# Patient Record
Sex: FEMALE | Race: WHITE | Employment: OTHER | URBAN - METROPOLITAN AREA
[De-identification: names, ages, dates, MRNs, and addresses within clinical notes are randomized per-mention and may not be internally consistent; named-entity substitution may affect disease eponyms.]

---

## 2017-04-11 ENCOUNTER — RECORDS - HEALTHEAST (OUTPATIENT)
Dept: LAB | Facility: CLINIC | Age: 82
End: 2017-04-11

## 2017-04-11 LAB
CHOLEST SERPL-MCNC: 222 MG/DL
FASTING STATUS PATIENT QL REPORTED: ABNORMAL
HDLC SERPL-MCNC: 65 MG/DL
LDLC SERPL CALC-MCNC: 138 MG/DL
TRIGL SERPL-MCNC: 96 MG/DL

## 2018-04-10 ENCOUNTER — RECORDS - HEALTHEAST (OUTPATIENT)
Dept: LAB | Facility: CLINIC | Age: 83
End: 2018-04-10

## 2018-04-10 LAB
ALBUMIN SERPL-MCNC: 3.6 G/DL (ref 3.5–5)
ALP SERPL-CCNC: 46 U/L (ref 45–120)
ALT SERPL W P-5'-P-CCNC: 10 U/L (ref 0–45)
ANION GAP SERPL CALCULATED.3IONS-SCNC: 10 MMOL/L (ref 5–18)
AST SERPL W P-5'-P-CCNC: 18 U/L (ref 0–40)
BILIRUB SERPL-MCNC: 0.5 MG/DL (ref 0–1)
BUN SERPL-MCNC: 31 MG/DL (ref 8–28)
CALCIUM SERPL-MCNC: 9.7 MG/DL (ref 8.5–10.5)
CHLORIDE BLD-SCNC: 104 MMOL/L (ref 98–107)
CO2 SERPL-SCNC: 28 MMOL/L (ref 22–31)
CREAT SERPL-MCNC: 1.17 MG/DL (ref 0.6–1.1)
GFR SERPL CREATININE-BSD FRML MDRD: 43 ML/MIN/1.73M2
GLUCOSE BLD-MCNC: 97 MG/DL (ref 70–125)
POTASSIUM BLD-SCNC: 3.8 MMOL/L (ref 3.5–5)
PROT SERPL-MCNC: 7 G/DL (ref 6–8)
SODIUM SERPL-SCNC: 142 MMOL/L (ref 136–145)

## 2019-07-26 ENCOUNTER — RECORDS - HEALTHEAST (OUTPATIENT)
Dept: LAB | Facility: CLINIC | Age: 84
End: 2019-07-26

## 2019-07-26 LAB
ANION GAP SERPL CALCULATED.3IONS-SCNC: 9 MMOL/L (ref 5–18)
BUN SERPL-MCNC: 24 MG/DL (ref 8–28)
CALCIUM SERPL-MCNC: 9.9 MG/DL (ref 8.5–10.5)
CHLORIDE BLD-SCNC: 103 MMOL/L (ref 98–107)
CO2 SERPL-SCNC: 28 MMOL/L (ref 22–31)
CREAT SERPL-MCNC: 1.2 MG/DL (ref 0.6–1.1)
GFR SERPL CREATININE-BSD FRML MDRD: 42 ML/MIN/1.73M2
GLUCOSE BLD-MCNC: 94 MG/DL (ref 70–125)
IRON SATN MFR SERPL: 13 % (ref 20–50)
IRON SERPL-MCNC: 59 UG/DL (ref 42–175)
MAGNESIUM SERPL-MCNC: 1.9 MG/DL (ref 1.8–2.6)
POTASSIUM BLD-SCNC: 4 MMOL/L (ref 3.5–5)
SODIUM SERPL-SCNC: 140 MMOL/L (ref 136–145)
TIBC SERPL-MCNC: 438 UG/DL (ref 313–563)
TRANSFERRIN SERPL-MCNC: 350 MG/DL (ref 212–360)
VIT B12 SERPL-MCNC: 165 PG/ML (ref 213–816)

## 2019-11-01 ENCOUNTER — RECORDS - HEALTHEAST (OUTPATIENT)
Dept: LAB | Facility: CLINIC | Age: 84
End: 2019-11-01

## 2019-11-01 LAB
IRON SATN MFR SERPL: 47 % (ref 20–50)
IRON SERPL-MCNC: 172 UG/DL (ref 42–175)
TIBC SERPL-MCNC: 364 UG/DL (ref 313–563)
TRANSFERRIN SERPL-MCNC: 291 MG/DL (ref 212–360)

## 2020-09-10 ENCOUNTER — RECORDS - HEALTHEAST (OUTPATIENT)
Dept: LAB | Facility: CLINIC | Age: 85
End: 2020-09-10

## 2020-09-10 LAB
ANION GAP SERPL CALCULATED.3IONS-SCNC: 16 MMOL/L (ref 5–18)
BUN SERPL-MCNC: 46 MG/DL (ref 8–28)
CALCIUM SERPL-MCNC: 9.3 MG/DL (ref 8.5–10.5)
CHLORIDE BLD-SCNC: 103 MMOL/L (ref 98–107)
CO2 SERPL-SCNC: 22 MMOL/L (ref 22–31)
CREAT SERPL-MCNC: 1.71 MG/DL (ref 0.6–1.1)
GFR SERPL CREATININE-BSD FRML MDRD: 28 ML/MIN/1.73M2
GLUCOSE BLD-MCNC: 101 MG/DL (ref 70–125)
MAGNESIUM SERPL-MCNC: 2.2 MG/DL (ref 1.8–2.6)
POTASSIUM BLD-SCNC: 4.2 MMOL/L (ref 3.5–5)
SODIUM SERPL-SCNC: 141 MMOL/L (ref 136–145)
VIT B12 SERPL-MCNC: 417 PG/ML (ref 213–816)

## 2020-09-30 ENCOUNTER — RECORDS - HEALTHEAST (OUTPATIENT)
Dept: LAB | Facility: CLINIC | Age: 85
End: 2020-09-30

## 2020-09-30 LAB
ANION GAP SERPL CALCULATED.3IONS-SCNC: 13 MMOL/L (ref 5–18)
BUN SERPL-MCNC: 40 MG/DL (ref 8–28)
CALCIUM SERPL-MCNC: 9.6 MG/DL (ref 8.5–10.5)
CHLORIDE BLD-SCNC: 105 MMOL/L (ref 98–107)
CO2 SERPL-SCNC: 27 MMOL/L (ref 22–31)
CREAT SERPL-MCNC: 1.43 MG/DL (ref 0.6–1.1)
GFR SERPL CREATININE-BSD FRML MDRD: 34 ML/MIN/1.73M2
GLUCOSE BLD-MCNC: 98 MG/DL (ref 70–125)
POTASSIUM BLD-SCNC: 4.4 MMOL/L (ref 3.5–5)
SODIUM SERPL-SCNC: 145 MMOL/L (ref 136–145)

## 2021-05-12 ENCOUNTER — RECORDS - HEALTHEAST (OUTPATIENT)
Dept: LAB | Facility: CLINIC | Age: 86
End: 2021-05-12

## 2021-05-12 LAB
ANION GAP SERPL CALCULATED.3IONS-SCNC: 14 MMOL/L (ref 5–18)
BUN SERPL-MCNC: 33 MG/DL (ref 8–28)
CALCIUM SERPL-MCNC: 9.6 MG/DL (ref 8.5–10.5)
CHLORIDE BLD-SCNC: 104 MMOL/L (ref 98–107)
CO2 SERPL-SCNC: 26 MMOL/L (ref 22–31)
CREAT SERPL-MCNC: 1.34 MG/DL (ref 0.6–1.1)
GFR SERPL CREATININE-BSD FRML MDRD: 37 ML/MIN/1.73M2
GLUCOSE BLD-MCNC: 101 MG/DL (ref 70–125)
POTASSIUM BLD-SCNC: 3.6 MMOL/L (ref 3.5–5)
SODIUM SERPL-SCNC: 144 MMOL/L (ref 136–145)

## 2021-09-06 ENCOUNTER — APPOINTMENT (OUTPATIENT)
Dept: RADIOLOGY | Facility: HOSPITAL | Age: 86
DRG: 854 | End: 2021-09-06
Attending: FAMILY MEDICINE
Payer: MEDICARE

## 2021-09-06 ENCOUNTER — APPOINTMENT (OUTPATIENT)
Dept: CT IMAGING | Facility: HOSPITAL | Age: 86
DRG: 854 | End: 2021-09-06
Attending: EMERGENCY MEDICINE
Payer: MEDICARE

## 2021-09-06 ENCOUNTER — APPOINTMENT (OUTPATIENT)
Dept: RADIOLOGY | Facility: HOSPITAL | Age: 86
DRG: 854 | End: 2021-09-06
Attending: EMERGENCY MEDICINE
Payer: MEDICARE

## 2021-09-06 ENCOUNTER — HOSPITAL ENCOUNTER (INPATIENT)
Facility: HOSPITAL | Age: 86
LOS: 10 days | Discharge: NURSING, REHAB OR LONG-TERM CARE | DRG: 854 | End: 2021-09-16
Attending: FAMILY MEDICINE | Admitting: FAMILY MEDICINE
Payer: MEDICARE

## 2021-09-06 DIAGNOSIS — S72.401A CLOSED FRACTURE OF DISTAL END OF RIGHT FEMUR, UNSPECIFIED FRACTURE MORPHOLOGY, INITIAL ENCOUNTER (H): Primary | ICD-10-CM

## 2021-09-06 DIAGNOSIS — I48.91 RAPID ATRIAL FIBRILLATION (H): ICD-10-CM

## 2021-09-06 LAB
ALBUMIN UR-MCNC: 30 MG/DL
ANION GAP SERPL CALCULATED.3IONS-SCNC: 14 MMOL/L (ref 5–18)
APPEARANCE UR: ABNORMAL
ATRIAL RATE - MUSE: 87 BPM
BACTERIA #/AREA URNS HPF: ABNORMAL /HPF
BILIRUB UR QL STRIP: NEGATIVE
BUN SERPL-MCNC: 34 MG/DL (ref 8–28)
CALCIUM SERPL-MCNC: 9.3 MG/DL (ref 8.5–10.5)
CHLORIDE BLD-SCNC: 105 MMOL/L (ref 98–107)
CK SERPL-CCNC: 2528 U/L (ref 30–190)
CO2 SERPL-SCNC: 25 MMOL/L (ref 22–31)
COLOR UR AUTO: YELLOW
CREAT SERPL-MCNC: 1.29 MG/DL (ref 0.6–1.1)
DIASTOLIC BLOOD PRESSURE - MUSE: NORMAL MMHG
ERYTHROCYTE [DISTWIDTH] IN BLOOD BY AUTOMATED COUNT: 12.8 % (ref 10–15)
GFR SERPL CREATININE-BSD FRML MDRD: 35 ML/MIN/1.73M2
GLUCOSE BLD-MCNC: 124 MG/DL (ref 70–125)
GLUCOSE UR STRIP-MCNC: NEGATIVE MG/DL
HCT VFR BLD AUTO: 31.3 % (ref 35–47)
HGB BLD-MCNC: 10.4 G/DL (ref 11.7–15.7)
HGB UR QL STRIP: ABNORMAL
HYALINE CASTS: 3 /LPF
INTERPRETATION ECG - MUSE: NORMAL
KETONES UR STRIP-MCNC: 10 MG/DL
LACTATE SERPL-SCNC: 1.1 MMOL/L (ref 0.7–2)
LACTATE SERPL-SCNC: 3 MMOL/L (ref 0.7–2)
LEUKOCYTE ESTERASE UR QL STRIP: ABNORMAL
MAGNESIUM SERPL-MCNC: 1.6 MG/DL (ref 1.8–2.6)
MCH RBC QN AUTO: 32.5 PG (ref 26.5–33)
MCHC RBC AUTO-ENTMCNC: 33.2 G/DL (ref 31.5–36.5)
MCV RBC AUTO: 98 FL (ref 78–100)
MUCOUS THREADS #/AREA URNS LPF: PRESENT /LPF
NITRATE UR QL: POSITIVE
P AXIS - MUSE: NORMAL DEGREES
PH UR STRIP: 5 [PH] (ref 5–7)
PLATELET # BLD AUTO: 148 10E3/UL (ref 150–450)
POTASSIUM BLD-SCNC: 3.3 MMOL/L (ref 3.5–5)
POTASSIUM BLD-SCNC: 3.4 MMOL/L (ref 3.5–5)
PR INTERVAL - MUSE: NORMAL MS
QRS DURATION - MUSE: 78 MS
QT - MUSE: 318 MS
QTC - MUSE: 469 MS
R AXIS - MUSE: -31 DEGREES
RBC # BLD AUTO: 3.2 10E6/UL (ref 3.8–5.2)
RBC URINE: 10 /HPF
SARS-COV-2 RNA RESP QL NAA+PROBE: NEGATIVE
SODIUM SERPL-SCNC: 144 MMOL/L (ref 136–145)
SP GR UR STRIP: 1.02 (ref 1–1.03)
SYSTOLIC BLOOD PRESSURE - MUSE: NORMAL MMHG
T AXIS - MUSE: 187 DEGREES
TROPONIN I SERPL-MCNC: 0.06 NG/ML (ref 0–0.29)
UROBILINOGEN UR STRIP-MCNC: <2 MG/DL
VENTRICULAR RATE- MUSE: 131 BPM
WBC # BLD AUTO: 16.4 10E3/UL (ref 4–11)
WBC URINE: 37 /HPF

## 2021-09-06 PROCEDURE — 82550 ASSAY OF CK (CPK): CPT | Performed by: FAMILY MEDICINE

## 2021-09-06 PROCEDURE — 96376 TX/PRO/DX INJ SAME DRUG ADON: CPT

## 2021-09-06 PROCEDURE — 81001 URINALYSIS AUTO W/SCOPE: CPT | Performed by: EMERGENCY MEDICINE

## 2021-09-06 PROCEDURE — 99223 1ST HOSP IP/OBS HIGH 75: CPT | Performed by: FAMILY MEDICINE

## 2021-09-06 PROCEDURE — 83735 ASSAY OF MAGNESIUM: CPT | Performed by: FAMILY MEDICINE

## 2021-09-06 PROCEDURE — 87086 URINE CULTURE/COLONY COUNT: CPT | Performed by: EMERGENCY MEDICINE

## 2021-09-06 PROCEDURE — 250N000013 HC RX MED GY IP 250 OP 250 PS 637: Performed by: EMERGENCY MEDICINE

## 2021-09-06 PROCEDURE — 87040 BLOOD CULTURE FOR BACTERIA: CPT | Performed by: FAMILY MEDICINE

## 2021-09-06 PROCEDURE — 70450 CT HEAD/BRAIN W/O DYE: CPT

## 2021-09-06 PROCEDURE — 72125 CT NECK SPINE W/O DYE: CPT

## 2021-09-06 PROCEDURE — 250N000009 HC RX 250: Performed by: FAMILY MEDICINE

## 2021-09-06 PROCEDURE — 85027 COMPLETE CBC AUTOMATED: CPT | Performed by: EMERGENCY MEDICINE

## 2021-09-06 PROCEDURE — 96374 THER/PROPH/DIAG INJ IV PUSH: CPT

## 2021-09-06 PROCEDURE — 36415 COLL VENOUS BLD VENIPUNCTURE: CPT | Performed by: EMERGENCY MEDICINE

## 2021-09-06 PROCEDURE — 73502 X-RAY EXAM HIP UNI 2-3 VIEWS: CPT

## 2021-09-06 PROCEDURE — 73560 X-RAY EXAM OF KNEE 1 OR 2: CPT | Mod: LT,XS

## 2021-09-06 PROCEDURE — 83605 ASSAY OF LACTIC ACID: CPT | Performed by: FAMILY MEDICINE

## 2021-09-06 PROCEDURE — 210N000001 HC R&B IMCU HEART CARE

## 2021-09-06 PROCEDURE — 82565 ASSAY OF CREATININE: CPT | Performed by: EMERGENCY MEDICINE

## 2021-09-06 PROCEDURE — 250N000011 HC RX IP 250 OP 636: Performed by: EMERGENCY MEDICINE

## 2021-09-06 PROCEDURE — 84484 ASSAY OF TROPONIN QUANT: CPT | Performed by: EMERGENCY MEDICINE

## 2021-09-06 PROCEDURE — 36415 COLL VENOUS BLD VENIPUNCTURE: CPT | Performed by: FAMILY MEDICINE

## 2021-09-06 PROCEDURE — 250N000013 HC RX MED GY IP 250 OP 250 PS 637: Performed by: FAMILY MEDICINE

## 2021-09-06 PROCEDURE — 250N000013 HC RX MED GY IP 250 OP 250 PS 637: Performed by: INTERNAL MEDICINE

## 2021-09-06 PROCEDURE — 96375 TX/PRO/DX INJ NEW DRUG ADDON: CPT

## 2021-09-06 PROCEDURE — C9803 HOPD COVID-19 SPEC COLLECT: HCPCS

## 2021-09-06 PROCEDURE — 73700 CT LOWER EXTREMITY W/O DYE: CPT | Mod: RT

## 2021-09-06 PROCEDURE — 73600 X-RAY EXAM OF ANKLE: CPT | Mod: RT

## 2021-09-06 PROCEDURE — 258N000003 HC RX IP 258 OP 636: Performed by: FAMILY MEDICINE

## 2021-09-06 PROCEDURE — 250N000011 HC RX IP 250 OP 636: Performed by: FAMILY MEDICINE

## 2021-09-06 PROCEDURE — 84132 ASSAY OF SERUM POTASSIUM: CPT | Performed by: FAMILY MEDICINE

## 2021-09-06 PROCEDURE — 73560 X-RAY EXAM OF KNEE 1 OR 2: CPT | Mod: RT

## 2021-09-06 PROCEDURE — 93005 ELECTROCARDIOGRAM TRACING: CPT | Performed by: EMERGENCY MEDICINE

## 2021-09-06 PROCEDURE — 250N000009 HC RX 250: Performed by: EMERGENCY MEDICINE

## 2021-09-06 PROCEDURE — 99285 EMERGENCY DEPT VISIT HI MDM: CPT | Mod: 25

## 2021-09-06 PROCEDURE — 87635 SARS-COV-2 COVID-19 AMP PRB: CPT | Performed by: EMERGENCY MEDICINE

## 2021-09-06 RX ORDER — LISINOPRIL AND HYDROCHLOROTHIAZIDE 20; 25 MG/1; MG/1
1 TABLET ORAL DAILY
Status: ON HOLD | COMMUNITY
Start: 2021-07-26 | End: 2021-09-16

## 2021-09-06 RX ORDER — DILTIAZEM HYDROCHLORIDE 5 MG/ML
10 INJECTION INTRAVENOUS ONCE
Status: COMPLETED | OUTPATIENT
Start: 2021-09-06 | End: 2021-09-06

## 2021-09-06 RX ORDER — DILTIAZEM HYDROCHLORIDE 30 MG/1
30 TABLET, FILM COATED ORAL ONCE
Status: COMPLETED | OUTPATIENT
Start: 2021-09-06 | End: 2021-09-06

## 2021-09-06 RX ORDER — NALOXONE HYDROCHLORIDE 0.4 MG/ML
0.2 INJECTION, SOLUTION INTRAMUSCULAR; INTRAVENOUS; SUBCUTANEOUS
Status: DISCONTINUED | OUTPATIENT
Start: 2021-09-06 | End: 2021-09-16 | Stop reason: HOSPADM

## 2021-09-06 RX ORDER — HYDRALAZINE HYDROCHLORIDE 20 MG/ML
5 INJECTION INTRAMUSCULAR; INTRAVENOUS EVERY 6 HOURS PRN
Status: DISCONTINUED | OUTPATIENT
Start: 2021-09-06 | End: 2021-09-16 | Stop reason: HOSPADM

## 2021-09-06 RX ORDER — SODIUM CHLORIDE 9 MG/ML
INJECTION, SOLUTION INTRAVENOUS CONTINUOUS
Status: DISCONTINUED | OUTPATIENT
Start: 2021-09-06 | End: 2021-09-08

## 2021-09-06 RX ORDER — POLYETHYLENE GLYCOL 3350 17 G/17G
17 POWDER, FOR SOLUTION ORAL DAILY
Status: DISCONTINUED | OUTPATIENT
Start: 2021-09-06 | End: 2021-09-08

## 2021-09-06 RX ORDER — AMLODIPINE BESYLATE 5 MG/1
5 TABLET ORAL DAILY
Status: DISCONTINUED | OUTPATIENT
Start: 2021-09-06 | End: 2021-09-09

## 2021-09-06 RX ORDER — AMLODIPINE BESYLATE 5 MG/1
5 TABLET ORAL DAILY
Status: ON HOLD | COMMUNITY
Start: 2021-07-26 | End: 2021-09-16

## 2021-09-06 RX ORDER — CEFTRIAXONE 1 G/1
1 INJECTION, POWDER, FOR SOLUTION INTRAMUSCULAR; INTRAVENOUS EVERY 24 HOURS
Status: DISCONTINUED | OUTPATIENT
Start: 2021-09-06 | End: 2021-09-08

## 2021-09-06 RX ORDER — UBIDECARENONE 75 MG
100 CAPSULE ORAL DAILY
Status: DISCONTINUED | OUTPATIENT
Start: 2021-09-06 | End: 2021-09-16 | Stop reason: HOSPADM

## 2021-09-06 RX ORDER — LIDOCAINE 40 MG/G
CREAM TOPICAL
Status: DISCONTINUED | OUTPATIENT
Start: 2021-09-06 | End: 2021-09-08

## 2021-09-06 RX ORDER — MORPHINE SULFATE 4 MG/ML
4 INJECTION, SOLUTION INTRAMUSCULAR; INTRAVENOUS ONCE
Status: COMPLETED | OUTPATIENT
Start: 2021-09-06 | End: 2021-09-06

## 2021-09-06 RX ORDER — FERROUS SULFATE 325(65) MG
325 TABLET ORAL
COMMUNITY
End: 2022-01-01

## 2021-09-06 RX ORDER — NALOXONE HYDROCHLORIDE 0.4 MG/ML
0.4 INJECTION, SOLUTION INTRAMUSCULAR; INTRAVENOUS; SUBCUTANEOUS
Status: DISCONTINUED | OUTPATIENT
Start: 2021-09-06 | End: 2021-09-16 | Stop reason: HOSPADM

## 2021-09-06 RX ORDER — METOPROLOL TARTRATE 1 MG/ML
5 INJECTION, SOLUTION INTRAVENOUS EVERY 6 HOURS PRN
Status: DISCONTINUED | OUTPATIENT
Start: 2021-09-06 | End: 2021-09-16 | Stop reason: HOSPADM

## 2021-09-06 RX ORDER — POTASSIUM CHLORIDE 1500 MG/1
20 TABLET, EXTENDED RELEASE ORAL ONCE
Status: COMPLETED | OUTPATIENT
Start: 2021-09-06 | End: 2021-09-06

## 2021-09-06 RX ORDER — ACETAMINOPHEN 325 MG/1
975 TABLET ORAL EVERY 8 HOURS
Status: DISCONTINUED | OUTPATIENT
Start: 2021-09-06 | End: 2021-09-08

## 2021-09-06 RX ORDER — VITAMIN B COMPLEX
25 TABLET ORAL DAILY
Status: DISCONTINUED | OUTPATIENT
Start: 2021-09-06 | End: 2021-09-16 | Stop reason: HOSPADM

## 2021-09-06 RX ORDER — MORPHINE SULFATE 2 MG/ML
1 INJECTION, SOLUTION INTRAMUSCULAR; INTRAVENOUS EVERY 4 HOURS PRN
Status: DISCONTINUED | OUTPATIENT
Start: 2021-09-06 | End: 2021-09-11

## 2021-09-06 RX ADMIN — ACETAMINOPHEN 975 MG: 325 TABLET ORAL at 18:25

## 2021-09-06 RX ADMIN — POLYETHYLENE GLYCOL 3350 17 G: 17 POWDER, FOR SOLUTION ORAL at 20:25

## 2021-09-06 RX ADMIN — ASPIRIN 325 MG: 325 TABLET, COATED ORAL at 20:25

## 2021-09-06 RX ADMIN — MORPHINE SULFATE 4 MG: 4 INJECTION INTRAVENOUS at 15:05

## 2021-09-06 RX ADMIN — POTASSIUM CHLORIDE 20 MEQ: 20 TABLET, EXTENDED RELEASE ORAL at 20:28

## 2021-09-06 RX ADMIN — METOPROLOL TARTRATE 5 MG: 5 INJECTION INTRAVENOUS at 18:57

## 2021-09-06 RX ADMIN — AMLODIPINE BESYLATE 5 MG: 5 TABLET ORAL at 18:30

## 2021-09-06 RX ADMIN — SODIUM CHLORIDE: 9 INJECTION, SOLUTION INTRAVENOUS at 20:28

## 2021-09-06 RX ADMIN — MORPHINE SULFATE 1 MG: 2 INJECTION, SOLUTION INTRAMUSCULAR; INTRAVENOUS at 18:04

## 2021-09-06 RX ADMIN — DILTIAZEM HYDROCHLORIDE 30 MG: 30 TABLET, FILM COATED ORAL at 15:50

## 2021-09-06 RX ADMIN — Medication 100 MCG: at 20:25

## 2021-09-06 RX ADMIN — Medication 12.5 MG: at 20:24

## 2021-09-06 RX ADMIN — DILTIAZEM HYDROCHLORIDE 10 MG: 5 INJECTION INTRAVENOUS at 15:02

## 2021-09-06 RX ADMIN — CEFTRIAXONE SODIUM 1 G: 1 INJECTION, POWDER, FOR SOLUTION INTRAMUSCULAR; INTRAVENOUS at 18:26

## 2021-09-06 RX ADMIN — Medication 25 MCG: at 18:26

## 2021-09-06 RX ADMIN — DILTIAZEM HYDROCHLORIDE 10 MG: 5 INJECTION INTRAVENOUS at 13:39

## 2021-09-06 ASSESSMENT — ENCOUNTER SYMPTOMS
EYE PAIN: 0
CHEST TIGHTNESS: 0
COUGH: 0
FEVER: 0
PALPITATIONS: 0
SEIZURES: 0
HEMATURIA: 0
DIFFICULTY URINATING: 0
SHORTNESS OF BREATH: 0
VOMITING: 0
SORE THROAT: 0
ABDOMINAL PAIN: 0
ACTIVITY CHANGE: 0
CHILLS: 0
DYSURIA: 0
COLOR CHANGE: 0

## 2021-09-06 ASSESSMENT — MIFFLIN-ST. JEOR: SCORE: 941.54

## 2021-09-06 ASSESSMENT — ACTIVITIES OF DAILY LIVING (ADL)
DEPENDENT_IADLS:: TRANSPORTATION;SHOPPING
DIFFICULTY_COMMUNICATING: NO
DIFFICULTY_EATING/SWALLOWING: NO
DRESSING/BATHING_DIFFICULTY: NO
TOILETING_ISSUES: YES

## 2021-09-06 NOTE — CONSULTS
Care Management Initial Consult    General Information  Assessment completed with: Patient, pt  Type of CM/SW Visit: Initial Assessment    Primary Care Provider verified and updated as needed: Yes   Readmission within the last 30 days: no previous admission in last 30 days   Return Category: Progression of disease  Reason for Consult: discharge planning  Advance Care Planning: Advance Care Planning Reviewed: no concerns identified          Communication Assessment  Patient's communication style: spoken language (English or Bilingual)    Hearing Difficulty or Deaf: no   Wear Glasses or Blind: no    Cognitive  Cognitive/Neuro/Behavioral: (P) orientation                      Living Environment:   People in home: alone     Current living Arrangements: house      Able to return to prior arrangements: yes       Family/Social Support:  Care provided by: self  Provides care for: no one  Marital Status:   Other (specify) (Kenia and Pacheco/ friends)          Description of Support System: Supportive    Support Assessment: Lacks necessary supervision and assistance    Current Resources:   Patient receiving home care services: No     Community Resources: DME  Equipment currently used at home: cane, straight  Supplies currently used at home: None    Employment/Financial:  Employment Status:          Financial Concerns: No concerns identified   Referral to Financial Counselor: No       Lifestyle & Psychosocial Needs:  Social Determinants of Health     Tobacco Use:      Smoking Tobacco Use:      Smokeless Tobacco Use:    Alcohol Use:      Frequency of Alcohol Consumption:      Average Number of Drinks:      Frequency of Binge Drinking:    Financial Resource Strain:      Difficulty of Paying Living Expenses:    Food Insecurity:      Worried About Running Out of Food in the Last Year:      Ran Out of Food in the Last Year:    Transportation Needs:      Lack of Transportation (Medical):      Lack of Transportation  (Non-Medical):    Physical Activity:      Days of Exercise per Week:      Minutes of Exercise per Session:    Stress:      Feeling of Stress :    Social Connections:      Frequency of Communication with Friends and Family:      Frequency of Social Gatherings with Friends and Family:      Attends Jainism Services:      Active Member of Clubs or Organizations:      Attends Club or Organization Meetings:      Marital Status:    Intimate Partner Violence:      Fear of Current or Ex-Partner:      Emotionally Abused:      Physically Abused:      Sexually Abused:    Depression:      PHQ-2 Score:    Housing Stability:      Unable to Pay for Housing in the Last Year:      Number of Places Lived in the Last Year:      Unstable Housing in the Last Year:        Functional Status:  Prior to admission patient needed assistance:   Dependent ADLs:: Ambulation-cane  Dependent IADLs:: Transportation, Shopping  Assesssment of Functional Status: Not at baseline with mobility    Mental Health Status:  Mental Health Status: No Current Concerns       Chemical Dependency Status:                Values/Beliefs:  Spiritual, Cultural Beliefs, Jainism Practices, Values that affect care:                 Additional Information:  Assessed, lives alone and has friend, Kenia and her  to help w/transportation and shopping, [place], they correspond by letter only, may need more help at home when discharged. Will need transport.      Peterson Garcia RN

## 2021-09-06 NOTE — ED PROVIDER NOTES
EMERGENCY DEPARTMENT ENCOUnter      NAME: Elysia Lane  AGE: 89 year old female  YOB: 1923  MRN: [id number]  EVALUATION DATE & TIME: 2021 11:26 AM    PCP: Sade Monique    ED PROVIDER: Shaggy Calzada DO      Chief Complaint   Patient presents with     Fall         FINAL IMPRESSION:  1. Closed fracture of distal end of right femur, unspecified fracture morphology, initial encounter (H)    2. Rapid atrial fibrillation (H)          ED COURSE & MEDICAL DECISION MAKIN:25 AM Met with patient for initial interview and exam. Discussed initial plan for care for their stay in the emergency department.   1:55 PM Paged Dr. Spicer at [facility].   2:33 PM I discussed case with Dr. Spicer, [facility]. Recommends a CT of the right knee.    The patient presents the emergency department today after being found on the ground in her apartment.  She complains of pain at the right knee and hip.  On exam, she was found to have swelling surrounding the right knee.  X-rays notable for a distal femur fracture.  These findings were discussed with orthopedics and they recommend admitting the patient and obtaining a CT for further evaluation.  The patient was also found to be in rapid atrial fibrillation and was given Cardizem for rate control.      At the conclusion of the encounter I discussed the results of all of the tests and the disposition. The questions were answered. The patient or family acknowledged understanding and was agreeable with the care plan.         =================================================================    HPI        Elysia Lane is a 89 year old female with a pertinent history of arthritis and hypertension who presents to this ED via EMS for evaluation of fall.    Per EMS, patient coming from her independent apartment where she was found half underneath the bed by her POA Kenia this morning. She was found to have significant pain, swelling, and ecchymosis  to the right knee. Unsure if she hit her head or not but patient is not complaining of headache or dizziness. Kenia states she was confused yesterday and this morning. She smelled of old urine on medic arrival. She also complained of right hip pain; given 25 mcg Fentanyl with notable improvement. She does not have a history of atrial fibrillation but noted to have in afib on 12-lead. No known allergies.    Patient does not remember falling today. She currently reports left shoulder pain, right hip pain, and right knee pain and swelling.     History limited secondary to confusion.      REVIEW OF SYSTEMS     Constitutional:  Denies fever or chills  HENT:  Denies sore throat   Respiratory:  Denies cough or shortness of breath   Cardiovascular:  Denies chest pain or palpitations  GI:  Denies abdominal pain, nausea, or vomiting  Musculoskeletal:  Positive for left shoulder, right hip, and right knee pain  Skin:  Denies rash   Neurologic:  Denies headache, focal weakness or sensory changes. Positive for confusion  All other systems reviewed and are negative      PAST MEDICAL HISTORY:  Past Medical History:   Diagnosis Date     Anemia      Chronic kidney disease      Essential hypertension      Osteoarthritis      Stenosis of carotid artery        PAST SURGICAL HISTORY:  Past Surgical History:   Procedure Laterality Date     OPEN REDUCTION INTERNAL FIXATION FEMUR DISTAL Right 9/8/2021    Procedure: OPEN REDUCTION INTERNAL FIXATION, FRACTURE, FEMUR, DISTAL;  Surgeon: Liam Haji MD;  [place] [facility] [place]           CURRENT MEDICATIONS:    acetaminophen (TYLENOL) 325 MG tablet  apixaban ANTICOAGULANT (ELIQUIS) 5 MG tablet  cyanocobalamin (CYANOCOBALAMIN) 100 MCG tablet  diltiazem ER (CARDIZEM SR) 90 MG 12 hr capsule  ferrous sulfate (FEROSUL) 325 (65 Fe) MG tablet  metoprolol tartrate (LOPRESSOR) 100 MG tablet  oxyCODONE (ROXICODONE) 5 MG tablet  polyethylene glycol (MIRALAX) 17 GM/Dose powder  Vitamin D3  (CHOLECALCIFEROL) 25 mcg (1000 units) tablet        ALLERGIES:  No Known Allergies    FAMILY HISTORY:  Family History   Problem Relation Age of Onset     No Known Problems Mother      No Known Problems Father        SOCIAL HISTORY:   Social History     Socioeconomic History     Marital status: Single     Spouse name: None     Number of children: None     Years of education: None     Highest education level: None   Occupational History     None   Tobacco Use     Smoking status: Never Smoker     Smokeless tobacco: Never Used   Vaping Use     Vaping Use: Never assessed   Substance and Sexual Activity     Alcohol use: Not Currently     Drug use: Never     Sexual activity: None   Other Topics Concern     Parent/sibling w/ CABG, MI or angioplasty before 65F 55M? Not Asked   Social History Narrative    At [facility] /will need TCU post femur fx    Eriberto cook [phone] are essentially next of kin/POA and have been involved with [place] since  when  and dtr . -they visit daily    Another daughter alive but estranged and not involved for 20+ years     Social Determinants of Health     Financial Resource Strain:      Difficulty of Paying Living Expenses:    Food Insecurity:      Worried About Running Out of Food in the Last Year:      Ran Out of Food in the Last Year:    Transportation Needs:      Lack of Transportation (Medical):      Lack of Transportation (Non-Medical):    Physical Activity:      Days of Exercise per Week:      Minutes of Exercise per Session:    Stress:      Feeling of Stress :    Social Connections:      Frequency of Communication with Friends and Family:      Frequency of Social Gatherings with Friends and Family:      Attends Yarsani Services:      Active Member of Clubs or Organizations:      Attends Club or Organization Meetings:      Marital Status:    Intimate Partner Violence:      Fear of Current or Ex-Partner:      Emotionally Abused:       Physically Abused:      Sexually Abused:        VITALS:  No data found.    PHYSICAL EXAM    Constitutional:  Well developed, Well nourished,  HENT:  Normocephalic, Atraumatic, Bilateral external ears normal, Oropharynx moist, Nose normal.   Neck:  Normal range of motion, No meningismus, No stridor.   Eyes:  EOMI, Conjunctiva normal, No discharge.   Respiratory:  Normal breath sounds, No respiratory distress, No wheezing, No chest tenderness.   Cardiovascular: Tachycardic, irregularly irregular rhythm, No murmurs  GI:  Soft, No tenderness, No guarding, No CVA tenderness.   Musculoskeletal:  Neurovascularly intact distally, No edema, No tenderness, No cyanosis, Good range of motion in all major joints. No tenderness to palpation or major deformities noted.   Integument:  Warm, Dry, No erythema, No rash.   Lymphatic:  No lymphadenopathy noted.   Neurologic:  Alert & oriented x 3, Normal motor function, Normal sensory function, No focal deficits noted.   Psychiatric:  Affect normal, Judgment normal, Mood normal.      LAB:  All pertinent labs reviewed and interpreted.  Results for orders placed or performed during the hospital encounter of 09/06/21                                                                                CBC with platelets   Result Value Ref Range    WBC Count 16.4 (H) 4.0 - 11.0 10e3/uL    RBC Count 3.20 (L) 3.80 - 5.20 10e6/uL    Hemoglobin 10.4 (L) 11.7 - 15.7 g/dL    Hematocrit 31.3 (L) 35.0 - 47.0 %    MCV 98 78 - 100 fL    MCH 32.5 26.5 - 33.0 pg    MCHC 33.2 31.5 - 36.5 g/dL    RDW 12.8 10.0 - 15.0 %    Platelet Count 148 (L) 150 - 450 10e3/uL   Basic metabolic panel   Result Value Ref Range    Sodium 144 136 - 145 mmol/L    Potassium 3.4 (L) 3.5 - 5.0 mmol/L    Chloride 105 98 - 107 mmol/L    Carbon Dioxide (CO2) 25 22 - 31 mmol/L    Anion Gap 14 5 - 18 mmol/L    Urea Nitrogen 34 (H) 8 - 28 mg/dL    Creatinine 1.29 (H) 0.60 - 1.10 mg/dL    Calcium 9.3 8.5 - 10.5 mg/dL    Glucose 124 70 - 125  mg/dL    GFR Estimate 35 (L) >60 mL/min/1.73m2   UA with Microscopic reflex to Culture    Specimen: Urine, Catheter   Result Value Ref Range    Color Urine Yellow Colorless, Straw, Light Yellow, Yellow    Appearance Urine Turbid (A) Clear    Glucose Urine Negative Negative mg/dL    Bilirubin Urine Negative Negative    Ketones Urine 10  (A) Negative mg/dL    Specific Gravity Urine 1.021 1.001 - 1.030    Blood Urine >1.0 mg/dL (A) Negative    pH Urine 5.0 5.0 - 7.0    Protein Albumin Urine 30  (A) Negative mg/dL    Urobilinogen Urine <2.0 <2.0 mg/dL    Nitrite Urine Positive (A) Negative    Leukocyte Esterase Urine 250 Castro/uL (A) Negative    Bacteria Urine Moderate (A) None Seen /HPF    Mucus Urine Present (A) None Seen /LPF    RBC Urine 10 (H) <=2 /HPF    WBC Urine 37 (H) <=5 /HPF    Hyaline Casts Urine 3 (H) <=2 /LPF   Result Value Ref Range    Troponin I 0.06 0.00 - 0.29 ng/mL   Asymptomatic COVID-19 Virus (Coronavirus) by PCR Nasopharyngeal    Specimen: Nasopharyngeal; Swab   Result Value Ref Range    SARS CoV2 PCR Negative Negative         RADIOLOGY:  I have independently reviewed and interpreted the above imaging, pending the final radiology read.                                               CT Knee Right w/o Contrast   Final Result   IMPRESSION:   1.  Acute displaced and angulated oblique fracture of the distal femoral metadiaphysis.   2.  There is marked diffuse bony demineralization, which reduces sensitivity for nondisplaced fracture.         XR Pelvis and Hip Right 2 Views   Final Result   IMPRESSION: No acute fracture or malalignment. Severe hip joint degenerative changes bilaterally with bone-on-bone articulation, most pronounced on the right with bony remodeling and subchondral sclerosis. Moderate to severe degenerative changes of the    lower lumbar spine. Osteopenia. Atherosclerosis.      XR Knee Right 1/2 Views   Final Result   IMPRESSION: Acute obliquely oriented fracture of the distal femoral  diaphysis with up to 3 cm medial displacement and 2.2 cm posterior displacement. There is likely intra-articular extension. Limited evaluation for joint effusion. Mild medial compartment    joint space narrowing. Osteopenia. Atherosclerosis.      CT Cervical Spine w/o Contrast   Final Result   IMPRESSION:   1.  No acute cervical spine fracture.      CT Head w/o Contrast   Final Result   IMPRESSION:   1.  No acute intracranial injury, hemorrhage, mass, or CT evidence of recent ischemia.   2.  Shallow fluid levels in the sinuses. Correlate for any symptoms of sinus inflammation.          EKG:    Rapid atrial fibrillation at 131 bpm.  No obvious signs of acute ischemia.  QRS 70 ms,  ms.    I have independently reviewed and interpreted this EKG          I, Charity Whittaker, am serving as a scribe to document services personally performed by Dr. Calzada based on my observation and the provider's statements to me. I, Shaggy Calzada, DO attest that Charity Whittaker is acting in a scribe capacity, has observed my performance of the services and has documented them in accordance with my direction.    Shaggy Calzada, DO  Emergency Medicine  [facility] EMERGENCY DEPARTMENT  [address] [place] [id number]  [phone]  Dept: [phone]       Shaggy Calzada MD  09/20/21 0226

## 2021-09-06 NOTE — PHARMACY-ADMISSION MEDICATION HISTORY
Pharmacy Note - Admission Medication History    Pertinent Provider Information:  Patient does not remember when she last took her pills for certain, but thinks it has been within the past week. ______________________________________________________________________    Prior To Admission (PTA) med list completed and updated in EMR.       PTA Med List   Medication Sig Note Last Dose     acetaminophen (TYLENOL) 325 MG tablet Take 325 mg by mouth every 6 hours as needed for fever or pain   PRN     amLODIPine (NORVASC) 5 MG tablet Take 5 mg by mouth daily 9/6/2021: Filled 7/26, #90 tablets Past Week at Unknown time     aspirin (ASA) 325 MG EC tablet Take 325 mg by mouth daily  Past Week at Unknown time     ferrous sulfate (FEROSUL) 325 (65 Fe) MG tablet Take 325 mg by mouth daily as needed  9/6/2021: Pt remembers taking iron, but not every day Past Month at Unknown time     lisinopril-hydrochlorothiazide (ZESTORETIC) 20-25 MG tablet Take 1 tablet by mouth daily 9/6/2021: Filled 7/26 #90 tablets Past Week at Unknown time       Information source(s): Patient and CareEverywhere/[facility]  Method of interview communication: in-person    Summary of Changes to PTA Med List  New: all    Patient was asked about OTC/herbal products specifically.  PTA med list reflects this.    In the past week, patient estimated taking medication this percent of the time:  50-90% due to other.    Allergies were reviewed, assessed, and updated with the patient.      Patient does not use any multi-dose medications prior to admission.    The information provided in this note is only as accurate as the sources available at the time of the update(s).    Thank you,  Marti Perez, [facility]  9/6/2021 4:29 PM

## 2021-09-06 NOTE — H&P
"ADMISSION HISTORY & PHYSICAL        ADMIT DATE: 9/6/2021      FACILITY: [facility]      PCP: Elif Alcantara, [phone]     ASSESSMENT AND PLAN:  89 year old female with history significant for  has a past medical history of Anemia, Chronic kidney disease, Essential hypertension, Osteoarthritis, and Stenosis of carotid artery. comes in after suspected fall at home. Patient was found to have a right leg fracture, new onset atrial fib, and fit SIRS criteria on admission with suspicion for a UTI and was admitted for these reasons.     Active Problems:    Rapid atrial fibrillation (H)    Closed fracture of distal end of right femur, unspecified fracture morphology, initial encounter (H)      Right leg fracture  -x-ray knee shows: \"Acute obliquely oriented fracture of the distal femoral diaphysis with up to 3 cm medial displacement and 2.2 cm posterior displacement. \"  -ortho consult  -strict bedrest for now  -pain control with scheduled tylenol and IV morphine for now      Left knee pain and right ankle pain  -left knee x-ray and right ankle x-ray   -pain control with scheduled tylenol and IV morphine for now      Fall  -CT head and cervical spine showed no acute findings  -fall precautions   -obtain CK level  -orthostatics  -PT/OT eval  -obtain vitamin B12 and vitamin D levels and start daily supplementation      SIRS criteria with suspicion for UTI  -patient fit SIRS criteria on admission with tachycardia and leukocytosis of 16.4  -UA concerning for UTI  -urine culture pending  -obtain blood cultures  -start IV rocephin for now      New onset atrial fib  -EKG on admission showed atrial fib with RVR with HR of 131 bpm. Atrial fib is new onset.  -given IV cardizem in ED, HR is still elevated  -start PO metoprolol 25 mg BID  -start IV metoprolol q 6 hours PRN for HR > 120 bpm  -cardio consult  -tele    HTN  -BP elevated on admission.   -c/w home norvasc  -hold home zestoretic for now  -IV " "hydralazine PRN for now      Hypokalemia  -Mg level  -RN managed K and Mag protocols    CKD  -creatinine at baseline on admission  -monitor with daily BMP for now    Anemia  -Hgb trending down  -monitor daily for now     Lactic acidosis  -lactic acid 3.0 on admission  -IV fluids  -trend lactic acid q 4 hours for now until value normalizes      FEN/GI: low salt diet  DVT proph: hold AC for now, start lovenox 24 hours after surgery  Code status: Full code (patient states she would like CPR and intubation if needed stating \"I would like to live a little bit longer.\" )      Updated friend Kenia on current plan.         Disposition:  -Anticipated Length of Stay in midnights and medical necessity (including a midnight in the Emergency Department after triage if applicable): 3-4 days  -Discharge barriers: surgery, post-op care, PT/OT eval, discharge planning, HR control, cardio consult, IV antibx, cultures pending       CHIEF COMPLAINT:  Chief Complaint   Patient presents with     Fall        HISTORY OF PRESENTING ILLNESS:  89 year old female with history significant for  has a past medical history of Anemia, Chronic kidney disease, Essential hypertension, Osteoarthritis, and Stenosis of carotid artery. comes in after suspected fall at home. Patient was found to have a right leg fracture, new onset atrial fib, and fit SIRS criteria on admission with suspicion for a UTI and was admitted for these reasons.     History limited because patient could not remember the events that brought her to the hospital. Obtained history from ED provider's note:    \"Per EMS, patient coming from her independent apartment where she was found half underneath the bed by her POA Kenia this morning. She was found to have significant pain, swelling, and ecchymosis to the right knee. Unsure if she hit her head or not but patient is not complaining of headache or dizziness. Kenia states she was confused yesterday and this morning. She smelled " "of old urine on medic arrival. She also complained of right hip pain; given 25 mcg Fentanyl with notable improvement. She does not have a history of atrial fibrillation but noted to have in afib on 12-lead. No known allergies.     Patient does not remember falling today. She currently reports left shoulder pain, right hip pain, and right knee pain and swelling.\"       Patient complains of severe right knee pain right now. She also complains of right ankle pain and left knee pain as well right now. Patient denies any other complaints at this time.       PMH:  Past Medical History:   Diagnosis Date     Anemia      Chronic kidney disease      Essential hypertension      Osteoarthritis      Stenosis of carotid artery        PSH:  History reviewed. No pertinent surgical history.     ALLERGIES:  No Known Allergies    MEDICATIONS:  Reviewed.  Current Facility-Administered Medications   Medication     acetaminophen (TYLENOL) tablet 975 mg     morphine (PF) injection 1 mg     naloxone (NARCAN) injection 0.2 mg    Or     naloxone (NARCAN) injection 0.4 mg    Or     naloxone (NARCAN) injection 0.2 mg    Or     naloxone (NARCAN) injection 0.4 mg        SOCIAL HISTORY:  Social History     Socioeconomic History     Marital status: Single     Spouse name: Not on file     Number of children: Not on file     Years of education: Not on file     Highest education level: Not on file   Occupational History     Not on file   Tobacco Use     Smoking status: Not on file   Substance and Sexual Activity     Alcohol use: Not on file     Drug use: Not on file     Sexual activity: Not on file   Other Topics Concern     Not on file   Social History Narrative     Not on file     Social Determinants of Health     Financial Resource Strain:      Difficulty of Paying Living Expenses:    Food Insecurity:      Worried About Running Out of Food in the Last Year:      Ran Out of Food in the Last Year:    Transportation Needs:      Lack of Transportation " "(Medical):      Lack of Transportation (Non-Medical):    Physical Activity:      Days of Exercise per Week:      Minutes of Exercise per Session:    Stress:      Feeling of Stress :    Social Connections:      Frequency of Communication with Friends and Family:      Frequency of Social Gatherings with Friends and Family:      Attends [employer] Services:      Active Member of Clubs or Organizations:      Attends Club or Organization Meetings:      Marital Status:    Intimate Partner Violence:      Fear of Current or Ex-Partner:      Emotionally Abused:      Physically Abused:      Sexually Abused:        FAMILY HISTORY:  Family History   Problem Relation Age of Onset     No Known Problems Mother      No Known Problems Father        ROS:  Review of Systems   Constitutional: Negative for activity change, chills and fever.   HENT: Negative for congestion, ear pain and sore throat.    Eyes: Negative for pain and visual disturbance.   Respiratory: Negative for cough, chest tightness and shortness of breath.    Cardiovascular: Negative for chest pain and palpitations.   Gastrointestinal: Negative for abdominal pain and vomiting.   Genitourinary: Negative for difficulty urinating, dysuria and hematuria.   Musculoskeletal:        Right knee, left knee, and right ankle pain   Skin: Negative for color change and rash.   Neurological: Negative for seizures and syncope.   All other systems reviewed and are negative.         PHYSICAL EXAM:  Patient Vitals for the past 24 hrs:   BP Temp Temp src Pulse Resp SpO2 Height Weight   09/06/21 1726 (!) 144/103 97.7  F (36.5  C) Oral (!) 122 22 93 % -- --   09/06/21 1705 139/69 98.7  F (37.1  C) Oral 108 22 94 % 1.575 m (5' 2\") --   09/06/21 1530 132/63 -- -- (!) 156 17 96 % -- --   09/06/21 1515 (!) 140/63 -- -- 120 23 94 % -- --   09/06/21 1500 (!) 151/80 -- -- (!) 161 26 92 % -- --   09/06/21 1445 (!) 177/73 -- -- (!) 135 24 98 % -- --   09/06/21 1400 (!) 146/74 -- -- (!) 133 19 97 % -- " --   09/06/21 1345 131/69 -- -- 96 20 94 % -- --   09/06/21 1300 -- -- -- (!) 155 (!) 39 94 % -- --   09/06/21 1200 -- -- -- (!) 131 17 -- 1.524 m (5') 63.5 kg (140 lb)   09/06/21 1145 97/54 -- -- (!) 173 22 -- -- --   09/06/21 1130 (!) 161/76 -- -- 116 -- 97 % -- --      No intake or output data in the 24 hours ending 09/06/21 1602  GENRL: Alert and oriented X 3. Not in acute distress. Satting at 93% on room air.   HEENT: NC/AT      Neck- supple      Sclera- anicteric      Mucous membrane- moist and pink  CHEST: Clear to auscultation bilaterally  HEART: S1S2 irregular. No murmurs, rubs or gallops  ABDMN: Soft. Non-tender. No guarding or rigidity. Bowel sounds- active  EXTRM: Significant bruising present on right knee and is very tender on palpation. Right ankle is mild bruising and mildly tender on palpation. Left knee is very tender on palpation, no bruising present on left knee.   NEURO: No involuntary movements  INTGM: please see nursing assessment for full skin assessment  PSYCH: normal affect, normal speech       DIAGNOSTIC DATA:  Recent Results (from the past 24 hour(s))   ECG 12-LEAD WITH MUSE (LHE)    Collection Time: 09/06/21 11:51 AM   Result Value Ref Range    Systolic Blood Pressure  mmHg    Diastolic Blood Pressure  mmHg    Ventricular Rate 131 BPM    Atrial Rate 87 BPM    ND Interval  ms    QRS Duration 78 ms     ms    QTc 469 ms    P Axis  degrees    R AXIS -31 degrees    T Axis 187 degrees    Interpretation ECG        Poor data quality, interpretation may be adversely affected  Supraventricular tachycardia  Left axis deviation  Inferior infarct , age undetermined  Anterior infarct (cited on or before 23-MAY-2014)  ST & T wave abnormality, consider lateral ischemia  Abnormal ECG  When compared with ECG of 19-NOV-2018 09:15,  Significant changes have occurred  Confirmed by SEE ED PROVIDER NOTE FOR, ECG INTERPRETATION ([id number]),  RUBY EDWARDS ([id number]) on 9/6/2021 12:12:25 PM     CBC with  platelets    Collection Time: 09/06/21 12:31 PM   Result Value Ref Range    WBC Count 16.4 (H) 4.0 - 11.0 10e3/uL    RBC Count 3.20 (L) 3.80 - 5.20 10e6/uL    Hemoglobin 10.4 (L) 11.7 - 15.7 g/dL    Hematocrit 31.3 (L) 35.0 - 47.0 %    MCV 98 78 - 100 fL    MCH 32.5 26.5 - 33.0 pg    MCHC 33.2 31.5 - 36.5 g/dL    RDW 12.8 10.0 - 15.0 %    Platelet Count 148 (L) 150 - 450 10e3/uL   Basic metabolic panel    Collection Time: 09/06/21 12:31 PM   Result Value Ref Range    Sodium 144 136 - 145 mmol/L    Potassium 3.4 (L) 3.5 - 5.0 mmol/L    Chloride 105 98 - 107 mmol/L    Carbon Dioxide (CO2) 25 22 - 31 mmol/L    Anion Gap 14 5 - 18 mmol/L    Urea Nitrogen 34 (H) 8 - 28 mg/dL    Creatinine 1.29 (H) 0.60 - 1.10 mg/dL    Calcium 9.3 8.5 - 10.5 mg/dL    Glucose 124 70 - 125 mg/dL    GFR Estimate 35 (L) >60 mL/min/1.73m2   Troponin I    Collection Time: 09/06/21 12:31 PM   Result Value Ref Range    Troponin I 0.06 0.00 - 0.29 ng/mL   UA with Microscopic reflex to Culture    Collection Time: 09/06/21  1:46 PM    Specimen: Urine, Catheter   Result Value Ref Range    Color Urine Yellow Colorless, Straw, Light Yellow, Yellow    Appearance Urine Turbid (A) Clear    Glucose Urine Negative Negative mg/dL    Bilirubin Urine Negative Negative    Ketones Urine 10  (A) Negative mg/dL    Specific Gravity Urine 1.021 1.001 - 1.030    Blood Urine >1.0 mg/dL (A) Negative    pH Urine 5.0 5.0 - 7.0    Protein Albumin Urine 30  (A) Negative mg/dL    Urobilinogen Urine <2.0 <2.0 mg/dL    Nitrite Urine Positive (A) Negative    Leukocyte Esterase Urine 250 Castro/uL (A) Negative    Bacteria Urine Moderate (A) None Seen /HPF    Mucus Urine Present (A) None Seen /LPF    RBC Urine 10 (H) <=2 /HPF    WBC Urine 37 (H) <=5 /HPF    Hyaline Casts Urine 3 (H) <=2 /LPF   Asymptomatic COVID-19 Virus (Coronavirus) by PCR Nasopharyngeal    Collection Time: 09/06/21  3:33 PM    Specimen: Nasopharyngeal; Swab   Result Value Ref Range    SARS CoV2 PCR Negative  Negative   Lactic Acid STAT    Collection Time: 09/06/21  5:35 PM   Result Value Ref Range    Lactic Acid 3.0 (H) 0.7 - 2.0 mmol/L      Results for orders placed or performed during the hospital encounter of 09/06/21   CT Head w/o Contrast    Impression    IMPRESSION:  1.  No acute intracranial injury, hemorrhage, mass, or CT evidence of recent ischemia.  2.  Shallow fluid levels in the sinuses. Correlate for any symptoms of sinus inflammation.   CT Cervical Spine w/o Contrast    Impression    IMPRESSION:  1.  No acute cervical spine fracture.   XR Pelvis and Hip Right 2 Views    Impression    IMPRESSION: No acute fracture or malalignment. Severe hip joint degenerative changes bilaterally with bone-on-bone articulation, most pronounced on the right with bony remodeling and subchondral sclerosis. Moderate to severe degenerative changes of the   lower lumbar spine. Osteopenia. Atherosclerosis.   XR Knee Right 1/2 Views    Impression    IMPRESSION: Acute obliquely oriented fracture of the distal femoral diaphysis with up to 3 cm medial displacement and 2.2 cm posterior displacement. There is likely intra-articular extension. Limited evaluation for joint effusion. Mild medial compartment   joint space narrowing. Osteopenia. Atherosclerosis.      All lab studies reviewed personally    Radiology Results: imaging results reviewed      Total time is 70 minutes with greater than 50% of time spent in chart review, coordination of care with ED provider/patient's PCP/provider from outside facility and consultants, and discussing plan of care with patient and his/her family.     Dexter Ny MD.   [facility] Medicine Service   [phone]   Pager [phone]

## 2021-09-06 NOTE — PLAN OF CARE
ORTHO REMOTE NOTE    Xrays and CT reviewed, showing a displaced extra-articular fracture of the right distal femur. Recommend open reduction internal fixation.    Have discussed with my partner Dr. Schneider, and patient is currently scheduled for surgery with him tomorrow morning at 0930. Patient to be NPO after midnight. Bedrest. Knee immobilizer to right knee. Non-weightbearing. Pain control as needed. Appreciate admitting hospitalist assistance in medical optimization and OR clearance.    Formal consult in morning.    Jaswant Spicer MD ................. 9/6/2021 6:08 PM  [facility]

## 2021-09-06 NOTE — PLAN OF CARE
Patient states pain medication is providing adequate pain control. Afib with RVR. Heart rate continues to be 120-160's. Blood pressure improved with pain medication administration. Continue to monitor telemetry.

## 2021-09-07 ENCOUNTER — ANESTHESIA EVENT (OUTPATIENT)
Dept: MEDSURG UNIT | Facility: HOSPITAL | Age: 86
End: 2021-09-07

## 2021-09-07 ENCOUNTER — ANCILLARY PROCEDURE (OUTPATIENT)
Dept: ULTRASOUND IMAGING | Facility: HOSPITAL | Age: 86
DRG: 854 | End: 2021-09-07
Attending: ANESTHESIOLOGY
Payer: MEDICARE

## 2021-09-07 ENCOUNTER — APPOINTMENT (OUTPATIENT)
Dept: CARDIOLOGY | Facility: HOSPITAL | Age: 86
DRG: 854 | End: 2021-09-07
Attending: INTERNAL MEDICINE
Payer: MEDICARE

## 2021-09-07 ENCOUNTER — ANESTHESIA (OUTPATIENT)
Dept: MEDSURG UNIT | Facility: HOSPITAL | Age: 86
End: 2021-09-07

## 2021-09-07 ENCOUNTER — DOCUMENTATION ONLY (OUTPATIENT)
Dept: OTHER | Facility: CLINIC | Age: 86
End: 2021-09-07

## 2021-09-07 LAB
ALBUMIN SERPL-MCNC: 3.1 G/DL (ref 3.5–5)
ALP SERPL-CCNC: 43 U/L (ref 45–120)
ALT SERPL W P-5'-P-CCNC: 28 U/L (ref 0–45)
ANION GAP SERPL CALCULATED.3IONS-SCNC: 11 MMOL/L (ref 5–18)
AST SERPL W P-5'-P-CCNC: 76 U/L (ref 0–40)
BILIRUB SERPL-MCNC: 1 MG/DL (ref 0–1)
BUN SERPL-MCNC: 36 MG/DL (ref 8–28)
CALCIUM SERPL-MCNC: 8.9 MG/DL (ref 8.5–10.5)
CHLORIDE BLD-SCNC: 107 MMOL/L (ref 98–107)
CO2 SERPL-SCNC: 24 MMOL/L (ref 22–31)
CREAT SERPL-MCNC: 0.94 MG/DL (ref 0.6–1.1)
DEPRECATED CALCIDIOL+CALCIFEROL SERPL-MC: 15 UG/L (ref 30–80)
ERYTHROCYTE [DISTWIDTH] IN BLOOD BY AUTOMATED COUNT: 12.9 % (ref 10–15)
GFR SERPL CREATININE-BSD FRML MDRD: 51 ML/MIN/1.73M2
GLUCOSE BLD-MCNC: 113 MG/DL (ref 70–125)
HCT VFR BLD AUTO: 28.3 % (ref 35–47)
HGB BLD-MCNC: 9.4 G/DL (ref 11.7–15.7)
LACTATE SERPL-SCNC: 0.9 MMOL/L (ref 0.7–2)
LACTATE SERPL-SCNC: 1 MMOL/L (ref 0.7–2)
LACTATE SERPL-SCNC: 1.3 MMOL/L (ref 0.7–2)
LACTATE SERPL-SCNC: 1.3 MMOL/L (ref 0.7–2)
LACTATE SERPL-SCNC: 1.6 MMOL/L (ref 0.7–2)
LACTATE SERPL-SCNC: 2.1 MMOL/L (ref 0.7–2)
LVEF ECHO: NORMAL
MAGNESIUM SERPL-MCNC: 1.6 MG/DL (ref 1.8–2.6)
MCH RBC QN AUTO: 32.5 PG (ref 26.5–33)
MCHC RBC AUTO-ENTMCNC: 33.2 G/DL (ref 31.5–36.5)
MCV RBC AUTO: 98 FL (ref 78–100)
PLATELET # BLD AUTO: 150 10E3/UL (ref 150–450)
POTASSIUM BLD-SCNC: 3.4 MMOL/L (ref 3.5–5)
POTASSIUM BLD-SCNC: 3.7 MMOL/L (ref 3.5–5)
PROT SERPL-MCNC: 5.9 G/DL (ref 6–8)
RBC # BLD AUTO: 2.89 10E6/UL (ref 3.8–5.2)
SODIUM SERPL-SCNC: 142 MMOL/L (ref 136–145)
VIT B12 SERPL-MCNC: 316 PG/ML (ref 213–816)
WBC # BLD AUTO: 16 10E3/UL (ref 4–11)

## 2021-09-07 PROCEDURE — 250N000013 HC RX MED GY IP 250 OP 250 PS 637: Performed by: FAMILY MEDICINE

## 2021-09-07 PROCEDURE — 80053 COMPREHEN METABOLIC PANEL: CPT | Performed by: INTERNAL MEDICINE

## 2021-09-07 PROCEDURE — 83605 ASSAY OF LACTIC ACID: CPT | Performed by: FAMILY MEDICINE

## 2021-09-07 PROCEDURE — 210N000001 HC R&B IMCU HEART CARE

## 2021-09-07 PROCEDURE — 999N000141 HC STATISTIC PRE-PROCEDURE NURSING ASSESSMENT: Performed by: ORTHOPAEDIC SURGERY

## 2021-09-07 PROCEDURE — 99222 1ST HOSP IP/OBS MODERATE 55: CPT | Mod: 25 | Performed by: INTERNAL MEDICINE

## 2021-09-07 PROCEDURE — 83735 ASSAY OF MAGNESIUM: CPT | Performed by: INTERNAL MEDICINE

## 2021-09-07 PROCEDURE — 93306 TTE W/DOPPLER COMPLETE: CPT | Mod: 26 | Performed by: INTERNAL MEDICINE

## 2021-09-07 PROCEDURE — 250N000013 HC RX MED GY IP 250 OP 250 PS 637: Performed by: INTERNAL MEDICINE

## 2021-09-07 PROCEDURE — 36415 COLL VENOUS BLD VENIPUNCTURE: CPT | Performed by: FAMILY MEDICINE

## 2021-09-07 PROCEDURE — 250N000009 HC RX 250: Performed by: FAMILY MEDICINE

## 2021-09-07 PROCEDURE — 84132 ASSAY OF SERUM POTASSIUM: CPT | Performed by: INTERNAL MEDICINE

## 2021-09-07 PROCEDURE — 82607 VITAMIN B-12: CPT | Performed by: FAMILY MEDICINE

## 2021-09-07 PROCEDURE — G0463 HOSPITAL OUTPT CLINIC VISIT: HCPCS

## 2021-09-07 PROCEDURE — 82306 VITAMIN D 25 HYDROXY: CPT | Performed by: FAMILY MEDICINE

## 2021-09-07 PROCEDURE — 999N000208 ECHOCARDIOGRAM COMPLETE

## 2021-09-07 PROCEDURE — 85027 COMPLETE CBC AUTOMATED: CPT | Performed by: FAMILY MEDICINE

## 2021-09-07 PROCEDURE — 99232 SBSQ HOSP IP/OBS MODERATE 35: CPT | Performed by: HOSPITALIST

## 2021-09-07 PROCEDURE — 36415 COLL VENOUS BLD VENIPUNCTURE: CPT | Performed by: INTERNAL MEDICINE

## 2021-09-07 PROCEDURE — 250N000011 HC RX IP 250 OP 636: Performed by: FAMILY MEDICINE

## 2021-09-07 PROCEDURE — 255N000002 HC RX 255 OP 636: Performed by: HOSPITALIST

## 2021-09-07 PROCEDURE — C8929 TTE W OR WO FOL WCON,DOPPLER: HCPCS

## 2021-09-07 PROCEDURE — 250N000011 HC RX IP 250 OP 636: Performed by: ANESTHESIOLOGY

## 2021-09-07 PROCEDURE — 84132 ASSAY OF SERUM POTASSIUM: CPT | Performed by: FAMILY MEDICINE

## 2021-09-07 RX ORDER — LIDOCAINE 40 MG/G
CREAM TOPICAL
Status: DISCONTINUED | OUTPATIENT
Start: 2021-09-07 | End: 2021-09-07 | Stop reason: HOSPADM

## 2021-09-07 RX ORDER — SODIUM CHLORIDE, SODIUM LACTATE, POTASSIUM CHLORIDE, CALCIUM CHLORIDE 600; 310; 30; 20 MG/100ML; MG/100ML; MG/100ML; MG/100ML
INJECTION, SOLUTION INTRAVENOUS CONTINUOUS
Status: DISCONTINUED | OUTPATIENT
Start: 2021-09-07 | End: 2021-09-07 | Stop reason: HOSPADM

## 2021-09-07 RX ORDER — ACETAMINOPHEN 325 MG/1
975 TABLET ORAL ONCE
Status: DISCONTINUED | OUTPATIENT
Start: 2021-09-07 | End: 2021-09-07 | Stop reason: HOSPADM

## 2021-09-07 RX ORDER — POTASSIUM CHLORIDE 1500 MG/1
20 TABLET, EXTENDED RELEASE ORAL ONCE
Status: COMPLETED | OUTPATIENT
Start: 2021-09-07 | End: 2021-09-07

## 2021-09-07 RX ORDER — METOPROLOL TARTRATE 25 MG/1
25 TABLET, FILM COATED ORAL ONCE
Status: DISCONTINUED | OUTPATIENT
Start: 2021-09-07 | End: 2021-09-07

## 2021-09-07 RX ORDER — MORPHINE SULFATE 4 MG/ML
2 INJECTION, SOLUTION INTRAMUSCULAR; INTRAVENOUS ONCE
Status: COMPLETED | OUTPATIENT
Start: 2021-09-07 | End: 2021-09-07

## 2021-09-07 RX ORDER — FENTANYL CITRATE 50 UG/ML
50 INJECTION, SOLUTION INTRAMUSCULAR; INTRAVENOUS
Status: DISCONTINUED | OUTPATIENT
Start: 2021-09-07 | End: 2021-09-07 | Stop reason: HOSPADM

## 2021-09-07 RX ORDER — METOPROLOL TARTRATE 25 MG/1
25 TABLET, FILM COATED ORAL 2 TIMES DAILY
Status: DISCONTINUED | OUTPATIENT
Start: 2021-09-07 | End: 2021-09-08

## 2021-09-07 RX ORDER — MAGNESIUM SULFATE 4 G/50ML
4 INJECTION INTRAVENOUS ONCE
Status: DISCONTINUED | OUTPATIENT
Start: 2021-09-07 | End: 2021-09-07 | Stop reason: HOSPADM

## 2021-09-07 RX ADMIN — ACETAMINOPHEN 975 MG: 325 TABLET ORAL at 02:17

## 2021-09-07 RX ADMIN — MORPHINE SULFATE 1 MG: 2 INJECTION, SOLUTION INTRAMUSCULAR; INTRAVENOUS at 06:34

## 2021-09-07 RX ADMIN — MORPHINE SULFATE 2 MG: 4 INJECTION INTRAVENOUS at 09:11

## 2021-09-07 RX ADMIN — METOPROLOL TARTRATE 5 MG: 5 INJECTION INTRAVENOUS at 18:04

## 2021-09-07 RX ADMIN — ACETAMINOPHEN 975 MG: 325 TABLET ORAL at 18:03

## 2021-09-07 RX ADMIN — MORPHINE SULFATE 1 MG: 2 INJECTION, SOLUTION INTRAMUSCULAR; INTRAVENOUS at 22:59

## 2021-09-07 RX ADMIN — PERFLUTREN 3 ML: 6.52 INJECTION, SUSPENSION INTRAVENOUS at 10:30

## 2021-09-07 RX ADMIN — METOPROLOL TARTRATE 25 MG: 25 TABLET, FILM COATED ORAL at 20:00

## 2021-09-07 RX ADMIN — Medication 12.5 MG: at 08:00

## 2021-09-07 RX ADMIN — Medication 12.5 MG: at 11:51

## 2021-09-07 RX ADMIN — POTASSIUM CHLORIDE 20 MEQ: 20 TABLET, EXTENDED RELEASE ORAL at 02:17

## 2021-09-07 RX ADMIN — CEFTRIAXONE SODIUM 1 G: 1 INJECTION, POWDER, FOR SOLUTION INTRAMUSCULAR; INTRAVENOUS at 19:55

## 2021-09-07 RX ADMIN — AMLODIPINE BESYLATE 5 MG: 5 TABLET ORAL at 08:00

## 2021-09-07 ASSESSMENT — MIFFLIN-ST. JEOR: SCORE: 961.04

## 2021-09-07 NOTE — OR NURSING
Pt arrived in the  [facility] and was connected to the monitors.  Pt heart rate 100-150's.  Dr. Reilly anesthesiologist visited with pt.  Stated that pt needs to have the cardiac consult and clearance before surgery can be done.  Let P3 RN know that pt is going back to her room and wait for the cardiac consult.

## 2021-09-07 NOTE — CONSULTS
ORTHOPEDIC CONSULTATION    Consultation  Elysia Lane,  1923, MRN [id number]    @[facility] @  Rapid atrial fibrillation (H) [I48.91]  Closed fracture of distal end of right femur, unspecified fracture morphology, initial encounter (H) [S72.401A]    PCP: Elif Alcantara, [phone]   Code status:  Full Code       Extended Emergency Contact Information  Primary Emergency Contact: Pacheco Vaughn  Home Phone: [phone]  Mobile Phone: [phone]  Relation: Friend  Secondary Emergency Contact: Kenia Vaughn  Home Phone: [phone]  Mobile Phone: [phone]  Relation: Friend         IMPRESSION:  Right distal femur fracture     PLAN:  -Patient with new onset a-fib preoperatively, awaiting Cardiology consult for medical clearance  -Plan for right distal femur ORIF pending clearance, either later today or tomorrow by Dr. Schneider or Dr. Haji  -Continue NPO  -Hold all anticoagulation  -Ice to affected area  -Bedrest until fixation  -Appreciate continued medicine input for optimization    This patient was discussed with Dr. Spicer/Dr. Haji, on-call surgeon for [facility] and they are in agreement with the following plan.     Thank you for including [facility] in the care of Elysia Lane. It has been a pleasure participating in her care.        CHIEF COMPLAINT: <principal problem not specified>    HISTORY OF PRESENT ILLNESS:  The patient is seen in orthopedic consultation at the request of Dr. Dexter Ny. 89-year-old female PMHx significant for anemia, CKD, HTN, osteoarthritis, stenosis or carotid artery presents to the ER status post unwitnessed GLF at home.  She was found to be confused and was unable to bear weight after falling, complaining of right knee pain.  X-rays show displaced extra-articular distal femur fracture.  Baseline anticoagulation is  mg daily. Walker and cane baseline assistive device.        PAST MEDICAL HISTORY:  Past Medical History:   Diagnosis Date      Anemia      Chronic kidney disease      Essential hypertension      Osteoarthritis      Stenosis of carotid artery          ALLERGIES:   Review of patient's allergies indicates No Known Allergies      MEDICATIONS UPON ADMISSION:  Medications were reviewed.  They include:   Medications Prior to Admission   Medication Sig Dispense Refill Last Dose     acetaminophen (TYLENOL) 325 MG tablet Take 325 mg by mouth every 6 hours as needed for fever or pain    PRN     amLODIPine (NORVASC) 5 MG tablet Take 5 mg by mouth daily   Past Week at Unknown time     aspirin (ASA) 325 MG EC tablet Take 325 mg by mouth daily   Past Week at Unknown time     ferrous sulfate (FEROSUL) 325 (65 Fe) MG tablet Take 325 mg by mouth daily as needed    Past Month at Unknown time     lisinopril-hydrochlorothiazide (ZESTORETIC) 20-25 MG tablet Take 1 tablet by mouth daily   Past Week at Unknown time         SOCIAL HISTORY:   she  reports that she has never smoked. She has never used smokeless tobacco. She reports previous alcohol use. She reports that she does not use drugs.      FAMILY HISTORY:  family history includes No Known Problems in her father and mother.      REVIEW OF SYSTEMS:   Reviewed with patient. See HPI, otherwise negative.      PHYSICAL EXAMINATION:  Vitals: Temp:  [97.5  F (36.4  C)-98.7  F (37.1  C)] 98.6  F (37  C)  Pulse:  [] 126  Resp:  [17-39] 20  BP: ()/() 146/81  SpO2:  [92 %-98 %] 94 %     General: On examination, the patient is resting comfortably, NAD, awake and alert and oriented to person, place, time, and and general circumstances   Right knee: Diffuse knee swelling and ecchymosis.  Diffuse knee and distal femur tenderness.  ROM deferred 2/2 fracture.   Intact plantar/dorsiflexion ankles bilaterally.  Sensation grossly intact bilateral lower extremities.    Wiggles all toes without pain.   Calf soft and nontender.   Foot warm and well-perfused.   Brisk capillary refill.     Contralateral side=  Full range of motion, Negative joint instability findings, 5/5 motor groups about the joint, Non-tender.       RADIOGRAPHIC EVALUATION:    EXAM: CT KNEE RIGHT W/O CONTRAST  LOCATION: [facility]  DATE/TIME: 9/6/2021 3:56 PM     INDICATION: Pain, fracture  COMPARISON: None.  TECHNIQUE: Noncontrast. Axial, sagittal and coronal thin-section reconstruction. Dose reduction techniques were used.      FINDINGS:   Acute displaced and angulated oblique fracture of the distal femoral metadiaphysis. The distal-articular fragment is displaced posterolaterally by two thirds bone width. There is overriding of the fracture fragments and apex lateral and posterior   angulation. The fracture extends from the distal diaphysis medially through the metaphysis laterally and extends to the level of the physeal scar near the lateral margin of the trochlear groove. The distal end of the proximal fragment protrudes   anterolaterally and superficially into the subcutaneous fat near the dermis. There is fluid or hemorrhage within the fracture, and a small amount of contiguous fluid in the knee joint.     There is marked bony demineralization. Tricompartmental degenerative arthritis, advanced in the patellofemoral compartment. Lateral subluxation of the patella. Multiple small intra-articular ossific bodies in the posterior joint recess.     Arterial calcification.                                                                      IMPRESSION:  1.  Acute displaced and angulated oblique fracture of the distal femoral metadiaphysis.  2.  There is marked diffuse bony demineralization, which reduces sensitivity for nondisplaced fracture.         PERTINENT LABS:  Lab Results: personally reviewed.   Lab Results   Component Value Date     09/07/2021    CO2 24 09/07/2021    BUN 36 09/07/2021     Lab Results   Component Value Date    WBC 16.0 09/07/2021    HGB 9.4 09/07/2021    HCT 28.3 09/07/2021    MCV 98 09/07/2021      09/07/2021         Audrey Kyle PA-C, JOBY  Date: 9/7/2021  Time: 10:12 AM    CC1:   Nani Cherry MD    CC2:   Elif Alcantara

## 2021-09-07 NOTE — PLAN OF CARE
Denies pain, only with movement.  NPO instructed, for surgery at 9:50 am.  Afib 90 to 120's fluctuating  Pressure ulcer noted on her left buttock and coccyx area, area cleansed and mepilex applied. WOC RN to see.  Unable to void, bladder scan showed 350 CC- unable to void despite several attempts by using the bedpan. Dr. Sloan notified, Sun inserted as ordered.  During Sun insertion. HR went up to 180's due to pain and discomfort. Morphine given, HR gone down to 110 to 130's after Morphine dose

## 2021-09-07 NOTE — PLAN OF CARE
Care Management Follow Up    Length of Stay (days): 1    Expected Discharge Date: 09/10/2021     Concerns to be Addressed:     Surgery  Patient plan of care discussed at interdisciplinary rounds: Yes    Anticipated Discharge Disposition:  TBD       Additional Information:  Plan is for the patient to have surgery, Care management would follow patients progress post surgery and follow discharge recommendations by the medical team.      Suraj Mcdaniel RN

## 2021-09-07 NOTE — CONSULTS
Wound Ostomy  WOC Assessment       Allergies:  No Known Allergies    Diagnosis:   Patient Active Problem List    Diagnosis Date Noted     Rapid atrial fibrillation (H) 09/06/2021     Priority: Medium     Closed fracture of distal end of right femur, unspecified fracture morphology, initial encounter (H) 09/06/2021     Priority: Medium       Height:  [unfilled]    Weight:  [unfilled]    Labs:  Recent Labs   Lab Test 09/07/21  0521   HGB 9.4*   ALBUMIN 3.1*       Shahab:  Shahab Score: 13    Specialty Bed:       Wound culture obtained: No    Edema:  Yes:  Localized    Anatomic Site/Laterality: Bilateral ITs and buttocks    Reason for ongoing care:   Wound assessment and plan of care     Encounter Type:  Subsequent Encounter Wound Type:   Pressure Injury (Pressure Ulcer): Present on Admit    Stage:  Deep Tissue Injury    Associated conditions/related trauma: Fall at home, unclear how long she was down prior to admission    Assessment:    Left IT 3x3x0.3cm deep purple with 10% full thickness    Right IT (unable to get photo) 3 x 4cm deep purple    Left buttock 3 x 4cm and right buttock 7x5cm deep purple    Tunneling/Undermining: No    Wound Bed: see above    Exudate: No    Periwound Skin: Intact    Treatment Plan: Silicone foam and MAURICE mattress                       Nursing care provided was coordinated care with NA.    Discussed plan of care with nurse and patient.    Outcomes and treatment recommendations are to promote skin integrity and promote wound healing.    Actions taken by WOC RN: 2 minutes of education.    Planned Follow Up: Later this week.    Plan for next visit: Reassess wound(s) and Reassess skin integrity

## 2021-09-07 NOTE — CONSULTS
"  HEART CARE CONSULTATON NOTE        Assessment/Recommendations   Assessment:  1.  Preoperative cardiac evaluation prior to right femoral fracture repair  2.  Atrial fibrillation: New onset persistent atrial fibrillation with rapid ventricular response of unknown duration and asymptomatic.  May have been triggered by UTI and a fall with femur fracture.  3.  Anticoagulation: [id number] score of 4 for age, gender and hypertension  4.  Right femur fracture after a fall    Plan:  1.  Awaiting echocardiogram  2.  Increase metoprolol for better heart rate control.    3.  If no significant findings on echocardiogram ie significant valvular heart disease or segmental wall motion abnormality may proceed with surgery as planned later today.  No signs of heart failure and heart rates have been better controlled now that metoprolol has been started.  4.  Will reevaluate post surgery to determine whether or not we want to start long-term anticoagulation.  Given advanced age, falls primary team may decide against this.         History of Present Illness/Subjective    HPI: Elysia Lane is a 89 year old female with history of anemia, chronic kidney disease, hypertension who was admitted to the hospital after a fall at home.  She is found to have a right leg fracture as well as new onset atrial fibrillation and UTI.  The atrial fibrillation is new onset for her.  Unclear duration.  She is not symptomatic.  No complaints of shortness of breath or chest discomfort.      Twelve-lead EKG 9/6/2021 atrial fibrillation with rapid ventricular response at 131 bpm with nonspecific ST-T abnormality     Physical Examination  Review of Systems   VITALS: BP (!) 146/81   Pulse (!) 126   Temp 98.6  F (37  C) (Oral)   Resp 20   Ht 1.575 m (5' 2\")   Wt 62.3 kg (137 lb 4.8 oz)   SpO2 94%   BMI 25.11 kg/m    BMI: Body mass index is 25.11 kg/m .  Wt Readings from Last 3 Encounters:   09/07/21 62.3 kg (137 lb 4.8 oz)       Intake/Output " Summary (Last 24 hours) at 9/7/2021 1201  Last data filed at 9/7/2021 0700  Gross per 24 hour   Intake --   Output 350 ml   Net -350 ml     General Appearance:   no distress, normal body habitus   ENT/Mouth: membranes moist, no oral lesions or bleeding gums.      EYES:  no scleral icterus, normal conjunctivae   Neck: no carotid bruits or thyromegaly   Chest/Lungs:   lungs are clear to auscultation   Cardiovascular:   irregular. Normal first and second heart sounds with no murmurs, rubs, or gallopsJugular venous pressure normal, mild edema bilaterally    Abdomen:  no organomegaly, masses, bruits, or tenderness; bowel sounds are present   Extremities: no cyanosis or clubbing   Skin: no xanthelasma, warm.    Neurologic: normal  bilateral, no tremors     Psychiatric: alert and oriented x3, calm     Review Of Systems  Skin: negative  Eyes: negative  Ears/Nose/Throat: negative  Respiratory: No shortness of breath, dyspnea on exertion, cough, or hemoptysis  Cardiovascular: irregular heart beat  Gastrointestinal: negative  Genitourinary: negative  Musculoskeletal: negative  Neurologic: negative  Psychiatric: negative  Hematologic/Lymphatic/Immunologic: negative  Endocrine: negative          Lab Results    Chemistry/lipid CBC Cardiac Enzymes/BNP/TSH/INR   Recent Labs   Lab Test 04/11/17  1039   CHOL 222*   HDL 65   *   TRIG 96     Recent Labs   Lab Test 04/11/17  1039 04/13/15  1151   * 130*     Recent Labs   Lab Test 09/07/21  0521      POTASSIUM 3.7   CHLORIDE 107   CO2 24      BUN 36*   CR 0.94   GFRESTIMATED 51*   ADAM 8.9     Recent Labs   Lab Test 09/07/21  0521 09/06/21  1231 05/12/21  1506   CR 0.94 1.29* 1.34*     No results for input(s): A1C in the last 93986 hours.       Recent Labs   Lab Test 09/07/21  0521   WBC 16.0*   HGB 9.4*   HCT 28.3*   MCV 98        Recent Labs   Lab Test 09/07/21  0521 09/06/21  1231 11/19/18  0904   HGB 9.4* 10.4* 12.2    Recent Labs   Lab Test  09/06/21  1231 11/19/18  0904   TROPONINI 0.06 0.01     No results for input(s): BNP, NTBNPI, NTBNP in the last 40911 hours.  No results for input(s): TSH in the last 49225 hours.  No results for input(s): INR in the last 76975 hours.     Medical History  Surgical History Family History Social History   Past Medical History:   Diagnosis Date     Anemia      Chronic kidney disease      Essential hypertension      Osteoarthritis      Stenosis of carotid artery      History reviewed. No pertinent surgical history.  Family History   Problem Relation Age of Onset     No Known Problems Mother      No Known Problems Father         Social History     Socioeconomic History     Marital status: Single     Spouse name: Not on file     Number of children: Not on file     Years of education: Not on file     Highest education level: Not on file   Occupational History     Not on file   Tobacco Use     Smoking status: Never Smoker     Smokeless tobacco: Never Used   Vaping Use     Vaping Use: Never assessed   Substance and Sexual Activity     Alcohol use: Not Currently     Drug use: Never     Sexual activity: Not on file   Other Topics Concern     Parent/sibling w/ CABG, MI or angioplasty before 65F 55M? Not Asked   Social History Narrative     Not on file     Social Determinants of Health     Financial Resource Strain:      Difficulty of Paying Living Expenses:    Food Insecurity:      Worried About Running Out of Food in the Last Year:      Ran Out of Food in the Last Year:    Transportation Needs:      Lack of Transportation (Medical):      Lack of Transportation (Non-Medical):    Physical Activity:      Days of Exercise per Week:      Minutes of Exercise per Session:    Stress:      Feeling of Stress :    Social Connections:      Frequency of Communication with Friends and Family:      Frequency of Social Gatherings with Friends and Family:      Attends [employer] Services:      Active Member of Clubs or Organizations:       Attends Club or Organization Meetings:      Marital Status:    Intimate Partner Violence:      Fear of Current or Ex-Partner:      Emotionally Abused:      Physically Abused:      Sexually Abused:          Medications  Allergies   No current outpatient medications on file.      No Known Allergies      Rhonda Marshall MD

## 2021-09-07 NOTE — PLAN OF CARE
Problem: Fracture Stabilization and Management (Orthopaedic Fracture)  Goal: Fracture Stability  Outcome: Improving   Plan for surgery tomorrow morning. Updated patient and friend. Eating lunch now, npo at midnight. Denies pain at rest, but painful to turn. Ice applied to right knee. Will continue to monitor.    Problem: Dysrhythmia  Goal: Normalized Cardiac Rhythm  Outcome: Improving   HR sporadic, increases with activity or pain, 90s at rest. ECHO done this AM.

## 2021-09-07 NOTE — PROGRESS NOTES
Hospitalist Progress Note    Assessment/Plan    Active Problems:    Rapid atrial fibrillation (H)    Closed fracture of distal end of right femur, unspecified fracture morphology, initial encounter (H)  89-year-old patient with history of carotid artery, hypertension, anemia presented to the hospital for evaluation of right leg pain after fall was found to have new onset A. fib and right    hip fracture,     right hip fracture   secondary to fall, orthopedic surgery consulted awaiting cardiac clearance given the A. fib with RVR  -Pain appears to be controlled while resting in bed    New onset A. fib with RVR   patient was found to have A. fib with RVR no previous diagnosis of A. Fib,   patient appears to be asymptomatic and comfortable, started on beta-blocker, IV and p.o.   cardiology consulted, echo showed EF of 45 to 50%, with mild global hypokinesia of the left ventricle,  Will reevaluate post surgery the need for long-term anticoagulation per cardiology    SIRS positive, sepsis  Tachycardia leukocytosis evidence of UTI on UA   urine culture ordered blood culture ordered started on Rocephin,      Lactic acidosis   started on IV fluids lactic acid came down to 2.1 from 3     anemia.   , hemoglobin trending down we will monitor Q12 HOURS      hypokalemia.   -Replace with potassium replacement protocol      left knee pain and right ankle pain,  No evidence of fracture but soft tissue swelling    DVT prophylaxis  -Lovenox, prophylactic dose, will hold off patient has a drop in his hemoglobin    Barriers to Discharge: Pending surgery,    Anticipated discharge date/Disposition: TBD    Subjective  Patient was seen this morning, he states his pain is controlled as long as he is lying in bed, denies feeling palpitation or history of A. fib    Objective    Vital signs in last 24 hours  Temp:  [97.5  F (36.4  C)-98.7  F (37.1  C)] 98.6  F (37  C)  Pulse:  [] 126  Resp:  [17-23] 20  BP: (132-146)/()  146/81  SpO2:  [93 %-96 %] 94 % [unfilled] O2 Device: None (Room air)    Weight:   [unfilled] Weight change:     Intake/Output last 3 shifts  I/O last 3 completed shifts:  In: -   Out: 350 [Urine:350]  Body mass index is 25.11 kg/m .    Physical Exam:  General Appearance: Alert, oriented x3, does not appear in distress.  HEENT: Normocephalic. No scleral icterus, . Mucous membranes moist.  Heart: Fast irregular rate and rhythm, normal S1 ,S2, No murmurs, no JVD, no pedal edema   Lungs: Clear to auscultation bilaterally. No wheezing or crackles  Abdomen: Soft, non tender, no rebound or rigidity, non distended, bowel sounds present.  Extremity: right knee bruising, range of motion of her right lower extremity due to pain    Pertinent Labs   Lab Results: personally reviewed.   Recent Labs   Lab 09/07/21  0521 09/06/21  1231    144   CO2 24 25   BUN 36* 34*   ALBUMIN 3.1*  --    ALKPHOS 43*  --    ALT 28  --    AST 76*  --      Recent Labs   Lab 09/07/21  0521 09/06/21  1231   WBC 16.0* 16.4*   HGB 9.4* 10.4*   HCT 28.3* 31.3*    148*     Recent Labs   Lab 09/06/21  1231   TROPONINI 0.06     Invalid input(s): POCGLUFGR    Medications  Drug and lactation database from the [place] [employer] of Medicine:  http://toxnet.nlm.nih.gov/cgi-bin/sis/htmlgen?LACT        Advanced Care Planning:  Discharge Planning discussed with patient  Total time with this patient is 25 min with 50% of time spent in examining the patient, reviewing records, discussing plan of care and counseling, 50% of time spent in coordination of care.  Care discussed and coordinated with RN, cardiology.      Nani Cherry MD  Internal Medicine Hospitalist  9/7/2021

## 2021-09-08 ENCOUNTER — ANESTHESIA EVENT (OUTPATIENT)
Dept: SURGERY | Facility: HOSPITAL | Age: 86
DRG: 854 | End: 2021-09-08
Payer: MEDICARE

## 2021-09-08 ENCOUNTER — APPOINTMENT (OUTPATIENT)
Dept: RADIOLOGY | Facility: HOSPITAL | Age: 86
DRG: 854 | End: 2021-09-08
Attending: ORTHOPAEDIC SURGERY
Payer: MEDICARE

## 2021-09-08 ENCOUNTER — ANESTHESIA (OUTPATIENT)
Dept: SURGERY | Facility: HOSPITAL | Age: 86
DRG: 854 | End: 2021-09-08
Payer: MEDICARE

## 2021-09-08 LAB
ALBUMIN SERPL-MCNC: 2.6 G/DL (ref 3.5–5)
ALP SERPL-CCNC: 38 U/L (ref 45–120)
ALT SERPL W P-5'-P-CCNC: 27 U/L (ref 0–45)
ANION GAP SERPL CALCULATED.3IONS-SCNC: 10 MMOL/L (ref 5–18)
AST SERPL W P-5'-P-CCNC: 56 U/L (ref 0–40)
BACTERIA UR CULT: ABNORMAL
BILIRUB SERPL-MCNC: 0.6 MG/DL (ref 0–1)
BUN SERPL-MCNC: 37 MG/DL (ref 8–28)
CALCIUM SERPL-MCNC: 8.4 MG/DL (ref 8.5–10.5)
CHLORIDE BLD-SCNC: 108 MMOL/L (ref 98–107)
CO2 SERPL-SCNC: 24 MMOL/L (ref 22–31)
CREAT SERPL-MCNC: 0.82 MG/DL (ref 0.6–1.1)
CREAT SERPL-MCNC: 0.83 MG/DL (ref 0.6–1.1)
ERYTHROCYTE [DISTWIDTH] IN BLOOD BY AUTOMATED COUNT: 13 % (ref 10–15)
ERYTHROCYTE [DISTWIDTH] IN BLOOD BY AUTOMATED COUNT: 13 % (ref 10–15)
GFR SERPL CREATININE-BSD FRML MDRD: 59 ML/MIN/1.73M2
GFR SERPL CREATININE-BSD FRML MDRD: 60 ML/MIN/1.73M2
GLUCOSE BLD-MCNC: 98 MG/DL (ref 70–125)
GLUCOSE BLDC GLUCOMTR-MCNC: 113 MG/DL (ref 70–125)
HCT VFR BLD AUTO: 26 % (ref 35–47)
HCT VFR BLD AUTO: 27.6 % (ref 35–47)
HGB BLD-MCNC: 8.4 G/DL (ref 11.7–15.7)
HGB BLD-MCNC: 8.4 G/DL (ref 11.7–15.7)
HGB BLD-MCNC: 8.8 G/DL (ref 11.7–15.7)
HGB BLD-MCNC: 8.9 G/DL (ref 11.7–15.7)
INR PPP: 1.09 (ref 0.9–1.15)
LACTATE SERPL-SCNC: 0.7 MMOL/L (ref 0.7–2)
LACTATE SERPL-SCNC: 0.8 MMOL/L (ref 0.7–2)
LACTATE SERPL-SCNC: 1.3 MMOL/L (ref 0.7–2)
LACTATE SERPL-SCNC: 2.2 MMOL/L (ref 0.7–2)
MAGNESIUM SERPL-MCNC: 1.7 MG/DL (ref 1.8–2.6)
MCH RBC QN AUTO: 32.1 PG (ref 26.5–33)
MCH RBC QN AUTO: 32.3 PG (ref 26.5–33)
MCHC RBC AUTO-ENTMCNC: 32.2 G/DL (ref 31.5–36.5)
MCHC RBC AUTO-ENTMCNC: 32.3 G/DL (ref 31.5–36.5)
MCV RBC AUTO: 100 FL (ref 78–100)
MCV RBC AUTO: 100 FL (ref 78–100)
PLATELET # BLD AUTO: 116 10E3/UL (ref 150–450)
PLATELET # BLD AUTO: 138 10E3/UL (ref 150–450)
POTASSIUM BLD-SCNC: 3.5 MMOL/L (ref 3.5–5)
POTASSIUM BLD-SCNC: 3.6 MMOL/L (ref 3.5–5)
PROT SERPL-MCNC: 5.3 G/DL (ref 6–8)
RBC # BLD AUTO: 2.6 10E6/UL (ref 3.8–5.2)
RBC # BLD AUTO: 2.77 10E6/UL (ref 3.8–5.2)
SODIUM SERPL-SCNC: 142 MMOL/L (ref 136–145)
TSH SERPL DL<=0.005 MIU/L-ACNC: 1.55 UIU/ML (ref 0.3–5)
WBC # BLD AUTO: 11.4 10E3/UL (ref 4–11)
WBC # BLD AUTO: 12.2 10E3/UL (ref 4–11)

## 2021-09-08 PROCEDURE — 84132 ASSAY OF SERUM POTASSIUM: CPT | Performed by: PHYSICIAN ASSISTANT

## 2021-09-08 PROCEDURE — 250N000013 HC RX MED GY IP 250 OP 250 PS 637: Performed by: FAMILY MEDICINE

## 2021-09-08 PROCEDURE — 250N000013 HC RX MED GY IP 250 OP 250 PS 637: Performed by: INTERNAL MEDICINE

## 2021-09-08 PROCEDURE — 82565 ASSAY OF CREATININE: CPT | Performed by: PHYSICIAN ASSISTANT

## 2021-09-08 PROCEDURE — 36415 COLL VENOUS BLD VENIPUNCTURE: CPT | Performed by: HOSPITALIST

## 2021-09-08 PROCEDURE — 83605 ASSAY OF LACTIC ACID: CPT | Performed by: FAMILY MEDICINE

## 2021-09-08 PROCEDURE — 210N000001 HC R&B IMCU HEART CARE

## 2021-09-08 PROCEDURE — 85027 COMPLETE CBC AUTOMATED: CPT | Performed by: PHYSICIAN ASSISTANT

## 2021-09-08 PROCEDURE — 99233 SBSQ HOSP IP/OBS HIGH 50: CPT | Performed by: INTERNAL MEDICINE

## 2021-09-08 PROCEDURE — P9041 ALBUMIN (HUMAN),5%, 50ML: HCPCS | Performed by: NURSE ANESTHETIST, CERTIFIED REGISTERED

## 2021-09-08 PROCEDURE — 272N000001 HC OR GENERAL SUPPLY STERILE: Performed by: ORTHOPAEDIC SURGERY

## 2021-09-08 PROCEDURE — 360N000084 HC SURGERY LEVEL 4 W/ FLUORO, PER MIN: Performed by: ORTHOPAEDIC SURGERY

## 2021-09-08 PROCEDURE — 36415 COLL VENOUS BLD VENIPUNCTURE: CPT | Performed by: PHYSICIAN ASSISTANT

## 2021-09-08 PROCEDURE — C1713 ANCHOR/SCREW BN/BN,TIS/BN: HCPCS | Performed by: ORTHOPAEDIC SURGERY

## 2021-09-08 PROCEDURE — 36415 COLL VENOUS BLD VENIPUNCTURE: CPT | Performed by: FAMILY MEDICINE

## 2021-09-08 PROCEDURE — 250N000011 HC RX IP 250 OP 636: Performed by: NURSE ANESTHETIST, CERTIFIED REGISTERED

## 2021-09-08 PROCEDURE — 85027 COMPLETE CBC AUTOMATED: CPT | Performed by: FAMILY MEDICINE

## 2021-09-08 PROCEDURE — 250N000011 HC RX IP 250 OP 636: Performed by: PHYSICIAN ASSISTANT

## 2021-09-08 PROCEDURE — 278N000051 HC OR IMPLANT GENERAL: Performed by: ORTHOPAEDIC SURGERY

## 2021-09-08 PROCEDURE — 999N000127 HC STATISTIC PERIPHERAL IV START W US GUIDANCE

## 2021-09-08 PROCEDURE — 250N000009 HC RX 250: Performed by: ANESTHESIOLOGY

## 2021-09-08 PROCEDURE — 250N000013 HC RX MED GY IP 250 OP 250 PS 637: Performed by: PHYSICIAN ASSISTANT

## 2021-09-08 PROCEDURE — 258N000003 HC RX IP 258 OP 636: Performed by: NURSE ANESTHETIST, CERTIFIED REGISTERED

## 2021-09-08 PROCEDURE — 710N000009 HC RECOVERY PHASE 1, LEVEL 1, PER MIN: Performed by: ORTHOPAEDIC SURGERY

## 2021-09-08 PROCEDURE — 258N000003 HC RX IP 258 OP 636: Performed by: FAMILY MEDICINE

## 2021-09-08 PROCEDURE — 999N000141 HC STATISTIC PRE-PROCEDURE NURSING ASSESSMENT: Performed by: ORTHOPAEDIC SURGERY

## 2021-09-08 PROCEDURE — 84443 ASSAY THYROID STIM HORMONE: CPT | Performed by: INTERNAL MEDICINE

## 2021-09-08 PROCEDURE — 999N000180 XR SURGERY CARM FLUORO LESS THAN 5 MIN

## 2021-09-08 PROCEDURE — 36415 COLL VENOUS BLD VENIPUNCTURE: CPT | Performed by: INTERNAL MEDICINE

## 2021-09-08 PROCEDURE — 85018 HEMOGLOBIN: CPT | Performed by: HOSPITALIST

## 2021-09-08 PROCEDURE — 83735 ASSAY OF MAGNESIUM: CPT | Performed by: INTERNAL MEDICINE

## 2021-09-08 PROCEDURE — 0QHB36Z INSERTION OF INTRAMEDULLARY INTERNAL FIXATION DEVICE INTO RIGHT LOWER FEMUR, PERCUTANEOUS APPROACH: ICD-10-PCS | Performed by: ORTHOPAEDIC SURGERY

## 2021-09-08 PROCEDURE — 258N000003 HC RX IP 258 OP 636: Performed by: ANESTHESIOLOGY

## 2021-09-08 PROCEDURE — 258N000001 HC RX 258: Performed by: PHYSICIAN ASSISTANT

## 2021-09-08 PROCEDURE — 370N000017 HC ANESTHESIA TECHNICAL FEE, PER MIN: Performed by: ORTHOPAEDIC SURGERY

## 2021-09-08 PROCEDURE — 85610 PROTHROMBIN TIME: CPT | Performed by: PHYSICIAN ASSISTANT

## 2021-09-08 PROCEDURE — 250N000009 HC RX 250: Performed by: NURSE ANESTHETIST, CERTIFIED REGISTERED

## 2021-09-08 PROCEDURE — 82040 ASSAY OF SERUM ALBUMIN: CPT | Performed by: FAMILY MEDICINE

## 2021-09-08 DEVICE — IMPLANTABLE DEVICE: Type: IMPLANTABLE DEVICE | Site: LEG | Status: FUNCTIONAL

## 2021-09-08 DEVICE — IMP SCR STRK LOCK 5.0X70MM FT 1896-5070S: Type: IMPLANTABLE DEVICE | Site: LEG | Status: FUNCTIONAL

## 2021-09-08 DEVICE — IMP SCR STRK LOCK 5.0X60MM FT 1896-5060S: Type: IMPLANTABLE DEVICE | Site: LEG | Status: FUNCTIONAL

## 2021-09-08 RX ORDER — HYDROMORPHONE HCL IN WATER/PF 6 MG/30 ML
0.2 PATIENT CONTROLLED ANALGESIA SYRINGE INTRAVENOUS
Status: DISCONTINUED | OUTPATIENT
Start: 2021-09-08 | End: 2021-09-11

## 2021-09-08 RX ORDER — ACETAMINOPHEN 325 MG/1
975 TABLET ORAL EVERY 8 HOURS
Status: COMPLETED | OUTPATIENT
Start: 2021-09-08 | End: 2021-09-11

## 2021-09-08 RX ORDER — CEFAZOLIN SODIUM 2 G/100ML
2 INJECTION, SOLUTION INTRAVENOUS
Status: COMPLETED | OUTPATIENT
Start: 2021-09-08 | End: 2021-09-08

## 2021-09-08 RX ORDER — DIPHENHYDRAMINE HCL 12.5MG/5ML
12.5 LIQUID (ML) ORAL EVERY 6 HOURS PRN
Status: DISCONTINUED | OUTPATIENT
Start: 2021-09-08 | End: 2021-09-16 | Stop reason: HOSPADM

## 2021-09-08 RX ORDER — ACETAMINOPHEN 325 MG/1
975 TABLET ORAL ONCE
Status: COMPLETED | OUTPATIENT
Start: 2021-09-08 | End: 2021-09-08

## 2021-09-08 RX ORDER — KETAMINE HYDROCHLORIDE 50 MG/ML
INJECTION, SOLUTION INTRAMUSCULAR; INTRAVENOUS PRN
Status: DISCONTINUED | OUTPATIENT
Start: 2021-09-08 | End: 2021-09-08

## 2021-09-08 RX ORDER — ONDANSETRON 4 MG/1
4 TABLET, ORALLY DISINTEGRATING ORAL EVERY 6 HOURS PRN
Status: DISCONTINUED | OUTPATIENT
Start: 2021-09-08 | End: 2021-09-16 | Stop reason: HOSPADM

## 2021-09-08 RX ORDER — SODIUM CHLORIDE, SODIUM LACTATE, POTASSIUM CHLORIDE, CALCIUM CHLORIDE 600; 310; 30; 20 MG/100ML; MG/100ML; MG/100ML; MG/100ML
INJECTION, SOLUTION INTRAVENOUS CONTINUOUS
Status: DISCONTINUED | OUTPATIENT
Start: 2021-09-08 | End: 2021-09-08 | Stop reason: HOSPADM

## 2021-09-08 RX ORDER — LIDOCAINE 40 MG/G
CREAM TOPICAL
Status: DISCONTINUED | OUTPATIENT
Start: 2021-09-08 | End: 2021-09-16 | Stop reason: HOSPADM

## 2021-09-08 RX ORDER — ONDANSETRON 2 MG/ML
4 INJECTION INTRAMUSCULAR; INTRAVENOUS EVERY 6 HOURS PRN
Status: DISCONTINUED | OUTPATIENT
Start: 2021-09-08 | End: 2021-09-16 | Stop reason: HOSPADM

## 2021-09-08 RX ORDER — SODIUM CHLORIDE, SODIUM LACTATE, POTASSIUM CHLORIDE, CALCIUM CHLORIDE 600; 310; 30; 20 MG/100ML; MG/100ML; MG/100ML; MG/100ML
INJECTION, SOLUTION INTRAVENOUS CONTINUOUS
Status: DISCONTINUED | OUTPATIENT
Start: 2021-09-08 | End: 2021-09-08

## 2021-09-08 RX ORDER — OXYCODONE HYDROCHLORIDE 5 MG/1
5 TABLET ORAL EVERY 4 HOURS PRN
Status: DISCONTINUED | OUTPATIENT
Start: 2021-09-08 | End: 2021-09-12

## 2021-09-08 RX ORDER — BUPIVACAINE HYDROCHLORIDE 5 MG/ML
INJECTION, SOLUTION EPIDURAL; INTRACAUDAL
Status: COMPLETED | OUTPATIENT
Start: 2021-09-08 | End: 2021-09-08

## 2021-09-08 RX ORDER — CEFAZOLIN SODIUM 1 G/3ML
1 INJECTION, POWDER, FOR SOLUTION INTRAMUSCULAR; INTRAVENOUS EVERY 8 HOURS
Status: COMPLETED | OUTPATIENT
Start: 2021-09-08 | End: 2021-09-10

## 2021-09-08 RX ORDER — BISACODYL 10 MG
10 SUPPOSITORY, RECTAL RECTAL DAILY PRN
Status: DISCONTINUED | OUTPATIENT
Start: 2021-09-08 | End: 2021-09-16 | Stop reason: HOSPADM

## 2021-09-08 RX ORDER — ONDANSETRON 4 MG/1
4 TABLET, ORALLY DISINTEGRATING ORAL EVERY 30 MIN PRN
Status: DISCONTINUED | OUTPATIENT
Start: 2021-09-08 | End: 2021-09-08 | Stop reason: HOSPADM

## 2021-09-08 RX ORDER — ACETAMINOPHEN 325 MG/1
650 TABLET ORAL EVERY 4 HOURS PRN
Status: DISCONTINUED | OUTPATIENT
Start: 2021-09-11 | End: 2021-09-16 | Stop reason: HOSPADM

## 2021-09-08 RX ORDER — OXYCODONE HCL 10 MG/1
10 TABLET, FILM COATED, EXTENDED RELEASE ORAL ONCE
Status: DISCONTINUED | OUTPATIENT
Start: 2021-09-08 | End: 2021-09-08 | Stop reason: HOSPADM

## 2021-09-08 RX ORDER — ASPIRIN 81 MG/1
81 TABLET ORAL 2 TIMES DAILY
Status: DISCONTINUED | OUTPATIENT
Start: 2021-09-08 | End: 2021-09-08

## 2021-09-08 RX ORDER — ONDANSETRON 2 MG/ML
4 INJECTION INTRAMUSCULAR; INTRAVENOUS EVERY 30 MIN PRN
Status: DISCONTINUED | OUTPATIENT
Start: 2021-09-08 | End: 2021-09-08 | Stop reason: HOSPADM

## 2021-09-08 RX ORDER — PROPOFOL 10 MG/ML
INJECTION, EMULSION INTRAVENOUS CONTINUOUS PRN
Status: DISCONTINUED | OUTPATIENT
Start: 2021-09-08 | End: 2021-09-08

## 2021-09-08 RX ORDER — SODIUM CHLORIDE, SODIUM LACTATE, POTASSIUM CHLORIDE, CALCIUM CHLORIDE 600; 310; 30; 20 MG/100ML; MG/100ML; MG/100ML; MG/100ML
INJECTION, SOLUTION INTRAVENOUS CONTINUOUS PRN
Status: DISCONTINUED | OUTPATIENT
Start: 2021-09-08 | End: 2021-09-08

## 2021-09-08 RX ORDER — AMOXICILLIN 250 MG
1 CAPSULE ORAL 2 TIMES DAILY
Status: DISCONTINUED | OUTPATIENT
Start: 2021-09-08 | End: 2021-09-16 | Stop reason: HOSPADM

## 2021-09-08 RX ORDER — CEFAZOLIN SODIUM 2 G/100ML
2 INJECTION, SOLUTION INTRAVENOUS SEE ADMIN INSTRUCTIONS
Status: DISCONTINUED | OUTPATIENT
Start: 2021-09-08 | End: 2021-09-08 | Stop reason: HOSPADM

## 2021-09-08 RX ORDER — POLYETHYLENE GLYCOL 3350 17 G/17G
17 POWDER, FOR SOLUTION ORAL DAILY
Status: DISCONTINUED | OUTPATIENT
Start: 2021-09-09 | End: 2021-09-16 | Stop reason: HOSPADM

## 2021-09-08 RX ORDER — TRANEXAMIC ACID 650 MG/1
1950 TABLET ORAL ONCE
Status: COMPLETED | OUTPATIENT
Start: 2021-09-08 | End: 2021-09-08

## 2021-09-08 RX ORDER — OXYCODONE HYDROCHLORIDE 5 MG/1
5 TABLET ORAL EVERY 4 HOURS PRN
Status: DISCONTINUED | OUTPATIENT
Start: 2021-09-08 | End: 2021-09-08 | Stop reason: HOSPADM

## 2021-09-08 RX ORDER — MAGNESIUM HYDROXIDE/ALUMINUM HYDROXICE/SIMETHICONE 120; 1200; 1200 MG/30ML; MG/30ML; MG/30ML
30 SUSPENSION ORAL EVERY 4 HOURS PRN
Status: DISCONTINUED | OUTPATIENT
Start: 2021-09-08 | End: 2021-09-16 | Stop reason: HOSPADM

## 2021-09-08 RX ORDER — FENTANYL CITRATE 50 UG/ML
25 INJECTION, SOLUTION INTRAMUSCULAR; INTRAVENOUS EVERY 5 MIN PRN
Status: DISCONTINUED | OUTPATIENT
Start: 2021-09-08 | End: 2021-09-08 | Stop reason: HOSPADM

## 2021-09-08 RX ORDER — ALBUMIN, HUMAN INJ 5% 5 %
SOLUTION INTRAVENOUS CONTINUOUS PRN
Status: DISCONTINUED | OUTPATIENT
Start: 2021-09-08 | End: 2021-09-08

## 2021-09-08 RX ORDER — METOPROLOL TARTRATE 25 MG/1
50 TABLET, FILM COATED ORAL 2 TIMES DAILY
Status: DISCONTINUED | OUTPATIENT
Start: 2021-09-08 | End: 2021-09-09

## 2021-09-08 RX ADMIN — METOPROLOL TARTRATE 50 MG: 25 TABLET, FILM COATED ORAL at 20:28

## 2021-09-08 RX ADMIN — CEFAZOLIN 1 G: 1 INJECTION, POWDER, FOR SOLUTION INTRAMUSCULAR; INTRAVENOUS at 17:24

## 2021-09-08 RX ADMIN — PHENYLEPHRINE HYDROCHLORIDE 100 MCG: 10 INJECTION INTRAVENOUS at 10:59

## 2021-09-08 RX ADMIN — BUPIVACAINE HYDROCHLORIDE 2.6 ML: 5 INJECTION, SOLUTION EPIDURAL; INTRACAUDAL; PERINEURAL at 10:25

## 2021-09-08 RX ADMIN — SODIUM CHLORIDE, POTASSIUM CHLORIDE, SODIUM LACTATE AND CALCIUM CHLORIDE: 600; 310; 30; 20 INJECTION, SOLUTION INTRAVENOUS at 09:44

## 2021-09-08 RX ADMIN — PHENYLEPHRINE HYDROCHLORIDE 0.2 MCG/KG/MIN: 10 INJECTION INTRAVENOUS at 10:25

## 2021-09-08 RX ADMIN — AMLODIPINE BESYLATE 5 MG: 5 TABLET ORAL at 17:45

## 2021-09-08 RX ADMIN — Medication 25 MCG: at 17:45

## 2021-09-08 RX ADMIN — PHENYLEPHRINE HYDROCHLORIDE 50 MCG: 10 INJECTION INTRAVENOUS at 11:11

## 2021-09-08 RX ADMIN — PHENYLEPHRINE HYDROCHLORIDE 100 MCG: 10 INJECTION INTRAVENOUS at 10:48

## 2021-09-08 RX ADMIN — ACETAMINOPHEN 975 MG: 325 TABLET ORAL at 09:45

## 2021-09-08 RX ADMIN — PROPOFOL 40 MCG/KG/MIN: 10 INJECTION, EMULSION INTRAVENOUS at 10:24

## 2021-09-08 RX ADMIN — METOPROLOL TARTRATE 25 MG: 25 TABLET, FILM COATED ORAL at 08:34

## 2021-09-08 RX ADMIN — KETAMINE HYDROCHLORIDE 25 MG: 50 INJECTION, SOLUTION INTRAMUSCULAR; INTRAVENOUS at 10:25

## 2021-09-08 RX ADMIN — SODIUM CHLORIDE, POTASSIUM CHLORIDE, SODIUM LACTATE AND CALCIUM CHLORIDE: 600; 310; 30; 20 INJECTION, SOLUTION INTRAVENOUS at 10:04

## 2021-09-08 RX ADMIN — Medication 12.5 MG: at 17:59

## 2021-09-08 RX ADMIN — KETAMINE HYDROCHLORIDE 25 MG: 50 INJECTION, SOLUTION INTRAMUSCULAR; INTRAVENOUS at 10:19

## 2021-09-08 RX ADMIN — TRANEXAMIC ACID 1950 MG: 650 TABLET ORAL at 09:45

## 2021-09-08 RX ADMIN — ALBUMIN HUMAN: 0.05 INJECTION, SOLUTION INTRAVENOUS at 10:12

## 2021-09-08 RX ADMIN — ACETAMINOPHEN 975 MG: 325 TABLET ORAL at 00:31

## 2021-09-08 RX ADMIN — ALBUMIN HUMAN: 0.05 INJECTION, SOLUTION INTRAVENOUS at 11:28

## 2021-09-08 RX ADMIN — CEFAZOLIN SODIUM 2 G: 2 INJECTION, SOLUTION INTRAVENOUS at 10:27

## 2021-09-08 RX ADMIN — Medication 100 MCG: at 17:45

## 2021-09-08 RX ADMIN — ACETAMINOPHEN 975 MG: 325 TABLET ORAL at 17:24

## 2021-09-08 RX ADMIN — ASPIRIN 325 MG: 325 TABLET ORAL at 18:28

## 2021-09-08 RX ADMIN — SODIUM CHLORIDE, POTASSIUM CHLORIDE, SODIUM LACTATE AND CALCIUM CHLORIDE 100 ML: 600; 310; 30; 20 INJECTION, SOLUTION INTRAVENOUS at 13:48

## 2021-09-08 RX ADMIN — OXYCODONE HYDROCHLORIDE 5 MG: 5 TABLET ORAL at 20:28

## 2021-09-08 RX ADMIN — SODIUM CHLORIDE: 9 INJECTION, SOLUTION INTRAVENOUS at 17:25

## 2021-09-08 ASSESSMENT — ENCOUNTER SYMPTOMS: DYSRHYTHMIAS: 1

## 2021-09-08 ASSESSMENT — MIFFLIN-ST. JEOR: SCORE: 961.95

## 2021-09-08 NOTE — ANESTHESIA PROCEDURE NOTES
Intrathecal injection Procedure Note  Pre-Procedure   Staff -        Anesthesiologist:  Julián Hollis MD       Performed By: anesthesiologist       Location: OR       Procedure Start/Stop Times: 9/8/2021 10:22 AM and 9/8/2021 10:25 AM       Pre-Anesthestic Checklist: patient identified, IV checked, risks and benefits discussed, informed consent, monitors and equipment checked, pre-op evaluation, at physician/surgeon's request and post-op pain management  Timeout:       Correct Patient: Yes        Correct Procedure: Yes        Correct Site: Yes        Correct Position: Yes   Procedure Documentation  Procedure: intrathecal injection       Diagnosis: humerus fracture       Patient Position: sitting       Patient Prep/Sterile Barriers: sterile gloves, mask, patient draped       Skin prep: Chloraprep       Insertion Site: L3-4. (midline approach).       Needle Gauge: 24.        Needle Length (Inches): 3.5        Spinal Needle Type: Sprotte       Introducer used       # of attempts: 1 and  # of redirects:  1    Assessment/Narrative         Paresthesias: No.       Sensory Level: T10       CSF fluid: clear.    Medication(s) Administered   Medication Administration Time: 9/8/2021 10:25 AM

## 2021-09-08 NOTE — ANESTHESIA POSTPROCEDURE EVALUATION
Patient: Elysia Lane    Procedure(s):  OPEN REDUCTION INTERNAL FIXATION, FRACTURE, FEMUR, DISTAL    Diagnosis:Closed fracture of distal end of right femur, unspecified fracture morphology, initial encounter (H) [S79.228A]  Diagnosis Additional Information: No value filed.    Anesthesia Type:  Spinal    Note:  Disposition: Inpatient   Postop Pain Control: Uneventful            Sign Out: Well controlled pain   PONV: No   Neuro/Psych: Uneventful            Sign Out: Acceptable/Baseline neuro status   Airway/Respiratory: Uneventful            Sign Out: Acceptable/Baseline resp. status   CV/Hemodynamics: Uneventful            Sign Out: Acceptable CV status; No obvious hypovolemia; No obvious fluid overload   Other NRE: NONE   DID A NON-ROUTINE EVENT OCCUR? No           Last vitals:  Vitals Value Taken Time   /55 09/08/21 1330   Temp 36.5  C (97.7  F) 09/08/21 1302   Pulse 144 09/08/21 1329   Resp 27 09/08/21 1329   SpO2 94 % 09/08/21 1329   Vitals shown include unvalidated device data.    Electronically Signed By: Jose Hein MD  September 8, 2021  1:30 PM

## 2021-09-08 NOTE — PROGRESS NOTES
[facility]  Hospitalist Progress Note    Admit Date:  9/6/2021  Date of Service (when I saw the patient): 09/08/2021   Provider:  Cynthia Granger, DO    Assessment & Plan   Elysia Lane is a 89 year old female with a history of carotid artery, hypertension, anemia presented to the hospital for evaluation of right leg pain after fall was found to have new onset A. fib and right    hip fracture,    Problem List:    1. Traumatic closed fracture of the distal femur  She sustained hip fracture and significant knee contusion on the right side  Orthopedic surgery consulted - appreciated  Planning to go to the OR later this am for surgical fixation     2. New onset A. fib with RVR  On admission, patient was found to have A. fib with RVR  She has no previous diagnosis of A. Fib,  Patient appears to be fairly asymptomatic at this time with her a fib    Cardiology consulted, appreciated  Echo 9/7/21 showed EF of 45 to 50%, with mild global hypokinesia of the left ventricle,  Cardiology will reevaluate post surgery the need for long-term anticoagulation     She continues to have variable HR - this am still having HR occasionally 110-120's, especially with any movement of the right leg by nursing (pre-op prep)  HR overnight was fairly well controlled on metoprolol 50mg bid    Will continue to monitor HR and vitals closely in the post-op period, as well.  Will add a po prn metoprolol dose today for HR >120 and see if we can avoid IV rate-control medication  Continue tele monitoring    Will add on TSH  Will supplement low magnesium level (1.7) today     3. E. Coli UTI with sepsis  On admission she had leukocytosis, tachycardia, tachypnea and evidence of abnormal UA  Also had elevated lactic acid  Urine culture 9/6/21 now positive for pan-sensitive e.coli  Continue IV rocephin today (day#3)  Blood culture x 2 (9/6/21) - so far are negative     4. Lactic acidosis - normalized     5.   Anemia  Chronicity unclear  Last hgb 12.2 (11/2018)  hgb improved this am 8.9  Significant bruising noted from traumatic fall  Will continue to monitor closely in the post-op period    Diet: NPO per Anesthesia Guidelines for Procedure/Surgery Except for: Meds    DVT Prophylaxis: per surgical service  Sun Catheter: PRESENT, indication: Retention  Code Status: Full Code      Entered: Cynthia PenalozaMerlinPatsy, DO 09/08/2021, 7:59 AM       The patient's care was discussed with the Bedside Nurse, Patient and Patient's Family (designated POA present)    Interval History   Seen with POA at bedside.  Having a lot of pain in the right knee and leg as RN is cleaning area in prep for surgery later this am.  No clear CP or palpitations reported.  No SOB or HA.  No N/V reported since admission; no F/C.    -Data reviewed today: I reviewed all new labs and imaging results over the last 24 hours. I personally reviewed no images or EKG's today.    Physical Exam   Temp: 97.5  F (36.4  C) Temp src: Axillary BP: 134/73 Pulse: 94   Resp: 18 SpO2: 94 % O2 Device: None (Room air)    Vitals:    09/06/21 1200 09/07/21 0325 09/08/21 0500   Weight: 63.5 kg (140 lb) 62.3 kg (137 lb 4.8 oz) 62.4 kg (137 lb 8 oz)     Vital Signs with Ranges  Temp:  [97.5  F (36.4  C)-98.8  F (37.1  C)] 97.5  F (36.4  C)  Pulse:  [] 94  Resp:  [18-20] 18  BP: (102-137)/(72-74) 134/73  SpO2:  [93 %-94 %] 94 %  I/O last 3 completed shifts:  In: -   Out: 750 [Urine:750]    GEN:  Alert, awake, very uncomfortable with movement of leg, mild tacypnea no in the setting of pain.  HEENT:  Normocephalic/atraumatic, no scleral icterus, no nasal discharge, mouth and membranes slightly dry  NECK:  No clear thyromegaly or clear JVD  CV:  Tachy and irregular rate and rhythm, soft sys murmur to ausc.  S1 + S2 noted, no S3 or S4.  LUNGS:  Clear to auscultation ant/lat bilaterally but diminished at the bases bilaterally. No clear rales/rhonchi/wheezing auscultated  bilaterally.  No costal retractions bilaterally.  Symmetric chest rise on inhalation noted.  ABD:  Active bowel sounds, soft, non-tender, mildly distended.  No clear rebound/guarding/rigidity.  No masses palpated.  No obvious HSM to exam.  EXT:  Right knee very swollen and with significant ecchymosis noted.  No pretibial pitting edema or cyanosis bilaterally. No calf-tenderness or asymmetry noted.  SKIN:  Dry to touch, no rashes or jaundice noted.  PSYCH:  Mood slightly anxious and in some distress currently from the pain.  NEURO:  Slight resting tremor noted in the right hand at rest.  Very [place] but CN 2-12 otherwise grossly intact to testing bilaterally.    Data   Labs:  No results for input(s): CULT in the last 168 hours.  Recent Labs   Lab 09/08/21 1701 09/08/21 0529 09/08/21 0457 09/07/21 0521 09/06/21  1231   NA  --   --  142 142 144   POTASSIUM  --  3.6 3.5 3.7 3.4*   CHLORIDE  --   --  108* 107 105   CO2  --   --  24 24 25   ANIONGAP  --   --  10 11 14     --  98 113 124   BUN  --   --  37* 36* 34*   CR  --  0.83 0.82 0.94 1.29*   GFRESTIMATED  --  59* 60* 51* 35*   ADAM  --   --  8.4* 8.9 9.3     Recent Labs   Lab 09/08/21 0529 09/08/21 0457 09/07/21 0521   WBC 12.2* 11.4* 16.0*   HGB 8.9* 8.4*  8.4* 9.4*   HCT 27.6* 26.0* 28.3*    100 98   * 116* 150     Recent Labs   Lab 09/08/21 1701 09/08/21 0529 09/08/21 0457 09/07/21 0521 09/06/21  1231   NA  --   --  142 142 144   POTASSIUM  --  3.6 3.5 3.7 3.4*   CHLORIDE  --   --  108* 107 105   CO2  --   --  24 24 25   ANIONGAP  --   --  10 11 14     --  98 113 124   BUN  --   --  37* 36* 34*   CR  --  0.83 0.82 0.94 1.29*   GFRESTIMATED  --  59* 60* 51* 35*   ADAM  --   --  8.4* 8.9 9.3   MAG  --   --  1.7* 1.6* 1.6*   PROTTOTAL  --   --  5.3* 5.9*  --    ALBUMIN  --   --  2.6* 3.1*  --    BILITOTAL  --   --  0.6 1.0  --    ALKPHOS  --   --  38* 43*  --    AST  --   --  56* 76*  --    ALT  --   --  27 28  --      Recent Labs    Lab 21  0529   INR 1.09     Recent Labs   Lab 21  1346   COLOR Yellow   APPEARANCE Turbid*   URINEGLC Negative   URINEBILI Negative   URINEKETONE 10 *   SG 1.021   UBLD >1.0 mg/dL*   URINEPH 5.0   PROTEIN 30 *   NITRITE Positive*   LEUKEST 250 [place]/uL*   RBCU 10*   WBCU 37*      Recent Imaging:   Recent Results (from the past 24 hour(s))   Echocardiogram Complete   Result Value    LVEF  45-50% (mildly reduced)    [facility]    [id number]  [id number]  [id number]  [id number]^MARISELA^[place] [address]     Name: ANDREI MCNAMARA  MRN: [id number]  : 1923  Study Date: 2021 10:02 AM  Age: 89 yrs  Gender: Female  Patient Location: [facility]  Reason For Study: Atrial Fibrillation  Ordering Physician: KHRIS ANTONIO  Performed By: BP     BSA: 1.6 m2  Height: 62 in  Weight: 137 lb  ______________________________________________________________________________  ______________________________________________________________________________  Interpretation Summary     1. The left ventricle is normal in size. Left ventricular function is  decreased. The ejection fraction is 45-50% (mildly reduced). There is normal  left ventricular wall thickness. Mild global hypokinesia of the left  ventricle.  2. The right ventricle is normal size. Moderately decreased right ventricular  systolic function  3. The left atrium is severely dilated. The right atrium is moderately  dilated.  4. Mild to moderate (1-2+) mitral regurgitation.  5. Moderate (2+) tricuspid regurgitation.  6. The right ventricular systolic pressure is approximated at 26mmHg plus the  right atrial pressure. RA pressure of 8 mmHg.     The rhythm was rapid atrial fibrillation.  ______________________________________________________________________________  Left Ventricle  The left ventricle is normal in size. Left ventricular function is decreased.  The ejection fraction is 45-50% (mildly reduced). There is  normal left  ventricular wall thickness. Diastolic function not assessed due to atrial  fibrillation. There is mild global hypokinesia of the left ventricle.     Right Ventricle  The right ventricle is normal size. Moderately decreased right ventricular  systolic function.     Atria  The left atrium is severely dilated. The right atrium is moderately dilated.  There is no color Doppler evidence of an atrial shunt.     Mitral Valve  There is mild mitral annular calcification. The mitral valve leaflets are  mildly thickened. There is no evidence of mitral valve prolapse. There is mild  to moderate (1-2+) mitral regurgitation. There is no mitral valve stenosis.     Tricuspid Valve  The tricuspid valve is not well visualized, but is grossly normal. There is  moderate (2+) tricuspid regurgitation. The right ventricular systolic pressure  is approximated at 26.4 mmHg plus the right atrial pressure. IVC diameter and  respiratory changes fall into an intermediate range suggesting an RA pressure  of 8 mmHg. The right ventricular systolic pressure is approximated at 26mmHg  plus the right atrial pressure.     Aortic Valve  There is moderate trileaflet aortic sclerosis. There is trace aortic  regurgitation. No aortic stenosis is present.     Pulmonic Valve  The pulmonic valve is not well seen, but is grossly normal. This degree of  valvular regurgitation is within normal limits.     Vessels  The aorta root is normal. Normal size ascending aorta. IVC diameter <2.1 cm  collapsing >50% with sniff suggests a normal RA pressure of 3 mmHg.     Pericardium  There is no pericardial effusion.     Rhythm  The rhythm was rapid atrial fibrillation.  ______________________________________________________________________________  MMode/2D Measurements & Calculations  IVSd: 0.85 cm  LVIDd: 4.2 cm  LVIDs: 3.3 cm  LVPWd: 0.82 cm  FS: 22.6 %  LV mass(C)d: 108.0 grams  LV mass(C)dI: 66.4 grams/m2  Ao root diam: 2.6 cm  LA dimension: 3.7  cm  LA/Ao: 1.4  LVOT diam: 1.6 cm  LVOT area: 1.9 cm2     EF(MOD-bp): 55.1 %  LA Volume Indexed (AL/bp): 50.5 ml/m2     RWT: 0.39     Time Measurements  MM HR: 80.0 BPM     Doppler Measurements & Calculations  MV E max walter: 113.0 cm/sec  MV dec time: 0.20 sec  LV V1 max P.6 mmHg  LV V1 max: 80.1 cm/sec  LV V1 VTI: 14.2 cm  MR ERO: 0.13 cm2  MR volume: 18.7 ml  SV(LVOT): 27.5 ml  SI(LVOT): 16.9 ml/m2  PA acc time: 0.07 sec  TR max walter: 257.0 cm/sec  TR max P.4 mmHg  E/E': 12.2  E/E' avg: 15.0  Lateral E/e': 12.1     Medial E/e': 17.9  Peak E' Walter: 9.3 cm/sec     ______________________________________________________________________________  Report approved by: Africa Martinez 2021 12:06 PM             Medications     irrigation builder (choose ingredients)       sodium chloride 50 mL/hr at 21 1000       acetaminophen  975 mg Oral Q8H     amLODIPine  5 mg Oral Daily     aspirin  325 mg Oral Daily     cefTRIAXone  1 g Intravenous Q24H     cyanocobalamin  100 mcg Oral Daily     [START ON 2021] enoxaparin ANTICOAGULANT  30 mg Subcutaneous Q24H     metoprolol tartrate  25 mg Oral BID     polyethylene glycol  17 g Oral Daily     sodium chloride (PF)  3 mL Intracatheter Q8H     cholecalciferol  25 mcg Oral Daily

## 2021-09-08 NOTE — PLAN OF CARE
Explained to pt and  Family member  Regarding surgery.  Chlorhexidine scrub x1 on rt leg. Pt c/o painful rt leg.Cms intact rt foot. Weak pedal pulse. Pt is forgetful.

## 2021-09-08 NOTE — OP NOTE
Operative Note    Name:  Elysia Nicolekinjal  PCP:  Elif Alcantara  Procedure Date:  9/6/2021 - 9/8/2021      Procedure(s):  Retrograde IM nailing right supracondylar distal femur fracture (Rojelio retrograde nail 12 mm x 320 mm, interlocked (    Pre-Procedure Diagnosis:  Closed fracture of distal end of right femur, unspecified fracture morphology, initial encounter (H) [Z13.236A]     Post-Procedure Diagnosis:    Supracondylar distal femur fracture right    Surgeon(s):  Liam Haji MD    Assist:  Ricki burnett PA-C PA-C assist  was required for this operation due to the complexity of the procedure, for proper positioning of the limb during the operation, and for patient safety.    Anesthesia Type:  Regional    Antibiotics:   Ancef 2 g IVPB on induction  Ancef 3 g and 3 L for irrigation    Condition on discharge from OR:  stable    Indications:  Displaced distal right femur fracture    Findings:  Minimally comminuted oblique supracondylar distal femur fracture displaced    Operative Report:     Upon informed consent having reviewed the procedure along with attendant risk of complication and informed collaborative decision was made to proceed. All protocols and regimens were followed. The leg was marked. Labs checked. Chart checked. Consent obtained through power of  who I know. Patient premedicated. Brought to the operating room. Undergoing anesthetic induction and transferred to the Arsen table. A small pad was placed under the ipsilateral buttock to rotate the leg neutral. I washed the leg with Hibiclens and then alcohol, suspended the lower extremity, placed barrier drapes, re-alcohol washed the leg, and then DuraPrep applied to the entire lower extremity. Ioban drapes placed on all exposed skin.    We paused for patient identification, limb and procedure confirmation.    Straight midline incision was made centered at the patella and dissection carried on the extensor mechanism. A medial parapatellar  capsulotomy was performed. Closed reduction of the fracture was obtained bringing the leg out to length with proper rotational alignment. Reduction clamp held the reduction. Steinmann pin was then inserted into the roof of the intercondylar notch checked in the frontal and sagittal planes for proper alignment. This was overdrilled. A guide yury was then inserted into the medullary canal to the level of the lesser trochanter and measured 320 mm.    With reduction maintained I reamed the IM canal in 0.5 mm increments starting at 9.0 mm ending at 13.5 mm. I chose a 12 mm x 320 mm retrograde nail. This was then inserted and retrograded into the medullary canal to the appropriate level maintaining reduction of the fracture.    The outrigger was attached and screws 1 through 4 were inserted with minimal purchase given the significant osteopenia. Care was directed towards proper length and proper orientation and engagement of the right. 5 mm end cap was placed at the distal end of the yury which had been recessed at interlock the distal screw.    Then, under fluoroscopic control, small incision was made at the proximal end of the right and both checking in the frontal and sagittal plane, I directed a screw to interlock the proximal yury probe providing additional fixation.    All wounds were irrigated. Wounds were closed in layers. A soft bulky compressive dressing was applied. Counts were correct. Estimated blood loss was 100cc including fracture hematoma. Counts were correct. Patient tolerated the procedure well and was brought to the PAR stable condition.    Counts were correct. Bleeding was largely from reamed cancellous surfaces of the acetabulum as well as from the medullary canal of the femur. The patient tolerated the procedure well and was sent to the VT in stable condition.    Postoperative plan:  Up in chair all meals.  Bed chair status.  Patient can weight-bear with full assist for transfers and pivots  only.  Nonambulatory however x6 weeks.  Anticoagulation per cardiology given her underlying cardiac arrhythmia: Recommend minimal aspirin twice daily or Coumadin.  Return to clinic 2 weeks      Estimated Blood Loss:   100 cc    Specimens:    None       Drains: None  Urethral Catheter 16 fr (Active)   Catheter Care Done 09/07/21 2051   Collection Container Standard 09/08/21 0954   Securement Method Securing device (Describe) 09/08/21 0954   Rationale for Continued Use Retention 09/07/21 1545   Urine Output 400 mL 09/08/21 0954       External Urinary Catheter (Active)   Skin no redness;no breakdown 09/07/21 0000   External Catheter changed Done 09/06/21 2028       Complications:    None    Liam Haji MD     Date: 9/8/2021  Time: 12:07 PM      Implant Name Type Inv. Item Serial No.  Lot No. LRB No. Used Action   IMP SCR STRK LOCK 5.0X75MM FT 1896-5075S - [id number] Metallic Hardware/[place] IMP SCR STRK LOCK 5.0X75MM FT 1896-5075S  [employer] [id number] Right 1 Implanted   IMP SCR STRK LOCK 5.0X70MM FT 1896-5070S - [id number] Metallic Hardware/[place] IMP SCR STRK LOCK 5.0X70MM FT 1896-5070S  [employer] [id number] Right 1 Implanted   IMP SCR STRK LOCK 5.0X60MM FT 1896-5060S - [id number] Metallic Hardware/[place] IMP SCR STRK LOCK 5.0X60MM FT 1896-5060S  [employer] [id number] Right 1 Implanted   SUPRACONDYLAR NAIL    SHANNAN [id number] Right 1 Implanted   LOCKING SCREW, FULLY THREADED 5X70MM     [id number] Right 1 Implanted   LOCKINF SCREW, FULLY THREADED 5X 37.5MM    SHANNAN [id number] Right 1 Implanted   END CAP    SHANNAN [id number] Right 1 Implanted

## 2021-09-08 NOTE — PLAN OF CARE
Problem: Risk for Delirium  Goal: Improved Sleep  Outcome: Improving     Problem: Pain (Orthopaedic Fracture)  Goal: Acceptable Pain Control  Outcome: Improving  Intervention: Manage Acute Orthopaedic-Related Pain  Recent Flowsheet Documentation  Taken 9/8/2021 1600 by Francisco Javier Haro  Pain Management Interventions:   cold applied   pillow support provided   repositioned  Taken 9/8/2021 1530 by Francisco Javier Haro  Pain Management Interventions:   repositioned   pillow support provided   cold applied     Problem: Respiratory Compromise (Orthopaedic Fracture)  Goal: Effective Oxygenation and Ventilation  Outcome: Improving     Patient states they are in no pain but they do moan when turned/repositioned. Pulse ox is at 97% with 3LPM of 02. Finger 02 device malfunctioned so patient was switched to headband. Walked in on patient sleeping several times from 1530 to 1700. Patient had stool incontinence on self so sheets and brief were changed.     Francisco Javier Haro  BSN Nursing  9/8/21

## 2021-09-08 NOTE — ANESTHESIA PREPROCEDURE EVALUATION
Anesthesia Pre-Procedure Evaluation    Patient: Elysia Lane   MRN: [id number] : 1923        Preoperative Diagnosis: Closed fracture of distal end of right femur, unspecified fracture morphology, initial encounter (H) [S72.401A]   Procedure : Procedure(s):  OPEN REDUCTION INTERNAL FIXATION, FRACTURE, FEMUR, DISTAL     Past Medical History:   Diagnosis Date     Anemia      Chronic kidney disease      Essential hypertension      Osteoarthritis      Stenosis of carotid artery       History reviewed. No pertinent surgical history.   No Known Allergies   Social History     Tobacco Use     Smoking status: Never Smoker     Smokeless tobacco: Never Used   Substance Use Topics     Alcohol use: Not Currently      Wt Readings from Last 1 Encounters:   21 62.4 kg (137 lb 8 oz)        Anesthesia Evaluation   Pt has had prior anesthetic.         ROS/MED HX  ENT/Pulmonary:  - neg pulmonary ROS     Neurologic:  - neg neurologic ROS     Cardiovascular:     (+) hypertension-----dysrhythmias, a-fib, valvular problems/murmurs type: MR     METS/Exercise Tolerance:     Hematologic:       Musculoskeletal:   (+) arthritis,     GI/Hepatic:  - neg GI/hepatic ROS     Renal/Genitourinary:     (+) renal disease,     Endo:  - neg endo ROS     Psychiatric/Substance Use:  - neg psychiatric ROS     Infectious Disease:  - neg infectious disease ROS     Malignancy:  - neg malignancy ROS     Other:            Physical Exam    Airway        Mallampati: II   TM distance: > 3 FB   Neck ROM: full   Mouth opening: < 3 cm    Respiratory Devices and Support         Dental  no notable dental history         Cardiovascular          Rhythm and rate: irregular and tachycardia     Pulmonary   pulmonary exam normal        breath sounds clear to auscultation           OUTSIDE LABS:  CBC:   Lab Results   Component Value Date    WBC 12.2 (H) 2021    WBC 11.4 (H) 2021    HGB 8.9 (L) 2021    HGB 8.4 (L) 2021    HGB 8.4 (L)  09/08/2021    HCT 27.6 (L) 09/08/2021    HCT 26.0 (L) 09/08/2021     (L) 09/08/2021     (L) 09/08/2021     BMP:   Lab Results   Component Value Date     09/08/2021     09/07/2021    POTASSIUM 3.6 09/08/2021    POTASSIUM 3.5 09/08/2021    CHLORIDE 108 (H) 09/08/2021    CHLORIDE 107 09/07/2021    CO2 24 09/08/2021    CO2 24 09/07/2021    BUN 37 (H) 09/08/2021    BUN 36 (H) 09/07/2021    CR 0.83 09/08/2021    CR 0.82 09/08/2021    GLC 98 09/08/2021     09/07/2021     COAGS:   Lab Results   Component Value Date    INR 1.09 09/08/2021     POC: No results found for: BGM, HCG, HCGS  HEPATIC:   Lab Results   Component Value Date    ALBUMIN 2.6 (L) 09/08/2021    PROTTOTAL 5.3 (L) 09/08/2021    ALT 27 09/08/2021    AST 56 (H) 09/08/2021    ALKPHOS 38 (L) 09/08/2021    BILITOTAL 0.6 09/08/2021     OTHER:   Lab Results   Component Value Date    LACT 0.8 09/08/2021    ADAM 8.4 (L) 09/08/2021    MAG 1.7 (L) 09/08/2021       Anesthesia Plan    ASA Status:  3   NPO Status:  NPO Appropriate    Anesthesia Type: Spinal.      Maintenance: TIVA.        Consents    Anesthesia Plan(s) and associated risks, benefits, and realistic alternatives discussed. Questions answered and patient/representative(s) expressed understanding.     - Discussed with:  Patient      - Extended Intubation/Ventilatory Support Discussed: No.      - Patient is DNR/DNI Status: No    Use of blood products discussed: No .     Postoperative Care    Pain management: IV analgesics.   PONV prophylaxis: Ondansetron (or other 5HT-3), Dexamethasone or Solumedrol     Comments:                Julián Hollis MD

## 2021-09-08 NOTE — PLAN OF CARE
Problem: UTI (Urinary Tract Infection)  Goal: Improved Infection Symptoms  9/7/2021 1933 by Tg Zavaleta RN  Outcome: Improving  9/7/2021 1355 by Tg Zavaleta RN  Outcome: Improving   IV antibiotics infusing as ordered.     Problem: Dysrhythmia  Goal: Normalized Cardiac Rhythm  9/7/2021 1933 by Tg Zavaleta RN  Outcome: Improving  9/7/2021 1355 by Tg Zavaleta RN  Outcome: Improving   -140s.  PRN IV metoprolol given x1. Denies shortness of breath nor palpitations. Will continue to monitor

## 2021-09-08 NOTE — OR NURSING
Report rec'd from RACHEL Shrestha P3.    RN will give Metoprolol 25mg   Pt has pérez catheter  Pt rec Rocephin Q24H last dose given 1900 9/7  RN reports pt has swelling at hip, bruised knee.  Denies pain.  Will send NA to  pt from room 330

## 2021-09-08 NOTE — PLAN OF CARE
Problem: Adult Inpatient Plan of Care  Goal: Plan of Care Review  Outcome: No Change     Problem: UTI (Urinary Tract Infection)  Goal: Improved Infection Symptoms  Outcome: No Change     Problem: Fracture Stabilization and Management (Orthopaedic Fracture)  Goal: Fracture Stability  Outcome: No Change     Patient is confused but is pleasant and cooperative.  Right knee is swollen.  Pt reports that the morphine helped with the pain and doesn't need any pain meds right now.  Scheduled tylenol given.  CMS is intact.  Will continue to monitor.    NPO for right femur ORIF today.  Sun in place and draining.

## 2021-09-08 NOTE — ANESTHESIA CARE TRANSFER NOTE
Patient: Elysia Lane    Procedure(s):  OPEN REDUCTION INTERNAL FIXATION, FRACTURE, FEMUR, DISTAL    Diagnosis: Closed fracture of distal end of right femur, unspecified fracture morphology, initial encounter (H) [S72.401A]  Diagnosis Additional Information: No value filed.    Anesthesia Type:   Spinal     Note:    Oropharynx: oropharynx clear of all foreign objects and spontaneously breathing  Level of Consciousness: awake  Oxygen Supplementation: room air    Independent Airway: airway patency satisfactory and stable  Dentition: dentition unchanged  Vital Signs Stable: post-procedure vital signs reviewed and stable  Report to RN Given: handoff report given  Patient transferred to: PACU    Handoff Report: Identifed the Patient, Identified the Reponsible Provider, Reviewed the pertinent medical history, Discussed the surgical course, Reviewed Intra-OP anesthesia mangement and issues during anesthesia, Set expectations for post-procedure period and Allowed opportunity for questions and acknowledgement of understanding      Vitals:  Vitals Value Taken Time   BP     Temp     Pulse     Resp     SpO2         Electronically Signed By: NICOLÁS Vail CRNA  September 8, 2021  12:59 PM

## 2021-09-09 ENCOUNTER — APPOINTMENT (OUTPATIENT)
Dept: OCCUPATIONAL THERAPY | Facility: HOSPITAL | Age: 86
DRG: 854 | End: 2021-09-09
Attending: FAMILY MEDICINE
Payer: MEDICARE

## 2021-09-09 ENCOUNTER — APPOINTMENT (OUTPATIENT)
Dept: PHYSICAL THERAPY | Facility: HOSPITAL | Age: 86
DRG: 854 | End: 2021-09-09
Attending: FAMILY MEDICINE
Payer: MEDICARE

## 2021-09-09 LAB
ALBUMIN SERPL-MCNC: 3 G/DL (ref 3.5–5)
ALP SERPL-CCNC: 36 U/L (ref 45–120)
ALT SERPL W P-5'-P-CCNC: 21 U/L (ref 0–45)
ANION GAP SERPL CALCULATED.3IONS-SCNC: 8 MMOL/L (ref 5–18)
AST SERPL W P-5'-P-CCNC: 45 U/L (ref 0–40)
BILIRUB SERPL-MCNC: 0.7 MG/DL (ref 0–1)
BUN SERPL-MCNC: 26 MG/DL (ref 8–28)
CALCIUM SERPL-MCNC: 8.6 MG/DL (ref 8.5–10.5)
CHLORIDE BLD-SCNC: 108 MMOL/L (ref 98–107)
CO2 SERPL-SCNC: 26 MMOL/L (ref 22–31)
CREAT SERPL-MCNC: 0.86 MG/DL (ref 0.6–1.1)
ERYTHROCYTE [DISTWIDTH] IN BLOOD BY AUTOMATED COUNT: 12.8 % (ref 10–15)
GFR SERPL CREATININE-BSD FRML MDRD: 57 ML/MIN/1.73M2
GLUCOSE BLD-MCNC: 109 MG/DL (ref 70–125)
GLUCOSE BLDC GLUCOMTR-MCNC: 103 MG/DL (ref 70–125)
GLUCOSE BLDC GLUCOMTR-MCNC: 156 MG/DL (ref 70–125)
HCT VFR BLD AUTO: 24.6 % (ref 35–47)
HGB BLD-MCNC: 7.9 G/DL (ref 11.7–15.7)
INR PPP: 1.15 (ref 0.85–1.15)
LACTATE SERPL-SCNC: 1 MMOL/L (ref 0.7–2)
LACTATE SERPL-SCNC: 1 MMOL/L (ref 0.7–2)
LACTATE SERPL-SCNC: 1.8 MMOL/L (ref 0.7–2)
LACTATE SERPL-SCNC: 2.1 MMOL/L (ref 0.7–2)
MAGNESIUM SERPL-MCNC: 1.7 MG/DL (ref 1.8–2.6)
MCH RBC QN AUTO: 32.4 PG (ref 26.5–33)
MCHC RBC AUTO-ENTMCNC: 32.1 G/DL (ref 31.5–36.5)
MCV RBC AUTO: 101 FL (ref 78–100)
PLATELET # BLD AUTO: 121 10E3/UL (ref 150–450)
POTASSIUM BLD-SCNC: 3.5 MMOL/L (ref 3.5–5)
PROT SERPL-MCNC: 5.5 G/DL (ref 6–8)
RBC # BLD AUTO: 2.44 10E6/UL (ref 3.8–5.2)
SODIUM SERPL-SCNC: 142 MMOL/L (ref 136–145)
WBC # BLD AUTO: 10.4 10E3/UL (ref 4–11)

## 2021-09-09 PROCEDURE — 82040 ASSAY OF SERUM ALBUMIN: CPT | Performed by: FAMILY MEDICINE

## 2021-09-09 PROCEDURE — 210N000001 HC R&B IMCU HEART CARE

## 2021-09-09 PROCEDURE — 99233 SBSQ HOSP IP/OBS HIGH 50: CPT | Mod: 24 | Performed by: INTERNAL MEDICINE

## 2021-09-09 PROCEDURE — 97162 PT EVAL MOD COMPLEX 30 MIN: CPT | Mod: GP

## 2021-09-09 PROCEDURE — 250N000013 HC RX MED GY IP 250 OP 250 PS 637: Performed by: INTERNAL MEDICINE

## 2021-09-09 PROCEDURE — 97166 OT EVAL MOD COMPLEX 45 MIN: CPT | Mod: GO | Performed by: OCCUPATIONAL THERAPIST

## 2021-09-09 PROCEDURE — 36415 COLL VENOUS BLD VENIPUNCTURE: CPT | Performed by: FAMILY MEDICINE

## 2021-09-09 PROCEDURE — 83735 ASSAY OF MAGNESIUM: CPT | Performed by: HOSPITALIST

## 2021-09-09 PROCEDURE — 83605 ASSAY OF LACTIC ACID: CPT | Performed by: INTERNAL MEDICINE

## 2021-09-09 PROCEDURE — 250N000011 HC RX IP 250 OP 636: Performed by: PHYSICIAN ASSISTANT

## 2021-09-09 PROCEDURE — G0463 HOSPITAL OUTPT CLINIC VISIT: HCPCS

## 2021-09-09 PROCEDURE — 250N000013 HC RX MED GY IP 250 OP 250 PS 637: Performed by: FAMILY MEDICINE

## 2021-09-09 PROCEDURE — 85014 HEMATOCRIT: CPT | Performed by: FAMILY MEDICINE

## 2021-09-09 PROCEDURE — 99233 SBSQ HOSP IP/OBS HIGH 50: CPT | Performed by: INTERNAL MEDICINE

## 2021-09-09 PROCEDURE — 83605 ASSAY OF LACTIC ACID: CPT | Performed by: FAMILY MEDICINE

## 2021-09-09 PROCEDURE — 85610 PROTHROMBIN TIME: CPT | Performed by: PHYSICIAN ASSISTANT

## 2021-09-09 PROCEDURE — 250N000013 HC RX MED GY IP 250 OP 250 PS 637: Performed by: PHYSICIAN ASSISTANT

## 2021-09-09 PROCEDURE — 97530 THERAPEUTIC ACTIVITIES: CPT | Mod: GP

## 2021-09-09 PROCEDURE — 36415 COLL VENOUS BLD VENIPUNCTURE: CPT | Performed by: INTERNAL MEDICINE

## 2021-09-09 RX ORDER — METOPROLOL TARTRATE 25 MG/1
50 TABLET, FILM COATED ORAL
Status: DISCONTINUED | OUTPATIENT
Start: 2021-09-09 | End: 2021-09-10

## 2021-09-09 RX ORDER — METOPROLOL TARTRATE 25 MG/1
25 TABLET, FILM COATED ORAL ONCE
Status: COMPLETED | OUTPATIENT
Start: 2021-09-09 | End: 2021-09-09

## 2021-09-09 RX ORDER — DILTIAZEM HYDROCHLORIDE 30 MG/1
30 TABLET, FILM COATED ORAL EVERY 6 HOURS SCHEDULED
Status: COMPLETED | OUTPATIENT
Start: 2021-09-09 | End: 2021-09-11

## 2021-09-09 RX ORDER — METOPROLOL TARTRATE 25 MG/1
25 TABLET, FILM COATED ORAL
Status: DISCONTINUED | OUTPATIENT
Start: 2021-09-09 | End: 2021-09-09

## 2021-09-09 RX ADMIN — ANORECTAL OINTMENT: 15.7; .44; 24; 20.6 OINTMENT TOPICAL at 17:22

## 2021-09-09 RX ADMIN — ACETAMINOPHEN 975 MG: 325 TABLET ORAL at 17:20

## 2021-09-09 RX ADMIN — Medication 100 MCG: at 08:50

## 2021-09-09 RX ADMIN — APIXABAN 5 MG: 5 TABLET, FILM COATED ORAL at 11:33

## 2021-09-09 RX ADMIN — ACETAMINOPHEN 975 MG: 325 TABLET ORAL at 00:27

## 2021-09-09 RX ADMIN — HYDROMORPHONE HYDROCHLORIDE 0.2 MG: 0.2 INJECTION, SOLUTION INTRAMUSCULAR; INTRAVENOUS; SUBCUTANEOUS at 11:08

## 2021-09-09 RX ADMIN — DILTIAZEM HYDROCHLORIDE 30 MG: 30 TABLET, FILM COATED ORAL at 14:47

## 2021-09-09 RX ADMIN — METOPROLOL TARTRATE 50 MG: 25 TABLET, FILM COATED ORAL at 14:47

## 2021-09-09 RX ADMIN — ASPIRIN 325 MG: 325 TABLET ORAL at 08:51

## 2021-09-09 RX ADMIN — APIXABAN 5 MG: 5 TABLET, FILM COATED ORAL at 20:48

## 2021-09-09 RX ADMIN — CEFAZOLIN 1 G: 1 INJECTION, POWDER, FOR SOLUTION INTRAMUSCULAR; INTRAVENOUS at 02:49

## 2021-09-09 RX ADMIN — ANORECTAL OINTMENT: 15.7; .44; 24; 20.6 OINTMENT TOPICAL at 20:49

## 2021-09-09 RX ADMIN — CEFAZOLIN 1 G: 1 INJECTION, POWDER, FOR SOLUTION INTRAMUSCULAR; INTRAVENOUS at 09:21

## 2021-09-09 RX ADMIN — METOPROLOL TARTRATE 25 MG: 25 TABLET, FILM COATED ORAL at 11:28

## 2021-09-09 RX ADMIN — DILTIAZEM HYDROCHLORIDE 30 MG: 30 TABLET, FILM COATED ORAL at 17:20

## 2021-09-09 RX ADMIN — CEFAZOLIN 1 G: 1 INJECTION, POWDER, FOR SOLUTION INTRAMUSCULAR; INTRAVENOUS at 17:20

## 2021-09-09 RX ADMIN — OXYCODONE HYDROCHLORIDE 5 MG: 5 TABLET ORAL at 16:11

## 2021-09-09 RX ADMIN — METOPROLOL TARTRATE 25 MG: 25 TABLET, FILM COATED ORAL at 09:20

## 2021-09-09 RX ADMIN — ACETAMINOPHEN 975 MG: 325 TABLET ORAL at 08:49

## 2021-09-09 RX ADMIN — OXYCODONE HYDROCHLORIDE 5 MG: 5 TABLET ORAL at 20:48

## 2021-09-09 RX ADMIN — Medication 25 MCG: at 08:50

## 2021-09-09 RX ADMIN — METOPROLOL TARTRATE 50 MG: 25 TABLET, FILM COATED ORAL at 20:49

## 2021-09-09 RX ADMIN — HYDROMORPHONE HYDROCHLORIDE 0.2 MG: 0.2 INJECTION, SOLUTION INTRAMUSCULAR; INTRAVENOUS; SUBCUTANEOUS at 09:07

## 2021-09-09 ASSESSMENT — ACTIVITIES OF DAILY LIVING (ADL): PREVIOUS_RESPONSIBILITIES: LAUNDRY;MEAL PREP

## 2021-09-09 ASSESSMENT — MIFFLIN-ST. JEOR: SCORE: 1216.87

## 2021-09-09 NOTE — PROGRESS NOTES
"Clinically Significant Risk Factors Present on Admission              Cardiovascular: Cardiac Arrhythmia: Atrial fibrillation: Persistent            Nephrology: Stage 3 (unspecified if a or b) (GFR 30 - 59)        Systemic: Chronic Fatigue and Other Debilities: Age-related physical debility  Limitation of activities due to disability  Other reduced mobility           HEART CARE CONSULTATON NOTE        Assessment/Recommendations   Assessment:  1.  Atrial fibrillation: New onset persistent atrial fibrillation with rapid ventricular response of unknown duration and asymptomatic.  May have been triggered by UTI and a fall with femur fracture.  2.  Anticoagulation: PUZ1PD1-PUYd score of 4 for age, gender and hypertension  3.  Right femur fracture after a fall status post repair done on 9/8/2021       Plan:  1.  Chest x-ray will consider diuretic therapy  2.  Increase dose of metoprolol and add low-dose diltiazem with holding parameters for better rate control  3.  Anticoagulation discussed with Ortho, family and patient.  Will start on Eliquis 5 mg twice daily         History of Present Illness/Subjective      Lying flat in bed appears to be breathing comfortably       Physical Examination  Review of Systems   VITALS: /62 (BP Location: Right arm)   Pulse (!) 121   Temp 98.2  F (36.8  C) (Oral)   Resp 20   Ht 1.575 m (5' 2\")   Wt 87.9 kg (193 lb 11.2 oz)   SpO2 90%   BMI 35.43 kg/m    BMI: Body mass index is 35.43 kg/m .  Wt Readings from Last 3 Encounters:   09/09/21 87.9 kg (193 lb 11.2 oz)       Intake/Output Summary (Last 24 hours) at 9/9/2021 1330  Last data filed at 9/9/2021 0600  Gross per 24 hour   Intake 125 ml   Output 800 ml   Net -675 ml     General Appearance:   no distress, normal body habitus   ENT/Mouth: membranes moist, no oral lesions or bleeding gums.      EYES:  no scleral icterus, normal conjunctivae   Neck: no carotid bruits or thyromegaly   Chest/Lungs:   lungs are clear to auscultation "   Cardiovascular:   irregular. Normal first and second heart sounds with no murmurs   Abdomen:  no organomegaly, masses, bruits, or tenderness; bowel sounds are present   Extremities: no cyanosis or clubbing   Skin: no xanthelasma, warm.    Neurologic: normal  bilateral, no tremors     Psychiatric: alert and oriented x3, calm     Review Of Systems  Skin: negative  Eyes: negative  Ears/Nose/Throat: negative  Respiratory: No shortness of breath, dyspnea on exertion, cough, or hemoptysis  Cardiovascular: negative  Gastrointestinal: negative  Genitourinary: negative  Musculoskeletal: negative  Neurologic: negative  Psychiatric: negative  Hematologic/Lymphatic/Immunologic: negative  Endocrine: negative          Lab Results    Chemistry/lipid CBC Cardiac Enzymes/BNP/TSH/INR   Recent Labs   Lab Test 04/11/17  1039   CHOL 222*   HDL 65   *   TRIG 96     Recent Labs   Lab Test 04/11/17  1039 04/13/15  1151   * 130*     Recent Labs   Lab Test 09/09/21  1104 09/09/21  0534   NA  --  142   POTASSIUM  --  3.5   CHLORIDE  --  108*   CO2  --  26   * 109   BUN  --  26   CR  --  0.86   GFRESTIMATED  --  57*   ADAM  --  8.6     Recent Labs   Lab Test 09/09/21  0534 09/08/21  0529 09/08/21  0457   CR 0.86 0.83 0.82     No results for input(s): A1C in the last 39356 hours.       Recent Labs   Lab Test 09/09/21  0534   WBC 10.4   HGB 7.9*   HCT 24.6*   *   *     Recent Labs   Lab Test 09/09/21  0534 09/08/21  1800 09/08/21  0529   HGB 7.9* 8.8* 8.9*    Recent Labs   Lab Test 09/06/21  1231 11/19/18  0904   TROPONINI 0.06 0.01     No results for input(s): BNP, NTBNPI, NTBNP in the last 81535 hours.  Recent Labs   Lab Test 09/08/21  1806   TSH 1.55     Recent Labs   Lab Test 09/09/21  0534 09/08/21  0529   INR 1.15 1.09        Medical History  Surgical History Family History Social History   Past Medical History:   Diagnosis Date     Anemia      Chronic kidney disease      Essential hypertension       Osteoarthritis      Stenosis of carotid artery      Past Surgical History:   Procedure Laterality Date     OPEN REDUCTION INTERNAL FIXATION FEMUR DISTAL Right 9/8/2021    Procedure: OPEN REDUCTION INTERNAL FIXATION, FRACTURE, FEMUR, DISTAL;  Surgeon: Liam Haji MD;  Location: [facility] Main OR     Family History   Problem Relation Age of Onset     No Known Problems Mother      No Known Problems Father         Social History     Socioeconomic History     Marital status: Single     Spouse name: Not on file     Number of children: Not on file     Years of education: Not on file     Highest education level: Not on file   Occupational History     Not on file   Tobacco Use     Smoking status: Never Smoker     Smokeless tobacco: Never Used   Vaping Use     Vaping Use: Never assessed   Substance and Sexual Activity     Alcohol use: Not Currently     Drug use: Never     Sexual activity: Not on file   Other Topics Concern     Parent/sibling w/ CABG, MI or angioplasty before 65F 55M? Not Asked   Social History Narrative     Not on file     Social Determinants of Health     Financial Resource Strain:      Difficulty of Paying Living Expenses:    Food Insecurity:      Worried About Running Out of Food in the Last Year:      Ran Out of Food in the Last Year:    Transportation Needs:      Lack of Transportation (Medical):      Lack of Transportation (Non-Medical):    Physical Activity:      Days of Exercise per Week:      Minutes of Exercise per Session:    Stress:      Feeling of Stress :    Social Connections:      Frequency of Communication with Friends and Family:      Frequency of Social Gatherings with Friends and Family:      Attends Hoahaoism Services:      Active Member of Clubs or Organizations:      Attends Club or Organization Meetings:      Marital Status:    Intimate Partner Violence:      Fear of Current or Ex-Partner:      Emotionally Abused:      Physically Abused:      Sexually Abused:          Medications   Allergies   No current outpatient medications on file.      No Known Allergies      Rhonda Marshall MD

## 2021-09-09 NOTE — PROGRESS NOTES
Spiritual Health Note    Spiritual Assessment:     visited patient due to length of stay and to initiate relationship of support.     Patient shared at length about family/life history. She grew up in [facility] and then spent some years in [place] before returning to [place]. She is one of 9 children in her family; she has two younger brothers still living. She shared about her three children who are all , she does not have any grandchildren. She shared about her father's [place] heritage and her mother's [place] heritage, both immigrated to the .     Patient comes from Hoahaoism david background and derives meaning, purpose, and comfort from david. Patient welcomed prayer and expressed appreciation for the visit.      Care Provided:    Introduced self and role of Spiritual Care     Empathic listening and presence     Helped patient in processing of emotions     Facilitated storytelling and life review     Guided patient in exploration of beliefs     Offered prayer     Plan of Care: A  will continue to visit as able or per request by patient/family/staff.         Lex Gama MDiv, [facility]

## 2021-09-09 NOTE — PROGRESS NOTES
Wound Ostomy  WOC Assessment       Allergies:  No Known Allergies    Diagnosis:   Patient Active Problem List    Diagnosis Date Noted     Rapid atrial fibrillation (H) 09/06/2021     Priority: Medium     Closed fracture of distal end of right femur, unspecified fracture morphology, initial encounter (H) 09/06/2021     Priority: Medium       Height:  [unfilled]    Weight:  [unfilled]    Labs:  Recent Labs   Lab Test 09/07/21  0521   HGB 9.4*   ALBUMIN 3.1*       Shahab:  Shahab Score: 13    Specialty Bed:       Wound culture obtained: No    Edema:  Yes:  Localized    Anatomic Site/Laterality: Bilateral ITs and buttocks    Reason for ongoing care:   Wound assessment and plan of care     Encounter Type:  Subsequent Encounter Wound Type:   Pressure Injury (Pressure Ulcer): Present on Admit    Stage:  Deep Tissue Injury    Associated conditions/related trauma: Fall at home, unclear how long she was down prior to admission    Assessment:    Left IT 3x3x0.1cm- 70% agranular, 30% slough     Right IT: 2x3cm intact scar tissue/erythema    Buttocks: now blanchable erythema and scattered areas of partial thickness skin damage measuring between 0.5x05cm and 1.5x0.5cm    Tunneling/Undermining: No    Wound Bed: see above    Exudate: No    Periwound Skin: Intact    Treatment Plan: Cream/Ointment: calmoseptine and MAURICE mattress. Patient having more incontinence of stools now and mepilex getting soiled frequently.       Nursing care provided was incontinence Care and coordinated care with RN.    Discussed plan of care with nurse and patient.    Outcomes and treatment recommendations are to promote skin integrity and promote wound healing.    Actions taken by WOC RN: 2 minutes of education.    Planned Follow Up: Weekly.    Plan for next visit: Reassess wound(s) and Reassess skin integrity

## 2021-09-09 NOTE — PROGRESS NOTES
[facility]  Hospitalist Progress Note    Admit Date:  9/6/2021  Date of Service (when I saw the patient): 09/09/2021   Provider:  Cynthia Granger, DO    Assessment & Plan   Elysia Lane is a 89 year old female with a history of carotid artery, hypertension, anemia presented to the hospital for evaluation of right leg pain after fall was found to have new onset A. fib and right    hip fracture,    Problem List:    1. Traumatic closed fracture of the distal femur and significant right knee contusion  POD1 - retrograde IM nailing right supracondylar distal femur fracture  Having a lot of pain post-op this am  D/w bedside RN - likely pt got behind on pain meds  Will give IV dilaudid now and continue to optimize pain control     2. New onset a. fib with RVR  Still with RVR this am  Will work on improving post-op pain control, as above  Will increase metoprolol frequency to 25mg po q6hr and continue with po prn dosing, as well  Will also consider adding cardizem or digoxin pending on BP and HR trends later this am    Cardiology has been following and will await any further recs on rounds pending HR trends at that time  Echo 9/7/21 showed EF of 45 to 50%, with mild global hypokinesia of the left ventricle,    CHADS score appears to be 4 - will need to discuss further with Cards and ortho regarding recs for oral anti-coagualation  PTA was on asa 325mg po daily (has been continued post-op in place of post-op asa 81mg po bid dosing ordered)    TSH within normal limits  Will continue to supplement magnesium       3. E. Coli UTI with sepsis  On admission she had leukocytosis, tachycardia, tachypnea and evidence of abnormal UA  Also had elevated lactic acid  Urine culture 9/6/21 positive for pan-sensitive e.coli  Received 2 days of IV rocephin and then switched to IV cefazolin pre and post-op ortho instructions (which will cover her UTI as well on review of UC and sens)    4. Lactic acidosis    Was elevated on admission and then normalized with IVF and improvement of HR     5. Acute blood loss anemia  Chronicity unclear  Last hgb 12.2 (11/2018)  hgb trended down today to 7.9 (8.8)  Will repeat hgb in am    Significant bruising noted from traumatic fall  Will continue to monitor closely in the post-op period    6.  H/O HTN  Will hold PTA norvasc today to allow for further titration of other cardiac meds needed for HR control    Diet: Advance Diet as Tolerated: Regular Diet Adult    DVT Prophylaxis: per surgical service  Sun Catheter: PRESENT, indication: Retention  Code Status: No CPR- Do NOT Intubate      Entered: Cynthia Granger, DO 09/09/2021, 7:53 AM     The patient's care was discussed with the Bedside Nurse, Patient and Patient's Family (designated POA present)    Interval History   Seen with RN at bedside.  C/o severe right leg pain this am.  No CP or palpitations.  HR has been very elevated this am again.  Was given 2 doses of prn 25 mg metoprolol overnight for elevated HR.      -Data reviewed today: I reviewed all new labs and imaging results over the last 24 hours. I personally reviewed no images or EKG's today.    Physical Exam   Temp: 97.7  F (36.5  C) Temp src: Oral BP: 110/70 Pulse: (!) 127   Resp: 24 SpO2: 94 % O2 Device: Oxymask Oxygen Delivery: 3 LPM  Vitals:    09/07/21 0325 09/08/21 0500 09/09/21 0557   Weight: 62.3 kg (137 lb 4.8 oz) 62.4 kg (137 lb 8 oz) 87.9 kg (193 lb 11.2 oz)     Vital Signs with Ranges  Temp:  [97.1  F (36.2  C)-98.8  F (37.1  C)] 97.7  F (36.5  C)  Pulse:  [] 127  Resp:  [16-24] 24  BP: ()/(50-85) 110/70  SpO2:  [87 %-100 %] 94 %  I/O last 3 completed shifts:  In: 1500 [P.O.:75; I.V.:925]  Out: 1300 [Urine:1200; Blood:100]    GEN:  Alert, awake, quite uncomfortable this am   HEENT:  Normocephalic/atraumatic, no scleral icterus, no nasal discharge, mouth and membranes appear fairly moist  NECK:  No clear thyromegaly or clear JVD  CV:  Tachy  and irregular rate and rhythm, no loud murmur to ausc.  S1 + S2 noted, no S3 or S4.  LUNGS:  Clear to auscultation ant/lat bilaterally. No clear rales/rhonchi/wheezing auscultated bilaterally.  No costal retractions bilaterally.  Mild tachypnea and shallower breaths noted d/t pain. Symmetric chest rise on inhalation noted.  ABD:  Active bowel sounds, soft, non-tender, mildly distended.  No clear rebound/guarding/rigidity.  No masses palpated.  No obvious HSM to exam.  EXT:  Right leg in ACE wrap.  Surgical dressing that is visualized is clean and dry. Toes on both feet warm, pink.    SKIN:  Dry to touch, no rashes or jaundice noted.  PSYCH:  Mood slightly anxious and upset about her current pain  NEURO:  Resting tremor not noticed in the right hand at rest today. Very [place] but CN 2-12 otherwise grossly intact to testing bilaterally.    Data   Labs:  No results for input(s): CULT in the last 168 hours.  Recent Labs   Lab 09/09/21  0735 09/09/21  0534 09/08/21  1701 09/08/21  0529 09/08/21  0457 09/07/21  0521   NA  --  142  --   --  142 142   POTASSIUM  --  3.5  --  3.6 3.5 3.7   CHLORIDE  --  108*  --   --  108* 107   CO2  --  26  --   --  24 24   ANIONGAP  --  8  --   --  10 11    109 113  --  98 113   BUN  --  26  --   --  37* 36*   CR  --  0.86  --  0.83 0.82 0.94   GFRESTIMATED  --  57*  --  59* 60* 51*   ADAM  --  8.6  --   --  8.4* 8.9     Recent Labs   Lab 09/09/21  0534 09/08/21  1800 09/08/21 0529 09/08/21 0457   WBC 10.4  --  12.2* 11.4*   HGB 7.9* 8.8* 8.9* 8.4*  8.4*   HCT 24.6*  --  27.6* 26.0*   *  --  100 100   *  --  138* 116*     Recent Labs   Lab 09/09/21  0735 09/09/21  0534 09/08/21  1701 09/08/21  0529 09/08/21  0457 09/07/21  0521   NA  --  142  --   --  142 142   POTASSIUM  --  3.5  --  3.6 3.5 3.7   CHLORIDE  --  108*  --   --  108* 107   CO2  --  26  --   --  24 24   ANIONGAP  --  8  --   --  10 11    109 113  --  98 113   BUN  --  26  --   --  37* 36*   CR  --   0.86  --  0.83 0.82 0.94   GFRESTIMATED  --  57*  --  59* 60* 51*   ADAM  --  8.6  --   --  8.4* 8.9   MAG  --  1.7*  --   --  1.7* 1.6*   PROTTOTAL  --  5.5*  --   --  5.3* 5.9*   ALBUMIN  --  3.0*  --   --  2.6* 3.1*   BILITOTAL  --  0.7  --   --  0.6 1.0   ALKPHOS  --  36*  --   --  38* 43*   AST  --  45*  --   --  56* 76*   ALT  --  21  --   --  27 28     Recent Labs   Lab 09/09/21  0534 09/08/21  0529   INR 1.15 1.09     Recent Labs   Lab 09/06/21  1346   COLOR Yellow   APPEARANCE Turbid*   URINEGLC Negative   URINEBILI Negative   URINEKETONE 10 *   SG 1.021   UBLD >1.0 mg/dL*   URINEPH 5.0   PROTEIN 30 *   NITRITE Positive*   LEUKEST 250 [place]/uL*   RBCU 10*   WBCU 37*      Recent Imaging:   Recent Results (from the past 24 hour(s))   XR Surgery ANNETTE Fluoro L/T 5 Min    Narrative    This exam was marked as non-reportable because it will not be read by a   radiologist or a [place] non-radiologist provider.             Medications       acetaminophen  975 mg Oral Q8H     amLODIPine  5 mg Oral Daily     aspirin  325 mg Oral Daily     ceFAZolin  1 g Intravenous Q8H     cyanocobalamin  100 mcg Oral Daily     [Held by provider] enoxaparin ANTICOAGULANT  30 mg Subcutaneous Q24H     metoprolol tartrate  50 mg Oral BID     polyethylene glycol  17 g Oral Daily     senna-docusate  1 tablet Oral BID     sodium chloride (PF)  3 mL Intracatheter Q8H     cholecalciferol  25 mcg Oral Daily

## 2021-09-09 NOTE — PLAN OF CARE
Problem: Adult Inpatient Plan of Care  Goal: Plan of Care Review  Outcome: Improving     Problem: Adult Inpatient Plan of Care  Goal: Optimal Comfort and Wellbeing  Outcome: Improving     Pt alert and oriented to self, reports some discomfort in her right leg. Schedule tylenol given with good relief. Pt resting in between cares. Right leg dressing in place, ice applied and elevated on pillows.   Pt on 3 liters oxymask overnight, does de-sat to low 80's when she takes off mask.

## 2021-09-09 NOTE — PLAN OF CARE
Problem: Dysrhythmia  Goal: Normalized Cardiac Rhythm  Outcome: No Change     Problem: Adult Inpatient Plan of Care  Goal: Absence of Hospital-Acquired Illness or Injury  Intervention: Identify and Manage Fall Risk  Recent Flowsheet Documentation  Taken 9/8/2021 2054 by Diana Shah RN  Safety Promotion/Fall Prevention:    activity supervised    assistive device/personal items within reach    safety round/check completed  Taken 9/8/2021 1800 by Diana Shah RN  Safety Promotion/Fall Prevention:    activity supervised    assistive device/personal items within reach    safety round/check completed     Problem: Functional Ability Impaired (Orthopaedic Fracture)  Goal: Optimal Functional Ability  Intervention: Optimize Functional Ability  Recent Flowsheet Documentation  Taken 9/8/2021 2054 by Diana Shah RN  Activity Management: activity adjusted per tolerance  Taken 9/8/2021 1800 by Diana Shah RN  Activity Management: activity adjusted per tolerance     Problem: Respiratory Compromise (Orthopaedic Fracture)  Goal: Effective Oxygenation and Ventilation  Intervention: Promote Airway Secretion Clearance  Recent Flowsheet Documentation  Taken 9/8/2021 2054 by Diana Shah RN  Cough And Deep Breathing: done with encouragement  Taken 9/8/2021 1800 by Diana Shah RN  Cough And Deep Breathing: done with encouragement   Patient is alert and able to her needs be known. Prn oxycodone given, for leg pain, this was effective for her. On bedrest. Patient is Afib on the monitor and tachy at times 120-130. PRN metoprolol given, this was some what effective. IVF discontinued. Will continue to monitor.   PICC team placed new IV this shift. The IV in left AC infiltrated.

## 2021-09-09 NOTE — PLAN OF CARE
Problem: UTI (Urinary Tract Infection)  Goal: Improved Infection Symptoms  Outcome: Improving     Problem: Dysrhythmia  Goal: Normalized Cardiac Rhythm  Outcome: Improving     Problem: Fracture Stabilization and Management (Orthopaedic Fracture)  Goal: Fracture Stability  Outcome: Improving     Problem: Functional Ability Impaired (Orthopaedic Fracture)  Goal: Optimal Functional Ability  Outcome: Improving   Pt alert to self but does have some confusion with date and situation. Pt states she is having rt leg pain. Reinforced pt that she had surgery.  Pt very forgetful.  Lungs clear. Vitals stable. Pt remains in Afib with RVR. Dr. Marshall notified of Afib with RVR see  medication sheet. Continue to monitor vitals.

## 2021-09-09 NOTE — PROGRESS NOTES
Occupational Therapy      09/09/21 0930   Quick Adds   Type of Visit Initial Occupational Therapy Evaluation   Living Environment   People in home alone   Current Living Arrangements apartment   Home Accessibility no concerns  (Elevator )   Transportation Anticipated family or friend will provide   Living Environment Comments Tub/shower (pt does not use, sponge bathes for safety)   Self-Care   Usual Activity Tolerance good   Current Activity Tolerance fair   Equipment Currently Used at Home cane, straight;walker, rolling;dressing device  (4WW, sock aid, shoe horn, reacher, shower stool-out of tub )   Activity/Exercise/Self-Care Comment Pt mod I with all ADL, requires assist with IADL of driving, obtaining groceries and med mng from friends, has     Instrumental Activities of Daily Living (IADL)   Previous Responsibilities laundry;meal prep   Disability/Function   Hearing Difficulty or Deaf yes   Patient's preferred means of communication   (Mild hearing loss)   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;hearing loss on left side   Wear Glasses or Blind yes   Vision Management Glasses    Difficulty Communicating no   Difficulty Eating/Swallowing no   Walking or Climbing Stairs Difficulty yes   Walking or Climbing Stairs ambulation difficulty, requires equipment   Dressing/Bathing Difficulty yes   Dressing/Bathing bathing difficulty, requires equipment   Toileting issues no   Doing Errands Independently Difficulty (such as shopping) yes   Fall history within last six months yes   Number of times patient has fallen within last six months 1   Change in Functional Status Since Onset of Current Illness/Injury yes   General Information   Onset of Illness/Injury or Date of Surgery 09/06/21   Referring Physician Dexter Ny MD   Patient/Family Therapy Goal Statement (OT) To decrease pain    Existing Precautions/Restrictions fall  (FWB for t/f or pivot, pt is not to ambulate per MD orders)   Right  Lower Extremity (Weight-bearing Status)   (WBAT with t/fs/pivot )   Cognitive Status Examination   Orientation Status person   Affect/Mental Status (Cognitive) confused   Follows Commands WFL   Memory Deficit minimal deficit   Sensory   Sensory Quick Adds   (Denies issues with limbs )   Pain Assessment   Patient Currently in Pain   (Yes, moderate in RLE at times, nursing in room/aware )   Integumentary/Edema   Integumentary/Edema   (RLE edema )   Range of Motion Comprehensive   General Range of Motion upper extremity range of motion deficits identified   General Upper Extremity Assessment (Range of Motion)   Upper Extremity: Range of Motion shoulder, left: UE ROM;shoulder, right: UE ROM;elbow/forearm, left: UE ROM;elbow/forearm, right: UE ROM;wrist, left: UE ROM;wrist, right: UE ROM   Comment: Upper Extremity ROM B shoulder AROM <50% full, B elbow, wrist and digits WFL    Strength Comprehensive (MMT)   General Manual Muscle Testing (MMT) Assessment upper extremity strength deficits identified   Upper Extremity (Manual Muscle Testing)   Upper Extremity: Manual Muscle Testing (MMT) left shoulder strength deficit;right shoulder strength deficit;left elbow/forearm strength deficit;right elbow/forearm strength deficit;left wrist strength deficit;right wrist strength deficit   Coordination   Upper Extremity Coordination No deficits were identified   Bed Mobility   Comment (Bed Mobility) Pt limited by increased  - 150 BPM at rest - mobility deferred.    Clinical Impression   Criteria for Skilled Therapeutic Interventions Met (OT) yes;skilled treatment is necessary   OT Diagnosis Decreased endurance and pain impairing ADL    OT Problem List-Impairments impacting ADL problems related to;activity tolerance impaired;balance;cognition;coordination;mobility;motor control;range of motion (ROM);strength;pain;post-surgical precautions   Assessment of Occupational Performance 3-5 Performance Deficits   Identified Performance  Deficits Decreased endurance and pain impairing ADL    Planned Therapy Interventions (OT) ADL retraining;IADL retraining;balance training;bed mobility training;cognition;motor coordination training;neuromuscular re-education;ROM;strengthening;stretching;transfer training;home program guidelines;progressive activity/exercise;risk factor education   Clinical Decision Making Complexity (OT) moderate complexity   Therapy Frequency (OT) Daily   Predicted Duration of Therapy 7 days    Anticipated Equipment Needs Upon Discharge (OT) commode chair   Risk & Benefits of therapy have been explained evaluation/treatment results reviewed   OT Discharge Planning    OT Discharge Recommendation (DC Rec) Transitional Care Facility   OT Rationale for DC Rec Decreased endurance and pain impairing ADL    Total Evaluation Time (Minutes)   Total Evaluation Time (Minutes) 25

## 2021-09-09 NOTE — PROGRESS NOTES
09/09/21 1130   Quick Adds   Type of Visit Initial PT Evaluation   Living Environment   Living Environment Comments see OT eval for living and self care   Self-Care   Equipment Currently Used at Home cane, straight;walker, rolling  (I always have my 4WW and SEC with me (puts cane on seat))   Disability/Function   Hearing Difficulty or Deaf yes   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   Number of times patient has fallen within last six months   (unable to remember if she has had other falls)   General Information   Onset of Illness/Injury or Date of Surgery 09/06/21   Referring Physician  Dexter Ny MD   Patient/Family Therapy Goals Statement (PT) unable to state any goals   Pertinent History of Current Problem (include personal factors and/or comorbidities that impact the POC) moderate complexity   Existing Precautions/Restrictions   (no PT/ROM to R knee, patient can actively move)   Weight-Bearing Status - RLE other (see comments)  (WBAT for transfers/pivot only with assist)   Cognition   Orientation Status (Cognition) oriented to;person;place  (disoriented to situation and time)   Follows Commands (Cognition) WNL   Pain Assessment   Patient Currently in Pain   (yes- did not rate, pain in R LE, RN present/premedicate)   Bed Mobility   Bed Mobility Limitations cognitive deficits;decreased ability to use legs for bridging/pushing   Comment (Bed Mobility) dizzy while sitting EOB, returned to supine and notified RN   Clinical Impression   Criteria for Skilled Therapeutic Intervention yes, treatment indicated   PT Diagnosis (PT) impaired functional mobility   Influenced by the following impairments weakness, pain   Functional limitations due to impairments transfers   Clinical Presentation Stable/Uncomplicated   Clinical Presentation Rationale patient presents as medically diagnosed   Clinical Decision Making (Complexity) low complexity   Therapy Frequency (PT) Daily   Predicted Duration of  Therapy Intervention (days/wks) 5 days   Planned Therapy Interventions (PT) bed mobility training;transfer training   Anticipated Equipment Needs at Discharge (PT) wheelchair   Risk & Benefits of therapy have been explained patient   PT Discharge Planning    PT Discharge Recommendation (DC Rec) Transitional Care Facility   PT Rationale for DC Rec limited mobility, transfers only, lives alone   Total Evaluation Time   Total Evaluation Time (Minutes) 15

## 2021-09-09 NOTE — PROGRESS NOTES
"Orthopedic Progress Note      Assessment: 1 Day Post-Op  S/P Procedure(s):  OPEN REDUCTION INTERNAL FIXATION, FRACTURE, FEMUR, DISTAL     Plan:   - Continue PT/OT  - Continue pain management, pain improved with IV dilaudid this morning. Will consider pain management consult if pain is uncontrolled.  - Do not remove leg ace wrap.   - Weightbearing status: may weight bear with full assist and pivots only, but no ambulation for 6 weeks.   -Up in chair for all meals, bed to chair status.   - Anticoagulation: will start Eliquis per cardiology in addition to SCDs, pebbles stockings.  - Discharge planning: pending pain control, medical clearance, and TCU placement. Follow up with Dr. Haji in clinic in 2 weeks.       Subjective:  Pain: severe as of this AM per nursing staff, improved with Dilaudid   Nausea, Vomiting:  No  Lightheadedness, Dizziness:  No  Neuro:  Patient denies new onset numbness or paresthesias    Patient reports right leg is painful. Per nursing staff, she did not receive any pain medication overnight and woke this morning in severe pain. She was given IV diluadid and patient reports pain has improved and is tolerable at rest. At this point she had not been out of bed today yet. She denies any calf pain. Denies any numbness, tingling in lower extremities.     Objective:  /80 (BP Location: Right arm)   Pulse 102   Temp 97.8  F (36.6  C) (Oral)   Resp 19   Ht 1.575 m (5' 2\")   Wt 87.9 kg (193 lb 11.2 oz)   SpO2 99%   BMI 35.43 kg/m    The patient is alert and oriented. Laying in bed, no acute distress.   Sensation is intact to visible toes RLE. Normal sensation LLE.   Dorsalis pedis pulse intact left.   Calves are soft and non-tender.   RLE has full ace wrap post op bandage in place, clean and dry.     Pertinent Labs   Lab Results: personally reviewed.   Lab Results   Component Value Date    INR 1.15 09/09/2021    INR 1.09 09/08/2021     Lab Results   Component Value Date    WBC 10.4 09/09/2021    " HGB 7.9 (L) 09/09/2021    HCT 24.6 (L) 09/09/2021     (H) 09/09/2021     (L) 09/09/2021     Lab Results   Component Value Date     09/09/2021    CO2 26 09/09/2021         Report completed by:  Tiffany Chandler PA-C, JOBY  Date: 9/9/2021  Time: 5:04 PM

## 2021-09-10 ENCOUNTER — APPOINTMENT (OUTPATIENT)
Dept: PHYSICAL THERAPY | Facility: HOSPITAL | Age: 86
DRG: 854 | End: 2021-09-10
Payer: MEDICARE

## 2021-09-10 ENCOUNTER — APPOINTMENT (OUTPATIENT)
Dept: OCCUPATIONAL THERAPY | Facility: HOSPITAL | Age: 86
DRG: 854 | End: 2021-09-10
Payer: MEDICARE

## 2021-09-10 PROBLEM — Z98.890 H/O MAJOR ORTHOPEDIC SURGERY: Chronic | Status: ACTIVE | Noted: 2021-09-10

## 2021-09-10 PROBLEM — Z66 DNR (DO NOT RESUSCITATE): Status: ACTIVE | Noted: 2021-09-10

## 2021-09-10 PROBLEM — S72.401A CLOSED FRACTURE OF DISTAL END OF RIGHT FEMUR, UNSPECIFIED FRACTURE MORPHOLOGY, INITIAL ENCOUNTER (H): Chronic | Status: ACTIVE | Noted: 2021-09-06

## 2021-09-10 PROBLEM — Z98.890 H/O MAJOR ORTHOPEDIC SURGERY: Status: ACTIVE | Noted: 2021-09-10

## 2021-09-10 PROBLEM — I48.91 RAPID ATRIAL FIBRILLATION (H): Chronic | Status: ACTIVE | Noted: 2021-09-06

## 2021-09-10 LAB
GLUCOSE BLDC GLUCOMTR-MCNC: 123 MG/DL (ref 70–99)
HGB BLD-MCNC: 7.6 G/DL (ref 11.7–15.7)
MAGNESIUM SERPL-MCNC: 1.7 MG/DL (ref 1.8–2.6)
POTASSIUM BLD-SCNC: 3.6 MMOL/L (ref 3.5–5)

## 2021-09-10 PROCEDURE — 99232 SBSQ HOSP IP/OBS MODERATE 35: CPT | Performed by: INTERNAL MEDICINE

## 2021-09-10 PROCEDURE — 36415 COLL VENOUS BLD VENIPUNCTURE: CPT | Performed by: INTERNAL MEDICINE

## 2021-09-10 PROCEDURE — 99233 SBSQ HOSP IP/OBS HIGH 50: CPT | Mod: 24 | Performed by: INTERNAL MEDICINE

## 2021-09-10 PROCEDURE — 84132 ASSAY OF SERUM POTASSIUM: CPT | Performed by: INTERNAL MEDICINE

## 2021-09-10 PROCEDURE — 97530 THERAPEUTIC ACTIVITIES: CPT | Mod: GO | Performed by: OCCUPATIONAL THERAPIST

## 2021-09-10 PROCEDURE — 97530 THERAPEUTIC ACTIVITIES: CPT | Mod: GP

## 2021-09-10 PROCEDURE — 250N000013 HC RX MED GY IP 250 OP 250 PS 637: Performed by: PHYSICIAN ASSISTANT

## 2021-09-10 PROCEDURE — 250N000013 HC RX MED GY IP 250 OP 250 PS 637: Performed by: INTERNAL MEDICINE

## 2021-09-10 PROCEDURE — 83735 ASSAY OF MAGNESIUM: CPT | Performed by: INTERNAL MEDICINE

## 2021-09-10 PROCEDURE — 250N000013 HC RX MED GY IP 250 OP 250 PS 637: Performed by: FAMILY MEDICINE

## 2021-09-10 PROCEDURE — 250N000011 HC RX IP 250 OP 636: Performed by: PHYSICIAN ASSISTANT

## 2021-09-10 PROCEDURE — 210N000001 HC R&B IMCU HEART CARE

## 2021-09-10 PROCEDURE — 85018 HEMOGLOBIN: CPT | Performed by: INTERNAL MEDICINE

## 2021-09-10 RX ORDER — DILTIAZEM HYDROCHLORIDE 180 MG/1
180 CAPSULE, COATED, EXTENDED RELEASE ORAL DAILY
Status: DISCONTINUED | OUTPATIENT
Start: 2021-09-11 | End: 2021-09-11

## 2021-09-10 RX ORDER — METOPROLOL TARTRATE 25 MG/1
100 TABLET, FILM COATED ORAL 2 TIMES DAILY
Status: DISCONTINUED | OUTPATIENT
Start: 2021-09-10 | End: 2021-09-16 | Stop reason: HOSPADM

## 2021-09-10 RX ADMIN — DILTIAZEM HYDROCHLORIDE 30 MG: 30 TABLET, FILM COATED ORAL at 14:09

## 2021-09-10 RX ADMIN — DILTIAZEM HYDROCHLORIDE 30 MG: 30 TABLET, FILM COATED ORAL at 19:09

## 2021-09-10 RX ADMIN — ANORECTAL OINTMENT: 15.7; .44; 24; 20.6 OINTMENT TOPICAL at 08:11

## 2021-09-10 RX ADMIN — OXYCODONE HYDROCHLORIDE 5 MG: 5 TABLET ORAL at 16:24

## 2021-09-10 RX ADMIN — CEFAZOLIN 1 G: 1 INJECTION, POWDER, FOR SOLUTION INTRAMUSCULAR; INTRAVENOUS at 02:44

## 2021-09-10 RX ADMIN — APIXABAN 5 MG: 5 TABLET, FILM COATED ORAL at 20:29

## 2021-09-10 RX ADMIN — ACETAMINOPHEN 975 MG: 325 TABLET ORAL at 00:31

## 2021-09-10 RX ADMIN — Medication 25 MCG: at 08:10

## 2021-09-10 RX ADMIN — DILTIAZEM HYDROCHLORIDE 30 MG: 30 TABLET, FILM COATED ORAL at 00:33

## 2021-09-10 RX ADMIN — DILTIAZEM HYDROCHLORIDE 30 MG: 30 TABLET, FILM COATED ORAL at 06:19

## 2021-09-10 RX ADMIN — ACETAMINOPHEN 975 MG: 325 TABLET ORAL at 16:25

## 2021-09-10 RX ADMIN — METOPROLOL TARTRATE 50 MG: 25 TABLET, FILM COATED ORAL at 08:10

## 2021-09-10 RX ADMIN — METOPROLOL TARTRATE 50 MG: 25 TABLET, FILM COATED ORAL at 02:44

## 2021-09-10 RX ADMIN — OXYCODONE HYDROCHLORIDE 5 MG: 5 TABLET ORAL at 03:22

## 2021-09-10 RX ADMIN — ACETAMINOPHEN 975 MG: 325 TABLET ORAL at 08:10

## 2021-09-10 RX ADMIN — ANORECTAL OINTMENT 1 G: 15.7; .44; 24; 20.6 OINTMENT TOPICAL at 20:29

## 2021-09-10 RX ADMIN — OXYCODONE HYDROCHLORIDE 5 MG: 5 TABLET ORAL at 09:09

## 2021-09-10 RX ADMIN — CEFAZOLIN 1 G: 1 INJECTION, POWDER, FOR SOLUTION INTRAMUSCULAR; INTRAVENOUS at 10:00

## 2021-09-10 RX ADMIN — METOPROLOL TARTRATE 100 MG: 25 TABLET, FILM COATED ORAL at 20:29

## 2021-09-10 RX ADMIN — Medication 100 MCG: at 08:10

## 2021-09-10 RX ADMIN — APIXABAN 5 MG: 5 TABLET, FILM COATED ORAL at 08:11

## 2021-09-10 ASSESSMENT — MIFFLIN-ST. JEOR: SCORE: 1224.58

## 2021-09-10 NOTE — PLAN OF CARE
"  Problem: Pain (Orthopaedic Fracture)  Goal: Acceptable Pain Control  Outcome: Improving  Intervention: Manage Acute Orthopaedic-Related Pain  Recent Flowsheet Documentation  Taken 9/10/2021 1000 by Tg Zavaleta, RN  Pain Management Interventions: cold applied  Taken 9/10/2021 0909 by Tg Zavaleta, RN  Pain Management Interventions: medication (see MAR)   Pt up to chair this AM after receiving oxycodone. Will cry out in pain but states \"I just need to get it out\". Needs frequent encouragement with activity, but pain is well controlled at rest. Sat in chair for an hour before going back to bed.    Problem: Dysrhythmia  Goal: Normalized Cardiac Rhythm  Outcome: Improving   HR 70-80s at rest, up to 110s with activity. Dhaval palpitations nor shortness of breath.   "

## 2021-09-10 NOTE — PLAN OF CARE
Problem: Dysrhythmia  Goal: Normalized Cardiac Rhythm  Outcome: No Change    Still afib. HR controlled. On scheduled diltiazem and metoprolol with parameters. Also on eliquis.     Problem: Pain (Orthopaedic Fracture)  Goal: Acceptable Pain Control  Outcome: No Change    Patient denied any pain. Medicated prior to repositioning. Still with pain while turning. Very appreciative after cares. Has small bowel movement, changed soiled mepilex and calmoseptine applied. Has air mattress.

## 2021-09-10 NOTE — PROGRESS NOTES
Care Management Follow Up    Length of Stay (days): 4    Expected Discharge Date: 09/13/2021     Concerns to be Addressed:       Patient plan of care discussed at interdisciplinary rounds: Yes    Anticipated Discharge Disposition:  SNF/TCU     Anticipated Discharge Services:  TCU  Anticipated Discharge DME:  none    Patient/family educated on Medicare website which has current facility and service quality ratings:  yes  Education Provided on the Discharge Plan:  yes  Patient/Family in Agreement with the Plan:  yes    Referrals Placed by CM/SW:  TCU  Private pay costs discussed: not at this time    Additional Information:  Call placed to Kenia paul to discuss discharge planning and obtain TCU choices per pt's request. Kenia provided choices of #1 [facility], #2 [facility], #3 [facility].  Referrals sent.     Nai Decker RN

## 2021-09-10 NOTE — PROGRESS NOTES
"  HEART CARE CONSULTATON NOTE        Assessment/Recommendations   Assessment:  1.  Atrial fibrillation: New onset persistent atrial fibrillation with rapid ventricular response of unknown duration and asymptomatic.  May have been triggered by UTI and a fall with femur fracture.  2.  Anticoagulation: [id number] score of 4 for age, gender and hypertension  3.  Right femur fracture after a fall status post repair done on 9/8/2021        Plan:  1.  Heart rate now well controlled on metoprolol and diltiazem.    2.  Anticoagulation discussed with Ortho, family and patient.  Started on Eliquis 5 mg twice daily  No further cardiac recommendations.  May follow-up with me as outpatient in 4 to 6 weeks time.     History of Present Illness/Subjective    Heart rate now well controlled.  Breathing comfortably.  No complaints of chest pain.       Physical Examination  Review of Systems   VITALS: /53 (BP Location: Right arm)   Pulse 90   Temp 97.8  F (36.6  C) (Oral)   Resp 20   Ht 1.575 m (5' 2\")   Wt 88.6 kg (195 lb 6.4 oz)   SpO2 91%   BMI 35.74 kg/m    BMI: Body mass index is 35.74 kg/m .  Wt Readings from Last 3 Encounters:   09/10/21 88.6 kg (195 lb 6.4 oz)       Intake/Output Summary (Last 24 hours) at 9/10/2021 1353  Last data filed at 9/9/2021 2250  Gross per 24 hour   Intake --   Output 400 ml   Net -400 ml     General Appearance:   no distress, normal body habitus   ENT/Mouth: membranes moist, no oral lesions or bleeding gums.      EYES:  no scleral icterus, normal conjunctivae   Neck: no carotid bruits or thyromegaly   Chest/Lungs:   lungs are clear to auscultation   Cardiovascular:   irregular. Normal first and second heart sounds with no murmurs no edema bilaterally    Abdomen:  no organomegaly, masses, bruits, or tenderness; bowel sounds are present   Extremities: no cyanosis or clubbing   Skin: no xanthelasma, warm.    Neurologic: normal  bilateral, no tremors     Psychiatric: alert and oriented " x3, calm     Review Of Systems  Skin: negative  Eyes: negative  Ears/Nose/Throat: negative  Respiratory: No shortness of breath, dyspnea on exertion, cough, or hemoptysis  Cardiovascular: negative  Gastrointestinal: negative  Genitourinary: negative  Musculoskeletal: negative  Neurologic: negative  Psychiatric: negative  Hematologic/Lymphatic/Immunologic: negative  Endocrine: negative          Lab Results    Chemistry/lipid CBC Cardiac Enzymes/BNP/TSH/INR   Recent Labs   Lab Test 04/11/17  1039   CHOL 222*   HDL 65   *   TRIG 96     Recent Labs   Lab Test 04/11/17  1039 04/13/15  1151   * 130*     Recent Labs   Lab Test 09/10/21  0626 09/10/21  0535 09/09/21  0534   NA  --   --  142   POTASSIUM  --  3.6 3.5   CHLORIDE  --   --  108*   CO2  --   --  26   *  --  109   BUN  --   --  26   CR  --   --  0.86   GFRESTIMATED  --   --  57*   ADAM  --   --  8.6     Recent Labs   Lab Test 09/09/21  0534 09/08/21  0529 09/08/21  0457   CR 0.86 0.83 0.82     No results for input(s): A1C in the last 41224 hours.       Recent Labs   Lab Test 09/10/21  0535 09/09/21  0534   WBC  --  10.4   HGB 7.6* 7.9*   HCT  --  24.6*   MCV  --  101*   PLT  --  121*     Recent Labs   Lab Test 09/10/21  0535 09/09/21  0534 09/08/21  1800   HGB 7.6* 7.9* 8.8*    Recent Labs   Lab Test 09/06/21  1231 11/19/18  0904   TROPONINI 0.06 0.01     No results for input(s): BNP, NTBNPI, NTBNP in the last 70875 hours.  Recent Labs   Lab Test 09/08/21  1806   TSH 1.55     Recent Labs   Lab Test 09/09/21  0534 09/08/21  0529   INR 1.15 1.09        Medical History  Surgical History Family History Social History   Past Medical History:   Diagnosis Date     Anemia      Chronic kidney disease      Essential hypertension      Osteoarthritis      Stenosis of carotid artery      Past Surgical History:   Procedure Laterality Date     OPEN REDUCTION INTERNAL FIXATION FEMUR DISTAL Right 9/8/2021    Procedure: OPEN REDUCTION INTERNAL FIXATION,  FRACTURE, FEMUR, DISTAL;  Surgeon: Liam Haji MD;  Location: [facility] Main OR     Family History   Problem Relation Age of Onset     No Known Problems Mother      No Known Problems Father         Social History     Socioeconomic History     Marital status: Single     Spouse name: Not on file     Number of children: Not on file     Years of education: Not on file     Highest education level: Not on file   Occupational History     Not on file   Tobacco Use     Smoking status: Never Smoker     Smokeless tobacco: Never Used   Vaping Use     Vaping Use: Never assessed   Substance and Sexual Activity     Alcohol use: Not Currently     Drug use: Never     Sexual activity: Not on file   Other Topics Concern     Parent/sibling w/ CABG, MI or angioplasty before 65F 55M? Not Asked   Social History Narrative     Not on file     Social Determinants of Health     Financial Resource Strain:      Difficulty of Paying Living Expenses:    Food Insecurity:      Worried About Running Out of Food in the Last Year:      Ran Out of Food in the Last Year:    Transportation Needs:      Lack of Transportation (Medical):      Lack of Transportation (Non-Medical):    Physical Activity:      Days of Exercise per Week:      Minutes of Exercise per Session:    Stress:      Feeling of Stress :    Social Connections:      Frequency of Communication with Friends and Family:      Frequency of Social Gatherings with Friends and Family:      Attends [employer]ae[employer] Services:      Active Member of Clubs or Organizations:      Attends Club or Organization Meetings:      Marital Status:    Intimate Partner Violence:      Fear of Current or Ex-Partner:      Emotionally Abused:      Physically Abused:      Sexually Abused:          Medications  Allergies   No current outpatient medications on file.      No Known Allergies      Rhonda Marshall MD

## 2021-09-10 NOTE — PROGRESS NOTES
[facility]    Medicine Progress Note - Hospitalist Service       Date of Admission:  9/6/2021    Assessment & Plan           Active Problems:    Closed fracture of distal end of right femur, unspecified fracture morphology, initial encounter (H) (9/6/2021)    H/O major orthopedic surgery (9/10/2021)    Rapid atrial fibrillation (H) (9/6/2021)    DNR (do not resuscitate) (9/10/2021)    She is doing remarkably well postop she is all smiles she had some friends visiting her today not much in the way of any living family members or next of kin  Pain is controlled appetite is getting better she will eventually need to go to SNF TCU  On Eliquis for anticoagulation         Diet: Advance Diet as Tolerated: Regular Diet Adult    DVT Prophylaxis: eliquis  Sun Catheter: PRESENT, indication: Retention  Central Lines: None  Code Status: No CPR- Do NOT Intubate      Disposition Plan   Expected discharge: 09/13/2021   recommended to transitional care unit.     The patient's care was discussed with the Bedside Nurse and Patient.    Pacheco Servin MD  Hospitalist Service  [facility]  Securely message with the Vocera Web Console (learn more here)  Text page via [employer] Paging/Directory      Clinically Significant Risk Factors Present on Admission                ______________________________________________________________________    Interval History   She is doing surprisingly well she is alert oriented all smiles pain is controlled she had some visitors earlier which made her happy    Data reviewed today: I reviewed all medications, new labs and imaging results over the last 24 hours. I personally reviewed   Recent Results (from the past 24 hour(s))   Potassium    Collection Time: 09/10/21  5:35 AM   Result Value Ref Range    Potassium 3.6 3.5 - 5.0 mmol/L   Magnesium    Collection Time: 09/10/21  5:35 AM   Result Value Ref Range    Magnesium 1.7 (L) 1.8 - 2.6 mg/dL   Hemoglobin     Collection Time: 09/10/21  5:35 AM   Result Value Ref Range    Hemoglobin 7.6 (L) 11.7 - 15.7 g/dL   Glucose by meter    Collection Time: 09/10/21  6:26 AM   Result Value Ref Range    GLUCOSE BY METER POCT 123 (H) 70 - 99 mg/dL       Physical Exam   Vital Signs: Temp: 97.6  F (36.4  C) Temp src: Oral BP: 110/61 Pulse: 93   Resp: 20 SpO2: 96 % O2 Device: None (Room air)    Weight: 195 lbs 6.4 oz  She is comfortable ate breakfast earlier lungs clear heart atrial fibrillation dressing intact over lower extremity right leg    Data

## 2021-09-10 NOTE — PROGRESS NOTES
"Orthopedic Progress Note      Assessment: 2 Days Post-Op  S/P Procedure(s):  OPEN REDUCTION INTERNAL FIXATION, FRACTURE, FEMUR, DISTAL     Plan:   - Continue PT/OT  - Continue pain management  - Do not remove leg ace wrap   -**Continue to strongly encourage her to be up in chair for all meals, bed to chair status.   -Continue to monitor pressure points in bed  - LLE is tender to palpation on exam, not specifically to calf at this point. Will continue to monitor and if pain isolates to left calf will consider LLE venous US.   - Weightbearing status: may weight bear with full assist and pivots only, but no ambulation for 6 weeks.   - Anticoagulation: Eliquis 5 mg BID per cardiology  - Discharge planning: pending pain control, therapy progression, medical clearance, and TCU placement. Follow up with Dr. Haji in clinic in 2 weeks.     Subjective:  Pain: mild at rest, moderate-severe with touching RLE or any movement  Nausea, Vomiting:  No  Lightheadedness, Dizziness:  No  Neuro:  Patient denies new onset numbness or paresthesias    Patient reports she has some pain in the right knee and proximal lower leg at rest, but with any attempt at movement or if right leg is touched, pain increases. She denies any calf pain. She notes her low back is sore and stiff from laying in bed.     Per nursing staff, her memory and orientation fluctuates. She seems to be doing better on oral oxycodone overall and will continue pain management to keep her comfortable.     Objective:  /53 (BP Location: Right arm)   Pulse 90   Temp 97.8  F (36.6  C) (Oral)   Resp 20   Ht 1.575 m (5' 2\")   Wt 88.6 kg (195 lb 6.4 oz)   SpO2 91%   BMI 35.74 kg/m    The patient is awake laying in bed, comfortable appearing. Once leg is touched or attempt to slightly move, she appears in some distress due to pain. She is somewhat confused to place at times and does not always remember her surgery.   Sensation is intact to visible toes RLE, normal " sensation LLE.  Dorsiflexion and plantar flexion is intact in LLE.  Dorsalis pedis pulse intact LLE. Unable to assess RLE.   Entire LLE is tender to palpation, soft.   RLE has full ace wrap post op bandage in place, clean and dry.     Exam is somewhat limited due to patient's pain with repositioning.      Pertinent Labs   Lab Results: personally reviewed.   Lab Results   Component Value Date    INR 1.15 09/09/2021    INR 1.09 09/08/2021     Lab Results   Component Value Date    WBC 10.4 09/09/2021    HGB 7.6 (L) 09/10/2021    HCT 24.6 (L) 09/09/2021     (H) 09/09/2021     (L) 09/09/2021     Lab Results   Component Value Date     09/09/2021    CO2 26 09/09/2021         Report completed by:  Tiffany Chandler PA-C, JOBY  Date: 9/10/2021  Time: 2:15 PM

## 2021-09-10 NOTE — PLAN OF CARE
Problem: Pain (Orthopaedic Fracture)  Goal: Acceptable Pain Control  Outcome: No Change   Pt continues to yell out in pain with any turns despite prn oxycodone given. Ice applied to knee. Will continue to monitor.     HR 90s at rest, up to 120s with pain. Denies palpitations nor shortness of breath.

## 2021-09-11 PROBLEM — Z79.01 CHRONIC ANTICOAGULATION: Chronic | Status: ACTIVE | Noted: 2021-09-11

## 2021-09-11 PROBLEM — Z79.01 CHRONIC ANTICOAGULATION: Status: ACTIVE | Noted: 2021-09-11

## 2021-09-11 LAB
BACTERIA BLD CULT: NO GROWTH
BACTERIA BLD CULT: NO GROWTH
MAGNESIUM SERPL-MCNC: 1.7 MG/DL (ref 1.8–2.6)
POTASSIUM BLD-SCNC: 3.6 MMOL/L (ref 3.5–5)

## 2021-09-11 PROCEDURE — 36415 COLL VENOUS BLD VENIPUNCTURE: CPT | Performed by: INTERNAL MEDICINE

## 2021-09-11 PROCEDURE — 250N000013 HC RX MED GY IP 250 OP 250 PS 637: Performed by: INTERNAL MEDICINE

## 2021-09-11 PROCEDURE — 99232 SBSQ HOSP IP/OBS MODERATE 35: CPT | Performed by: INTERNAL MEDICINE

## 2021-09-11 PROCEDURE — 210N000001 HC R&B IMCU HEART CARE

## 2021-09-11 PROCEDURE — 250N000013 HC RX MED GY IP 250 OP 250 PS 637: Performed by: FAMILY MEDICINE

## 2021-09-11 PROCEDURE — 84132 ASSAY OF SERUM POTASSIUM: CPT | Performed by: INTERNAL MEDICINE

## 2021-09-11 PROCEDURE — 250N000011 HC RX IP 250 OP 636: Performed by: INTERNAL MEDICINE

## 2021-09-11 PROCEDURE — 250N000013 HC RX MED GY IP 250 OP 250 PS 637: Performed by: PHYSICIAN ASSISTANT

## 2021-09-11 PROCEDURE — 83735 ASSAY OF MAGNESIUM: CPT | Performed by: INTERNAL MEDICINE

## 2021-09-11 RX ORDER — OXYCODONE HYDROCHLORIDE 5 MG/1
5 TABLET ORAL EVERY 4 HOURS PRN
Status: DISCONTINUED | OUTPATIENT
Start: 2021-09-11 | End: 2021-09-15

## 2021-09-11 RX ORDER — HYDROMORPHONE HYDROCHLORIDE 1 MG/ML
0.5 INJECTION, SOLUTION INTRAMUSCULAR; INTRAVENOUS; SUBCUTANEOUS
Status: DISCONTINUED | OUTPATIENT
Start: 2021-09-11 | End: 2021-09-11

## 2021-09-11 RX ORDER — DILTIAZEM HYDROCHLORIDE 90 MG/1
90 CAPSULE, EXTENDED RELEASE ORAL 2 TIMES DAILY
Status: DISCONTINUED | OUTPATIENT
Start: 2021-09-11 | End: 2021-09-16 | Stop reason: HOSPADM

## 2021-09-11 RX ORDER — HYDROMORPHONE HCL IN WATER/PF 6 MG/30 ML
.2-.4 PATIENT CONTROLLED ANALGESIA SYRINGE INTRAVENOUS
Status: DISCONTINUED | OUTPATIENT
Start: 2021-09-11 | End: 2021-09-16 | Stop reason: HOSPADM

## 2021-09-11 RX ADMIN — METOPROLOL TARTRATE 100 MG: 25 TABLET, FILM COATED ORAL at 20:14

## 2021-09-11 RX ADMIN — ANORECTAL OINTMENT: 15.7; .44; 24; 20.6 OINTMENT TOPICAL at 20:17

## 2021-09-11 RX ADMIN — APIXABAN 5 MG: 5 TABLET, FILM COATED ORAL at 20:14

## 2021-09-11 RX ADMIN — ANORECTAL OINTMENT: 15.7; .44; 24; 20.6 OINTMENT TOPICAL at 09:26

## 2021-09-11 RX ADMIN — HYDROMORPHONE HYDROCHLORIDE 0.2 MG: 0.2 INJECTION, SOLUTION INTRAMUSCULAR; INTRAVENOUS; SUBCUTANEOUS at 12:56

## 2021-09-11 RX ADMIN — DOCUSATE SODIUM 50 MG AND SENNOSIDES 8.6 MG 1 TABLET: 8.6; 5 TABLET, FILM COATED ORAL at 09:26

## 2021-09-11 RX ADMIN — DILTIAZEM HYDROCHLORIDE 30 MG: 30 TABLET, FILM COATED ORAL at 00:25

## 2021-09-11 RX ADMIN — DILTIAZEM HYDROCHLORIDE 90 MG: 90 CAPSULE, EXTENDED RELEASE ORAL at 20:13

## 2021-09-11 RX ADMIN — METOPROLOL TARTRATE 100 MG: 25 TABLET, FILM COATED ORAL at 09:26

## 2021-09-11 RX ADMIN — ACETAMINOPHEN 975 MG: 325 TABLET ORAL at 09:26

## 2021-09-11 RX ADMIN — DILTIAZEM HYDROCHLORIDE 90 MG: 90 CAPSULE, EXTENDED RELEASE ORAL at 13:05

## 2021-09-11 RX ADMIN — Medication 25 MCG: at 09:26

## 2021-09-11 RX ADMIN — Medication 100 MCG: at 09:36

## 2021-09-11 RX ADMIN — ACETAMINOPHEN 975 MG: 325 TABLET ORAL at 00:58

## 2021-09-11 RX ADMIN — APIXABAN 5 MG: 5 TABLET, FILM COATED ORAL at 09:26

## 2021-09-11 RX ADMIN — OXYCODONE HYDROCHLORIDE 5 MG: 5 TABLET ORAL at 10:25

## 2021-09-11 RX ADMIN — OXYCODONE HYDROCHLORIDE 5 MG: 5 TABLET ORAL at 19:13

## 2021-09-11 ASSESSMENT — MIFFLIN-ST. JEOR: SCORE: 1254.97

## 2021-09-11 NOTE — PLAN OF CARE
Problem: Pain (Orthopaedic Fracture)  Goal: Acceptable Pain Control  Outcome: Improving  Intervention: Manage Acute Orthopaedic-Related Pain  Recent Flowsheet Documentation  Taken 9/11/2021 1256 by Tamara Bravo, RN  Pain Management Interventions: medication (see MAR)  Taken 9/11/2021 1025 by Tamara Bravo, RN  Pain Management Interventions:   medication (see MAR)   cold applied   Patient continues to report pain to right knee. Patient comfortable until reposition or transfer is needed and then patient yells in pain. Pain medications updated today. PRN dilaudid given prior to getting up in chair with melvina. Patient states she is comfortable in chair. Offered to get patient back into bed prior to shift change but patient asked to sit for a bit longer. Patient ate 50% for breakfast and lunch.

## 2021-09-11 NOTE — PROGRESS NOTES
"Orthopedic Progress Note      Assessment: 3 Days Post-Op  S/P Procedure(s):  OPEN REDUCTION INTERNAL FIXATION, FRACTURE, FEMUR, DISTAL    Plan:   - Continue PT/OT  - Continue pain management  - Do not remove leg ace wrap   -Continue to strongly encourage her to be up in chair for all meals, bed to chair status.   -Continue to monitor pressure points in bed  - LLE is tender to palpation on exam, not specifically to calf at this point. Will continue to monitor and if pain isolates to left calf will consider LLE venous US.   - Weightbearing status: may weight bear with full assist and pivots only, but no ambulation for 6 weeks.   - Anticoagulation: Eliquis 5 mg BID per cardiology  - Discharge planning: pending pain control, therapy progression, medical clearance, and TCU placement. Follow up with Dr. Haji in clinic in 2 weeks.     Subjective:  Pain: Moderate to Severe  Nausea, Vomiting:  No  Lightheadedness, Dizziness:  No  Neuro:  Patient denies new onset numbness or paresthesias    Patient is laying in bed complaining of pain and stating she is miserable. She has back pain and R hip pain. She has severe pain with activity and body position changes. She denies sharp, shooting pain or numbness/tingling down her legs.     Objective:  /63 (BP Location: Right arm)   Pulse 76   Temp 97.4  F (36.3  C) (Oral)   Resp 18   Ht 1.575 m (5' 2\")   Wt 91.7 kg (202 lb 1.6 oz)   SpO2 96%   BMI 36.96 kg/m    The patient is awake laying in bed, in mild distress. Once leg is touched or attempt to slightly move, she cries out in pain. She is somewhat confused to place at times and does not always remember her surgery.   Sensation is intact to visible toes RLE, normal sensation LLE.  Not cooperative with motor strength testing of either lower extremity.   Dorsalis pedis pulse intact LLE. Unable to assess RLE.   Entire LLE is tender to palpation, soft.   RLE has full ace wrap post op bandage in place, clean and dry.      Exam " is somewhat limited due to patient's pain with repositioning.    Pertinent Labs   Lab Results: personally reviewed.   Lab Results   Component Value Date    INR 1.15 09/09/2021    INR 1.09 09/08/2021     Lab Results   Component Value Date    WBC 10.4 09/09/2021    HGB 7.6 (L) 09/10/2021    HCT 24.6 (L) 09/09/2021     (H) 09/09/2021     (L) 09/09/2021     Lab Results   Component Value Date     09/09/2021    CO2 26 09/09/2021         Report completed by:  Taylor Montano PA-C, JOBY  Date: 9/11/2021  Time: 9:23 AM    ,

## 2021-09-11 NOTE — PLAN OF CARE
Problem: Dysrhythmia  Goal: Normalized Cardiac Rhythm  Outcome: Improving   Pt controlled fib  Problem: Pain (Orthopaedic Fracture)  Goal: Acceptable Pain Control  Outcome: Improving   Pt medicated with oxycodone prior getting in chair. Screams out pain with activity. Once laying still pt comfortable.    Low urine output 100 ml. Have been pushing po fluids. 480 ml this shift. Talked to house officer Dr Ricketts\ at 2245. Continue to push fluids

## 2021-09-11 NOTE — PLAN OF CARE
Problem: Adult Inpatient Plan of Care  Goal: Plan of Care Review  Outcome: No Change     Pt states her legs are tender and can't move them much, schedule tylenol given and pt sleeping in between cares. Turned and repositioned, ice applied to right hip  A-fib HR 60-80's.

## 2021-09-11 NOTE — PROGRESS NOTES
[facility]    Medicine Progress Note - Hospitalist Service       Date of Admission:  9/6/2021    Assessment & Plan           She is comfortable at rest but any movement creates pain in consternation her friends will also have power  are here they are concerned about her pain we discussed pain medications; they make it clear that if her condition should decline hospice should be considered but currently she would not qualify; CODE STATUS remains DNR; she will need some type of nursing home and likely they will have a place for her on Monday  Atrial fibrillation is somewhat higher ventricular response probably with the pain will put her on scheduled diltiazem she is on Eliquis for anticoagulation    Active Problems:      Closed fracture of distal end of right femur, unspecified fracture morphology, initial encounter (H) (9/6/2021)    H/O major orthopedic surgery (9/10/2021)    Rapid atrial fibrillation (H) (9/6/2021)    DNR (do not resuscitate) (9/10/2021)  Chronic anticoagulation with Eliquis         Diet: Advance Diet as Tolerated: Regular Diet Adult    DVT Prophylaxis: Eliquis  Sun Catheter: PRESENT, indication: Retention  Central Lines: None  Code Status: No CPR- Do NOT Intubate      Disposition Plan   Transitional care unit when bed available probably Monday     The patient's care was discussed with the .he friends were basically next of kin (Pacheco and Adelaida Vaughn.    Pacheco Servin MD  Hospitalist Service  [facility]  Securely message with the Vocera Web Console (learn more here)  Text page via [employer] Paging/Directory      Clinically Significant Risk Factors Present on Admission                ______________________________________________________________________    Interval History   She is comfortable and able to even smile and converse but is soon she needs to move or change positions in bed she is uncomfortable and even gets a bit  delirious    Data reviewed today: I reviewed all medications, new labs and imaging results over the last 24 hours. I personally reviewed  Physical Exam   Vital Signs: Temp: 98.6  F (37  C) Temp src: Oral BP: 120/59 Pulse: 88   Resp: 18 SpO2: 96 % O2 Device: None (Room air)    Weight: 202 lbs 1.6 oz  See above she gets upset and even delirious with any movements will try to premedicate her with oxycodone or even IV Dilaudid as necessary comfort is her primary goal

## 2021-09-12 ENCOUNTER — APPOINTMENT (OUTPATIENT)
Dept: PHYSICAL THERAPY | Facility: HOSPITAL | Age: 86
DRG: 854 | End: 2021-09-12
Payer: MEDICARE

## 2021-09-12 ENCOUNTER — APPOINTMENT (OUTPATIENT)
Dept: OCCUPATIONAL THERAPY | Facility: HOSPITAL | Age: 86
DRG: 854 | End: 2021-09-12
Payer: MEDICARE

## 2021-09-12 LAB
CREAT SERPL-MCNC: 0.81 MG/DL (ref 0.6–1.1)
GFR SERPL CREATININE-BSD FRML MDRD: 61 ML/MIN/1.73M2
MAGNESIUM SERPL-MCNC: 1.7 MG/DL (ref 1.8–2.6)
PLATELET # BLD AUTO: 195 10E3/UL (ref 150–450)
POTASSIUM BLD-SCNC: 3.9 MMOL/L (ref 3.5–5)

## 2021-09-12 PROCEDURE — 82565 ASSAY OF CREATININE: CPT | Performed by: FAMILY MEDICINE

## 2021-09-12 PROCEDURE — 250N000011 HC RX IP 250 OP 636: Performed by: INTERNAL MEDICINE

## 2021-09-12 PROCEDURE — 97535 SELF CARE MNGMENT TRAINING: CPT | Mod: GO

## 2021-09-12 PROCEDURE — 250N000013 HC RX MED GY IP 250 OP 250 PS 637: Performed by: PHYSICIAN ASSISTANT

## 2021-09-12 PROCEDURE — 97110 THERAPEUTIC EXERCISES: CPT | Mod: GP

## 2021-09-12 PROCEDURE — 250N000013 HC RX MED GY IP 250 OP 250 PS 637: Performed by: INTERNAL MEDICINE

## 2021-09-12 PROCEDURE — 85049 AUTOMATED PLATELET COUNT: CPT | Performed by: FAMILY MEDICINE

## 2021-09-12 PROCEDURE — 99233 SBSQ HOSP IP/OBS HIGH 50: CPT | Performed by: INTERNAL MEDICINE

## 2021-09-12 PROCEDURE — 250N000013 HC RX MED GY IP 250 OP 250 PS 637: Performed by: FAMILY MEDICINE

## 2021-09-12 PROCEDURE — 210N000001 HC R&B IMCU HEART CARE

## 2021-09-12 PROCEDURE — 97110 THERAPEUTIC EXERCISES: CPT | Mod: GO

## 2021-09-12 PROCEDURE — 84132 ASSAY OF SERUM POTASSIUM: CPT | Performed by: INTERNAL MEDICINE

## 2021-09-12 PROCEDURE — 36415 COLL VENOUS BLD VENIPUNCTURE: CPT | Performed by: FAMILY MEDICINE

## 2021-09-12 PROCEDURE — 83735 ASSAY OF MAGNESIUM: CPT | Performed by: INTERNAL MEDICINE

## 2021-09-12 RX ADMIN — METOPROLOL TARTRATE 100 MG: 25 TABLET, FILM COATED ORAL at 09:24

## 2021-09-12 RX ADMIN — Medication 25 MCG: at 09:24

## 2021-09-12 RX ADMIN — APIXABAN 5 MG: 5 TABLET, FILM COATED ORAL at 20:01

## 2021-09-12 RX ADMIN — OXYCODONE HYDROCHLORIDE 5 MG: 5 TABLET ORAL at 03:44

## 2021-09-12 RX ADMIN — Medication 5 MG: at 18:46

## 2021-09-12 RX ADMIN — DILTIAZEM HYDROCHLORIDE 90 MG: 90 CAPSULE, EXTENDED RELEASE ORAL at 09:24

## 2021-09-12 RX ADMIN — DOCUSATE SODIUM 50 MG AND SENNOSIDES 8.6 MG 1 TABLET: 8.6; 5 TABLET, FILM COATED ORAL at 20:00

## 2021-09-12 RX ADMIN — METOPROLOL TARTRATE 100 MG: 25 TABLET, FILM COATED ORAL at 20:00

## 2021-09-12 RX ADMIN — ANORECTAL OINTMENT: 15.7; .44; 24; 20.6 OINTMENT TOPICAL at 09:23

## 2021-09-12 RX ADMIN — ANORECTAL OINTMENT 1 G: 15.7; .44; 24; 20.6 OINTMENT TOPICAL at 20:01

## 2021-09-12 RX ADMIN — Medication 5 MG: at 14:57

## 2021-09-12 RX ADMIN — APIXABAN 5 MG: 5 TABLET, FILM COATED ORAL at 09:24

## 2021-09-12 RX ADMIN — Medication 100 MCG: at 09:24

## 2021-09-12 RX ADMIN — DOCUSATE SODIUM 50 MG AND SENNOSIDES 8.6 MG 1 TABLET: 8.6; 5 TABLET, FILM COATED ORAL at 09:24

## 2021-09-12 RX ADMIN — HYDROMORPHONE HYDROCHLORIDE 0.2 MG: 0.2 INJECTION, SOLUTION INTRAMUSCULAR; INTRAVENOUS; SUBCUTANEOUS at 06:05

## 2021-09-12 RX ADMIN — DILTIAZEM HYDROCHLORIDE 90 MG: 90 CAPSULE, EXTENDED RELEASE ORAL at 20:00

## 2021-09-12 RX ADMIN — POLYETHYLENE GLYCOL 3350 17 G: 17 POWDER, FOR SOLUTION ORAL at 09:24

## 2021-09-12 ASSESSMENT — MIFFLIN-ST. JEOR: SCORE: 1181.49

## 2021-09-12 NOTE — PROGRESS NOTES
[facility]  Hospitalist Progress Note    Admit Date:  9/6/2021  Date of Service (when I saw the patient): 09/12/2021   Provider:  Cynthia Granger, DO    Assessment & Plan   Elysia Lane is a 89 year old female with a history of carotid artery, hypertension, anemia presented to the hospital for evaluation of right leg pain after fall was found to have new onset A. fib and right    hip fracture,    Problem List:    1. Traumatic closed fracture of the distal femur and significant right knee contusion  POD 4- ORIF distal femur fracture, right  Still having some difficulty with post-op pain with repositioning of her leg  D/w bedside RN  Will increase the availability of po oxycodone dose to q3hr (appears to be affective, per nursing)  Will keep prn IV dialudid for severe break-through pain ---although goal to use po meds as she will at TCU     2. New onset a. fib with RVR     anticoagulation  Improved HR with po metoprolol (100mg po bid) and po diltiazem (90mg po bid)    Echo 9/7/21 showed EF of 45 to 50%, with mild global hypokinesia of the left ventricle,  Tolerating apixaban   CHADS score appears to be 4     Holding on PTA aspirin, for now    TSH within normal limits  Electrolytes supplemented    F/u with cards 4-6wks     3. E. Coli UTI with sepsis  On admission she had leukocytosis, tachycardia, tachypnea and evidence of abnormal UA  Also had elevated lactic acid  Urine culture 9/6/21 positive for pan-sensitive e.coli  Completed course of IV ceftriaxone/IV ancef    4. Lactic acidosis   Was elevated on admission and then normalized with IVF and improvement of HR     5. Acute blood loss anemia  Chronicity unclear   Last hgb 12.2 (11/2018)  Last hgb 7.6 (9/10/21)    hgb in the am    6.  H/O HTN  Will hold PTA norvasc today to allow for further titration of other cardiac meds needed for HR control    Diet: Advance Diet as Tolerated: Regular Diet Adult    DVT Prophylaxis: per surgical  service  Sun Catheter: PRESENT, indication: Retention  Code Status: No CPR- Do NOT Intubate      Entered: Cynthia PenalozaMerlinPatsy,  09/12/2021, 7:40 AM     The patient's care was discussed with the patient and bedside RN    Interval History   Seen with RN at bedside.  Still having a lot of pain with movement and repositioning. Tolerating oxycodone without difficulty.  HR, overall, is improved.  No c/o HA, no N/V.      -Data reviewed today: I reviewed all new labs and imaging results over the last 24 hours. I personally reviewed no images or EKG's today.    Physical Exam   Temp: 98.1  F (36.7  C) Temp src: Oral BP: 137/57 Pulse: 71   Resp: 18 SpO2: 99 % O2 Device: None (Room air)    Vitals:    09/10/21 0326 09/11/21 0639 09/12/21 0318   Weight: 88.6 kg (195 lb 6.4 oz) 91.7 kg (202 lb 1.6 oz) 84.3 kg (185 lb 14.4 oz)     Vital Signs with Ranges  Temp:  [97.6  F (36.4  C)-98.6  F (37  C)] 98.1  F (36.7  C)  Pulse:  [] 71  Resp:  [18] 18  BP: ()/(50-85) 137/57  SpO2:  [92 %-99 %] 99 %  I/O last 3 completed shifts:  In: 1140 [P.O.:1140]  Out: 675 [Urine:675]    GEN:  Alert, awake, screaming in pain while being moved to chair in lift but recovers within seconds when not being moved.  HEENT:  Normocephalic/atraumatic, no clear scleral icterus, no nasal discharge, mouth and membranes appear fairly moist  NECK:  No clear thyromegaly or clear JVD  CV:  Irregular rate and rhythm, no loud murmur to ausc.  S1 + S2 noted, no S3 or S4.  LUNGS:  Clear to auscultation ant/lat bilaterally. No clear rales/rhonchi/wheezing auscultated bilaterally.  No costal retractions bilaterally.  Symmetric chest rise on inhalation noted.  ABD:  Active bowel sounds, soft, non-tender, mildly distended.  No clear rebound/guarding/rigidity.  No masses palpated.  No obvious HSM to exam.  EXT:  Right leg in ACE wrap.  Surgical dressing that is visualized is clean and dry.   SKIN:  Dry to touch, no rashes or jaundice noted.  PSYCH:   Crying in pain but then calms down quickly (within seconds) when not actively being moved.  NEURO:  Bilateral tremors when screaming noted today.  Very [place] but CN 2-12 otherwise grossly intact to testing bilaterally.    Data   Labs:  No results for input(s): CULT in the last 168 hours.  Recent Labs   Lab 09/12/21 0514 09/11/21  0516 09/10/21  0626 09/10/21  0535 09/09/21  1104 09/09/21  0735 09/09/21  0534 09/08/21  1701 09/08/21  0529 09/08/21  0457 09/08/21  0457 09/07/21 0521   NA  --   --   --   --   --   --  142  --   --   --  142 142   POTASSIUM 3.9 3.6  --  3.6  --   --  3.5   < > 3.6  --  3.5 3.7   CHLORIDE  --   --   --   --   --   --  108*  --   --   --  108* 107   CO2  --   --   --   --   --   --  26  --   --   --  24 24   ANIONGAP  --   --   --   --   --   --  8  --   --   --  10 11   GLC  --   --  123*  --  156* 103 109  --   --    < > 98 113   BUN  --   --   --   --   --   --  26  --   --   --  37* 36*   CR 0.81  --   --   --   --   --  0.86  --  0.83  --  0.82 0.94   GFRESTIMATED 61  --   --   --   --   --  57*  --  59*  --  60* 51*   ADAM  --   --   --   --   --   --  8.6  --   --   --  8.4* 8.9    < > = values in this interval not displayed.     Recent Labs   Lab 09/12/21 0514 09/10/21  0535 09/09/21 0534 09/08/21  1800 09/08/21  0529 09/08/21 0457   WBC  --   --  10.4  --  12.2* 11.4*   HGB  --  7.6* 7.9* 8.8* 8.9* 8.4*  8.4*   HCT  --   --  24.6*  --  27.6* 26.0*   MCV  --   --  101*  --  100 100     --  121*  --  138* 116*     Recent Labs   Lab 09/12/21  0514 09/11/21  0516 09/10/21  0626 09/10/21  0535 09/09/21  1104 09/09/21  0735 09/09/21  0534 09/08/21  1701 09/08/21  0529 09/08/21  0457 09/08/21 0457 09/07/21 0521   NA  --   --   --   --   --   --  142  --   --   --  142 142   POTASSIUM 3.9 3.6  --  3.6  --   --  3.5   < > 3.6  --  3.5 3.7   CHLORIDE  --   --   --   --   --   --  108*  --   --   --  108* 107   CO2  --   --   --   --   --   --  26  --   --   --  24 24   ANIONGAP   --   --   --   --   --   --  8  --   --   --  10 11   GLC  --   --  123*  --  156* 103 109  --   --    < > 98 113   BUN  --   --   --   --   --   --  26  --   --   --  37* 36*   CR 0.81  --   --   --   --   --  0.86  --  0.83  --  0.82 0.94   GFRESTIMATED 61  --   --   --   --   --  57*  --  59*  --  60* 51*   ADAM  --   --   --   --   --   --  8.6  --   --   --  8.4* 8.9   MAG 1.7* 1.7*  --  1.7*  --   --  1.7*   < >  --   --  1.7* 1.6*   PROTTOTAL  --   --   --   --   --   --  5.5*  --   --   --  5.3* 5.9*   ALBUMIN  --   --   --   --   --   --  3.0*  --   --   --  2.6* 3.1*   BILITOTAL  --   --   --   --   --   --  0.7  --   --   --  0.6 1.0   ALKPHOS  --   --   --   --   --   --  36*  --   --   --  38* 43*   AST  --   --   --   --   --   --  45*  --   --   --  56* 76*   ALT  --   --   --   --   --   --  21  --   --   --  27 28    < > = values in this interval not displayed.     Recent Labs   Lab 09/09/21  0534 09/08/21  0529   INR 1.15 1.09     Recent Labs   Lab 09/06/21  1346   COLOR Yellow   APPEARANCE Turbid*   URINEGLC Negative   URINEBILI Negative   URINEKETONE 10 *   SG 1.021   UBLD >1.0 mg/dL*   URINEPH 5.0   PROTEIN 30 *   NITRITE Positive*   LEUKEST 250 Castro/uL*   RBCU 10*   WBCU 37*      Recent Imaging:   No results found for this or any previous visit (from the past 24 hour(s)).    Medications       apixaban ANTICOAGULANT  5 mg Oral BID     cyanocobalamin  100 mcg Oral Daily     diltiazem ER  90 mg Oral BID     menthol-zinc oxide   Topical BID     metoprolol tartrate  100 mg Oral BID     polyethylene glycol  17 g Oral Daily     senna-docusate  1 tablet Oral BID     sodium chloride (PF)  3 mL Intracatheter Q8H     cholecalciferol  25 mcg Oral Daily

## 2021-09-12 NOTE — PLAN OF CARE
Problem: Pain (Orthopaedic Fracture)  Goal: Acceptable Pain Control  Outcome: Improving   Patient reports no pain while lying or sitting still. Pain reported during movement. PRN Oxycodone given prior to PT. Continue to encourage fluids. A fib.

## 2021-09-12 NOTE — PLAN OF CARE
Pt continues to have pain with any movements on right leg/hip. Medicated with oxycodone and IV dilaudid. Pt now resting comfortably in bed.

## 2021-09-12 NOTE — PLAN OF CARE
Problem: Pain (Orthopaedic Fracture)  Goal: Acceptable Pain Control  Outcome: Improving   Pt comfortable unless changing position. Medicated oxycodone prior to getting back in bed. Pt does scream out in pain when using melvina lift or turning patient.

## 2021-09-13 ENCOUNTER — APPOINTMENT (OUTPATIENT)
Dept: OCCUPATIONAL THERAPY | Facility: HOSPITAL | Age: 86
DRG: 854 | End: 2021-09-13
Payer: MEDICARE

## 2021-09-13 ENCOUNTER — APPOINTMENT (OUTPATIENT)
Dept: PHYSICAL THERAPY | Facility: HOSPITAL | Age: 86
DRG: 854 | End: 2021-09-13
Payer: MEDICARE

## 2021-09-13 LAB
HGB BLD-MCNC: 7.5 G/DL (ref 11.7–15.7)
MAGNESIUM SERPL-MCNC: 1.7 MG/DL (ref 1.8–2.6)
POTASSIUM BLD-SCNC: 3.9 MMOL/L (ref 3.5–5)
SARS-COV-2 RNA RESP QL NAA+PROBE: NEGATIVE

## 2021-09-13 PROCEDURE — 250N000011 HC RX IP 250 OP 636: Performed by: INTERNAL MEDICINE

## 2021-09-13 PROCEDURE — 83735 ASSAY OF MAGNESIUM: CPT | Performed by: INTERNAL MEDICINE

## 2021-09-13 PROCEDURE — 250N000013 HC RX MED GY IP 250 OP 250 PS 637: Performed by: FAMILY MEDICINE

## 2021-09-13 PROCEDURE — 210N000001 HC R&B IMCU HEART CARE

## 2021-09-13 PROCEDURE — 250N000013 HC RX MED GY IP 250 OP 250 PS 637: Performed by: INTERNAL MEDICINE

## 2021-09-13 PROCEDURE — 99232 SBSQ HOSP IP/OBS MODERATE 35: CPT | Performed by: HOSPITALIST

## 2021-09-13 PROCEDURE — 84132 ASSAY OF SERUM POTASSIUM: CPT | Performed by: INTERNAL MEDICINE

## 2021-09-13 PROCEDURE — 250N000013 HC RX MED GY IP 250 OP 250 PS 637: Performed by: PHYSICIAN ASSISTANT

## 2021-09-13 PROCEDURE — 97110 THERAPEUTIC EXERCISES: CPT | Mod: GP

## 2021-09-13 PROCEDURE — 85018 HEMOGLOBIN: CPT | Performed by: INTERNAL MEDICINE

## 2021-09-13 PROCEDURE — 87635 SARS-COV-2 COVID-19 AMP PRB: CPT | Performed by: HOSPITALIST

## 2021-09-13 PROCEDURE — 97535 SELF CARE MNGMENT TRAINING: CPT | Mod: GO

## 2021-09-13 PROCEDURE — 36415 COLL VENOUS BLD VENIPUNCTURE: CPT | Performed by: INTERNAL MEDICINE

## 2021-09-13 RX ORDER — BISACODYL 5 MG
5 TABLET, DELAYED RELEASE (ENTERIC COATED) ORAL DAILY PRN
Status: DISCONTINUED | OUTPATIENT
Start: 2021-09-13 | End: 2021-09-16 | Stop reason: HOSPADM

## 2021-09-13 RX ADMIN — ANORECTAL OINTMENT: 15.7; .44; 24; 20.6 OINTMENT TOPICAL at 08:33

## 2021-09-13 RX ADMIN — APIXABAN 5 MG: 5 TABLET, FILM COATED ORAL at 08:32

## 2021-09-13 RX ADMIN — METOPROLOL TARTRATE 100 MG: 25 TABLET, FILM COATED ORAL at 20:04

## 2021-09-13 RX ADMIN — METOPROLOL TARTRATE 100 MG: 25 TABLET, FILM COATED ORAL at 08:32

## 2021-09-13 RX ADMIN — OXYCODONE HYDROCHLORIDE 5 MG: 5 TABLET ORAL at 20:04

## 2021-09-13 RX ADMIN — Medication 100 MCG: at 08:32

## 2021-09-13 RX ADMIN — HYDROMORPHONE HYDROCHLORIDE 0.4 MG: 0.2 INJECTION, SOLUTION INTRAMUSCULAR; INTRAVENOUS; SUBCUTANEOUS at 06:10

## 2021-09-13 RX ADMIN — Medication 5 MG: at 00:09

## 2021-09-13 RX ADMIN — ANORECTAL OINTMENT 1 G: 15.7; .44; 24; 20.6 OINTMENT TOPICAL at 20:05

## 2021-09-13 RX ADMIN — Medication 25 MCG: at 08:33

## 2021-09-13 RX ADMIN — DOCUSATE SODIUM 50 MG AND SENNOSIDES 8.6 MG 1 TABLET: 8.6; 5 TABLET, FILM COATED ORAL at 08:32

## 2021-09-13 RX ADMIN — DILTIAZEM HYDROCHLORIDE 90 MG: 90 CAPSULE, EXTENDED RELEASE ORAL at 20:05

## 2021-09-13 RX ADMIN — DILTIAZEM HYDROCHLORIDE 90 MG: 90 CAPSULE, EXTENDED RELEASE ORAL at 08:32

## 2021-09-13 RX ADMIN — POLYETHYLENE GLYCOL 3350 17 G: 17 POWDER, FOR SOLUTION ORAL at 08:33

## 2021-09-13 RX ADMIN — MAGNESIUM HYDROXIDE 30 ML: 400 SUSPENSION ORAL at 14:53

## 2021-09-13 RX ADMIN — APIXABAN 5 MG: 5 TABLET, FILM COATED ORAL at 20:05

## 2021-09-13 ASSESSMENT — MIFFLIN-ST. JEOR: SCORE: 1214.6

## 2021-09-13 NOTE — PROGRESS NOTES
[facility]    Medicine Progress Note - Hospitalist Service       Date of Admission:  9/6/2021    Assessment & Plan           Elysia Lane is a 89 year old female with a history of hypertension, anemia who presented to the hospital for evaluation of right leg pain after a fall and was found to have new onset A. fib and right hip fracture.     Problem List:     1. Traumatic closed fracture of the distal femur and significant right knee contusion  POD 5 ORIF distal femur fracture, right  Still having some difficulty with post-op pain with repositioning of her leg  Contf po oxycodone dose to q3hr  Will keep prn IV dialudid for severe break-through pain ---although goal to use po meds as she will at TCU  F/u with Ortho in weeks  No ambulation for 6 weeks; weight bearing with full assist and pivots only     2. New onset a. fib with RVR  Improved HR with po metoprolol 100mg po bid and po diltiazem 90mg po bid  Echo 9/7/21 showed EF of 45 to 50%, with mild global hypokinesia of the left ventricle,  Tolerating apixaban   CHADS score appears to be 4   Holding on PTA aspirin for now  TSH within normal limits  Electrolytes supplemented  F/u with cards 4-6wks      3. E. Coli UTI with sepsis, resolved  On admission she had leukocytosis, tachycardia, tachypnea and evidence of abnormal UA  Also had elevated lactic acid  Urine culture 9/6/21 positive for pan-sensitive e.coli  Completed course of IV ceftriaxone/IV ancef     4. Lactic acidosis, resolved  Was elevated on admission and then normalized with IVF and improvement of HR     5. Acute blood loss anemia  Chronicity unclear   Last hgb 12.2 (11/2018)  Last hgb 7.6 (9/10/21)  Hgb stable at 7.5 today     6.  H/O HTN  Cont to hold PTA norvasc to allow for further titration of other cardiac meds needed for HR control       Diet: Advance Diet as Tolerated: Regular Diet Adult    DVT Prophylaxis: DOAC  Sun Catheter: PRESENT, indication: Wound  Healing  Central Lines: None  Code Status: No CPR- Do NOT Intubate      Disposition Plan   Expected discharge: 09/15/2021   recommended to transitional care unit once safe disposition plan/ TCU bed available.     The patient's care was discussed with the Bedside Nurse, Care Coordinator/ and Patient.    Taylor Jiménez MD  Hospitalist Service  [facility]  Securely message with the Vocera Web Console (learn more here)  Text page via [employer] Paging/Directory      Clinically Significant Risk Factors Present on Admission                ______________________________________________________________________    Interval History   Pt denies pain while resting in bed. Does report pain with movement in her leg. No SOB, nausea. Eating well. Had a bowel movement today.    Data reviewed today: I reviewed all medications, new labs and imaging results over the last 24 hours. I personally reviewed no images or EKG's today.    Physical Exam   Vital Signs: Temp: 98.5  F (36.9  C) Temp src: Axillary BP: 132/63 Pulse: 74   Resp: 18 SpO2: 94 % O2 Device: None (Room air)    Weight: 193 lbs 3.2 oz  Constitutional: WDWN female lying down in NAD  HEENT: NC/AT, no scleral icterus, nl conjunctiva, moist oral mucosa, nl dentition  Respiratory: good inspiratory effort, lungs CTAB  Cardiovascular: RRR, no M/R/G, 2+ radial pulses  GI: soft, nontender, nondistended  Skin: no rashes, warm, dry  Neuro: CN 2-12 grossly intact, sensation grossly intact  MSK: able to move all 4 exts  Psych: nl mood/affect/judgment    Data   Recent Labs   Lab 09/13/21  0542 09/12/21  0514 09/11/21  0516 09/10/21  0626 09/10/21  0535 09/09/21  1104 09/09/21  0735 09/09/21  0534 09/08/21  1701 09/08/21  0529 09/08/21  0457 09/08/21  0457 09/07/21  0521   WBC  --   --   --   --   --   --   --  10.4  --  12.2*  --  11.4* 16.0*   HGB 7.5*  --   --   --  7.6*  --   --  7.9*   < > 8.9*  --  8.4*  8.4* 9.4*   MCV  --   --   --   --   --    --   --  101*  --  100  --  100 98   PLT  --  195  --   --   --   --   --  121*  --  138*  --  116* 150   INR  --   --   --   --   --   --   --  1.15  --  1.09  --   --   --    NA  --   --   --   --   --   --   --  142  --   --   --  142 142   POTASSIUM 3.9 3.9 3.6  --  3.6  --   --  3.5   < > 3.6  --  3.5 3.7   CHLORIDE  --   --   --   --   --   --   --  108*  --   --   --  108* 107   CO2  --   --   --   --   --   --   --  26  --   --   --  24 24   BUN  --   --   --   --   --   --   --  26  --   --   --  37* 36*   CR  --  0.81  --   --   --   --   --  0.86  --  0.83  --  0.82 0.94   ANIONGAP  --   --   --   --   --   --   --  8  --   --   --  10 11   ADAM  --   --   --   --   --   --   --  8.6  --   --   --  8.4* 8.9   GLC  --   --   --  123*  --  156* 103 109  --   --    < > 98 113   ALBUMIN  --   --   --   --   --   --   --  3.0*  --   --   --  2.6* 3.1*   PROTTOTAL  --   --   --   --   --   --   --  5.5*  --   --   --  5.3* 5.9*   BILITOTAL  --   --   --   --   --   --   --  0.7  --   --   --  0.6 1.0   ALKPHOS  --   --   --   --   --   --   --  36*  --   --   --  38* 43*   ALT  --   --   --   --   --   --   --  21  --   --   --  27 28   AST  --   --   --   --   --   --   --  45*  --   --   --  56* 76*    < > = values in this interval not displayed.     No results found for this or any previous visit (from the past 24 hour(s)).  Medications       apixaban ANTICOAGULANT  5 mg Oral BID     cyanocobalamin  100 mcg Oral Daily     diltiazem ER  90 mg Oral BID     menthol-zinc oxide   Topical BID     metoprolol tartrate  100 mg Oral BID     polyethylene glycol  17 g Oral Daily     senna-docusate  1 tablet Oral BID     sodium chloride (PF)  3 mL Intracatheter Q8H     cholecalciferol  25 mcg Oral Daily

## 2021-09-13 NOTE — PROGRESS NOTES
"Orthopedic Progress Note      Assessment: 5 Days Post-Op  S/P Procedure(s):  OPEN REDUCTION INTERNAL FIXATION, FRACTURE, FEMUR, DISTAL     Plan:   - Continue PT/OT  - Continue pain management  - Do not remove leg ace wrap   -Continue to strongly encourage her to be up in chair for all meals, bed to chair status.   -Continue to monitor pressure points in bed  - Will continue to monitor left calf tenderness, continues to be tender along entire left leg.   - Weightbearing status: may weight bear with full assist and pivots only, but no ambulation for 6 weeks.   - Anticoagulation: Eliquis 5 mg BID per cardiology  - Discharge planning: pending pain control, therapy progression, medical clearance, and TCU placement. Follow up with Dr. Haji in clinic in 2 weeks.     Subjective:  Pain: comfortable at rest, moderate-severe with motion of RLE   Nausea, Vomiting:  No  Lightheadedness, Dizziness:  No  Neuro:  Patient denies new onset numbness or paresthesias    Patient reports she is not in much pain at rest, but yells out in pain with any movement or touch to the right leg. She notes occasional shooting pain down the leg, but mostly is in the knee. No new complaints. Limited due to memory.     Objective:  /58 (BP Location: Right arm)   Pulse 62   Temp 97.8  F (36.6  C) (Oral)   Resp 20   Ht 1.575 m (5' 2\")   Wt 87.6 kg (193 lb 3.2 oz)   SpO2 97%   BMI 35.34 kg/m    The patient is awake sitting in chair, comfortable appearing. Patient seems to be doing better and have better pain control than last week.  Once leg is touched or attempt to slightly move, she appears in some distress due to pain. She is somewhat confused to place at times and does not always remember her surgery or that she is in the hospital.   Sensation is intact to visible toes RLE, normal sensation LLE.  Dorsiflexion and plantar flexion is intact in LLE.   Normal capillary refill and warmth in right toes.   Dorsalis pedis pulse intact LLE. Unable " to assess RLE.   Entire LLE is tender to palpation, soft.   RLE has full ace wrap post op bandage in place, clean and dry.      Exam is somewhat limited due to patient's pain with repositioning.       Pertinent Labs   Lab Results: personally reviewed.   Lab Results   Component Value Date    INR 1.15 09/09/2021    INR 1.09 09/08/2021     Lab Results   Component Value Date    WBC 10.4 09/09/2021    HGB 7.5 (L) 09/13/2021    HCT 24.6 (L) 09/09/2021     (H) 09/09/2021     09/12/2021     Lab Results   Component Value Date     09/09/2021    CO2 26 09/09/2021         Report completed by:  Tiffany Chandler PA-C, JOBY  Date: 9/13/2021  Time: 2:13 PM

## 2021-09-13 NOTE — PROGRESS NOTES
CLINICAL NUTRITION SERVICES    Reviewed nutrition risk factors due to LOS. Pt is tolerating diet, eating well per nursing documentation. No nutrition issues identified at this time. RD will follow via rounds at this time, unless consulted.

## 2021-09-13 NOTE — PLAN OF CARE
Problem: Adult Inpatient Plan of Care  Goal: Plan of Care Review  Outcome: No Change     Pt reports no pain when lying in bed. Pt in excruciating pain when changing and turning her. Medicated with oxycodone and dilaudid. Pt resting in between cares.   Sun output 450 ml overnight.  A-fib HR 60-90's

## 2021-09-13 NOTE — PROGRESS NOTES
Care Management Follow Up    Length of Stay (days): 7    Expected Discharge Date:  9/14/21 (pending pain control and accepting TCU bed).        Concerns to be Addressed:   Postoperative care and recovery (Retrograde IM nailing right supracondylar distal femur fracture 9/8/21): Pain control still requiring IV analgesia.   Patient plan of care discussed at interdisciplinary rounds: Yes    Anticipated Discharge Disposition:  Transitional care (TCU) is recommended.      Anticipated Discharge Services:  Continued daily therapy and nursing care.  Anticipated Discharge DME:  Per therapy.    Patient/family educated on Medicare website which has current facility and service quality ratings:  Per previous care manager.  Education Provided on the Discharge Plan:  Per team.  Patient/Family in Agreement with the Plan:  yes    Referrals Placed by CM/SW:  See below  Private pay costs discussed: Not applicable     Additional Information:  Patient lives alone and her friends, Kenia and her , help with transportation and shopping. She also has a daughter in the [facility] area but they correspond by letter only. TCU is recommended and referrals have been re-sent (see below). Patient will need medical transport.    [facility] (reviewing for possible admission tomorrow but will need to stand first)    [facility] (reviewing) (Cannot accept to their TCU, due to no beds, and cannot accept to their LTC, due to staffing, but will keep referral and review later in the week.)    [facility]    12:42 PM:  Per RN BOWEN, who met with patient's friends, may need long term care if unable to participate in therapy.   1:20 PM:  Reviewed with [facility] who has no availability for transitional care but will keep to review for long term care. They are currently not accepting anyone to their long term care unit, due to staffing issues, but may be able to accept at the end of the week (pending review). Referral also made to  [facility].    Sarah Woods RN

## 2021-09-13 NOTE — PLAN OF CARE
Problem: Adult Inpatient Plan of Care  Goal: Plan of Care Review  Outcome: Improving  Flowsheets (Taken 9/13/2021 1224)  Plan of Care Reviewed With: friend  Goal: Patient-Specific Goal (Individualized)  Outcome: Improving  Goal: Optimal Comfort and Wellbeing  Outcome: Improving     Problem: Risk for Delirium  Goal: Optimal Coping  Outcome: Improving  Goal: Improved Behavioral Control  Outcome: Improving  Goal: Improved Attention and Thought Clarity  Outcome: Improving     Problem: Dysrhythmia  Goal: Normalized Cardiac Rhythm  Outcome: Improving     Problem: Embolism (Orthopaedic Fracture)  Goal: Absence of Embolism Signs and Symptoms  Outcome: Improving     Problem: Fracture Stabilization and Management (Orthopaedic Fracture)  Goal: Fracture Stability  Outcome: Improving   Was placed in the recliner with a ceiling lift Friend Miroslava was here today and per My conversation with her  she claimed that patient use to be able to walk with a walker  and a cane   Patient do not seem to remember that she was walking before this hospitalization.very painful with activity even with turning and placement of the  sling patient was screaming  but generally pain free otherwise .

## 2021-09-14 ENCOUNTER — APPOINTMENT (OUTPATIENT)
Dept: OCCUPATIONAL THERAPY | Facility: HOSPITAL | Age: 86
DRG: 854 | End: 2021-09-14
Payer: MEDICARE

## 2021-09-14 ENCOUNTER — APPOINTMENT (OUTPATIENT)
Dept: PHYSICAL THERAPY | Facility: HOSPITAL | Age: 86
DRG: 854 | End: 2021-09-14
Payer: MEDICARE

## 2021-09-14 LAB
GLUCOSE BLDC GLUCOMTR-MCNC: 108 MG/DL (ref 70–99)
MAGNESIUM SERPL-MCNC: 1.9 MG/DL (ref 1.8–2.6)
POTASSIUM BLD-SCNC: 4.1 MMOL/L (ref 3.5–5)

## 2021-09-14 PROCEDURE — 97129 THER IVNTJ 1ST 15 MIN: CPT | Mod: GO

## 2021-09-14 PROCEDURE — 84132 ASSAY OF SERUM POTASSIUM: CPT | Performed by: INTERNAL MEDICINE

## 2021-09-14 PROCEDURE — 250N000013 HC RX MED GY IP 250 OP 250 PS 637: Performed by: INTERNAL MEDICINE

## 2021-09-14 PROCEDURE — 97530 THERAPEUTIC ACTIVITIES: CPT | Mod: GP

## 2021-09-14 PROCEDURE — 210N000001 HC R&B IMCU HEART CARE

## 2021-09-14 PROCEDURE — 250N000013 HC RX MED GY IP 250 OP 250 PS 637: Performed by: PHYSICIAN ASSISTANT

## 2021-09-14 PROCEDURE — 97110 THERAPEUTIC EXERCISES: CPT | Mod: GP

## 2021-09-14 PROCEDURE — 250N000013 HC RX MED GY IP 250 OP 250 PS 637: Performed by: FAMILY MEDICINE

## 2021-09-14 PROCEDURE — 99232 SBSQ HOSP IP/OBS MODERATE 35: CPT | Performed by: HOSPITALIST

## 2021-09-14 PROCEDURE — 250N000011 HC RX IP 250 OP 636: Performed by: INTERNAL MEDICINE

## 2021-09-14 PROCEDURE — 97535 SELF CARE MNGMENT TRAINING: CPT | Mod: GO

## 2021-09-14 PROCEDURE — 83735 ASSAY OF MAGNESIUM: CPT | Performed by: INTERNAL MEDICINE

## 2021-09-14 PROCEDURE — 36415 COLL VENOUS BLD VENIPUNCTURE: CPT | Performed by: INTERNAL MEDICINE

## 2021-09-14 RX ADMIN — DILTIAZEM HYDROCHLORIDE 90 MG: 90 CAPSULE, EXTENDED RELEASE ORAL at 09:21

## 2021-09-14 RX ADMIN — Medication 2.5 MG: at 14:23

## 2021-09-14 RX ADMIN — Medication 2.5 MG: at 05:50

## 2021-09-14 RX ADMIN — ACETAMINOPHEN 650 MG: 325 TABLET ORAL at 09:22

## 2021-09-14 RX ADMIN — ANORECTAL OINTMENT: 15.7; .44; 24; 20.6 OINTMENT TOPICAL at 09:35

## 2021-09-14 RX ADMIN — HYDROMORPHONE HYDROCHLORIDE 0.2 MG: 0.2 INJECTION, SOLUTION INTRAMUSCULAR; INTRAVENOUS; SUBCUTANEOUS at 11:18

## 2021-09-14 RX ADMIN — DILTIAZEM HYDROCHLORIDE 90 MG: 90 CAPSULE, EXTENDED RELEASE ORAL at 21:06

## 2021-09-14 RX ADMIN — APIXABAN 5 MG: 5 TABLET, FILM COATED ORAL at 21:06

## 2021-09-14 RX ADMIN — Medication 100 MCG: at 09:21

## 2021-09-14 RX ADMIN — Medication 2.5 MG: at 21:06

## 2021-09-14 RX ADMIN — APIXABAN 5 MG: 5 TABLET, FILM COATED ORAL at 09:21

## 2021-09-14 RX ADMIN — METOPROLOL TARTRATE 100 MG: 25 TABLET, FILM COATED ORAL at 21:06

## 2021-09-14 RX ADMIN — ANORECTAL OINTMENT: 15.7; .44; 24; 20.6 OINTMENT TOPICAL at 21:08

## 2021-09-14 RX ADMIN — METOPROLOL TARTRATE 100 MG: 25 TABLET, FILM COATED ORAL at 09:22

## 2021-09-14 RX ADMIN — Medication 25 MCG: at 09:22

## 2021-09-14 ASSESSMENT — MIFFLIN-ST. JEOR: SCORE: 1213.24

## 2021-09-14 NOTE — PROVIDER NOTIFICATION
Heart rate dipped in to 30s. She did not sustain this, asymptomatic. This was also following pain medication administration.    Paged Dr. Jiménez.

## 2021-09-14 NOTE — PROGRESS NOTES
"Orthopedic Progress Note      Assessment: 6 Days Post-Op  S/P Procedure(s):  OPEN REDUCTION INTERNAL FIXATION, FRACTURE, FEMUR, DISTAL     Plan:   - Continue PT/OT  - Continue pain management  - Leg ace wrap reapplied, keep this on at all times. Wrap and bandage should stay on until first post op.   -Continue to encourage her to be up in chair for all meals, bed to chair status.   -Continue to monitor pressure points in bed  - Weightbearing status: may weight bear with full assist and pivots only, but no ambulation for 6 weeks.   - Anticoagulation: Eliquis 5 mg BID per cardiology  - Discharge planning: pending pain control, therapy progression, medical clearance, and TCU placement. Follow up with Dr. Haji in clinic in 2 weeks from surgery.     Subjective:  Pain: comfortable at rest, moderate-severe with motion of RLE   Nausea, Vomiting:  No  Lightheadedness, Dizziness:  No  Neuro:  Patient denies new onset numbness or paresthesias    Today patient appears to be doing better. She states she is comfortable at rest and denies pain, but notes pain with moving of the leg. She seems to have much less pain with touch and slight passive movement of the right leg. Denies N/T in the toes. Otherwise content and denies new complaints.     Objective:  /56 (BP Location: Right arm)   Pulse 69   Temp 97.8  F (36.6  C) (Oral)   Resp 18   Ht 1.575 m (5' 2\")   Wt 87.5 kg (192 lb 14.4 oz)   SpO2 98%   BMI 35.28 kg/m    The patient is A&Ox3. Appears comfortable sitting in chair.   Slight discomfort with wrapping upper leg with ace, but much improved. Tender to palpation throughout knee.   Sensation is intact to visible toes.   RLE has full ace wrap post op bandage in place, clean and dry.   Dressing at proximal thigh is visible, clean and dry.     Pertinent Labs   Lab Results: personally reviewed.   Lab Results   Component Value Date    INR 1.15 09/09/2021    INR 1.09 09/08/2021     Lab Results   Component Value Date    " WBC 10.4 09/09/2021    HGB 7.5 (L) 09/13/2021    HCT 24.6 (L) 09/09/2021     (H) 09/09/2021     09/12/2021     Lab Results   Component Value Date     09/09/2021    CO2 26 09/09/2021         Report completed by:  Tiffany Chandler PA-C, JOBY  Date: 9/14/2021  Time: 5:45 PM

## 2021-09-14 NOTE — PROGRESS NOTES
[facility]    Medicine Progress Note - Hospitalist Service       Date of Admission:  9/6/2021    Assessment & Plan           Elysia Lane is a 89 year old female with a history of hypertension, anemia who presented to the hospital for evaluation of right leg pain after a fall and was found to have new onset A. fib and right hip fracture.     Problem List:     1. Traumatic closed fracture of the distal femur and significant right knee contusion  POD 6 ORIF distal femur fracture, right  Still having some difficulty with post-op pain with repositioning of her leg  Cont po oxycodone dose to q3hr  Will keep prn IV dialudid for severe break-through pain ---although goal to use po meds as she will at TCU  F/u with Ortho in weeks  No ambulation for 6 weeks; weight bearing with full assist and pivots only     2. New onset a. fib with RVR  Improved HR with po metoprolol 100mg po bid and po diltiazem 90mg po bid  Echo 9/7/21 showed EF of 45 to 50%, with mild global hypokinesia of the left ventricle,  Tolerating apixaban   CHADS score appears to be 4   Holding on PTA aspirin for now  TSH within normal limits  Electrolytes supplemented  F/u with cards 4-6wks      3. E. Coli UTI with sepsis, resolved  On admission she had leukocytosis, tachycardia, tachypnea and evidence of abnormal UA  Also had elevated lactic acid  Urine culture 9/6/21 positive for pan-sensitive e.coli  Completed course of IV ceftriaxone/IV ancef     4. Lactic acidosis, resolved  Was elevated on admission and then normalized with IVF and improvement of HR     5. Acute blood loss anemia  Chronicity unclear   Last hgb 12.2 (11/2018)  Last hgb 7.6 (9/10/21)  Hgb stable at 7.5     6.  H/O HTN  Cont to hold PTA norvasc to allow for further titration of other cardiac meds needed for HR control       Diet: Advance Diet as Tolerated: Regular Diet Adult    DVT Prophylaxis: DOAC  Sun Catheter: PRESENT, indication: Other (Comment)  (imobility)  Central Lines: None  Code Status: No CPR- Do NOT Intubate      Disposition Plan   Expected discharge: 09/15/2021   recommended to transitional care unit once adequate pain management/ tolerating PO medications and safe disposition plan/ TCU bed available.     The patient's care was discussed with the Bedside Nurse, Care Coordinator/ and Patient.    Taylor Jiménez MD  Hospitalist Service  [facility]  Securely message with the Vocera Web Console (learn more here)  Text page via [employer] Paging/Directory      Clinically Significant Risk Factors Present on Admission                ______________________________________________________________________    Interval History   Pt denies pain while resting in bed but does have pain with light touch and movement. No SOB, nausea. Appetite okay.    Data reviewed today: I reviewed all medications, new labs and imaging results over the last 24 hours. I personally reviewed no images or EKG's today.    Physical Exam   Vital Signs: Temp: 97.8  F (36.6  C) Temp src: Oral BP: 118/56 Pulse: 69   Resp: 18 SpO2: 98 % O2 Device: None (Room air)    Weight: 192 lbs 14.4 oz  Constitutional: WDWN female lying down in NAD  HEENT: NC/AT, no scleral icterus, nl conjunctiva, moist oral mucosa, nl dentition  Respiratory: good inspiratory effort, lungs CTAB  Cardiovascular: RRR, no M/R/G, 2+ radial pulses  GI: soft, nondistended  Skin: no rashes, warm, dry  Neuro: CN 2-12 grossly intact, sensation grossly intact  MSK: able to move all 4 exts, tender to palpation over right leg  Psych: nl mood/affect/judgment    Data   Recent Labs   Lab 09/14/21  0818 09/14/21  0426 09/13/21  0542 09/12/21  0514 09/10/21  0626 09/10/21  0535 09/09/21  1104 09/09/21  0534 09/08/21  1701 09/08/21  0529 09/08/21 0457 09/08/21  0457   WBC  --   --   --   --   --   --   --  10.4  --  12.2*  --  11.4*   HGB  --   --  7.5*  --   --  7.6*  --  7.9*   < > 8.9*  --  8.4*   8.4*   MCV  --   --   --   --   --   --   --  101*  --  100  --  100   PLT  --   --   --  195  --   --   --  121*  --  138*  --  116*   INR  --   --   --   --   --   --   --  1.15  --  1.09  --   --    NA  --   --   --   --   --   --   --  142  --   --   --  142   POTASSIUM  --  4.1 3.9 3.9  --  3.6  --  3.5  --  3.6  --  3.5   CHLORIDE  --   --   --   --   --   --   --  108*  --   --   --  108*   CO2  --   --   --   --   --   --   --  26  --   --   --  24   BUN  --   --   --   --   --   --   --  26  --   --   --  37*   CR  --   --   --  0.81  --   --   --  0.86  --  0.83  --  0.82   ANIONGAP  --   --   --   --   --   --   --  8  --   --   --  10   ADAM  --   --   --   --   --   --   --  8.6  --   --   --  8.4*   *  --   --   --  123*  --  156* 109  --   --    < > 98   ALBUMIN  --   --   --   --   --   --   --  3.0*  --   --   --  2.6*   PROTTOTAL  --   --   --   --   --   --   --  5.5*  --   --   --  5.3*   BILITOTAL  --   --   --   --   --   --   --  0.7  --   --   --  0.6   ALKPHOS  --   --   --   --   --   --   --  36*  --   --   --  38*   ALT  --   --   --   --   --   --   --  21  --   --   --  27   AST  --   --   --   --   --   --   --  45*  --   --   --  56*    < > = values in this interval not displayed.     No results found for this or any previous visit (from the past 24 hour(s)).  Medications       apixaban ANTICOAGULANT  5 mg Oral BID     cyanocobalamin  100 mcg Oral Daily     diltiazem ER  90 mg Oral BID     menthol-zinc oxide   Topical BID     metoprolol tartrate  100 mg Oral BID     polyethylene glycol  17 g Oral Daily     senna-docusate  1 tablet Oral BID     sodium chloride (PF)  3 mL Intracatheter Q8H     cholecalciferol  25 mcg Oral Daily

## 2021-09-14 NOTE — PLAN OF CARE
Problem: Adult Inpatient Plan of Care  Goal: Plan of Care Review  Outcome: Improving  Goal: Absence of Hospital-Acquired Illness or Injury  Intervention: Identify and Manage Fall Risk  Recent Flowsheet Documentation  Taken 9/14/2021 0100 by Cassia Cardenas RN  Safety Promotion/Fall Prevention:    activity supervised    bed alarm on    fall prevention program maintained    increased rounding and observation    nonskid shoes/slippers when out of bed  Taken 9/13/2021 2100 by Cassia Cardenas RN  Safety Promotion/Fall Prevention:    activity supervised    bed alarm on    fall prevention program maintained    increased rounding and observation    nonskid shoes/slippers when out of bed     Problem: Fracture Stabilization and Management (Orthopaedic Fracture)  Goal: Fracture Stability  Outcome: Improving     Problem: Functional Ability Impaired (Orthopaedic Fracture)  Goal: Optimal Functional Ability  Outcome: Improving  Intervention: Optimize Functional Ability  Recent Flowsheet Documentation  Taken 9/14/2021 0100 by Cassia Cardenas RN  Activity Management: (to promote fracture healing. ) activity minimized  Taken 9/13/2021 2100 by Cassia Cardenas RN  Activity Management: (to promote fracture healing. ) activity minimized    Pt is alert but very forgetful, Pt comfortable except with movement and  changing position. Medicated oxycodone prior to changing. Pt will some times denies pain but demonstrate physiologic indoctrinators of pain or yells in pain. Pt's verbal rating pain scale competence is questionable at times. Other methods of pain rating can be useful as well. Incision dressings were changed last night dues to stool incontinence. Pt's HS stool softer held. Repositioning, takes medications with apple sauce. A-Fib on tele with control rate. RA. continue to monitor pt.

## 2021-09-15 ENCOUNTER — APPOINTMENT (OUTPATIENT)
Dept: PHYSICAL THERAPY | Facility: HOSPITAL | Age: 86
DRG: 854 | End: 2021-09-15
Payer: MEDICARE

## 2021-09-15 ENCOUNTER — APPOINTMENT (OUTPATIENT)
Dept: OCCUPATIONAL THERAPY | Facility: HOSPITAL | Age: 86
DRG: 854 | End: 2021-09-15
Payer: MEDICARE

## 2021-09-15 LAB
BASOPHILS # BLD AUTO: 0 10E3/UL (ref 0–0.2)
BASOPHILS NFR BLD AUTO: 0 %
CREAT SERPL-MCNC: 0.72 MG/DL (ref 0.6–1.1)
EOSINOPHIL # BLD AUTO: 0.2 10E3/UL (ref 0–0.7)
EOSINOPHIL NFR BLD AUTO: 1 %
ERYTHROCYTE [DISTWIDTH] IN BLOOD BY AUTOMATED COUNT: 14.5 % (ref 10–15)
GFR SERPL CREATININE-BSD FRML MDRD: 70 ML/MIN/1.73M2
HCT VFR BLD AUTO: 24.2 % (ref 35–47)
HGB BLD-MCNC: 7.6 G/DL (ref 11.7–15.7)
HGB BLD-MCNC: 7.6 G/DL (ref 11.7–15.7)
IMM GRANULOCYTES # BLD: 0.4 10E3/UL
IMM GRANULOCYTES NFR BLD: 4 %
LYMPHOCYTES # BLD AUTO: 1.3 10E3/UL (ref 0.8–5.3)
LYMPHOCYTES NFR BLD AUTO: 12 %
MAGNESIUM SERPL-MCNC: 1.7 MG/DL (ref 1.8–2.6)
MCH RBC QN AUTO: 32.1 PG (ref 26.5–33)
MCHC RBC AUTO-ENTMCNC: 31.4 G/DL (ref 31.5–36.5)
MCV RBC AUTO: 102 FL (ref 78–100)
MONOCYTES # BLD AUTO: 1.3 10E3/UL (ref 0–1.3)
MONOCYTES NFR BLD AUTO: 12 %
NEUTROPHILS # BLD AUTO: 7.8 10E3/UL (ref 1.6–8.3)
NEUTROPHILS NFR BLD AUTO: 74 %
NRBC # BLD AUTO: 0.1 10E3/UL
NRBC BLD AUTO-RTO: 1 /100
PLATELET # BLD AUTO: 271 10E3/UL (ref 150–450)
PLATELET # BLD AUTO: 271 10E3/UL (ref 150–450)
POTASSIUM BLD-SCNC: 3.7 MMOL/L (ref 3.5–5)
RBC # BLD AUTO: 2.37 10E6/UL (ref 3.8–5.2)
WBC # BLD AUTO: 10.5 10E3/UL (ref 4–11)

## 2021-09-15 PROCEDURE — 250N000013 HC RX MED GY IP 250 OP 250 PS 637: Performed by: INTERNAL MEDICINE

## 2021-09-15 PROCEDURE — 99232 SBSQ HOSP IP/OBS MODERATE 35: CPT | Performed by: HOSPITALIST

## 2021-09-15 PROCEDURE — 82565 ASSAY OF CREATININE: CPT | Performed by: FAMILY MEDICINE

## 2021-09-15 PROCEDURE — 97110 THERAPEUTIC EXERCISES: CPT | Mod: GP

## 2021-09-15 PROCEDURE — 250N000013 HC RX MED GY IP 250 OP 250 PS 637: Performed by: FAMILY MEDICINE

## 2021-09-15 PROCEDURE — 83735 ASSAY OF MAGNESIUM: CPT | Performed by: INTERNAL MEDICINE

## 2021-09-15 PROCEDURE — 210N000001 HC R&B IMCU HEART CARE

## 2021-09-15 PROCEDURE — 85025 COMPLETE CBC W/AUTO DIFF WBC: CPT | Performed by: HOSPITALIST

## 2021-09-15 PROCEDURE — 85049 AUTOMATED PLATELET COUNT: CPT | Performed by: FAMILY MEDICINE

## 2021-09-15 PROCEDURE — 258N000003 HC RX IP 258 OP 636: Performed by: HOSPITALIST

## 2021-09-15 PROCEDURE — 97530 THERAPEUTIC ACTIVITIES: CPT | Mod: GP

## 2021-09-15 PROCEDURE — 97535 SELF CARE MNGMENT TRAINING: CPT | Mod: GO

## 2021-09-15 PROCEDURE — 250N000013 HC RX MED GY IP 250 OP 250 PS 637: Performed by: PHYSICIAN ASSISTANT

## 2021-09-15 PROCEDURE — 85018 HEMOGLOBIN: CPT | Performed by: HOSPITALIST

## 2021-09-15 PROCEDURE — 250N000013 HC RX MED GY IP 250 OP 250 PS 637: Performed by: HOSPITALIST

## 2021-09-15 PROCEDURE — 999N000127 HC STATISTIC PERIPHERAL IV START W US GUIDANCE

## 2021-09-15 PROCEDURE — G0463 HOSPITAL OUTPT CLINIC VISIT: HCPCS

## 2021-09-15 PROCEDURE — 84132 ASSAY OF SERUM POTASSIUM: CPT | Performed by: INTERNAL MEDICINE

## 2021-09-15 PROCEDURE — 36415 COLL VENOUS BLD VENIPUNCTURE: CPT | Performed by: HOSPITALIST

## 2021-09-15 RX ORDER — SODIUM CHLORIDE 9 MG/ML
INJECTION, SOLUTION INTRAVENOUS CONTINUOUS
Status: ACTIVE | OUTPATIENT
Start: 2021-09-15 | End: 2021-09-16

## 2021-09-15 RX ADMIN — OXYCODONE HYDROCHLORIDE 5 MG: 5 TABLET ORAL at 13:51

## 2021-09-15 RX ADMIN — POLYETHYLENE GLYCOL 3350 17 G: 17 POWDER, FOR SOLUTION ORAL at 09:16

## 2021-09-15 RX ADMIN — Medication 25 MCG: at 09:15

## 2021-09-15 RX ADMIN — Medication 5 MG: at 05:09

## 2021-09-15 RX ADMIN — Medication 2.5 MG: at 20:06

## 2021-09-15 RX ADMIN — SODIUM CHLORIDE: 9 INJECTION, SOLUTION INTRAVENOUS at 17:02

## 2021-09-15 RX ADMIN — ANORECTAL OINTMENT: 15.7; .44; 24; 20.6 OINTMENT TOPICAL at 09:16

## 2021-09-15 RX ADMIN — METOPROLOL TARTRATE 100 MG: 25 TABLET, FILM COATED ORAL at 20:02

## 2021-09-15 RX ADMIN — DILTIAZEM HYDROCHLORIDE 90 MG: 90 CAPSULE, EXTENDED RELEASE ORAL at 09:15

## 2021-09-15 RX ADMIN — DILTIAZEM HYDROCHLORIDE 90 MG: 90 CAPSULE, EXTENDED RELEASE ORAL at 20:01

## 2021-09-15 RX ADMIN — DOCUSATE SODIUM 50 MG AND SENNOSIDES 8.6 MG 1 TABLET: 8.6; 5 TABLET, FILM COATED ORAL at 20:01

## 2021-09-15 RX ADMIN — Medication 100 MCG: at 09:15

## 2021-09-15 RX ADMIN — APIXABAN 5 MG: 5 TABLET, FILM COATED ORAL at 09:15

## 2021-09-15 RX ADMIN — METOPROLOL TARTRATE 100 MG: 25 TABLET, FILM COATED ORAL at 09:15

## 2021-09-15 RX ADMIN — DOCUSATE SODIUM 50 MG AND SENNOSIDES 8.6 MG 1 TABLET: 8.6; 5 TABLET, FILM COATED ORAL at 09:15

## 2021-09-15 RX ADMIN — ANORECTAL OINTMENT: 15.7; .44; 24; 20.6 OINTMENT TOPICAL at 20:02

## 2021-09-15 RX ADMIN — APIXABAN 5 MG: 5 TABLET, FILM COATED ORAL at 20:01

## 2021-09-15 ASSESSMENT — MIFFLIN-ST. JEOR: SCORE: 1220.04

## 2021-09-15 NOTE — PLAN OF CARE
Problem: Dysrhythmia  Goal: Normalized Cardiac Rhythm  Outcome: Improving   Afib, rates controlled    Problem: Functional Ability Impaired (Orthopaedic Fracture)  Goal: Optimal Functional Ability  Outcome: Improving   OOB to chair via melvina lift.     Problem: Pain (Orthopaedic Fracture)  Goal: Acceptable Pain Control  Outcome: Improving   PRN pain medications given, which patient reports are helpful.

## 2021-09-15 NOTE — PROGRESS NOTES
Wound Ostomy  WO Assessment       Allergies:  No Known Allergies    Diagnosis:   Patient Active Problem List    Diagnosis Date Noted     Chronic anticoagulation 09/11/2021     Priority: Medium     Eliquis atrial fibrillation       H/O major orthopedic surgery 09/10/2021     Priority: Medium     Procedure(s): Dr. Liam Haji  Procedure Date:  9/6/2021 - 9/8/2021  retrograde IM nailing right supracondylar distal femur fracture (Rojelio retrograde nail 12 mm x 320 mm, interlocked (     Pre-Procedure Diagnosis:  Closed fracture of distal end of right femur, unspecified fracture morphology, initial encounter (H) [S72.401A]      Post-Procedure Diagnosis:    Supracondylar distal femur fracture right     Postoperative plan:  Up in chair all meals.  Bed chair status.  Patient can weight-bear with full assist for transfers and pivots only.  Nonambulatory however x6 weeks.  Anticoagulation per cardiology given her underlying cardiac arrhythmia: Recommend minimal aspirin twice daily or Coumadin.  Return to clinic 2 weeks            DNR (do not resuscitate) 09/10/2021     Priority: Medium     Rapid atrial fibrillation (H) 09/06/2021     Priority: Medium     Closed fracture of distal end of right femur, unspecified fracture morphology, initial encounter (H) 09/06/2021     Priority: Medium     Procedure(s): Procedure Date:  9/6/2021 - 9/8/2021; Dr Liam Haji  Retrograde IM nailing right supracondylar distal femur fracture (Orjelio retrograde nail 12 mm x 320 mm, interlocked (   Pre-Procedure Diagnosis:  Closed fracture of distal end of right femur, unspecified fracture morphology, initial encounter (H) [S72.401A]    Post-Procedure Diagnosis:    Supracondylar distal femur fracture right     Postoperative plan:  Up in chair all meals.  Bed chair status.  Patient can weight-bear with full assist for transfers and pivots only.  Nonambulatory however x6 weeks.  Anticoagulation per cardiology given her underlying cardiac arrhythmia:  Recommend minimal aspirin twice daily or Coumadin.  Return to clinic 2 weeks            Height:  [unfilled]    Weight:  [unfilled]    Labs:  Recent Labs   Lab Test 09/07/21  0521   HGB 9.4*   ALBUMIN 3.1*       Shahab:  Shahab Score: 13    Specialty Bed:       Wound culture obtained: No    Edema:  Yes:  Localized    Anatomic Site/Laterality: Bilateral ITs and buttocks    Reason for ongoing care:   Wound assessment and plan of care     Encounter Type:  Subsequent Encounter Wound Type:   Pressure Injury (Pressure Ulcer): Present on Admit    Stage:  Deep Tissue Injury    Associated conditions/related trauma: Fall at home, unclear how long she was down prior to admission    Assessment:    Left IT 2x1.5cm 70% agranular, 30% slough     Right IT: 2x3cm intact scar tissue/erythema    Buttocks: now blanchable erythema and scattered areas of partial thickness skin damage measuring between 0.5x05cm and 1.5x0.5cm    Tunneling/Undermining: No    Wound Bed: see above    Exudate: No    Periwound Skin: Intact    Treatment Plan: Cream/Ointment: calmoseptine and MAURICE mattress.        Nursing care provided was reposition.    Discussed plan of care with nurse and patient.    Outcomes and treatment recommendations are to promote skin integrity and promote wound healing.    Actions taken by WOC RN: 2 minutes of education.    Planned Follow Up: Weekly.    Plan for next visit: Reassess wound(s) and Reassess skin integrity

## 2021-09-15 NOTE — PROGRESS NOTES
"Orthopedic Progress Note        Assessment: 7 Days Post-Op  S/P Procedure(s):  OPEN REDUCTION INTERNAL FIXATION, FRACTURE, FEMUR, DISTAL      Plan:   - Continue PT/OT  - Continue pain management  - Wrap and bandage should stay on until first post op.   -Continue to encourage her to be up in chair for all meals, bed to chair status.   -Continue to monitor pressure points in bed  - Weightbearing status: may weight bear with full assist and pivots only, but no ambulation for 6 weeks.   - Anticoagulation: Eliquis 5 mg BID per cardiology  - Discharge planning: pending TCU placement and pain management. Follow up with Dr. Haji in clinic in 2 weeks from surgery.      Subjective:  Pain: comfortable at rest, moderate-severe with motion of RLE   Nausea, Vomiting:  No  Lightheadedness, Dizziness:  No  Neuro:  Patient denies new onset numbness or paresthesias     Today patient appears to be doing better. She states she is comfortable at rest and denies pain, but notes pain with moving of the leg. She seems to have much less pain with touch and slight passive movement of the right leg. Denies N/T in the toes. Otherwise content and denies new complaints.      Objective:  /56 (BP Location: Right arm)   Pulse 69   Temp 97.8  F (36.6  C) (Oral)   Resp 18   Ht 1.575 m (5' 2\")   Wt 87.5 kg (192 lb 14.4 oz)   SpO2 98%   BMI 35.28 kg/m    The patient is A&Ox3. Appears comfortable sitting in chair.   Slight discomfort with wrapping upper leg with ace, but much improved. Tender to palpation throughout knee.   Sensation is intact to visible toes.   RLE has full ace wrap post op bandage in place, clean and dry.   Dressing at proximal thigh is visible, clean and dry.   "

## 2021-09-15 NOTE — PROGRESS NOTES
Care Management Discharge planning assist    Length of Stay (days): 9    Expected Discharge Date: 09/15/86076/14/21 (pending accepting TCU bed).        Concerns to be Addressed:   Postoperative care and recovery (Retrograde IM nailing right supracondylar distal femur fracture 9/8/21): Looking for a TCU bed for patient.  Patient plan of care discussed at interdisciplinary rounds: Yes    Anticipated Discharge Disposition:  Transitional care (TCU) is recommended.      Anticipated Discharge Services:  Continued daily therapy and nursing care.  Anticipated Discharge DME:  Per therapy.    Patient/family educated on Medicare website which has current facility and service quality ratings:  Per previous care manager.  Education Provided on the Discharge Plan:  Per team.  Patient/Family in Agreement with the Plan:  yes    Referrals Placed by CM/SW:  See below  Private pay costs discussed: Not applicable     Additional Information:  Patient lives alone and her friends, Kenia and her , help with transportation and shopping. She also has a daughter in the [facility] area but they correspond by letter only. Per RN BOWEN, who met with patient's friends, may need long term care if unable to participate in therapy.  TCU is recommended and referrals have been sent (see below). Patient will need medical transport.    [facility] TCU (Declined, not able to participate in therapy to the extent required for TCU)    [facility] long term care (reviewing) (Declined)    [facility] (Cannot accept to their TCU, due to no beds, and cannot accept to their LTC, due to staffing, but will keep referral and review later in the week.)    [facility] (Referral re-sent)    [facility] (Tcu vs LTC)    Sarah Woods RN

## 2021-09-15 NOTE — PLAN OF CARE
Problem: Pain (Orthopaedic Fracture)  Goal: Acceptable Pain Control  Outcome: No Change  Pt declined pain for most of the shift. PRN Oxy given before bed to stay on top of pain. Around 0500, pt was screaming out in pain. PRN Oxy given x1 and pt repositioned. Pt would benefit from scheduled pain control rather than on an as needed basis.    A. Fib with HR in 70s and 80s.     D/O to place-fluctuates.     Sun patent and draining.     VSS. Will continue to monitor.

## 2021-09-15 NOTE — PROGRESS NOTES
[facility]    Medicine Progress Note - Hospitalist Service       Date of Admission:  9/6/2021    Assessment & Plan           Elysia Lane is a 89 year old female with a history of hypertension, anemia who presented to the hospital for evaluation of right leg pain after a fall and was found to have new onset A. fib and right hip fracture.     Problem List:     1. Traumatic closed fracture of the distal femur and significant right knee contusion  POD 7 ORIF distal femur fracture, right  Still having some difficulty with post-op pain especially with movement of her right leg  Will schedule low dose oxycodone 2.5mg every 6 hours and cont prn for breakthrough pain  Will keep prn IV dialudid for severe break-through pain ---although goal to use po meds as she will at TCU  F/u with Ortho in weeks  No ambulation for 6 weeks; weight bearing with full assist and pivots only     2. New onset a. fib with RVR  Improved HR with po metoprolol 100mg po bid and po diltiazem 90mg po bid  Has had brief episode of bradycardia on 9/14; cont to monitor and adjust meds prn  Echo 9/7/21 showed EF of 45 to 50%, with mild global hypokinesia of the left ventricle  Tolerating apixaban   CHADS score appears to be 4   Holding on PTA aspirin for now  TSH within normal limits  Electrolytes supplemented  Cont to monitor on telemetry for now  F/u with cards 4-6wks      3. E. Coli UTI with sepsis, resolved  On admission she had leukocytosis, tachycardia, tachypnea and evidence of abnormal UA  Also had elevated lactic acid  Urine culture 9/6/21 positive for pan-sensitive e.coli  Completed course of IV ceftriaxone/IV ancef     4. Lactic acidosis, resolved  Was elevated on admission and then normalized with IVF and improvement of HR     5. Acute blood loss anemia  Chronicity unclear   Last hgb 12.2 (11/2018)  Last hgb 7.6 (9/10/21)  Hgb stable at 7.6     6.  H/O HTN  Cont to hold PTA norvasc to allow for further titration of  other cardiac meds needed for HR control       Diet: Advance Diet as Tolerated: Regular Diet Adult    DVT Prophylaxis: DOAC  Sun Catheter: PRESENT, indication: Other (Comment) (immobility)  Central Lines: None  Code Status: No CPR- Do NOT Intubate      Disposition Plan   Expected discharge: 09/16/2021   recommended to transitional care unit once adequate pain management/ tolerating PO medications and safe disposition plan/ TCU bed available.     The patient's care was discussed with the Bedside Nurse, Care Coordinator/ and Patient.    Taylor Jiménez MD  Hospitalist Service  [facility]  Securely message with the Vocera Web Console (learn more here)  Text page via [employer] Paging/Directory      Clinically Significant Risk Factors Present on Admission                ______________________________________________________________________    Interval History   Pt sitting up in the chair and has some discomfort with this. Feels tired. Denies SOB, nausea. Did eat some breakfast. She feels like she didn't sleep well overnight.    Data reviewed today: I reviewed all medications, new labs and imaging results over the last 24 hours. I personally reviewed no images or EKG's today.    Physical Exam   Vital Signs: Temp: 97.6  F (36.4  C) Temp src: Oral BP: 111/55 Pulse: 76   Resp: 18 SpO2: 96 % O2 Device: None (Room air)    Weight: 194 lbs 6.4 oz  Constitutional: WDWN female sitting up in the chair in NAD  HEENT: NC/AT, no scleral icterus, nl conjunctiva, dry oral mucosa, nl dentition  Respiratory: good inspiratory effort, lungs CTAB  Cardiovascular: RRR, no M/R/G, 2+ radial pulses  GI: soft, nondistended  Skin: no rashes, warm, dry  Neuro: CN 2-12 grossly intact, sensation grossly intact  MSK: able to move all 4 exts, tender to palpation over right leg  Psych: nl mood/affect/judgment    Data   Recent Labs   Lab 09/15/21  0558 09/14/21  0818 09/14/21  0426 09/13/21  0542 09/12/21  0514  09/12/21  0514 09/11/21  0516 09/10/21  0626 09/10/21  0535 09/09/21  1104 09/09/21  0735 09/09/21  0534 09/09/21  0534   WBC  --   --   --   --   --   --   --   --   --   --   --   --  10.4   HGB 7.6*  --   --  7.5*  --   --   --   --  7.6*  --   --    < > 7.9*   MCV  --   --   --   --   --   --   --   --   --   --   --   --  101*     --   --   --   --  195  --   --   --   --   --   --  121*   INR  --   --   --   --   --   --   --   --   --   --   --   --  1.15   NA  --   --   --   --   --   --   --   --   --   --   --   --  142   POTASSIUM 3.7  --  4.1 3.9   < > 3.9   < >  --  3.6  --   --    < > 3.5   CHLORIDE  --   --   --   --   --   --   --   --   --   --   --   --  108*   CO2  --   --   --   --   --   --   --   --   --   --   --   --  26   BUN  --   --   --   --   --   --   --   --   --   --   --   --  26   CR 0.72  --   --   --   --  0.81  --   --   --   --   --   --  0.86   ANIONGAP  --   --   --   --   --   --   --   --   --   --   --   --  8   ADAM  --   --   --   --   --   --   --   --   --   --   --   --  8.6   GLC  --  108*  --   --   --   --   --  123*  --  156*   < >  --  109   ALBUMIN  --   --   --   --   --   --   --   --   --   --   --   --  3.0*   PROTTOTAL  --   --   --   --   --   --   --   --   --   --   --   --  5.5*   BILITOTAL  --   --   --   --   --   --   --   --   --   --   --   --  0.7   ALKPHOS  --   --   --   --   --   --   --   --   --   --   --   --  36*   ALT  --   --   --   --   --   --   --   --   --   --   --   --  21   AST  --   --   --   --   --   --   --   --   --   --   --   --  45*    < > = values in this interval not displayed.     No results found for this or any previous visit (from the past 24 hour(s)).  Medications       apixaban ANTICOAGULANT  5 mg Oral BID     cyanocobalamin  100 mcg Oral Daily     diltiazem ER  90 mg Oral BID     menthol-zinc oxide   Topical BID     metoprolol tartrate  100 mg Oral BID     oxyCODONE  2.5 mg Oral Q6H     polyethylene glycol  17  g Oral Daily     senna-docusate  1 tablet Oral BID     sodium chloride (PF)  3 mL Intracatheter Q8H     cholecalciferol  25 mcg Oral Daily

## 2021-09-16 ENCOUNTER — APPOINTMENT (OUTPATIENT)
Dept: OCCUPATIONAL THERAPY | Facility: HOSPITAL | Age: 86
DRG: 854 | End: 2021-09-16
Payer: MEDICARE

## 2021-09-16 ENCOUNTER — LAB REQUISITION (OUTPATIENT)
Dept: LAB | Facility: CLINIC | Age: 86
End: 2021-09-16
Payer: MEDICARE

## 2021-09-16 VITALS
BODY MASS INDEX: 36.55 KG/M2 | RESPIRATION RATE: 20 BRPM | TEMPERATURE: 97.8 F | OXYGEN SATURATION: 96 % | SYSTOLIC BLOOD PRESSURE: 120 MMHG | DIASTOLIC BLOOD PRESSURE: 56 MMHG | HEART RATE: 75 BPM | WEIGHT: 198.6 LBS | HEIGHT: 62 IN

## 2021-09-16 DIAGNOSIS — R76.12 NONSPECIFIC REACTION TO CELL MEDIATED IMMUNITY MEASUREMENT OF GAMMA INTERFERON ANTIGEN RESPONSE WITHOUT ACTIVE TUBERCULOSIS: ICD-10-CM

## 2021-09-16 LAB
MAGNESIUM SERPL-MCNC: 1.6 MG/DL (ref 1.8–2.6)
POTASSIUM BLD-SCNC: 3.8 MMOL/L (ref 3.5–5)

## 2021-09-16 PROCEDURE — 250N000013 HC RX MED GY IP 250 OP 250 PS 637: Performed by: PHYSICIAN ASSISTANT

## 2021-09-16 PROCEDURE — 250N000013 HC RX MED GY IP 250 OP 250 PS 637: Performed by: INTERNAL MEDICINE

## 2021-09-16 PROCEDURE — 83735 ASSAY OF MAGNESIUM: CPT | Performed by: HOSPITALIST

## 2021-09-16 PROCEDURE — 250N000013 HC RX MED GY IP 250 OP 250 PS 637: Performed by: FAMILY MEDICINE

## 2021-09-16 PROCEDURE — 36415 COLL VENOUS BLD VENIPUNCTURE: CPT | Performed by: HOSPITALIST

## 2021-09-16 PROCEDURE — 99239 HOSP IP/OBS DSCHRG MGMT >30: CPT | Performed by: INTERNAL MEDICINE

## 2021-09-16 PROCEDURE — 84132 ASSAY OF SERUM POTASSIUM: CPT | Performed by: INTERNAL MEDICINE

## 2021-09-16 PROCEDURE — 97110 THERAPEUTIC EXERCISES: CPT | Mod: GO

## 2021-09-16 PROCEDURE — 97535 SELF CARE MNGMENT TRAINING: CPT | Mod: GO

## 2021-09-16 PROCEDURE — 250N000013 HC RX MED GY IP 250 OP 250 PS 637: Performed by: HOSPITALIST

## 2021-09-16 RX ORDER — OXYCODONE HYDROCHLORIDE 5 MG/1
2.5 TABLET ORAL EVERY 6 HOURS
Qty: 30 TABLET | Refills: 0 | Status: SHIPPED | OUTPATIENT
Start: 2021-09-16 | End: 2021-10-01

## 2021-09-16 RX ORDER — DILTIAZEM HYDROCHLORIDE 90 MG/1
90 CAPSULE, EXTENDED RELEASE ORAL 2 TIMES DAILY
Qty: 30 CAPSULE | Refills: 1 | Status: SHIPPED | OUTPATIENT
Start: 2021-09-16 | End: 2023-01-01 | Stop reason: ALTCHOICE

## 2021-09-16 RX ORDER — POLYETHYLENE GLYCOL 3350 17 G/17G
17 POWDER, FOR SOLUTION ORAL DAILY
Qty: 510 G | Refills: 1 | Status: SHIPPED | OUTPATIENT
Start: 2021-09-16

## 2021-09-16 RX ORDER — VITAMIN B COMPLEX
25 TABLET ORAL DAILY
Qty: 30 TABLET | Refills: 0 | Status: SHIPPED | OUTPATIENT
Start: 2021-09-17

## 2021-09-16 RX ORDER — METOPROLOL TARTRATE 100 MG
100 TABLET ORAL 2 TIMES DAILY
Qty: 60 TABLET | Refills: 1 | Status: SHIPPED | OUTPATIENT
Start: 2021-09-16

## 2021-09-16 RX ADMIN — Medication 2.5 MG: at 18:37

## 2021-09-16 RX ADMIN — ANORECTAL OINTMENT: 15.7; .44; 24; 20.6 OINTMENT TOPICAL at 09:07

## 2021-09-16 RX ADMIN — Medication 100 MCG: at 09:07

## 2021-09-16 RX ADMIN — DOCUSATE SODIUM 50 MG AND SENNOSIDES 8.6 MG 1 TABLET: 8.6; 5 TABLET, FILM COATED ORAL at 09:06

## 2021-09-16 RX ADMIN — DILTIAZEM HYDROCHLORIDE 90 MG: 90 CAPSULE, EXTENDED RELEASE ORAL at 09:06

## 2021-09-16 RX ADMIN — ACETAMINOPHEN 650 MG: 325 TABLET ORAL at 12:32

## 2021-09-16 RX ADMIN — METOPROLOL TARTRATE 100 MG: 25 TABLET, FILM COATED ORAL at 09:06

## 2021-09-16 RX ADMIN — ACETAMINOPHEN 650 MG: 325 TABLET ORAL at 18:37

## 2021-09-16 RX ADMIN — Medication 2.5 MG: at 02:21

## 2021-09-16 RX ADMIN — Medication 25 MCG: at 09:07

## 2021-09-16 RX ADMIN — APIXABAN 5 MG: 5 TABLET, FILM COATED ORAL at 09:07

## 2021-09-16 RX ADMIN — Medication 2.5 MG: at 09:06

## 2021-09-16 RX ADMIN — Medication 2.5 MG: at 14:17

## 2021-09-16 ASSESSMENT — MIFFLIN-ST. JEOR: SCORE: 1239.09

## 2021-09-16 NOTE — PLAN OF CARE
Problem: Dysrhythmia  Goal: Normalized Cardiac Rhythm  Outcome: Improving   Afib with controlled rate.    Problem: Infection (Orthopaedic Fracture)  Goal: Absence of Infection Signs and Symptoms  Outcome: Improving   Vital signs:  Temp: 97.4  F (36.3  C) Temp src: Oral BP: 139/62 Pulse: 73   Resp: 18 SpO2: 95 % O2 Device: Nasal cannula Oxygen Delivery: 2 LPM    Problem: Neurovascular Compromise (Orthopaedic Fracture)  Goal: Effective Tissue Perfusion  Outcome: Improving   No Change in CMS.  Dressing in place.    Problem: Pain (Orthopaedic Fracture)  Goal: Acceptable Pain Control  Outcome: Improving   Controlled by scheduled oxycodone.

## 2021-09-16 NOTE — PLAN OF CARE
Physical Therapy Discharge Summary    Reason for therapy discharge:    Discharged to transitional care facility.    Progress towards therapy goal(s). See goals on Care Plan in [facility] electronic health record for goal details.  Goals partially met.  Barriers to achieving goals:   pain and weakness.    Therapy recommendation(s):    Continued therapy is recommended.  Rationale/Recommendations:  to progress at TCU.

## 2021-09-16 NOTE — PLAN OF CARE
Pt is up in chair with ceiling lift.  Pt ate breakfast.     [facility] accepted pt per CM and Pt to discharge via stretcher at 1830.

## 2021-09-16 NOTE — DISCHARGE SUMMARY
[facility] MEDICINE  DISCHARGE SUMMARY     Primary Care Physician: Elif Alcantara  Admission Date: 9/6/2021   Discharge Provider: Milton Gloria MD Discharge Date: 9/16/2021   Diet: cardiac    Code Status: No CPR- Do NOT Intubate   Activity: no weight bearing         Condition at Discharge: stable       REASON FOR PRESENTATION(See Admission Note for Details)   Fall     PRINCIPAL & ACTIVE DISCHARGE DIAGNOSES     Active Problems:    Rapid atrial fibrillation (H)    Closed fracture of distal end of right femur, unspecified fracture morphology, initial encounter (H)    H/O major orthopedic surgery    DNR (do not resuscitate)    Chronic anticoagulation      SIGNIFICANT FINDINGS (Imaging, labs):     Results for orders placed or performed during the hospital encounter of 09/06/21   CT Head w/o Contrast    Narrative    EXAM: CT HEAD W/O CONTRAST  LOCATION: [facility]  DATE/TIME: 9/6/2021 1:03 PM    INDICATION: Head injury.  COMPARISON: 5/23/2014.  TECHNIQUE: Routine CT Head without IV contrast. Multiplanar reformats. Dose reduction techniques were used.    FINDINGS:  INTRACRANIAL CONTENTS: No intracranial hemorrhage, extraaxial collection, or mass effect. 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 No acute infarct. Mild atrophy, more pronounced in the temporal lobes than elsewhere.     VISUALIZED ORBITS/SINUSES/MASTOIDS: No intraorbital abnormality. Shallow fluid levels within the sphenoid and left maxillary sinuses. No middle ear or mastoid effusion.    BONES/SOFT TISSUES: No acute abnormality.      Impression    IMPRESSION:  1.  No acute intracranial injury, hemorrhage, mass, or CT evidence of recent ischemia.  2.  Shallow fluid levels in the sinuses. Correlate for any symptoms of sinus inflammation.   CT Cervical Spine w/o Contrast    Narrative    EXAM: CT CERVICAL SPINE W/O CONTRAST  LOCATION: [facility]  DATE/TIME: 9/6/2021 1:04  PM    INDICATION: Neck injury.  COMPARISON: None.  TECHNIQUE: Routine CT Cervical Spine without IV contrast. Multiplanar reformats. Dose reduction techniques were used.    FINDINGS:  Normal alignment. Cervical vertebral body heights preserved.  No acute cervical spine fracture. No prevertebral soft tissue swelling. Degenerative changes with moderate spinal canal stenosis at the C3-C4.      Impression    IMPRESSION:  1.  No acute cervical spine fracture.   XR Pelvis and Hip Right 2 Views    Narrative    EXAM: XR PELVIS AND HIP RIGHT 2 VIEWS  LOCATION: [facility]  DATE/TIME: 9/6/2021 1:10 PM    INDICATION: hip pain  COMPARISON: None.      Impression    IMPRESSION: No acute fracture or malalignment. Severe hip joint degenerative changes bilaterally with bone-on-bone articulation, most pronounced on the right with bony remodeling and subchondral sclerosis. Moderate to severe degenerative changes of the   lower lumbar spine. Osteopenia. Atherosclerosis.   XR Knee Right 1/2 Views    Narrative    EXAM: XR KNEE RT 1 /2 VW  LOCATION: [facility]  DATE/TIME: 9/6/2021 1:10 PM    INDICATION: knee injury  COMPARISON: None.      Impression    IMPRESSION: Acute obliquely oriented fracture of the distal femoral diaphysis with up to 3 cm medial displacement and 2.2 cm posterior displacement. There is likely intra-articular extension. Limited evaluation for joint effusion. Mild medial compartment   joint space narrowing. Osteopenia. Atherosclerosis.   CT Knee Right w/o Contrast    Narrative    EXAM: CT KNEE RIGHT W/O CONTRAST  LOCATION: [facility]  DATE/TIME: 9/6/2021 3:56 PM    INDICATION: Pain, fracture  COMPARISON: None.  TECHNIQUE: Noncontrast. Axial, sagittal and coronal thin-section reconstruction. Dose reduction techniques were used.     FINDINGS:   Acute displaced and angulated oblique fracture of the distal femoral metadiaphysis. The distal-articular  fragment is displaced posterolaterally by two thirds bone width. There is overriding of the fracture fragments and apex lateral and posterior   angulation. The fracture extends from the distal diaphysis medially through the metaphysis laterally and extends to the level of the physeal scar near the lateral margin of the trochlear groove. The distal end of the proximal fragment protrudes   anterolaterally and superficially into the subcutaneous fat near the dermis. There is fluid or hemorrhage within the fracture, and a small amount of contiguous fluid in the knee joint.    There is marked bony demineralization. Tricompartmental degenerative arthritis, advanced in the patellofemoral compartment. Lateral subluxation of the patella. Multiple small intra-articular ossific bodies in the posterior joint recess.    Arterial calcification.      Impression    IMPRESSION:  1.  Acute displaced and angulated oblique fracture of the distal femoral metadiaphysis.  2.  There is marked diffuse bony demineralization, which reduces sensitivity for nondisplaced fracture.     XR Knee Left 1/2 Views    Narrative    EXAM: XR KNEE LT 1/2 VW  LOCATION: [facility]  DATE/TIME: 9/6/2021 8:55 PM    INDICATION: exquisite tenderness on palpation, recent unwitnessed fall  COMPARISON: None.      Impression    IMPRESSION: Normal joint spaces and alignment. No acute fracture. No effusion. Small chronic heterotopic bone fragments adjacent to the medial femoral epicondyle and condyle suggesting remote MCL injury. Spurring and minimal heterotopic bone along the   distal quadriceps tendon. Diffuse bony demineralization. Arterial calcification.   XR Ankle Right 2 Views    Narrative    EXAM: XR ANKLE RT 2 VW  LOCATION: [facility]  DATE/TIME: 9/6/2021 9:35 PM    INDICATION: exquisite tenderness on palpation, recent unwitnessed fall  COMPARISON: None.      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 No acute appearing fracture. Diffuse bony demineralization. Lateral ankle soft tissue swelling. Mild degenerative arthritis of the talonavicular joint.   XR Surgery ANNETTE Fluoro L/T 5 Min    Narrative    This exam was marked as non-reportable because it will not be read by a   radiologist or a [facility] non-radiologist provider.         Echocardiogram Complete     Value    LVEF  45-50% (mildly reduced)    Narrative    [id number]  [id number]  [id number]  [id number]^MARISELA^[place] [address]     Name: ANDREI MCNAMARA  MRN: [id number]  : 1923  Study Date: 2021 10:02 AM  Age: 89 yrs  Gender: Female  Patient Location: [facility]  Reason For Study: Atrial Fibrillation  Ordering Physician: KHRIS ANTONIO  Performed By: BP     BSA: 1.6 m2  Height: 62 in  Weight: 137 lb  ______________________________________________________________________________  ______________________________________________________________________________  Interpretation Summary     1. The left ventricle is normal in size. Left ventricular function is  decreased. The ejection fraction is 45-50% (mildly reduced). There is normal  left ventricular wall thickness. Mild global hypokinesia of the left  ventricle.  2. The right ventricle is normal size. Moderately decreased right ventricular  systolic function  3. The left atrium is severely dilated. The right atrium is moderately  dilated.  4. Mild to moderate (1-2+) mitral regurgitation.  5. Moderate (2+) tricuspid regurgitation.  6. The right ventricular systolic pressure is approximated at 26mmHg plus the  right atrial pressure. RA pressure of 8 mmHg.     The rhythm was rapid atrial fibrillation.  ______________________________________________________________________________  Left Ventricle  The left ventricle is normal in size. Left ventricular function is decreased.  The ejection fraction is 45-50%  (mildly reduced). There is normal left  ventricular wall thickness. Diastolic function not assessed due to atrial  fibrillation. There is mild global hypokinesia of the left ventricle.     Right Ventricle  The right ventricle is normal size. Moderately decreased right ventricular  systolic function.     Atria  The left atrium is severely dilated. The right atrium is moderately dilated.  There is no color Doppler evidence of an atrial shunt.     Mitral Valve  There is mild mitral annular calcification. The mitral valve leaflets are  mildly thickened. There is no evidence of mitral valve prolapse. There is mild  to moderate (1-2+) mitral regurgitation. There is no mitral valve stenosis.     Tricuspid Valve  The tricuspid valve is not well visualized, but is grossly normal. There is  moderate (2+) tricuspid regurgitation. The right ventricular systolic pressure  is approximated at 26.4 mmHg plus the right atrial pressure. IVC diameter and  respiratory changes fall into an intermediate range suggesting an RA pressure  of 8 mmHg. The right ventricular systolic pressure is approximated at 26mmHg  plus the right atrial pressure.     Aortic Valve  There is moderate trileaflet aortic sclerosis. There is trace aortic  regurgitation. No aortic stenosis is present.     Pulmonic Valve  The pulmonic valve is not well seen, but is grossly normal. This degree of  valvular regurgitation is within normal limits.     Vessels  The aorta root is normal. Normal size ascending aorta. IVC diameter <2.1 cm  collapsing >50% with sniff suggests a normal RA pressure of 3 mmHg.     Pericardium  There is no pericardial effusion.     Rhythm  The rhythm was rapid atrial fibrillation.  ______________________________________________________________________________  MMode/2D Measurements & Calculations  IVSd: 0.85 cm  LVIDd: 4.2 cm  LVIDs: 3.3 cm  LVPWd: 0.82 cm  FS: 22.6 %  LV mass(C)d: 108.0 grams  LV mass(C)dI: 66.4 grams/m2  Ao root diam: 2.6  cm  LA dimension: 3.7 cm  LA/Ao: 1.4  LVOT diam: 1.6 cm  LVOT area: 1.9 cm2     EF(MOD-bp): 55.1 %  LA Volume Indexed (AL/bp): 50.5 ml/m2     RWT: 0.39     Time Measurements  MM HR: 80.0 BPM     Doppler Measurements & Calculations  MV E max walter: 113.0 cm/sec  MV dec time: 0.20 sec  LV V1 max P.6 mmHg  LV V1 max: 80.1 cm/sec  LV V1 VTI: 14.2 cm  MR ERO: 0.13 cm2  MR volume: 18.7 ml  SV(LVOT): 27.5 ml  SI(LVOT): 16.9 ml/m2  PA acc time: 0.07 sec  TR max walter: 257.0 cm/sec  TR max P.4 mmHg  E/E': 12.2  E/E' avg: 15.0  Lateral E/e': 12.1     Medial E/e': 17.9  Peak E' Walter: 9.3 cm/sec     ______________________________________________________________________________  Report approved by: Africa Martinez 2021 12:06 PM               PENDING LABS          PROCEDURES ( this hospitalization only)      Procedure(s):  OPEN REDUCTION INTERNAL FIXATION, FRACTURE, FEMUR, DISTAL    RECOMMENDATIONS TO OUTPATIENT PROVIDER FOR F/U VISIT     pcp 3-5 days   Ortho clinic 2-4 wks   Cardiology 4-6 wks     DISPOSITION     Skilled Nursing Facility    SUMMARY OF HOSPITAL COURSE:      Assessment & Plan            Elysia Lane is a 89 year old female with a history of hypertension, anemia who presented to the hospital for evaluation of right leg pain after a fall and was found to have new onset A. fib and right hip fracture.     1. Traumatic closed fracture of the distal femur and significant right knee contusion  POD 8 ORIF distal femur fracture, right  No ambulation for 6 weeks   weight bearing with full assist and pivots only     2. New onset a. fib with RVR  Cardiology consulted   Improved HR with po metoprolol 100mg po bid and po diltiazem 90mg po bid  Echo 21 showed EF of 45 to 50%, with mild global hypokinesia of the left ventricle , started apixaban   Holding on PTA aspirin for now  F/u with cards 4-6wks      3. E. Coli UTI with sepsis, resolved  Urine culture 21 positive for pan-sensitive  e.coli  Completed course of IV ceftriaxone/IV ancef     4. Lactic acidosis, resolved  Was elevated on admission and then normalized with IVF and improvement of HR     5. Acute blood loss anemia  From post op related , no overt bleeding   Hgb stable at 7.6 , weekly hb at tcu      6.  H/O HTN  Cont to hold PTA norvasc to allow for further titration of other cardiac meds needed for HR control    7- gluteal pressure ulcer- present on admit , WOC consulted, c/w cares at [facility]CU           Discharge Medications with Med changes:        Review of your medicines      START taking      Dose / Directions   apixaban ANTICOAGULANT 5 MG tablet  Commonly known as: ELIQUIS  Indication: Atrial Fibrillation Not Caused By A Heart Valve Problem      Dose: 5 mg  Take 1 tablet (5 mg) by mouth 2 times daily  Quantity: 60 tablet  Refills: 1     diltiazem ER 90 MG 12 hr capsule  Commonly known as: CARDIZEM SR      Dose: 90 mg  Take 1 capsule (90 mg) by mouth 2 times daily  Quantity: 30 capsule  Refills: 1     metoprolol tartrate 100 MG tablet  Commonly known as: LOPRESSOR      Dose: 100 mg  Take 1 tablet (100 mg) by mouth 2 times daily  Quantity: 60 tablet  Refills: 1     oxyCODONE 5 MG tablet  Commonly known as: ROXICODONE      Dose: 2.5 mg  Take 0.5 tablets (2.5 mg) by mouth every 6 hours  Quantity: 30 tablet  Refills: 0     polyethylene glycol 17 GM/Dose powder  Commonly known as: MIRALAX      Dose: 17 g  Take 17 g by mouth daily  Quantity: 510 g  Refills: 1     vitamin B-12 100 MCG tablet  Commonly known as: CYANOCOBALAMIN      Dose: 100 mcg  Start taking on: September 17, 2021  Take 1 tablet (100 mcg) by mouth daily  Quantity: 30 tablet  Refills: 1     Vitamin D3 25 mcg (1000 units) tablet  Commonly known as: CHOLECALCIFEROL      Dose: 25 mcg  Start taking on: September 17, 2021  Take 1 tablet (25 mcg) by mouth daily  Quantity: 30 tablet  Refills: 0        CONTINUE these medicines which have NOT CHANGED      Dose / Directions   ferrous  sulfate 325 (65 Fe) MG tablet  Commonly known as: FEROSUL      Dose: 325 mg  Take 325 mg by mouth daily as needed  Refills: 0     Tylenol 325 MG tablet  Generic drug: acetaminophen      Dose: 325 mg  Take 325 mg by mouth every 6 hours as needed for fever or pain  Refills: 0        STOP taking    amLODIPine 5 MG tablet  Commonly known as: NORVASC        lisinopril-hydrochlorothiazide 20-25 MG tablet  Commonly known as: ZESTORETIC              Where to get your medicines      These medications were sent to [facility]/pharmacy #[id number] - [place] [id number] [address]  [address] [address], [facility] [address]    Phone: [phone]     apixaban ANTICOAGULANT 5 MG tablet    diltiazem ER 90 MG 12 hr capsule    metoprolol tartrate 100 MG tablet    polyethylene glycol 17 GM/Dose powder    vitamin B-12 100 MCG tablet    Vitamin D3 25 mcg (1000 units) tablet     Some of these will need a paper prescription and others can be bought over the counter. Ask your nurse if you have questions.    Bring a paper prescription for each of these medications    oxyCODONE 5 MG tablet             Rationale for medication changes:      Above         Consults   Cards, ortho      Immunizations given this encounter     Most Recent Immunizations   Administered Date(s) Administered     COVID-19,PF,Moderna 03/18/2021           Anticoagulation Information      Recent INR results: No results for input(s): INR in the last 168 hours.  Warfarin doses (if applicable) or name of other anticoagulant:       Discharge Orders     Discharge Procedure Orders   General info for SNF   Order Comments: Length of Stay Estimate: Short Term Care: Estimated # of Days <30  Condition at Discharge: Stable  Level of care:skilled   Rehabilitation Potential: Good  Admission H&P remains valid and up-to-date: Yes  Recent Chemotherapy: N/A  Use Nursing Home Standing Orders: Yes     Mantoux instructions   Order Comments: Give two-step Mantoux (PPD) Per Facility Policy Yes      Follow Up and recommended labs and tests   Order Comments: Follow up with prison physician.  The following labs/tests are recommended: weekly hemoglobin    Follow up with primary care provider in 2-4  weeks.  No follow up labs or test are needed.  Follow up with specialist, orthopedics , in 6 wks  weeks.  No follow up labs or test are needed.  Fup with cardiology 2-4 wks for afib - new     Reason for your hospital stay   Order Comments: Sob     Weight bearing status     Weight bearing status   Order Comments: See ortho orders     No CPR- Do NOT Intubate     Order Specific Question Answer Comments   Code status determined by: Discussion with patient/ legal decision maker      Fall precautions     Knee Supplies Order   Order Comments: Bracing Documentation:   Patient requires the use of the ordered bracing device due to following medical need/condition: femur fracture    I, the undersigned, certify that the above prescribed supplies are medically necessary for this patient and is both reasonable and necessary in reference to accepted standards of medical and necessary in reference to accepted standards of medical practice in the treatment of this patient's condition and is not prescribed as a convenience.     Order Specific Question Answer Comments   SHAYNA Provider: [facility]    Which type: Knee Immobilizer    Knee Immobilizer Laterality: Right    Length of Need: Lifetime    The face to face evaluation was performed on: 9/6/2021      Walker SHAYNA   Order Comments: DME Documentation: Describe the reason for need to support medical necessity: Impaired gait status post knee surgery. I, the undersigned, certify that the above prescribed supplies are medically necessary for this patient and is both reasonable and necessary in reference to accepted standards of medical practice in the treatment of this patient's condition and is not prescribed as a convenience.     Order Specific Question Answer Comments   SHAYNA  "Provider: [facility]    Walker Type: Standard (2 Wheel)    Accessories: N/A      Advance Diet as Tolerated   Order Comments: Follow this diet upon discharge: Orders Placed This Encounter      Advance Diet as Tolerated: Regular Diet Adult     Order Specific Question Answer Comments   Is discharge order? Yes      Examination     Vital Signs in last 24 hours:   Temp:  [97.4  F (36.3  C)-98.5  F (36.9  C)] 97.6  F (36.4  C)  Pulse:  [65-96] 96  Resp:  [16-18] 18  BP: (106-144)/(60-69) 110/69  SpO2:  [91 %-98 %] 96 %   /69 (BP Location: Right arm)   Pulse 96   Temp 97.6  F (36.4  C) (Oral)   Resp 18   Ht 1.575 m (5' 2\")   Wt 90.1 kg (198 lb 9.6 oz)   SpO2 96%   BMI 36.32 kg/m    General appearance: alert, appears stated age and cooperative  Lungs: clear to auscultation bilaterally  Heart: s1 and 2   Abdomen: soft, non-tender; bowel sounds normal; no masses,  no organomegaly  Extremities: wound dressing noted        Please see EMR for more detailed significant labs, imaging, consultant notes etc.  Total time spent on discharge: 35 minutes    Milton Gloria MD   [facility]: Ph:[phone]    CC:Elif Alcantara  "

## 2021-09-16 NOTE — DISCHARGE INSTRUCTIONS
Follow up appointment made at [facility] at the [facility] on Monday 9/20 at 2:30pm with Tonny.  This appointment is for initial dressing change and closure check/removal.

## 2021-09-17 PROCEDURE — 86481 TB AG RESPONSE T-CELL SUSP: CPT | Mod: ORL

## 2021-09-17 PROCEDURE — P9604 ONE-WAY ALLOW PRORATED TRIP: HCPCS

## 2021-09-17 PROCEDURE — 36415 COLL VENOUS BLD VENIPUNCTURE: CPT

## 2021-09-17 NOTE — PLAN OF CARE
Patient discharged,  HE transport here with stretcher to transfer patient to [facility].  Report called to [facility], nurse Viviana .  Report given to EMS transporting her.  Discharge envelope with information for facility given to take to [facility].  The patient is ready to discharge, feels ready.  Pain medicine given at 1800.  She does not need oxygen, saturation is 97%.

## 2021-09-18 LAB
GAMMA INTERFERON BACKGROUND BLD IA-ACNC: 0.02 IU/ML
M TB IFN-G BLD-IMP: NEGATIVE
M TB IFN-G CD4+ BCKGRND COR BLD-ACNC: 2.15 IU/ML
MITOGEN IGNF BCKGRD COR BLD-ACNC: 0.01 IU/ML
MITOGEN IGNF BCKGRD COR BLD-ACNC: 0.01 IU/ML
QUANTIFERON MITOGEN: 2.17 IU/ML
QUANTIFERON NIL TUBE: 0.02 IU/ML
QUANTIFERON TB1 TUBE: 0.03 IU/ML
QUANTIFERON TB2 TUBE: 0.03

## 2021-09-20 ENCOUNTER — TRANSITIONAL CARE UNIT VISIT (OUTPATIENT)
Dept: GERIATRICS | Facility: CLINIC | Age: 86
End: 2021-09-20
Payer: MEDICARE

## 2021-09-20 VITALS
WEIGHT: 149.9 LBS | BODY MASS INDEX: 27.58 KG/M2 | TEMPERATURE: 97.7 F | RESPIRATION RATE: 18 BRPM | HEART RATE: 87 BPM | DIASTOLIC BLOOD PRESSURE: 77 MMHG | HEIGHT: 62 IN | OXYGEN SATURATION: 93 % | SYSTOLIC BLOOD PRESSURE: 124 MMHG

## 2021-09-20 DIAGNOSIS — Z66 DNR (DO NOT RESUSCITATE): ICD-10-CM

## 2021-09-20 DIAGNOSIS — Z98.890 H/O MAJOR ORTHOPEDIC SURGERY: ICD-10-CM
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
## 2021-09-20 DIAGNOSIS — I48.91 RAPID ATRIAL FIBRILLATION (H): ICD-10-CM

## 2021-09-20 DIAGNOSIS — S72.401A CLOSED FRACTURE OF DISTAL END OF RIGHT FEMUR, UNSPECIFIED FRACTURE MORPHOLOGY, INITIAL ENCOUNTER (H): ICD-10-CM

## 2021-09-20 DIAGNOSIS — Z79.01 CHRONIC ANTICOAGULATION: ICD-10-CM

## 2021-09-20 PROCEDURE — 99305 1ST NF CARE MODERATE MDM 35: CPT | Performed by: NURSE PRACTITIONER

## 2021-09-20 ASSESSMENT — MIFFLIN-ST. JEOR: SCORE: 1018.19

## 2021-09-20 NOTE — LETTER
9/20/2021        RE: Elysia Lane  [address]  [facility] [address]        [facility]  PRIMARY CARE PROVIDER AND CLINIC:  Elif Alcantara MD, [facility] [address] /   Chief Complaint   Patient presents with     Hospital F/U      [facility] Medical Record Number:  [id number]  Place of Service where encounter took place:  [facility] (U) [[id number]]    Elysia Lane  is a 89 year old  (12/2/1923), admitted to the above facility from  [facility]. Hospital stay 9/6 through 9/16..   HPI:  Elysia has a history of HTN and anemia who had a fall at home and presented to [facility] ED with  R leg pain. She had closed fracture of the distal femur and received ORIF and is non weight bearing. Completed IV ceftraixone for UTI with sepsis.   Also found in a fib with RVR, which improved on metoprolol 100mg and diltiazem, as well as apixaban.     She is alert and oriented to place, somewhat to situation.  Able to tell me that she fell but minimal details after that.  She is reporting some pain in his oxycodone 2.5 mg scheduled.  Nursing alerted me to her dressing which is saturated with purulent drainage at the incision site, multiple incisions along the lateral and medial knee as well as mid knee with sanguinous drainage.  Knee is swollen, mildly warm to touch.  She does have an orthopedics appointment this afternoon to view the incision.  She will be nonweightbearing for 6 weeks.  Also had some mild anemia postoperatively which we will monitor.      CODE STATUS/ADVANCE DIRECTIVES DISCUSSION:  No CPR- Do NOT Intubate  DNR / DNI  ALLERGIES: No Known Allergies   PAST MEDICAL HISTORY:   Past Medical History:   Diagnosis Date     Anemia      Chronic kidney disease      Essential hypertension      Osteoarthritis      Stenosis of carotid artery       PAST SURGICAL HISTORY:   has a past surgical history that includes Open reduction internal fixation femur distal  "(Right, 9/8/2021).  FAMILY HISTORY: family history includes No Known Problems in her father and mother.  SOCIAL HISTORY:   reports that she has never smoked. She has never used smokeless tobacco. She reports previous alcohol use. She reports that she does not use drugs.  Patient's living condition: group home    Post Discharge Medication Reconciliation Status: discharge medications reconciled, continue medications without change  Current Outpatient Medications   Medication Sig     acetaminophen (TYLENOL) 325 MG tablet Take 325 mg by mouth every 6 hours as needed for fever or pain      apixaban ANTICOAGULANT (ELIQUIS) 5 MG tablet Take 1 tablet (5 mg) by mouth 2 times daily     cyanocobalamin (CYANOCOBALAMIN) 100 MCG tablet Take 1 tablet (100 mcg) by mouth daily     diltiazem ER (CARDIZEM SR) 90 MG 12 hr capsule Take 1 capsule (90 mg) by mouth 2 times daily     ferrous sulfate (FEROSUL) 325 (65 Fe) MG tablet Take 325 mg by mouth daily as needed      metoprolol tartrate (LOPRESSOR) 100 MG tablet Take 1 tablet (100 mg) by mouth 2 times daily     oxyCODONE (ROXICODONE) 5 MG tablet Take 0.5 tablets (2.5 mg) by mouth every 6 hours     polyethylene glycol (MIRALAX) 17 GM/Dose powder Take 17 g by mouth daily     Vitamin D3 (CHOLECALCIFEROL) 25 mcg (1000 units) tablet Take 1 tablet (25 mcg) by mouth daily     No current facility-administered medications for this visit.       ROS:  4 point ROS including Respiratory, CV, GI and , other than that noted in the HPI,  is negative    Vitals:  /77   Pulse 87   Temp 97.7  F (36.5  C)   Resp 18   Ht 1.575 m (5' 2\")   Wt 68 kg (149 lb 14.4 oz)   SpO2 93%   BMI 27.42 kg/m    Exam:  Physical Exam   General appearance: alert, appears stated age and cooperative.   Head: Normocephalic, without obvious abnormality, atraumatic, Eyes: sclera anicteric.  Lungs: respirations without effort, LSCTA; no wheezing or rales.   Cardiovascular: S1, S2. Regular rate and rhythm.   ABDOMEN: Globular " and soft, non tender.    Extremities: extremities normal, atraumatic, no cyanosis or edema  Skin: R knee bruising around incision sites, stitches and staples longitudinally down knee, and lateral and medial knee, with purulent drainage. Warmth and swelling.    Neurologic:oriented. No focal deficits.   Psych: interacts well with caregivers, exhibits logical thought processes and connections, pleasant    Lab/Diagnostic data:  Recent labs in [facility] reviewed by me today.     ASSESSMENT/PLAN:    (W19.XXXA) Fall  (primary encounter diagnosis)  (S72.401A) Closed fracture of distal end of right femur, unspecified fracture morphology, initial encounter (H)  Comment:   Plan:   -Follow up with ortho 2-4 weeks.   -PT, OT at their discretion.  -Non weightbearing 6 weeks.   -Monitor incision site.     (I48.91) Rapid atrial fibrillation (H)  Comment: new onset.   Plan:   -metoprolol 100mg  -diltiazem 90 daily.   -apixiban.     Anemia, postop:  -Check labs weekly until stable.  -Continues on daily iron supplementation.    Electronically signed by:  Sherry Wu CNP                       Sincerely,        Sherry Wu, CNP

## 2021-09-20 NOTE — LETTER
9/20/2021        RE: Elysia Lane  [address]  [facility] [address]        No notes on file      Sincerely,        Sherry Wu, CNP

## 2021-09-23 ENCOUNTER — NURSING HOME VISIT (OUTPATIENT)
Dept: GERIATRICS | Facility: CLINIC | Age: 86
End: 2021-09-23
Payer: MEDICARE

## 2021-09-23 VITALS
TEMPERATURE: 97.5 F | WEIGHT: 152.4 LBS | HEART RATE: 74 BPM | DIASTOLIC BLOOD PRESSURE: 74 MMHG | BODY MASS INDEX: 28.05 KG/M2 | RESPIRATION RATE: 18 BRPM | SYSTOLIC BLOOD PRESSURE: 134 MMHG | OXYGEN SATURATION: 96 % | HEIGHT: 62 IN

## 2021-09-23 DIAGNOSIS — R41.89 COGNITIVE IMPAIRMENT: ICD-10-CM

## 2021-09-23 DIAGNOSIS — I48.91 ATRIAL FIBRILLATION, UNSPECIFIED TYPE (H): ICD-10-CM

## 2021-09-23 DIAGNOSIS — Z79.01 CHRONIC ANTICOAGULATION: ICD-10-CM

## 2021-09-23 DIAGNOSIS — Z98.890 H/O MAJOR ORTHOPEDIC SURGERY: ICD-10-CM
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
## 2021-09-23 DIAGNOSIS — L89.301 PRESSURE INJURY OF BUTTOCK, STAGE 1, UNSPECIFIED LATERALITY: ICD-10-CM

## 2021-09-23 DIAGNOSIS — S72.401A CLOSED FRACTURE OF DISTAL END OF RIGHT FEMUR, UNSPECIFIED FRACTURE MORPHOLOGY, INITIAL ENCOUNTER (H): ICD-10-CM

## 2021-09-23 DIAGNOSIS — D62 ANEMIA DUE TO BLOOD LOSS, ACUTE: ICD-10-CM

## 2021-09-23 PROBLEM — N18.32 CHRONIC KIDNEY DISEASE, STAGE 3B (H): Status: ACTIVE | Noted: 2021-09-23

## 2021-09-23 PROCEDURE — 99309 SBSQ NF CARE MODERATE MDM 30: CPT | Performed by: FAMILY MEDICINE

## 2021-09-23 ASSESSMENT — MIFFLIN-ST. JEOR: SCORE: 1029.53

## 2021-09-23 NOTE — PROGRESS NOTES
"[facility] GERIATRIC SERVICES    Chief Complaint   Patient presents with     Nursing Home Acute       [facility] Medical Record Number:  [id number]  Place of Service where encounter took place:  [facility] (TCU) [[id number]]    HPI:    Elysia Lane is a 89 year old  (12/2/1923), who is being seen today for an episodic care visit. She was admitted to facility 9/16/21 from [facility] following a fall with distal right femoral fracture. Additionally, she developed atrial fibrillation with rapid response and this was controlled with metoprolol and diltiazem, now on anticoagulation with apixaban,  anemia related to surgical blood loss, and has history of hypertension off prior medications with starting BB and diltizaem. HPI information obtained from: facility chart records, facility staff, patient report and [facility] chart review.Today's concern is:     She has no specific concerns today.  She was having trouble remembering exactly why she was at the facility.  She recalls that she had fallen but did not recall having surgery.  She did states she does not feel like she is at her baseline, saying she is more confused than normal and having trouble \"placing things in my brain.\"  She has not had significant pain except with standing when working with therapy.  She is taking regularly scheduled pain medications.  She feels her appetite is ok, denies any abdominal pain, nausea or vomiting..  She has been sleeping well.  She does not have any chest pain or pressure, palpitations, or shortness of breath.  She has been working with therapy and feels this has been beneficial.  She has been receiving regular wound checks and therapy due to pressure wounds of her buttocks present      ALLERGIES: Patient has no known allergies.  Past Medical, Surgical, Family and Social History reviewed and [facility] in [facility].    Reports she lives alone but stated that she was living in her house.  She has a close " "friend that checks on her multiple times a week, but not daily.  She does need help with multiple activities of her day.    Current Outpatient Medications   Medication Sig Dispense Refill     acetaminophen (TYLENOL) 325 MG tablet Take 325 mg by mouth every 6 hours as needed for fever or pain        apixaban ANTICOAGULANT (ELIQUIS) 5 MG tablet Take 1 tablet (5 mg) by mouth 2 times daily 60 tablet 1     cyanocobalamin (CYANOCOBALAMIN) 100 MCG tablet Take 1 tablet (100 mcg) by mouth daily 30 tablet 1     diltiazem ER (CARDIZEM SR) 90 MG 12 hr capsule Take 1 capsule (90 mg) by mouth 2 times daily 30 capsule 1     ferrous sulfate (FEROSUL) 325 (65 Fe) MG tablet Take 325 mg by mouth daily as needed        metoprolol tartrate (LOPRESSOR) 100 MG tablet Take 1 tablet (100 mg) by mouth 2 times daily 60 tablet 1     oxyCODONE (ROXICODONE) 5 MG tablet Take 0.5 tablets (2.5 mg) by mouth every 6 hours 30 tablet 0     polyethylene glycol (MIRALAX) 17 GM/Dose powder Take 17 g by mouth daily 510 g 1     Vitamin D3 (CHOLECALCIFEROL) 25 mcg (1000 units) tablet Take 1 tablet (25 mcg) by mouth daily 30 tablet 0     Medications reviewed:  Medications reconciled to facility chart and changes were made to reflect current medications as identified as above med list.     No changes required today    REVIEW OF SYSTEMS:  4 point ROS including Respiratory, CV, GI and , other than that noted in the HPI,  is negative    Physical Exam:  /74   Pulse 74   Temp 97.5  F (36.4  C)   Resp 18   Ht 1.575 m (5' 2\")   Wt 69.1 kg (152 lb 6.4 oz)   SpO2 96%   BMI 27.87 kg/m    GENERAL APPEARANCE:  Alert, in no distress  EYES:  EOM, conjunctivae, lids, pupils and irises normal  NECK:  No adenopathy,masses or thyromegaly, no JVD  RESP:  respiratory effort and palpation of chest normal, lungs clear to auscultation   CV:  Irregularly irregular, S1/S2 normal  M/S:   Seated in wheelchair on melvina pad; right leg with knee brace; DP pulses 2+  NEURO:   " Disoriented to place, day. Able to state month and year. Limited insight into situation. Conversant though repeptitive at times    Recent Labs:     CBC RESULTS:   Recent Labs   Lab Test 09/15/21  0558 09/13/21  0542 09/12/21  0514 09/10/21  0535 09/09/21  0534   WBC 10.5  --   --   --  10.4   RBC 2.37*  --   --   --  2.44*   HGB 7.6*  7.6* 7.5*  --    < > 7.9*   HCT 24.2*  --   --   --  24.6*   *  --   --   --  101*   MCH 32.1  --   --   --  32.4   MCHC 31.4*  --   --   --  32.1   RDW 14.5  --   --   --  12.8     271  --  195  --  121*    < > = values in this interval not displayed.       Last Basic Metabolic Panel:  Recent Labs   Lab Test 09/16/21  0457 09/15/21  0558 09/14/21  0818 09/14/21  0426 09/13/21 0542 09/12/21  0514 09/11/21  0516 09/10/21  0626 09/09/21  0735 09/09/21  0534 09/08/21  0529 09/08/21 0457   NA  --   --   --   --   --   --   --   --   --  142  --  142   POTASSIUM 3.8 3.7  --    < >   < > 3.9   < >  --    < > 3.5   < > 3.5   CHLORIDE  --   --   --   --   --   --   --   --   --  108*  --  108*   ADAM  --   --   --   --   --   --   --   --   --  8.6  --  8.4*   CO2  --   --   --   --   --   --   --   --   --  26  --  24   BUN  --   --   --   --   --   --   --   --   --  26  --  37*   CR  --  0.72  --   --   --  0.81  --   --    < > 0.86   < > 0.82   GLC  --   --  108*  --   --   --   --  123*   < > 109   < > 98    < > = values in this interval not displayed.       Liver Function Studies -   Recent Labs   Lab Test 09/09/21  0534 09/08/21  0457   PROTTOTAL 5.5* 5.3*   ALBUMIN 3.0* 2.6*   BILITOTAL 0.7 0.6   ALKPHOS 36* 38*   AST 45* 56*   ALT 21 27       TSH   Date Value Ref Range Status   09/08/2021 1.55 0.30 - 5.00 uIU/mL Final   ]    Assessment/Plan:  Elysia Lane is a 89 year old female with pmhx including atrial fibrillation, falls with distal right femoral fracture now status post ORIF, and hypertension who was admitted to [facility] for therapy following  surgical repair of fracture.      1. Fall, sequela    2. Closed fracture of distal end of right femur, unspecified fracture morphology, initial encounter (H)    3. H/O major orthopedic surgery    4. Anemia due to blood loss, acute    5. Cognitive impairment    6. Chronic anticoagulation    7. Atrial fibrillation, unspecified type (H)    8. Pressure injury of buttock, stage 1, unspecified laterality      She continues to work with therapies for mobility and pain control after orthopedic repair of her fracture.  Pain is well controlled with low-dose scheduled oxycodone and as needed Tylenol.  She just had follow-up with orthopedic surgery with no changes to current plan.  Continues to recommend no ambulation 6 weeks post surgery.  We will recheck hemoglobin today due to postoperative anemia.    Atrial fibrillation is rate controlled and she is currently on Eliquis for anticoagulation.  No other cardiac symptoms.  She has follow-up with cardiology 10/14/2021.    She has apparent cognitive deficits with disorientation and poor memory.  Unclear if this was present prior to admission to TCU.  Potentially could represent early stage dementia versus persistent postoperative delirium.  No behavioral concerns at this time.    She has bilateral pressure injuries to her buttocks r/t decreased mobility after her fracture.  Currently undergoing wound care is in facility.     Orders:  Hemoglobin    Electronically signed by    Benjamin Rosenstein, MD, MA  [facility] Faculty

## 2021-09-23 NOTE — LETTER
"    9/23/2021        RE: Elysia Lane  [address]  [facility] [address]        [facility] GERIATRIC SERVICES    Chief Complaint   Patient presents with     Nursing Home Acute       [facility] Medical Record Number:  [id number]  Place of Service where encounter took place:  [facility] (TCU) [[id number]]    HPI:    Elysia Lane is a 89 year old  (12/2/1923), who is being seen today for an episodic care visit. She was admitted to facility 9/16/21 from [facility] following a fall with distal right femoral fracture. Additionally, she developed atrial fibrillation with rapid response and this was controlled with metoprolol and diltiazem, now on anticoagulation with apixaban,  anemia related to surgical blood loss, and has history of hypertension off prior medications with starting BB and diltizaem. HPI information obtained from: facility chart records, facility staff, patient report and [facility] chart review.Today's concern is:     She has no specific concerns today.  She was having trouble remembering exactly why she was at the facility.  She recalls that she had fallen but did not recall having surgery.  She did states she does not feel like she is at her baseline, saying she is more confused than normal and having trouble \"placing things in my brain.\"  She has not had significant pain except with standing when working with therapy.  She is taking regularly scheduled pain medications.  She feels her appetite is ok, denies any abdominal pain, nausea or vomiting..  She has been sleeping well.  She does not have any chest pain or pressure, palpitations, or shortness of breath.  She has been working with therapy and feels this has been beneficial.  She has been receiving regular wound checks and therapy due to pressure wounds of her buttocks present      ALLERGIES: Patient has no known allergies.  Past Medical, Surgical, Family and Social History reviewed and updated in " "[facility].    Reports she lives alone but stated that she was living in her house.  She has a close friend that checks on her multiple times a week, but not daily.  She does need help with multiple activities of her day.    Current Outpatient Medications   Medication Sig Dispense Refill     acetaminophen (TYLENOL) 325 MG tablet Take 325 mg by mouth every 6 hours as needed for fever or pain        apixaban ANTICOAGULANT (ELIQUIS) 5 MG tablet Take 1 tablet (5 mg) by mouth 2 times daily 60 tablet 1     cyanocobalamin (CYANOCOBALAMIN) 100 MCG tablet Take 1 tablet (100 mcg) by mouth daily 30 tablet 1     diltiazem ER (CARDIZEM SR) 90 MG 12 hr capsule Take 1 capsule (90 mg) by mouth 2 times daily 30 capsule 1     ferrous sulfate (FEROSUL) 325 (65 Fe) MG tablet Take 325 mg by mouth daily as needed        metoprolol tartrate (LOPRESSOR) 100 MG tablet Take 1 tablet (100 mg) by mouth 2 times daily 60 tablet 1     oxyCODONE (ROXICODONE) 5 MG tablet Take 0.5 tablets (2.5 mg) by mouth every 6 hours 30 tablet 0     polyethylene glycol (MIRALAX) 17 GM/Dose powder Take 17 g by mouth daily 510 g 1     Vitamin D3 (CHOLECALCIFEROL) 25 mcg (1000 units) tablet Take 1 tablet (25 mcg) by mouth daily 30 tablet 0     Medications reviewed:  Medications reconciled to facility chart and changes were made to reflect current medications as identified as above med list.     No changes required today    REVIEW OF SYSTEMS:  4 point ROS including Respiratory, CV, GI and , other than that noted in the HPI,  is negative    Physical Exam:  /74   Pulse 74   Temp 97.5  F (36.4  C)   Resp 18   Ht 1.575 m (5' 2\")   Wt 69.1 kg (152 lb 6.4 oz)   SpO2 96%   BMI 27.87 kg/m    GENERAL APPEARANCE:  Alert, in no distress  EYES:  EOM, conjunctivae, lids, pupils and irises normal  NECK:  No adenopathy,masses or thyromegaly, no JVD  RESP:  respiratory effort and palpation of chest normal, lungs clear to auscultation   CV:  Irregularly irregular, S1/S2 " normal  M/S:   Seated in wheelchair on melvina pad; right leg with knee brace; DP pulses 2+  NEURO:   Disoriented to place, day. Able to state month and year. Limited insight into situation. Conversant though repeptitive at times    Recent Labs:     CBC RESULTS:   Recent Labs   Lab Test 09/15/21  0558 09/13/21  0542 09/12/21  0514 09/10/21  0535 09/09/21  0534   WBC 10.5  --   --   --  10.4   RBC 2.37*  --   --   --  2.44*   HGB 7.6*  7.6* 7.5*  --    < > 7.9*   HCT 24.2*  --   --   --  24.6*   *  --   --   --  101*   MCH 32.1  --   --   --  32.4   MCHC 31.4*  --   --   --  32.1   RDW 14.5  --   --   --  12.8     271  --  195  --  121*    < > = values in this interval not displayed.       Last Basic Metabolic Panel:  Recent Labs   Lab Test 09/16/21  0457 09/15/21  0558 09/14/21  0818 09/14/21  0426 09/13/21  0542 09/12/21  0514 09/11/21  0516 09/10/21  0626 09/09/21  0735 09/09/21  0534 09/08/21  0529 09/08/21  0457   NA  --   --   --   --   --   --   --   --   --  142  --  142   POTASSIUM 3.8 3.7  --    < >   < > 3.9   < >  --    < > 3.5   < > 3.5   CHLORIDE  --   --   --   --   --   --   --   --   --  108*  --  108*   ADAM  --   --   --   --   --   --   --   --   --  8.6  --  8.4*   CO2  --   --   --   --   --   --   --   --   --  26  --  24   BUN  --   --   --   --   --   --   --   --   --  26  --  37*   CR  --  0.72  --   --   --  0.81  --   --    < > 0.86   < > 0.82   GLC  --   --  108*  --   --   --   --  123*   < > 109   < > 98    < > = values in this interval not displayed.       Liver Function Studies -   Recent Labs   Lab Test 09/09/21  0534 09/08/21  0457   PROTTOTAL 5.5* 5.3*   ALBUMIN 3.0* 2.6*   BILITOTAL 0.7 0.6   ALKPHOS 36* 38*   AST 45* 56*   ALT 21 27       TSH   Date Value Ref Range Status   09/08/2021 1.55 0.30 - 5.00 uIU/mL Final   ]    Assessment/Plan:  Elysia Lane is a 89 year old female with pmhx including atrial fibrillation, falls with distal right femoral fracture now  status post ORIF, and hypertension who was admitted to [facility] for therapy following surgical repair of fracture.      1. Fall, sequela    2. Closed fracture of distal end of right femur, unspecified fracture morphology, initial encounter (H)    3. H/O major orthopedic surgery    4. Anemia due to blood loss, acute    5. Cognitive impairment    6. Chronic anticoagulation    7. Atrial fibrillation, unspecified type (H)    8. Pressure injury of buttock, stage 1, unspecified laterality      She continues to work with therapies for mobility and pain control after orthopedic repair of her fracture.  Pain is well controlled with low-dose scheduled oxycodone and as needed Tylenol.  She just had follow-up with orthopedic surgery with no changes to current plan.  Continues to recommend no ambulation 6 weeks post surgery.  We will recheck hemoglobin today due to postoperative anemia.    Atrial fibrillation is rate controlled and she is currently on Eliquis for anticoagulation.  No other cardiac symptoms.  She has follow-up with cardiology 10/14/2021.    She has apparent cognitive deficits with disorientation and poor memory.  Unclear if this was present prior to admission to TCU.  Potentially could represent early stage dementia versus persistent postoperative delirium.  No behavioral concerns at this time.    She has bilateral pressure injuries to her buttocks r/t decreased mobility after her fracture.  Currently undergoing wound care is in facility.     Orders:  Hemoglobin    Electronically signed by    Benjamin Rosenstein, MD, MA  [facility] Faculty              Sincerely,        Benjamin Rosenstein, MD

## 2021-09-23 NOTE — PROGRESS NOTES
[facility] GERIATRIC SERVICES  PRIMARY CARE PROVIDER AND CLINIC:  Elif Alcantara MD, [facility] [address] /   Chief Complaint   Patient presents with     Hospital F/U      [place] Medical Record Number:  [id number]  Place of Service where encounter took place:  [facility] (TCU) [[id number]]    Elysia Lane  is a 89 year old  (12/2/1923), admitted to the above facility from  [facility]. Hospital stay 9/6 through 9/16..   HPI:  Elysia has a history of HTN and anemia who had a fall at home and presented to [facility]'s ED with  R leg pain. She had closed fracture of the distal femur and received ORIF and is non weight bearing. Completed IV ceftraixone for UTI with sepsis.   Also found in a fib with RVR, which improved on metoprolol 100mg and diltiazem, as well as apixaban.     She is alert and oriented to place, somewhat to situation.  Able to tell me that she fell but minimal details after that.  She is reporting some pain in his oxycodone 2.5 mg scheduled.  Nursing alerted me to her dressing which is saturated with purulent drainage at the incision site, multiple incisions along the lateral and medial knee as well as mid knee with sanguinous drainage.  Knee is swollen, mildly warm to touch.  She does have an orthopedics appointment this afternoon to view the incision.  She will be nonweightbearing for 6 weeks.  Also had some mild anemia postoperatively which we will monitor.      CODE STATUS/ADVANCE DIRECTIVES DISCUSSION:  No CPR- Do NOT Intubate  DNR / DNI  ALLERGIES: No Known Allergies   PAST MEDICAL HISTORY:   Past Medical History:   Diagnosis Date     Anemia      Chronic kidney disease      Essential hypertension      Osteoarthritis      Stenosis of carotid artery       PAST SURGICAL HISTORY:   has a past surgical history that includes Open reduction internal fixation femur distal (Right, 9/8/2021).  FAMILY HISTORY: family history includes No Known Problems in her  "father and mother.  SOCIAL HISTORY:   reports that she has never smoked. She has never used smokeless tobacco. She reports previous alcohol use. She reports that she does not use drugs.  Patient's living condition: nursing home    Post Discharge Medication Reconciliation Status: discharge medications reconciled, continue medications without change  Current Outpatient Medications   Medication Sig     acetaminophen (TYLENOL) 325 MG tablet Take 325 mg by mouth every 6 hours as needed for fever or pain      apixaban ANTICOAGULANT (ELIQUIS) 5 MG tablet Take 1 tablet (5 mg) by mouth 2 times daily     cyanocobalamin (CYANOCOBALAMIN) 100 MCG tablet Take 1 tablet (100 mcg) by mouth daily     diltiazem ER (CARDIZEM SR) 90 MG 12 hr capsule Take 1 capsule (90 mg) by mouth 2 times daily     ferrous sulfate (FEROSUL) 325 (65 Fe) MG tablet Take 325 mg by mouth daily as needed      metoprolol tartrate (LOPRESSOR) 100 MG tablet Take 1 tablet (100 mg) by mouth 2 times daily     oxyCODONE (ROXICODONE) 5 MG tablet Take 0.5 tablets (2.5 mg) by mouth every 6 hours     polyethylene glycol (MIRALAX) 17 GM/Dose powder Take 17 g by mouth daily     Vitamin D3 (CHOLECALCIFEROL) 25 mcg (1000 units) tablet Take 1 tablet (25 mcg) by mouth daily     No current facility-administered medications for this visit.       ROS:  4 point ROS including Respiratory, CV, GI and , other than that noted in the HPI,  is negative    Vitals:  /77   Pulse 87   Temp 97.7  F (36.5  C)   Resp 18   Ht 1.575 m (5' 2\")   Wt 68 kg (149 lb 14.4 oz)   SpO2 93%   BMI 27.42 kg/m    Exam:  Physical Exam   General appearance: alert, appears stated age and cooperative.   Head: Normocephalic, without obvious abnormality, atraumatic, Eyes: sclera anicteric.  Lungs: respirations without effort, LSCTA; no wheezing or rales.   Cardiovascular: S1, S2. Regular rate and rhythm.   ABDOMEN: Globular and soft, non tender.    Extremities: extremities normal, atraumatic, no cyanosis or " edema  Skin: R knee bruising around incision sites, stitches and staples longitudinally down knee, and lateral and medial knee, with purulent drainage. Warmth and swelling.    Neurologic:oriented. No focal deficits.   Psych: interacts well with caregivers, exhibits logical thought processes and connections, pleasant    Lab/Diagnostic data:  Recent labs in [facility] reviewed by me today.     ASSESSMENT/PLAN:    (W19.XXXA) Fall  (primary encounter diagnosis)  (S72.401A) Closed fracture of distal end of right femur, unspecified fracture morphology, initial encounter (H)  Comment:   Plan:   -Follow up with ortho 2-4 weeks.   -PT, OT at their discretion.  -Non weightbearing 6 weeks.   -Monitor incision site.     (I48.91) Rapid atrial fibrillation (H)  Comment: new onset.   Plan:   -metoprolol 100mg  -diltiazem 90 daily.   -apixiban.     Anemia, postop:  -Check labs weekly until stable.  -Continues on daily iron supplementation.    Electronically signed by:  Sherry Wu, CNP

## 2021-09-24 ENCOUNTER — TRANSITIONAL CARE UNIT VISIT (OUTPATIENT)
Dept: GERIATRICS | Facility: CLINIC | Age: 86
End: 2021-09-24
Payer: MEDICARE

## 2021-09-24 ENCOUNTER — LAB REQUISITION (OUTPATIENT)
Dept: LAB | Facility: CLINIC | Age: 86
End: 2021-09-24
Payer: MEDICARE

## 2021-09-24 DIAGNOSIS — S72.401A CLOSED FRACTURE OF DISTAL END OF RIGHT FEMUR, UNSPECIFIED FRACTURE MORPHOLOGY, INITIAL ENCOUNTER (H): Primary | Chronic | ICD-10-CM

## 2021-09-24 DIAGNOSIS — R21 RASH AND NONSPECIFIC SKIN ERUPTION: ICD-10-CM

## 2021-09-24 DIAGNOSIS — I48.91 RAPID ATRIAL FIBRILLATION (H): Chronic | ICD-10-CM

## 2021-09-24 DIAGNOSIS — D62 ACUTE POSTHEMORRHAGIC ANEMIA: ICD-10-CM

## 2021-09-24 LAB — HGB BLD-MCNC: 9.1 G/DL (ref 11.7–15.7)

## 2021-09-24 PROCEDURE — P9604 ONE-WAY ALLOW PRORATED TRIP: HCPCS | Performed by: INTERNAL MEDICINE

## 2021-09-24 PROCEDURE — 99309 SBSQ NF CARE MODERATE MDM 30: CPT | Performed by: NURSE PRACTITIONER

## 2021-09-24 PROCEDURE — 36415 COLL VENOUS BLD VENIPUNCTURE: CPT | Performed by: INTERNAL MEDICINE

## 2021-09-24 PROCEDURE — 99207 PR CDG-MDM COMPONENT: MEETS MODERATE - DOWN CODED: CPT | Performed by: NURSE PRACTITIONER

## 2021-09-24 PROCEDURE — 85018 HEMOGLOBIN: CPT | Performed by: INTERNAL MEDICINE

## 2021-09-24 NOTE — LETTER
9/24/2021        RE: Elysia Lane  [address]  [facility] [address]        [facility]  PRIMARY CARE PROVIDER AND CLINIC:  Elif Alcantara MD, [facility] [address] /   No chief complaint on file.     [place] Medical Record Number:  [id number]  Place of Service where encounter took place:  No question data found.    Elysia Lane  is a 89 year old  (12/2/1923), admitted to the above facility from  [facility]. Hospital stay 9/6 through 9/16..   HPI:  Elysia has a history of HTN and anemia who had a fall at home and presented to [facility] ED with  R leg pain. She had closed fracture of the distal femur and received ORIF and is non weight bearing. Completed IV ceftraixone for UTI with sepsis.   Also found in a fib with RVR, which improved on metoprolol 100mg and diltiazem, as well as apixaban.     Today: Nursing asked me to see the patient due to evolving rash, she did have this rash present at her admission and it appeared to be more TPA with patches of macular papular pink rash along the groin and abdominal folds underneath her breast.  Some down proximal legs.  This appears to be evolving and is more hive-like.  Macular papular rash all throughout the groin, abdomen and breasts.  She is complaining of periods at this time however nursing says they have exhibited her scratching herself.  There are no open areas or concerning areas for infection.  Her knee continues to be painful and she was seen by Ortho 2 days ago.  She reportedly has 3 separate pressure wounds to her coccyx and gluteal fold, wound care MD has been following.      CODE STATUS/ADVANCE DIRECTIVES DISCUSSION:  No CPR- Do NOT Intubate  DNR / DNI  ALLERGIES: No Known Allergies   PAST MEDICAL HISTORY:   Past Medical History:   Diagnosis Date     Anemia      Chronic kidney disease      Essential hypertension      Osteoarthritis      Stenosis of carotid artery       PAST SURGICAL  HISTORY:   has a past surgical history that includes Open reduction internal fixation femur distal (Right, 9/8/2021).  FAMILY HISTORY: family history includes No Known Problems in her father and mother.  SOCIAL HISTORY:   reports that she has never smoked. She has never used smokeless tobacco. She reports previous alcohol use. She reports that she does not use drugs.  Patient's living condition: senior care    Post Discharge Medication Reconciliation Status: discharge medications reconciled, continue medications without change  Current Outpatient Medications   Medication Sig     acetaminophen (TYLENOL) 325 MG tablet Take 325 mg by mouth every 6 hours as needed for fever or pain      apixaban ANTICOAGULANT (ELIQUIS) 5 MG tablet Take 1 tablet (5 mg) by mouth 2 times daily     cyanocobalamin (CYANOCOBALAMIN) 100 MCG tablet Take 1 tablet (100 mcg) by mouth daily     diltiazem ER (CARDIZEM SR) 90 MG 12 hr capsule Take 1 capsule (90 mg) by mouth 2 times daily     ferrous sulfate (FEROSUL) 325 (65 Fe) MG tablet Take 325 mg by mouth daily as needed      metoprolol tartrate (LOPRESSOR) 100 MG tablet Take 1 tablet (100 mg) by mouth 2 times daily     oxyCODONE (ROXICODONE) 5 MG tablet Take 0.5 tablets (2.5 mg) by mouth every 6 hours     polyethylene glycol (MIRALAX) 17 GM/Dose powder Take 17 g by mouth daily     Vitamin D3 (CHOLECALCIFEROL) 25 mcg (1000 units) tablet Take 1 tablet (25 mcg) by mouth daily     No current facility-administered medications for this visit.       ROS:  4 point ROS including Respiratory, CV, GI and , other than that noted in the HPI,  is negative    Vitals:  There were no vitals taken for this visit.  Exam:  Physical Exam   General appearance: alert, appears stated age and cooperative.   Head: Normocephalic, without obvious abnormality, atraumatic, Eyes: sclera anicteric.  Lungs: respirations without effort, LSCTA; no wheezing or rales.   Cardiovascular: S1, S2. Regular rate and rhythm.   ABDOMEN: Globular  and soft, non tender.    Extremities: extremities normal, atraumatic, no cyanosis or edema  Skin: R knee bruising around incision sites, stitches and staples longitudinally down knee, and lateral and medial knee, with purulent drainage. Warmth and swelling.    Diffuse macular papular rash along groin, underneath breasts, down legs and upper abdomen.  Neurologic:oriented. No focal deficits.   Psych: interacts well with caregivers, exhibits logical thought processes and connections, pleasant    Lab/Diagnostic data:  Recent labs in [facility] reviewed by me today.     ASSESSMENT/PLAN:    Pressure sores to coccyx, gluteal fold: Unstable.  -Wound care MD following, continue per their orders.    (W19.XXXA) Fall  (primary encounter diagnosis)  (S72.401A) Closed fracture of distal end of right femur, unspecified fracture morphology, initial encounter (H): Unstable.  Comment: Continue to have significant pain.  Plan:   -Increase oxycodone to 5 mg every 6 hours as needed.  -Ice to right knee 4 times daily as needed.  -Follow up with ortho 2-4 weeks.   -PT, OT at their discretion.  -Non weightbearing 6 weeks.   -Monitor incision site.     (R21) Rash and nonspecific skin eruption: Unstable.  Macular papular, has a hives appearance.  Some petechiae.  Comment: Appears more like topical dermatitis given the location, surrounding diaper line.  Has evolved significantly since admission when first you the rash.  Plan:   -famotidine 20mg daily.   -Monitor for worsening rash, pruritus.    (I48.91) Rapid atrial fibrillation (H)  Comment: new onset.   Plan:   -metoprolol 100mg  -diltiazem 90 daily.   -apixiban.       Anemia, postop: Hemoglobin today was 9.  Improved from discharge in the sevens.  -Continues on daily iron supplementation.    Electronically signed by:  Sherry Wu, SMOOTH                     Sincerely,        Sherry Wu, CNP

## 2021-09-27 ENCOUNTER — TRANSITIONAL CARE UNIT VISIT (OUTPATIENT)
Dept: GERIATRICS | Facility: CLINIC | Age: 86
End: 2021-09-27
Payer: MEDICARE

## 2021-09-27 ENCOUNTER — DOCUMENTATION ONLY (OUTPATIENT)
Dept: OTHER | Facility: CLINIC | Age: 86
End: 2021-09-27

## 2021-09-27 VITALS
OXYGEN SATURATION: 99 % | HEART RATE: 71 BPM | RESPIRATION RATE: 20 BRPM | WEIGHT: 153.8 LBS | DIASTOLIC BLOOD PRESSURE: 68 MMHG | BODY MASS INDEX: 28.3 KG/M2 | TEMPERATURE: 98.1 F | SYSTOLIC BLOOD PRESSURE: 140 MMHG | HEIGHT: 62 IN

## 2021-09-27 DIAGNOSIS — S72.401A CLOSED FRACTURE OF DISTAL END OF RIGHT FEMUR, UNSPECIFIED FRACTURE MORPHOLOGY, INITIAL ENCOUNTER (H): Chronic | ICD-10-CM

## 2021-09-27 DIAGNOSIS — R21 RASH: Primary | ICD-10-CM

## 2021-09-27 DIAGNOSIS — Z79.01 CHRONIC ANTICOAGULATION: Chronic | ICD-10-CM

## 2021-09-27 DIAGNOSIS — R21 RASH AND NONSPECIFIC SKIN ERUPTION: ICD-10-CM

## 2021-09-27 PROCEDURE — 99309 SBSQ NF CARE MODERATE MDM 30: CPT | Performed by: NURSE PRACTITIONER

## 2021-09-27 ASSESSMENT — MIFFLIN-ST. JEOR: SCORE: 1035.88

## 2021-09-27 NOTE — LETTER
9/27/2021        RE: Elysia Lane  [address]  [facility] [address]        [facility]  PRIMARY CARE PROVIDER AND CLINIC:  Elif Alcantara MD, [facility] [address] /   Chief Complaint   Patient presents with     Derm Problem      [place] Medical Record Number:  [id number]  Place of Service where encounter took place:  [facility] (U) [[id number]]    Elysia Lane  is a 89 year old  (12/2/1923), admitted to the above facility from  [facility]. Hospital stay 9/6 through 9/16..   HPI:  Elysia has a history of HTN and anemia who had a fall at home and presented to [facility]'s ED with  R leg pain. She had closed fracture of the distal femur and received ORIF and is non weight bearing. Completed IV ceftraixone for UTI with sepsis.   Also found in a fib with RVR, which improved on metoprolol 100mg and diltiazem, as well as apixaban.     Today: Seen today to follow-up on patient's rash and acute pain to the right knee.  Rash appears to have completely resolved over the weekend, possibly from famotidine as well.  I will keep the H2 blocker on board for GI protection as well now she is taking apixaban.  She denies any pruritus today.  She does continue to have some moaning and pain again.  He said she is having no pain but then begins to moan and when asked why she is has right knee pain.  Again reiterated frequent use of ice and repositioning for nursing.  She is on 5 mg oxycodone every 6 hours, however this is risk for delirium and confusion so will need to be monitoring for this closely.      CODE STATUS/ADVANCE DIRECTIVES DISCUSSION:  No CPR- Do NOT Intubate  DNR / DNI  ALLERGIES: No Known Allergies   PAST MEDICAL HISTORY:   Past Medical History:   Diagnosis Date     Anemia      Chronic kidney disease      Essential hypertension      Osteoarthritis      Stenosis of carotid artery       PAST SURGICAL HISTORY:   has a past surgical history that  "includes Open reduction internal fixation femur distal (Right, 9/8/2021).  FAMILY HISTORY: family history includes No Known Problems in her father and mother.  SOCIAL HISTORY:   reports that she has never smoked. She has never used smokeless tobacco. She reports previous alcohol use. She reports that she does not use drugs.  Patient's living condition: longterm    Post Discharge Medication Reconciliation Status: discharge medications reconciled, continue medications without change  Current Outpatient Medications   Medication Sig     acetaminophen (TYLENOL) 325 MG tablet Take 325 mg by mouth every 6 hours as needed for fever or pain      apixaban ANTICOAGULANT (ELIQUIS) 5 MG tablet Take 1 tablet (5 mg) by mouth 2 times daily     cyanocobalamin (CYANOCOBALAMIN) 100 MCG tablet Take 1 tablet (100 mcg) by mouth daily     diltiazem ER (CARDIZEM SR) 90 MG 12 hr capsule Take 1 capsule (90 mg) by mouth 2 times daily     ferrous sulfate (FEROSUL) 325 (65 Fe) MG tablet Take 325 mg by mouth daily as needed      metoprolol tartrate (LOPRESSOR) 100 MG tablet Take 1 tablet (100 mg) by mouth 2 times daily     oxyCODONE (ROXICODONE) 5 MG tablet Take 0.5 tablets (2.5 mg) by mouth every 6 hours     polyethylene glycol (MIRALAX) 17 GM/Dose powder Take 17 g by mouth daily     Vitamin D3 (CHOLECALCIFEROL) 25 mcg (1000 units) tablet Take 1 tablet (25 mcg) by mouth daily     No current facility-administered medications for this visit.       ROS:  4 point ROS including Respiratory, CV, GI and , other than that noted in the HPI,  is negative    Vitals:  BP (!) 140/68   Pulse 71   Temp 98.1  F (36.7  C)   Resp 20   Ht 1.575 m (5' 2\")   Wt 69.8 kg (153 lb 12.8 oz)   SpO2 99%   BMI 28.13 kg/m    Exam:  Physical Exam   General appearance: alert, appears stated age and cooperative.   Head: Normocephalic, without obvious abnormality, atraumatic, Eyes: sclera anicteric.  Lungs: respirations without effort, LSCTA; no wheezing or rales. "   Cardiovascular: S1, S2. Regular rate and rhythm.   ABDOMEN: Globular and soft, non tender.    Extremities: extremities normal, atraumatic, no cyanosis or edema  Skin: R knee bruising around incision sites, stitches and staples longitudinally down knee, and lateral and medial knee, with purulent drainage. Warmth and swelling.    Diffuse macular papular rash along groin, underneath breasts, down legs and upper abdomen.  Neurologic:oriented. No focal deficits.   Psych: interacts well with caregivers, exhibits logical thought processes and connections, pleasant    Lab/Diagnostic data:  Recent labs in [facility] reviewed by me today.     ASSESSMENT/PLAN:    Pressure sores to coccyx, gluteal fold: Unstable.  -Wound care MD following, continue per their orders.    (W19.XXXA) Fall  (primary encounter diagnosis)  (S72.401A) Closed fracture of distal end of right femur, unspecified fracture morphology, initial encounter (H): Unstable.  Comment: Has had slight improvement with increased oxycodone however continuing to complain of pain during this visit.  Again will reiterate he is utilizing ice.  Plan:   -Continue oxycodone to 5 mg every 6 hours as needed.  -Ice to right knee 4 times daily as needed.  -Follow up with ortho 2-4 weeks.   -PT, OT at their discretion.  -Non weightbearing 6 weeks.   -Monitor incision site.     (R21) Rash and nonspecific skin eruption: Appears to completely clear up, patient is sitting in bed however were unable to view I do not see any signs or symptoms of the rash.  We will continue famotidine due to secondary effects of GI protection due to blood thinner and recent prolonged hospitalization.  Plan:   -Continue famotidine 20mg daily.     (I48.91) Rapid atrial fibrillation (H)  Comment: new onset.   Plan:   -metoprolol 100mg  -diltiazem 90 daily.   -apixiban.       Anemia, postop: Hemoglobin today was 9.  Improved from discharge in the sevens.  -Continues on daily iron  supplementation.    Electronically signed by:  Sherry Wu, SMOOTH                   Sincerely,        Sherry Wu, CNP

## 2021-09-28 PROBLEM — R21 RASH AND NONSPECIFIC SKIN ERUPTION: Status: ACTIVE | Noted: 2021-09-28

## 2021-09-28 NOTE — PROGRESS NOTES
[facility] GERIATRIC SERVICES  PRIMARY CARE PROVIDER AND CLINIC:  Elif Alcantara MD, [facility] [address] /   Chief Complaint   Patient presents with     Derm Problem      [place] Medical Record Number:  [id number]  Place of Service where encounter took place:  [facility] (TCU) [[id number]]    Elysia Lane  is a 89 year old  (12/2/1923), admitted to the above facility from  [facility]. Hospital stay 9/6 through 9/16..   HPI:  Elysia has a history of HTN and anemia who had a fall at home and presented to [facility]'s ED with  R leg pain. She had closed fracture of the distal femur and received ORIF and is non weight bearing. Completed IV ceftraixone for UTI with sepsis.   Also found in a fib with RVR, which improved on metoprolol 100mg and diltiazem, as well as apixaban.     Today: Seen today to follow-up on patient's rash and acute pain to the right knee.  Rash appears to have completely resolved over the weekend, possibly from famotidine as well.  I will keep the H2 blocker on board for GI protection as well now she is taking apixaban.  She denies any pruritus today.  She does continue to have some moaning and pain again.  He said she is having no pain but then begins to moan and when asked why she is has right knee pain.  Again reiterated frequent use of ice and repositioning for nursing.  She is on 5 mg oxycodone every 6 hours, however this is risk for delirium and confusion so will need to be monitoring for this closely.      CODE STATUS/ADVANCE DIRECTIVES DISCUSSION:  No CPR- Do NOT Intubate  DNR / DNI  ALLERGIES: No Known Allergies   PAST MEDICAL HISTORY:   Past Medical History:   Diagnosis Date     Anemia      Chronic kidney disease      Essential hypertension      Osteoarthritis      Stenosis of carotid artery       PAST SURGICAL HISTORY:   has a past surgical history that includes Open reduction internal fixation femur distal (Right, 9/8/2021).  FAMILY HISTORY:  "family history includes No Known Problems in her father and mother.  SOCIAL HISTORY:   reports that she has never smoked. She has never used smokeless tobacco. She reports previous alcohol use. She reports that she does not use drugs.  Patient's living condition: residential    Post Discharge Medication Reconciliation Status: discharge medications reconciled, continue medications without change  Current Outpatient Medications   Medication Sig     acetaminophen (TYLENOL) 325 MG tablet Take 325 mg by mouth every 6 hours as needed for fever or pain      apixaban ANTICOAGULANT (ELIQUIS) 5 MG tablet Take 1 tablet (5 mg) by mouth 2 times daily     cyanocobalamin (CYANOCOBALAMIN) 100 MCG tablet Take 1 tablet (100 mcg) by mouth daily     diltiazem ER (CARDIZEM SR) 90 MG 12 hr capsule Take 1 capsule (90 mg) by mouth 2 times daily     ferrous sulfate (FEROSUL) 325 (65 Fe) MG tablet Take 325 mg by mouth daily as needed      metoprolol tartrate (LOPRESSOR) 100 MG tablet Take 1 tablet (100 mg) by mouth 2 times daily     oxyCODONE (ROXICODONE) 5 MG tablet Take 0.5 tablets (2.5 mg) by mouth every 6 hours     polyethylene glycol (MIRALAX) 17 GM/Dose powder Take 17 g by mouth daily     Vitamin D3 (CHOLECALCIFEROL) 25 mcg (1000 units) tablet Take 1 tablet (25 mcg) by mouth daily     No current facility-administered medications for this visit.       ROS:  4 point ROS including Respiratory, CV, GI and , other than that noted in the HPI,  is negative    Vitals:  BP (!) 140/68   Pulse 71   Temp 98.1  F (36.7  C)   Resp 20   Ht 1.575 m (5' 2\")   Wt 69.8 kg (153 lb 12.8 oz)   SpO2 99%   BMI 28.13 kg/m    Exam:  Physical Exam   General appearance: alert, appears stated age and cooperative.   Head: Normocephalic, without obvious abnormality, atraumatic, Eyes: sclera anicteric.  Lungs: respirations without effort, LSCTA; no wheezing or rales.   Cardiovascular: S1, S2. Regular rate and rhythm.   ABDOMEN: Globular and soft, non tender.  "   Extremities: extremities normal, atraumatic, no cyanosis or edema  Skin: R knee bruising around incision sites, stitches and staples longitudinally down knee, and lateral and medial knee, with purulent drainage. Warmth and swelling.    Diffuse macular papular rash along groin, underneath breasts, down legs and upper abdomen.  Neurologic:oriented. No focal deficits.   Psych: interacts well with caregivers, exhibits logical thought processes and connections, pleasant    Lab/Diagnostic data:  Recent labs in [facility] reviewed by me today.     ASSESSMENT/PLAN:    Pressure sores to coccyx, gluteal fold: Unstable.  -Wound care MD following, continue per their orders.    (W19.XXXA) Fall  (primary encounter diagnosis)  (S72.401A) Closed fracture of distal end of right femur, unspecified fracture morphology, initial encounter (H): Unstable.  Comment: Has had slight improvement with increased oxycodone however continuing to complain of pain during this visit.  Again will reiterate he is utilizing ice.  Plan:   -Continue oxycodone to 5 mg every 6 hours as needed.  -Ice to right knee 4 times daily as needed.  -Follow up with ortho 2-4 weeks.   -PT, OT at their discretion.  -Non weightbearing 6 weeks.   -Monitor incision site.     (R21) Rash and nonspecific skin eruption: Appears to completely clear up, patient is sitting in bed however were unable to view I do not see any signs or symptoms of the rash.  We will continue famotidine due to secondary effects of GI protection due to blood thinner and recent prolonged hospitalization.  Plan:   -Continue famotidine 20mg daily.     (I48.91) Rapid atrial fibrillation (H)  Comment: new onset.   Plan:   -metoprolol 100mg  -diltiazem 90 daily.   -apixiban.       Anemia, postop: Hemoglobin today was 9.  Improved from discharge in the sevens.  -Continues on daily iron supplementation.    Electronically signed by:  Sherry Wu, SMOOTH

## 2021-09-28 NOTE — PROGRESS NOTES
[facility] GERIATRIC SERVICES  PRIMARY CARE PROVIDER AND CLINIC:  Elif Alcantara MD, [facility] [address] /   No chief complaint on file.     [place] Medical Record Number:  [id number]  Place of Service where encounter took place:  No question data found.    Elysia Lane  is a 89 year old  (12/2/1923), admitted to the above facility from  [facility]. Hospital stay 9/6 through 9/16..   HPI:  Elysia has a history of HTN and anemia who had a fall at home and presented to [place]'s ED with  R leg pain. She had closed fracture of the distal femur and received ORIF and is non weight bearing. Completed IV ceftraixone for UTI with sepsis.   Also found in a fib with RVR, which improved on metoprolol 100mg and diltiazem, as well as apixaban.     Today: Nursing asked me to see the patient due to evolving rash, she did have this rash present at her admission and it appeared to be more TPA with patches of macular papular pink rash along the groin and abdominal folds underneath her breast.  Some down proximal legs.  This appears to be evolving and is more hive-like.  Macular papular rash all throughout the groin, abdomen and breasts.  She is complaining of periods at this time however nursing says they have exhibited her scratching herself.  There are no open areas or concerning areas for infection.  Her knee continues to be painful and she was seen by Ortho 2 days ago.  She reportedly has 3 separate pressure wounds to her coccyx and gluteal fold, wound care MD has been following.      CODE STATUS/ADVANCE DIRECTIVES DISCUSSION:  No CPR- Do NOT Intubate  DNR / DNI  ALLERGIES: No Known Allergies   PAST MEDICAL HISTORY:   Past Medical History:   Diagnosis Date     Anemia      Chronic kidney disease      Essential hypertension      Osteoarthritis      Stenosis of carotid artery       PAST SURGICAL HISTORY:   has a past surgical history that includes Open reduction internal fixation femur  distal (Right, 9/8/2021).  FAMILY HISTORY: family history includes No Known Problems in her father and mother.  SOCIAL HISTORY:   reports that she has never smoked. She has never used smokeless tobacco. She reports previous alcohol use. She reports that she does not use drugs.  Patient's living condition: [facility]    Post Discharge Medication Reconciliation Status: discharge medications reconciled, continue medications without change  Current Outpatient Medications   Medication Sig     acetaminophen (TYLENOL) 325 MG tablet Take 325 mg by mouth every 6 hours as needed for fever or pain      apixaban ANTICOAGULANT (ELIQUIS) 5 MG tablet Take 1 tablet (5 mg) by mouth 2 times daily     cyanocobalamin (CYANOCOBALAMIN) 100 MCG tablet Take 1 tablet (100 mcg) by mouth daily     diltiazem ER (CARDIZEM SR) 90 MG 12 hr capsule Take 1 capsule (90 mg) by mouth 2 times daily     ferrous sulfate (FEROSUL) 325 (65 Fe) MG tablet Take 325 mg by mouth daily as needed      metoprolol tartrate (LOPRESSOR) 100 MG tablet Take 1 tablet (100 mg) by mouth 2 times daily     oxyCODONE (ROXICODONE) 5 MG tablet Take 0.5 tablets (2.5 mg) by mouth every 6 hours     polyethylene glycol (MIRALAX) 17 GM/Dose powder Take 17 g by mouth daily     Vitamin D3 (CHOLECALCIFEROL) 25 mcg (1000 units) tablet Take 1 tablet (25 mcg) by mouth daily     No current facility-administered medications for this visit.       ROS:  4 point ROS including Respiratory, CV, GI and , other than that noted in the HPI,  is negative    Vitals:  There were no vitals taken for this visit.  Exam:  Physical Exam   General appearance: alert, appears stated age and cooperative.   Head: Normocephalic, without obvious abnormality, atraumatic, Eyes: sclera anicteric.  Lungs: respirations without effort, LSCTA; no wheezing or rales.   Cardiovascular: S1, S2. Regular rate and rhythm.   ABDOMEN: Globular and soft, non tender.    Extremities: extremities normal, atraumatic, no cyanosis or  edema  Skin: R knee bruising around incision sites, stitches and staples longitudinally down knee, and lateral and medial knee, with purulent drainage. Warmth and swelling.    Diffuse macular papular rash along groin, underneath breasts, down legs and upper abdomen.  Neurologic:oriented. No focal deficits.   Psych: interacts well with caregivers, exhibits logical thought processes and connections, pleasant    Lab/Diagnostic data:  Recent labs in [facility] reviewed by me today.     ASSESSMENT/PLAN:    Pressure sores to coccyx, gluteal fold: Unstable.  -Wound care MD following, continue per their orders.    (W19.XXXA) Fall  (primary encounter diagnosis)  (S72.401A) Closed fracture of distal end of right femur, unspecified fracture morphology, initial encounter (H): Unstable.  Comment: Continue to have significant pain.  Plan:   -Increase oxycodone to 5 mg every 6 hours as needed.  -Ice to right knee 4 times daily as needed.  -Follow up with ortho 2-4 weeks.   -PT, OT at their discretion.  -Non weightbearing 6 weeks.   -Monitor incision site.     (R21) Rash and nonspecific skin eruption: Unstable.  Macular papular, has a hives appearance.  Some petechiae.  Comment: Appears more like topical dermatitis given the location, surrounding diaper line.  Has evolved significantly since admission when first you the rash.  Plan:   -famotidine 20mg daily.   -Monitor for worsening rash, pruritus.    (I48.91) Rapid atrial fibrillation (H)  Comment: new onset.   Plan:   -metoprolol 100mg  -diltiazem 90 daily.   -apixiban.       Anemia, postop: Hemoglobin today was 9.  Improved from discharge in the sevens.  -Continues on daily iron supplementation.    Electronically signed by:  Sherry Wu, CNP

## 2021-09-29 ENCOUNTER — TRANSITIONAL CARE UNIT VISIT (OUTPATIENT)
Dept: GERIATRICS | Facility: CLINIC | Age: 86
End: 2021-09-29
Payer: MEDICARE

## 2021-09-29 VITALS
HEART RATE: 79 BPM | BODY MASS INDEX: 27.73 KG/M2 | OXYGEN SATURATION: 93 % | SYSTOLIC BLOOD PRESSURE: 115 MMHG | RESPIRATION RATE: 20 BRPM | DIASTOLIC BLOOD PRESSURE: 68 MMHG | WEIGHT: 150.7 LBS | TEMPERATURE: 97.2 F | HEIGHT: 62 IN

## 2021-09-29 DIAGNOSIS — R52 PAIN: ICD-10-CM

## 2021-09-29 DIAGNOSIS — S72.401A CLOSED FRACTURE OF DISTAL END OF RIGHT FEMUR, UNSPECIFIED FRACTURE MORPHOLOGY, INITIAL ENCOUNTER (H): Primary | Chronic | ICD-10-CM

## 2021-09-29 PROCEDURE — 99309 SBSQ NF CARE MODERATE MDM 30: CPT | Performed by: NURSE PRACTITIONER

## 2021-09-29 ASSESSMENT — MIFFLIN-ST. JEOR: SCORE: 1021.82

## 2021-09-29 NOTE — LETTER
9/29/2021        RE: Elysia Lane  [address]  [facility] [address]        [facility]  PRIMARY CARE PROVIDER AND CLINIC:  Elif Alcantara MD, [facility] [address] /   Chief Complaint   Patient presents with     Pain      [place] Medical Record Number:  [id number]  Place of Service where encounter took place:  [facility] (U) [[id number]]    Elysia Lane  is a 89 year old  (12/2/1923), admitted to the above facility from  [facility]. Hospital stay 9/6 through 9/16..   HPI:  Elysia has a history of HTN and anemia who had a fall at home and presented to [place]'s ED with  R leg pain. She had closed fracture of the distal femur and received ORIF and is non weight bearing. Completed IV ceftraixone for UTI with sepsis.   Also found in a fib with RVR, which improved on metoprolol 100mg and diltiazem, as well as apixaban.     Today: seen today for pain follow up; she is very calm and conversational today. She is feeling well and appears to be in good spirits; talks to me about her family growing up as one of nine kids. She tells me she continues to have some R knee pain but is not moaning or distracted by it. Her rash is completely gone. She is denying acute concerns. Nursing has no concerns today. She is attending therapies.     CODE STATUS/ADVANCE DIRECTIVES DISCUSSION:  No CPR- Do NOT Intubate  DNR / DNI  ALLERGIES: No Known Allergies   PAST MEDICAL HISTORY:   Past Medical History:   Diagnosis Date     Anemia      Chronic kidney disease      Essential hypertension      Osteoarthritis      Stenosis of carotid artery       PAST SURGICAL HISTORY:   has a past surgical history that includes Open reduction internal fixation femur distal (Right, 9/8/2021).  FAMILY HISTORY: family history includes No Known Problems in her father and mother.  SOCIAL HISTORY:   reports that she has never smoked. She has never used smokeless tobacco. She reports  "previous alcohol use. She reports that she does not use drugs.  Patient's living condition: [facility]    Post Discharge Medication Reconciliation Status: discharge medications reconciled, continue medications without change  Current Outpatient Medications   Medication Sig     acetaminophen (TYLENOL) 325 MG tablet Take 325 mg by mouth every 6 hours as needed for fever or pain      apixaban ANTICOAGULANT (ELIQUIS) 5 MG tablet Take 1 tablet (5 mg) by mouth 2 times daily     cyanocobalamin (CYANOCOBALAMIN) 100 MCG tablet Take 1 tablet (100 mcg) by mouth daily     diltiazem ER (CARDIZEM SR) 90 MG 12 hr capsule Take 1 capsule (90 mg) by mouth 2 times daily     ferrous sulfate (FEROSUL) 325 (65 Fe) MG tablet Take 325 mg by mouth daily as needed      metoprolol tartrate (LOPRESSOR) 100 MG tablet Take 1 tablet (100 mg) by mouth 2 times daily     oxyCODONE (ROXICODONE) 5 MG tablet Take 1 tablet (5 mg) by mouth every 6 hours     polyethylene glycol (MIRALAX) 17 GM/Dose powder Take 17 g by mouth daily     Vitamin D3 (CHOLECALCIFEROL) 25 mcg (1000 units) tablet Take 1 tablet (25 mcg) by mouth daily     No current facility-administered medications for this visit.       ROS:  4 point ROS including Respiratory, CV, GI and , other than that noted in the HPI,  is negative    Vitals:  /68   Pulse 79   Temp 97.2  F (36.2  C)   Resp 20   Ht 1.575 m (5' 2\")   Wt 68.4 kg (150 lb 11.2 oz)   SpO2 93%   BMI 27.56 kg/m    Exam:  Physical Exam   General appearance: alert, appears stated age and cooperative.   Head: Normocephalic, without obvious abnormality, atraumatic, Eyes: sclera anicteric.  Lungs: respirations without effort, LSCTA; no wheezing or rales.   Cardiovascular: S1, S2. Regular rate and rhythm.   ABDOMEN: Globular and soft, non tender.    Extremities: extremities normal, atraumatic, no cyanosis or edema  Skin: R knee bruising around incision sites, stitches and staples longitudinally down knee, and lateral and medial " knee, with purulent drainage. Warmth and swelling.    Diffuse macular papular rash along groin, underneath breasts, down legs and upper abdomen.  Neurologic:oriented. No focal deficits.   Psych: interacts well with caregivers, exhibits logical thought processes and connections, pleasant    Lab/Diagnostic data:  Recent labs in [facility] reviewed by me today.     ASSESSMENT/PLAN:    Pressure sores to coccyx, gluteal fold: Unstable.  -Wound care MD following, continue per their orders.    (W19.XXXA) Fall  (primary encounter diagnosis)  (S72.401A) Closed fracture of distal end of right femur, unspecified fracture morphology, initial encounter (H): Unstable.  Comment: Has had slight improvement with increased oxycodone however continuing to complain of pain during this visit.  Again will reiterate he is utilizing ice.  Plan:   -Continue oxycodone to 5 mg every 6 hours as needed.  -Ice to right knee 4 times daily as needed.  -Follow up with ortho 2-4 weeks.   -PT, OT at their discretion.  -Non weightbearing 6 weeks.   -Monitor incision site.     (R21) Rash and nonspecific skin eruption: Appears to completely clear up, patient is sitting in bed however were unable to view I do not see any signs or symptoms of the rash.  We will continue famotidine due to secondary effects of GI protection due to blood thinner and recent prolonged hospitalization.  Plan:   -Continue famotidine 20mg daily.     (I48.91) Rapid atrial fibrillation (H)  Comment: new onset.   Plan:   -metoprolol 100mg  -diltiazem 90 daily.   -apixiban.       Anemia, postop: Hemoglobin today was 9.  Improved from discharge in the sevens.  -Continues on daily iron supplementation.    Electronically signed by:  Sherry Wu CNP                 Sincerely,        Sherry Wu, CNP

## 2021-10-01 ENCOUNTER — TRANSITIONAL CARE UNIT VISIT (OUTPATIENT)
Dept: GERIATRICS | Facility: CLINIC | Age: 86
End: 2021-10-01
Payer: MEDICARE

## 2021-10-01 VITALS
OXYGEN SATURATION: 94 % | WEIGHT: 150.7 LBS | BODY MASS INDEX: 27.73 KG/M2 | RESPIRATION RATE: 16 BRPM | TEMPERATURE: 97.4 F | HEART RATE: 63 BPM | DIASTOLIC BLOOD PRESSURE: 66 MMHG | HEIGHT: 62 IN | SYSTOLIC BLOOD PRESSURE: 119 MMHG

## 2021-10-01 DIAGNOSIS — I48.91 RAPID ATRIAL FIBRILLATION (H): Chronic | ICD-10-CM

## 2021-10-01 DIAGNOSIS — S72.401A CLOSED FRACTURE OF DISTAL END OF RIGHT FEMUR, UNSPECIFIED FRACTURE MORPHOLOGY, INITIAL ENCOUNTER (H): Primary | Chronic | ICD-10-CM

## 2021-10-01 DIAGNOSIS — N18.32 CHRONIC KIDNEY DISEASE, STAGE 3B (H): ICD-10-CM

## 2021-10-01 DIAGNOSIS — S72.401A CLOSED FRACTURE OF DISTAL END OF RIGHT FEMUR, UNSPECIFIED FRACTURE MORPHOLOGY, INITIAL ENCOUNTER (H): ICD-10-CM

## 2021-10-01 PROCEDURE — 99309 SBSQ NF CARE MODERATE MDM 30: CPT | Performed by: NURSE PRACTITIONER

## 2021-10-01 RX ORDER — OXYCODONE HYDROCHLORIDE 5 MG/1
5 TABLET ORAL EVERY 6 HOURS
Qty: 30 TABLET | Refills: 0 | Status: SHIPPED | OUTPATIENT
Start: 2021-10-01 | End: 2021-10-07

## 2021-10-01 ASSESSMENT — MIFFLIN-ST. JEOR: SCORE: 1021.82

## 2021-10-01 NOTE — LETTER
10/1/2021        RE: Elysia Lane  [address]  [facility] [address]        [facility]  PRIMARY CARE PROVIDER AND CLINIC:  Elif Alcantara MD, [facility] [address] /   Chief Complaint   Patient presents with     RECHECK      [place] Medical Record Number:  [id number]  Place of Service where encounter took place:  [facility] ([facility]) [[id number]]    Elysia Lane  is a 89 year old  (12/2/1923), admitted to the above facility from  [facility]. Hospital stay 9/6 through 9/16..   HPI:  Elysia has a history of HTN and anemia who had a fall at home and presented to [place]'s ED with  R leg pain. She had closed fracture of the distal femur and received ORIF and is non weight bearing. Completed IV ceftraixone for UTI with sepsis.   Also found in a fib with RVR, which improved on metoprolol 100mg and diltiazem, as well as apixaban.     Today: Although seen today to review pain and rash.  Rash is completely clear however I will keep for GI protection.  She is in a very good mood today and is clear and alert is about her past and her family.  She is denying any pain and is attempting to move around in the bed.  She remains nonweightbearing.  Vital signs are reviewed and are satisfactory.  No signs or symptoms of bleeding.    CODE STATUS/ADVANCE DIRECTIVES DISCUSSION:  No CPR- Do NOT Intubate  DNR / DNI  ALLERGIES: No Known Allergies   PAST MEDICAL HISTORY:   Past Medical History:   Diagnosis Date     Anemia      Chronic kidney disease      Essential hypertension      Osteoarthritis      Stenosis of carotid artery       PAST SURGICAL HISTORY:   has a past surgical history that includes Open reduction internal fixation femur distal (Right, 9/8/2021).  FAMILY HISTORY: family history includes No Known Problems in her father and mother.  SOCIAL HISTORY:   reports that she has never smoked. She has never used smokeless tobacco. She reports previous alcohol  "use. She reports that she does not use drugs.  Patient's living condition: half-way    Post Discharge Medication Reconciliation Status: discharge medications reconciled, continue medications without change  Current Outpatient Medications   Medication Sig     acetaminophen (TYLENOL) 325 MG tablet Take 325 mg by mouth every 6 hours as needed for fever or pain      apixaban ANTICOAGULANT (ELIQUIS) 5 MG tablet Take 1 tablet (5 mg) by mouth 2 times daily     cyanocobalamin (CYANOCOBALAMIN) 100 MCG tablet Take 1 tablet (100 mcg) by mouth daily     diltiazem ER (CARDIZEM SR) 90 MG 12 hr capsule Take 1 capsule (90 mg) by mouth 2 times daily     ferrous sulfate (FEROSUL) 325 (65 Fe) MG tablet Take 325 mg by mouth daily as needed      metoprolol tartrate (LOPRESSOR) 100 MG tablet Take 1 tablet (100 mg) by mouth 2 times daily     oxyCODONE (ROXICODONE) 5 MG tablet Take 1 tablet (5 mg) by mouth every 6 hours     polyethylene glycol (MIRALAX) 17 GM/Dose powder Take 17 g by mouth daily     Vitamin D3 (CHOLECALCIFEROL) 25 mcg (1000 units) tablet Take 1 tablet (25 mcg) by mouth daily     No current facility-administered medications for this visit.       ROS:  4 point ROS including Respiratory, CV, GI and , other than that noted in the HPI,  is negative    Vitals:  /66   Pulse 63   Temp 97.4  F (36.3  C)   Resp 16   Ht 1.575 m (5' 2\")   Wt 68.4 kg (150 lb 11.2 oz)   SpO2 94%   BMI 27.56 kg/m    Exam:  Physical Exam   General appearance: alert, appears stated age and cooperative.   Head: Normocephalic, without obvious abnormality, atraumatic, Eyes: sclera anicteric.  Lungs: respirations without effort, LSCTA; no wheezing or rales.   Cardiovascular: S1, S2. Regular rate and rhythm.   ABDOMEN: Globular and soft, non tender.    Extremities: extremities normal, atraumatic, no cyanosis or edema  Skin: warm and dry where visible.   Neurologic:oriented. No focal deficits.   Psych: interacts well with caregivers, exhibits logical " thought processes and connections, pleasant    Lab/Diagnostic data:  Recent labs in [facility] reviewed by me today.     ASSESSMENT/PLAN:    Pressure sores to coccyx, gluteal fold: Unstable.  -Wound care MD following, continue per their orders.    (W19.XXXA) Fall  (primary encounter diagnosis)  (S72.401A) Closed fracture of distal end of right femur, unspecified fracture morphology, initial encounter (H): Unstable.  Comment: No c/o pain; very jovial during visit.   Plan:   -Continue oxycodone to 5 mg every 6 hours as needed.- review next week.   -Ice to right knee 4 times daily as needed.  -Follow up with ortho 2-4 weeks.   -PT, OT at their discretion.   -Non weightbearing 6 weeks.   -Monitor incision site.     (R21) Rash and nonspecific skin eruption: Appears to have cleared; no s/sx of rash.  We will continue famotidine for GI protection due to blood thinner and recent prolonged hospitalization.  Plan:   -Continue famotidine 20mg daily.     (I48.91) Rapid atrial fibrillation (H)  Comment: new onset.   Plan:   -metoprolol 100mg  -diltiazem 90 daily.   -apixiban.       Anemia, postop: Hemoglobin today was 9.  Improved from discharge in the sevens.  -Continues on daily iron supplementation.    Electronically signed by:  Sherry Wu CNP               Sincerely,        Sherry Wu CNP

## 2021-10-03 NOTE — PROGRESS NOTES
[facility] GERIATRIC SERVICES  PRIMARY CARE PROVIDER AND CLINIC:  Elif Alcantara MD, [facility] [address] /   Chief Complaint   Patient presents with     Pain      [place] Medical Record Number:  [id number]  Place of Service where encounter took place:  [facility] (U) [[id number]]    Elysia Lane  is a 89 year old  (12/2/1923), admitted to the above facility from  [facility]. Hospital stay 9/6 through 9/16..   HPI:  Elysia has a history of HTN and anemia who had a fall at home and presented to [place]'s ED with  R leg pain. She had closed fracture of the distal femur and received ORIF and is non weight bearing. Completed IV ceftraixone for UTI with sepsis.   Also found in a fib with RVR, which improved on metoprolol 100mg and diltiazem, as well as apixaban.     Today: seen today for pain follow up; she is very calm and conversational today. She is feeling well and appears to be in good spirits; talks to me about her family growing up as one of nine kids. She tells me she continues to have some R knee pain but is not moaning or distracted by it. Her rash is completely gone. She is denying acute concerns. Nursing has no concerns today. She is attending therapies.     CODE STATUS/ADVANCE DIRECTIVES DISCUSSION:  No CPR- Do NOT Intubate  DNR / DNI  ALLERGIES: No Known Allergies   PAST MEDICAL HISTORY:   Past Medical History:   Diagnosis Date     Anemia      Chronic kidney disease      Essential hypertension      Osteoarthritis      Stenosis of carotid artery       PAST SURGICAL HISTORY:   has a past surgical history that includes Open reduction internal fixation femur distal (Right, 9/8/2021).  FAMILY HISTORY: family history includes No Known Problems in her father and mother.  SOCIAL HISTORY:   reports that she has never smoked. She has never used smokeless tobacco. She reports previous alcohol use. She reports that she does not use drugs.  Patient's living condition:  "custodial    Post Discharge Medication Reconciliation Status: discharge medications reconciled, continue medications without change  Current Outpatient Medications   Medication Sig     acetaminophen (TYLENOL) 325 MG tablet Take 325 mg by mouth every 6 hours as needed for fever or pain      apixaban ANTICOAGULANT (ELIQUIS) 5 MG tablet Take 1 tablet (5 mg) by mouth 2 times daily     cyanocobalamin (CYANOCOBALAMIN) 100 MCG tablet Take 1 tablet (100 mcg) by mouth daily     diltiazem ER (CARDIZEM SR) 90 MG 12 hr capsule Take 1 capsule (90 mg) by mouth 2 times daily     ferrous sulfate (FEROSUL) 325 (65 Fe) MG tablet Take 325 mg by mouth daily as needed      metoprolol tartrate (LOPRESSOR) 100 MG tablet Take 1 tablet (100 mg) by mouth 2 times daily     oxyCODONE (ROXICODONE) 5 MG tablet Take 1 tablet (5 mg) by mouth every 6 hours     polyethylene glycol (MIRALAX) 17 GM/Dose powder Take 17 g by mouth daily     Vitamin D3 (CHOLECALCIFEROL) 25 mcg (1000 units) tablet Take 1 tablet (25 mcg) by mouth daily     No current facility-administered medications for this visit.       ROS:  4 point ROS including Respiratory, CV, GI and , other than that noted in the HPI,  is negative    Vitals:  /68   Pulse 79   Temp 97.2  F (36.2  C)   Resp 20   Ht 1.575 m (5' 2\")   Wt 68.4 kg (150 lb 11.2 oz)   SpO2 93%   BMI 27.56 kg/m    Exam:  Physical Exam   General appearance: alert, appears stated age and cooperative.   Head: Normocephalic, without obvious abnormality, atraumatic, Eyes: sclera anicteric.  Lungs: respirations without effort, LSCTA; no wheezing or rales.   Cardiovascular: S1, S2. Regular rate and rhythm.   ABDOMEN: Globular and soft, non tender.    Extremities: extremities normal, atraumatic, no cyanosis or edema  Skin: R knee bruising around incision sites, stitches and staples longitudinally down knee, and lateral and medial knee, with purulent drainage. Warmth and swelling.    Diffuse macular papular rash along groin, " underneath breasts, down legs and upper abdomen.  Neurologic:oriented. No focal deficits.   Psych: interacts well with caregivers, exhibits logical thought processes and connections, pleasant    Lab/Diagnostic data:  Recent labs in [facility] reviewed by me today.     ASSESSMENT/PLAN:    Pressure sores to coccyx, gluteal fold: Unstable.  -Wound care MD following, continue per their orders.    (W19.XXXA) Fall  (primary encounter diagnosis)  (S72.401A) Closed fracture of distal end of right femur, unspecified fracture morphology, initial encounter (H): Unstable.  Comment: Has had slight improvement with increased oxycodone however continuing to complain of pain during this visit.  Again will reiterate he is utilizing ice.  Plan:   -Continue oxycodone to 5 mg every 6 hours as needed.  -Ice to right knee 4 times daily as needed.  -Follow up with ortho 2-4 weeks.   -PT, OT at their discretion.  -Non weightbearing 6 weeks.   -Monitor incision site.     (R21) Rash and nonspecific skin eruption: Appears to completely clear up, patient is sitting in bed however were unable to view I do not see any signs or symptoms of the rash.  We will continue famotidine due to secondary effects of GI protection due to blood thinner and recent prolonged hospitalization.  Plan:   -Continue famotidine 20mg daily.     (I48.91) Rapid atrial fibrillation (H)  Comment: new onset.   Plan:   -metoprolol 100mg  -diltiazem 90 daily.   -apixiban.       Anemia, postop: Hemoglobin today was 9.  Improved from discharge in the sevens.  -Continues on daily iron supplementation.    Electronically signed by:  Sherry Wu, SMOOTH

## 2021-10-04 ENCOUNTER — TRANSITIONAL CARE UNIT VISIT (OUTPATIENT)
Dept: GERIATRICS | Facility: CLINIC | Age: 86
End: 2021-10-04
Payer: MEDICARE

## 2021-10-04 VITALS
TEMPERATURE: 96.8 F | OXYGEN SATURATION: 95 % | SYSTOLIC BLOOD PRESSURE: 131 MMHG | HEIGHT: 62 IN | HEART RATE: 70 BPM | RESPIRATION RATE: 19 BRPM | BODY MASS INDEX: 27.73 KG/M2 | DIASTOLIC BLOOD PRESSURE: 81 MMHG | WEIGHT: 150.7 LBS

## 2021-10-04 DIAGNOSIS — S72.401A CLOSED FRACTURE OF DISTAL END OF RIGHT FEMUR, UNSPECIFIED FRACTURE MORPHOLOGY, INITIAL ENCOUNTER (H): Primary | Chronic | ICD-10-CM

## 2021-10-04 DIAGNOSIS — Z98.890 H/O MAJOR ORTHOPEDIC SURGERY: Chronic | ICD-10-CM

## 2021-10-04 DIAGNOSIS — Z79.01 CHRONIC ANTICOAGULATION: Chronic | ICD-10-CM

## 2021-10-04 PROCEDURE — 99309 SBSQ NF CARE MODERATE MDM 30: CPT | Performed by: NURSE PRACTITIONER

## 2021-10-04 ASSESSMENT — MIFFLIN-ST. JEOR: SCORE: 1021.82

## 2021-10-04 NOTE — LETTER
10/4/2021        RE: Elysia Lane  [address]  [facility] [address]        [facility]  PRIMARY CARE PROVIDER AND CLINIC:  Elif Alcantara MD, [facility] [address] /   Chief Complaint   Patient presents with     RECHECK      [facility] Medical Record Number:  [id number]  Place of Service where encounter took place:  [facility] ([facility]) [[id number]]    Elysia Lane  is a 89 year old  (12/2/1923), admitted to the above facility from  [facility]. Hospital stay 9/6 through 9/16..   HPI:  Elysia has a history of HTN and anemia who had a fall at home and presented to [place]'s ED with  R leg pain. She had closed fracture of the distal femur and received ORIF and is non weight bearing. Completed IV ceftraixone for UTI with sepsis.   Also found in a fib with RVR, which improved on metoprolol 100mg and diltiazem, as well as apixaban.     Today: She is seen sitting in wheelchair and in no acute distress. She appears to be in a very  good mood and is happily conversational. She reports pain has improved although she feels the changing  weather is hard on her pain and aches. She takes oxycodone 5 mg every 6 hours; discussed decreasing her oxycodone dose. Will decrease her oxycodone does to 2.5 mg every 6 hours, re-evaluate next week and consider tapering off when pain well controlled. She is agreeable to plan. She denies CP, palpitations, fatigue, nausea, vomiting,  SOB, fever, chills, and/or b/b concerns today. she is eating and drinking and tells me she likes the food. She is sleeping well. She remains non-weightbearing.  Vital signs are reviewed and are  Stable.  She is satisfied with the services and says wonderful staff and facility.  Nursing report her knee incision is healing well; not viewed today due to pt positioning and knee secured with kerlix and ace wrap; changed daily by nursing. Ortho viewed incision on 9/20 with no concerns.     CODE  STATUS/ADVANCE DIRECTIVES DISCUSSION:  No CPR- Do NOT Intubate  DNR / DNI  ALLERGIES: No Known Allergies   PAST MEDICAL HISTORY:   Past Medical History:   Diagnosis Date     Anemia      Chronic kidney disease      Essential hypertension      Osteoarthritis      Stenosis of carotid artery       PAST SURGICAL HISTORY:   has a past surgical history that includes Open reduction internal fixation femur distal (Right, 9/8/2021).  FAMILY HISTORY: family history includes No Known Problems in her father and mother.  SOCIAL HISTORY:   reports that she has never smoked. She has never used smokeless tobacco. She reports previous alcohol use. She reports that she does not use drugs.  Patient's living condition: NICHOLE    Post Discharge Medication Reconciliation Status: discharge medications reconciled, continue medications without change  Current Outpatient Medications   Medication Sig     acetaminophen (TYLENOL) 325 MG tablet Take 325 mg by mouth every 6 hours as needed for fever or pain      apixaban ANTICOAGULANT (ELIQUIS) 5 MG tablet Take 1 tablet (5 mg) by mouth 2 times daily     collagenase (SANTYL) 250 UNIT/GM external ointment Apply topically daily     cyanocobalamin (CYANOCOBALAMIN) 100 MCG tablet Take 1 tablet (100 mcg) by mouth daily     diltiazem ER (CARDIZEM SR) 90 MG 12 hr capsule Take 1 capsule (90 mg) by mouth 2 times daily     famotidine (PEPCID) 20 MG tablet Take 1 tablet (20 mg) by mouth every morning     ferrous sulfate (FEROSUL) 325 (65 Fe) MG tablet Take 325 mg by mouth daily as needed      metoprolol tartrate (LOPRESSOR) 100 MG tablet Take 1 tablet (100 mg) by mouth 2 times daily     oxyCODONE (ROXICODONE) 5 MG tablet Take 0.5 tablets (2.5 mg) by mouth every 6 hours     polyethylene glycol (MIRALAX) 17 GM/Dose powder Take 17 g by mouth daily     Vitamin D3 (CHOLECALCIFEROL) 25 mcg (1000 units) tablet Take 1 tablet (25 mcg) by mouth daily     No current facility-administered medications for this visit.  "      ROS:  4 point ROS including Respiratory, CV, GI and , other than that noted in the HPI,  is negative    Vitals:  /81   Pulse 70   Temp 96.8  F (36  C)   Resp 19   Ht 1.575 m (5' 2\")   Wt 68.4 kg (150 lb 11.2 oz)   SpO2 95%   BMI 27.56 kg/m    Exam:  Physical Exam   General appearance: alert, appears stated age and cooperative.   Head: Normocephalic, without obvious abnormality, atraumatic, Eyes: sclera anicteric.  Lungs: respirations without effort, LSCTA; no wheezing or rales.   Cardiovascular: S1, S2. Regular rate and rhythm.   ABDOMEN: Globular and soft, non tender.    Extremities: extremities normal, atraumatic, no cyanosis or edema  Skin: warm and dry where visible.   Neurologic:oriented. No focal deficits.   Psych: interacts well with caregivers, exhibits logical thought processes and connections, pleasant    Lab/Diagnostic data:  Recent labs in [facility] reviewed by me today.     ASSESSMENT/PLAN:    Pressure sores to coccyx, gluteal fold: Unstable.  -Wound care MD following, continue per their orders.    (W19.XXXA) Fall  (primary encounter diagnosis)  (S72.401A) Closed fracture of distal end of right femur, unspecified fracture morphology, initial encounter (H): Unstable.  Comment: No c/o pain; very jovial during visit.   Plan:   -Decrease oxycodone to 2.5 mg every 6 hours  -Ice to right knee 4 times daily as needed.  -Follow up with ortho 2-4 weeks.   -PT, OT at their discretion.   -Non weightbearing 6 weeks.   -Monitor incision site.     (R21) Rash and nonspecific skin eruption: Appears to have cleared; no s/sx of rash.  We will continue famotidine for GI protection due to blood thinner and recent prolonged hospitalization.  Plan:   -Continue famotidine 20mg daily.     (I48.91) Rapid atrial fibrillation (H)  Comment: new onset.   Plan:   -metoprolol 100mg  -diltiazem 90 daily.   -apixiban.       Anemia, postop: Hemoglobin today was 9.  Improved from discharge in the sevens.  -Continues on " daily iron supplementation.    Electronically signed by:  Sherry Wu CNP             Sincerely,        Sherry Wu, CNP

## 2021-10-06 VITALS
WEIGHT: 148.2 LBS | SYSTOLIC BLOOD PRESSURE: 131 MMHG | OXYGEN SATURATION: 93 % | HEART RATE: 71 BPM | DIASTOLIC BLOOD PRESSURE: 79 MMHG | RESPIRATION RATE: 18 BRPM | BODY MASS INDEX: 27.27 KG/M2 | HEIGHT: 62 IN | TEMPERATURE: 97 F

## 2021-10-06 ASSESSMENT — MIFFLIN-ST. JEOR: SCORE: 1010.48

## 2021-10-06 NOTE — PROGRESS NOTES
[facility] GERIATRIC SERVICES  PRIMARY CARE PROVIDER AND CLINIC:  Elif Alcantara MD, [facility] [address] /   Chief Complaint   Patient presents with     ARIA[facility] [place] Medical Record Number:  [id number]  Place of Service where encounter took place:  [facility] ([facility]) [[id number]]    Elysia Lane  is a 89 year old  (12/2/1923), admitted to the above facility from  [facility]. Hospital stay 9/6 through 9/16..   HPI:  Elysia has a history of HTN and anemia who had a fall at home and presented to [place]'s ED with  R leg pain. She had closed fracture of the distal femur and received ORIF and is non weight bearing. Completed IV ceftraixone for UTI with sepsis.   Also found in a fib with RVR, which improved on metoprolol 100mg and diltiazem, as well as apixaban.     Today: Although seen today to review pain and rash.  Rash is completely clear however I will keep for GI protection.  She is in a very good mood today and is clear and alert is about her past and her family.  She is denying any pain and is attempting to move around in the bed.  She remains nonweightbearing.  Vital signs are reviewed and are satisfactory.  No signs or symptoms of bleeding.    CODE STATUS/ADVANCE DIRECTIVES DISCUSSION:  No CPR- Do NOT Intubate  DNR / DNI  ALLERGIES: No Known Allergies   PAST MEDICAL HISTORY:   Past Medical History:   Diagnosis Date     Anemia      Chronic kidney disease      Essential hypertension      Osteoarthritis      Stenosis of carotid artery       PAST SURGICAL HISTORY:   has a past surgical history that includes Open reduction internal fixation femur distal (Right, 9/8/2021).  FAMILY HISTORY: family history includes No Known Problems in her father and mother.  SOCIAL HISTORY:   reports that she has never smoked. She has never used smokeless tobacco. She reports previous alcohol use. She reports that she does not use drugs.  Patient's living condition: NICHOLE    Post  "Discharge Medication Reconciliation Status: discharge medications reconciled, continue medications without change  Current Outpatient Medications   Medication Sig     acetaminophen (TYLENOL) 325 MG tablet Take 325 mg by mouth every 6 hours as needed for fever or pain      apixaban ANTICOAGULANT (ELIQUIS) 5 MG tablet Take 1 tablet (5 mg) by mouth 2 times daily     cyanocobalamin (CYANOCOBALAMIN) 100 MCG tablet Take 1 tablet (100 mcg) by mouth daily     diltiazem ER (CARDIZEM SR) 90 MG 12 hr capsule Take 1 capsule (90 mg) by mouth 2 times daily     ferrous sulfate (FEROSUL) 325 (65 Fe) MG tablet Take 325 mg by mouth daily as needed      metoprolol tartrate (LOPRESSOR) 100 MG tablet Take 1 tablet (100 mg) by mouth 2 times daily     oxyCODONE (ROXICODONE) 5 MG tablet Take 1 tablet (5 mg) by mouth every 6 hours     polyethylene glycol (MIRALAX) 17 GM/Dose powder Take 17 g by mouth daily     Vitamin D3 (CHOLECALCIFEROL) 25 mcg (1000 units) tablet Take 1 tablet (25 mcg) by mouth daily     No current facility-administered medications for this visit.       ROS:  4 point ROS including Respiratory, CV, GI and , other than that noted in the HPI,  is negative    Vitals:  /66   Pulse 63   Temp 97.4  F (36.3  C)   Resp 16   Ht 1.575 m (5' 2\")   Wt 68.4 kg (150 lb 11.2 oz)   SpO2 94%   BMI 27.56 kg/m    Exam:  Physical Exam   General appearance: alert, appears stated age and cooperative.   Head: Normocephalic, without obvious abnormality, atraumatic, Eyes: sclera anicteric.  Lungs: respirations without effort, LSCTA; no wheezing or rales.   Cardiovascular: S1, S2. Regular rate and rhythm.   ABDOMEN: Globular and soft, non tender.    Extremities: extremities normal, atraumatic, no cyanosis or edema  Skin: warm and dry where visible.   Neurologic:oriented. No focal deficits.   Psych: interacts well with caregivers, exhibits logical thought processes and connections, pleasant    Lab/Diagnostic data:  Recent labs in [facility] " reviewed by me today.     ASSESSMENT/PLAN:    Pressure sores to coccyx, gluteal fold: Unstable.  -Wound care MD following, continue per their orders.    (W19.XXXA) Fall  (primary encounter diagnosis)  (S72.401A) Closed fracture of distal end of right femur, unspecified fracture morphology, initial encounter (H): Unstable.  Comment: No c/o pain; very jovial during visit.   Plan:   -Continue oxycodone to 5 mg every 6 hours as needed.- review next week.   -Ice to right knee 4 times daily as needed.  -Follow up with ortho 2-4 weeks.   -PT, OT at their discretion.   -Non weightbearing 6 weeks.   -Monitor incision site.     (R21) Rash and nonspecific skin eruption: Appears to have cleared; no s/sx of rash.  We will continue famotidine for GI protection due to blood thinner and recent prolonged hospitalization.  Plan:   -Continue famotidine 20mg daily.     (I48.91) Rapid atrial fibrillation (H)  Comment: new onset.   Plan:   -metoprolol 100mg  -diltiazem 90 daily.   -apixiban.       Anemia, postop: Hemoglobin today was 9.  Improved from discharge in the sevens.  -Continues on daily iron supplementation.    Electronically signed by:  Sherry Wu, CNP

## 2021-10-07 ENCOUNTER — TRANSITIONAL CARE UNIT VISIT (OUTPATIENT)
Dept: GERIATRICS | Facility: CLINIC | Age: 86
End: 2021-10-07
Payer: MEDICARE

## 2021-10-07 DIAGNOSIS — S72.401S CLOSED FRACTURE OF DISTAL END OF RIGHT FEMUR, UNSPECIFIED FRACTURE MORPHOLOGY, SEQUELA: ICD-10-CM

## 2021-10-07 DIAGNOSIS — T81.49XA SUPERFICIAL POSTOPERATIVE WOUND INFECTION: ICD-10-CM

## 2021-10-07 DIAGNOSIS — T81.31XA POSTOPERATIVE WOUND DEHISCENCE, INITIAL ENCOUNTER: Primary | ICD-10-CM

## 2021-10-07 PROCEDURE — 99310 SBSQ NF CARE HIGH MDM 45: CPT | Performed by: FAMILY MEDICINE

## 2021-10-07 RX ORDER — OXYCODONE HYDROCHLORIDE 5 MG/1
2.5 TABLET ORAL EVERY 6 HOURS
Qty: 30 TABLET | Refills: 0 | COMMUNITY
Start: 2021-10-07 | End: 2021-10-12

## 2021-10-07 RX ORDER — FAMOTIDINE 20 MG/1
20 TABLET, FILM COATED ORAL EVERY MORNING
COMMUNITY
Start: 2021-10-07

## 2021-10-07 NOTE — PROGRESS NOTES
[facility] GERIATRIC SERVICES    Chief Complaint   Patient presents with     Nursing Home Acute     HPI:  Elysia Lane is a 89 year old female who was admitted to the facility 9/16/21 from [facility] following a fall with distal right femoral fracture s/p ORIF. Additionally, she developed atrial fibrillation with rapid response and this was controlled with metoprolol and diltiazem, now on anticoagulation with apixaban, anemia related to surgical blood loss, and has history of hypertension now off prior medications with starting BB and diltizaem. She is being seen today for an episodic care visit at: [facility] ([facility]) [[id number]].       Today's concern is:   Nursing had raised concerns regarding possible infection at her surgical site since at least yesterday.  Elysia denies any changes.  Overall she is feeling well.  Her appetite is still okay.  She continues to enjoy listening to classical piano music.  She denies any change in her knee, just saying that it feels sore.  She is not sure if the pain is worse because she does not remember how it felt yesterday.  Overall, she feels her pain is recently controlled.  She denies any fevers or chills.  She has had no change in appetite, no nausea or vomiting.  She has had no diarrhea.  No change in urination.  She has no headaches, increasing confusion, visual changes.  She has had no chest pain or pressure, no palpitations, tachycardia, shortness of breath, wheezing, or cough.  She has no numbness or tingling into her feet or hands.  She continues to be nonambulatory on her right leg per surgery recommendations.    Allergies, and PMH/PSH reviewed in [facility] today.  REVIEW OF SYSTEMS:  10 point ROS of systems including Constitutional, Eyes, Respiratory, Cardiovascular, Gastroenterology, Genitourinary, Integumentary, Musculoskeletal, Psychiatric were all negative except for pertinent positives noted in my HPI.    Objective:   /79   Pulse 71   Temp 97  F  "(36.1  C)   Resp 18   Ht 1.575 m (5' 2\")   Wt 67.2 kg (148 lb 3.2 oz)   SpO2 93%   BMI 27.11 kg/m      General: Seated comfortably, appears well, NAD  Eyes: Corneas clear, EOMI, PERRL  HENT: NCAT, MMM, no erythema or swelling of oropharynx  CV: Irregularly irregular, S1/S2, normal  Pulm: Normal WOB on RA, lungs CTAB, no wheezes/crackles  GI: Abdomen soft, not tender, not distended, BS active  Ext: Edema to ankles bilaterally  Skin: Right knees with crusted over, healing, stapled anterior surgical wound. Right lateral knee with open wound with purulent drainage. Mild surrounding erythema. Minimally tender to palpation. Appears as though at least 1, possibly 2, staples tore through skin of wound edge. No prominent hardware.  Neuro: Alert, disoriented to day and date (baseline). Limited insight into situation, does not easily recall knee surgery.     Assessment/Plan:  1. Postoperative wound dehiscence, initial encounter    2. Superficial postoperative wound infection    3. Closed fracture of distal end of right femur, unspecified fracture morphology, sequela      Exam examination reveals dehiscenceof the lateral aspect of the surgical area likely related to staples pulling through fragile skin.  There is a moderate amount of purulent drainage suggesting at least a superficial infection.  There is minimal surrounding erythema, minimal pain with palpation, no systemic symptoms such as fever, chills, or mental status change to suggest a deeper infection.  Due to her cognitive impairments, it is possible she bent her knee or placed weight unintentionally, forgetting her current limitations which may have led to wound opening.  Considered starting antibiotic therapy, however we were able to expedite orthopedic surgery follow-up for tomorrow 10/8/2021.  As she is otherwise stable without systemic symptoms, will hold off starting antibiotics awaiting orthopedics evaluation of the wound and recommendations.  It is unclear " if any of the hardware is involved, however seems unlikely given no systemic symptoms suggesting deeper infection.  At this time continue routine wound dressings and frequent assessment and reasons reviewed for more urgent evaluation.    Orders:  Medication reconciliation completed    Benjamin Rosenstein, MD, MA  [facility]

## 2021-10-07 NOTE — LETTER
10/7/2021        RE: Elysia Lane  [address]  Located within [facility] [id number]        [facility] GERIATRIC SERVICES    Chief Complaint   Patient presents with     Nursing Home Acute     HPI:  Elysia Lane is a 89 year old female who was admitted to the facility 9/16/21 from [facility] following a fall with distal right femoral fracture s/p ORIF. Additionally, she developed atrial fibrillation with rapid response and this was controlled with metoprolol and diltiazem, now on anticoagulation with apixaban, anemia related to surgical blood loss, and has history of hypertension now off prior medications with starting BB and diltizaem. She is being seen today for an episodic care visit at: [facility] ([facility]) [[id number]].       Today's concern is:   Nursing had raised concerns regarding possible infection at her surgical site since at least yesterday.  Elysia denies any changes.  Overall she is feeling well.  Her appetite is still okay.  She continues to enjoy listening to classical piano music.  She denies any change in her knee, just saying that it feels sore.  She is not sure if the pain is worse because she does not remember how it felt yesterday.  Overall, she feels her pain is recently controlled.  She denies any fevers or chills.  She has had no change in appetite, no nausea or vomiting.  She has had no diarrhea.  No change in urination.  She has no headaches, increasing confusion, visual changes.  She has had no chest pain or pressure, no palpitations, tachycardia, shortness of breath, wheezing, or cough.  She has no numbness or tingling into her feet or hands.  She continues to be nonambulatory on her right leg per surgery recommendations.    Allergies, and PMH/PSH reviewed in [facility] today.  REVIEW OF SYSTEMS:  10 point ROS of systems including Constitutional, Eyes, Respiratory, Cardiovascular, Gastroenterology, Genitourinary, Integumentary, Musculoskeletal, Psychiatric were all negative except for  "pertinent positives noted in my HPI.    Objective:   /79   Pulse 71   Temp 97  F (36.1  C)   Resp 18   Ht 1.575 m (5' 2\")   Wt 67.2 kg (148 lb 3.2 oz)   SpO2 93%   BMI 27.11 kg/m      General: Seated comfortably, appears well, NAD  Eyes: Corneas clear, EOMI, PERRL  HENT: NCAT, MMM, no erythema or swelling of oropharynx  CV: Irregularly irregular, S1/S2, normal  Pulm: Normal WOB on RA, lungs CTAB, no wheezes/crackles  GI: Abdomen soft, not tender, not distended, BS active  Ext: Edema to ankles bilaterally  Skin: Right knees with crusted over, healing, stapled anterior surgical wound. Right lateral knee with open wound with purulent drainage. Mild surrounding erythema. Minimally tender to palpation. Appears as though at least 1, possibly 2, staples tore through skin of wound edge. No prominent hardware.  Neuro: Alert, disoriented to day and date (baseline). Limited insight into situation, does not easily recall knee surgery.     Assessment/Plan:  1. Postoperative wound dehiscence, initial encounter    2. Superficial postoperative wound infection    3. Closed fracture of distal end of right femur, unspecified fracture morphology, sequela      Exam examination reveals dehiscenceof the lateral aspect of the surgical area likely related to staples pulling through fragile skin.  There is a moderate amount of purulent drainage suggesting at least a superficial infection.  There is minimal surrounding erythema, minimal pain with palpation, no systemic symptoms such as fever, chills, or mental status change to suggest a deeper infection.  Due to her cognitive impairments, it is possible she bent her knee or placed weight unintentionally, forgetting her current limitations which may have led to wound opening.  Considered starting antibiotic therapy, however we were able to expedite orthopedic surgery follow-up for tomorrow 10/8/2021.  As she is otherwise stable without systemic symptoms, will hold off starting " antibiotics awaiting orthopedics evaluation of the wound and recommendations.  It is unclear if any of the hardware is involved, however seems unlikely given no systemic symptoms suggesting deeper infection.  At this time continue routine wound dressings and frequent assessment and reasons reviewed for more urgent evaluation.    Orders:  Medication reconciliation completed    Benjamin Rosenstein, MD, MA  [facility]          Sincerely,        Benjamin Rosenstein, MD

## 2021-10-08 ENCOUNTER — TRANSITIONAL CARE UNIT VISIT (OUTPATIENT)
Dept: GERIATRICS | Facility: CLINIC | Age: 86
End: 2021-10-08
Payer: MEDICARE

## 2021-10-08 VITALS
DIASTOLIC BLOOD PRESSURE: 76 MMHG | WEIGHT: 145 LBS | HEIGHT: 62 IN | OXYGEN SATURATION: 95 % | HEART RATE: 84 BPM | TEMPERATURE: 97.3 F | RESPIRATION RATE: 18 BRPM | BODY MASS INDEX: 26.68 KG/M2 | SYSTOLIC BLOOD PRESSURE: 138 MMHG

## 2021-10-08 DIAGNOSIS — S72.401A CLOSED FRACTURE OF DISTAL END OF RIGHT FEMUR, UNSPECIFIED FRACTURE MORPHOLOGY, INITIAL ENCOUNTER (H): Primary | Chronic | ICD-10-CM

## 2021-10-08 DIAGNOSIS — Z98.890 H/O MAJOR ORTHOPEDIC SURGERY: Chronic | ICD-10-CM

## 2021-10-08 DIAGNOSIS — T81.49XA CELLULITIS, WOUND, POST-OPERATIVE: ICD-10-CM

## 2021-10-08 PROCEDURE — 99309 SBSQ NF CARE MODERATE MDM 30: CPT | Performed by: NURSE PRACTITIONER

## 2021-10-08 ASSESSMENT — MIFFLIN-ST. JEOR: SCORE: 995.97

## 2021-10-08 NOTE — PROGRESS NOTES
[facility] GERIATRIC SERVICES  PRIMARY CARE PROVIDER AND CLINIC:  Elif Alcantara MD, [facility] [address] /   Chief Complaint   Patient presents with     RALPHECK      [place] Medical Record Number:  [id number]  Place of Service where encounter took place:  [facility] (TCU) [[id number]]    Elysia Lane  is a 89 year old  (12/2/1923), admitted to the above facility from  [facility]. Hospital stay 9/6 through 9/16..   HPI:  Elysia has a history of HTN and anemia who had a fall at home and presented to [place]'s ED with  R leg pain. She had closed fracture of the distal femur and received ORIF and is non weight bearing. Completed IV ceftraixone for UTI with sepsis.   Also found in a fib with RVR, which improved on metoprolol 100mg and diltiazem, as well as apixaban.     Today: She is seen sitting in wheelchair and in no acute distress. She appears to be in a very  good mood and is happily conversational. She reports pain has improved although she feels the changing  weather is hard on her pain and aches. She takes oxycodone 5 mg every 6 hours; discussed decreasing her oxycodone dose. Will decrease her oxycodone does to 2.5 mg every 6 hours, re-evaluate next week and consider tapering off when pain well controlled. She is agreeable to plan. She denies CP, palpitations, fatigue, nausea, vomiting,  SOB, fever, chills, and/or b/b concerns today. she is eating and drinking and tells me she likes the food. She is sleeping well. She remains non-weightbearing.  Vital signs are reviewed and are  Stable.  She is satisfied with the services and says wonderful staff and facility.  Nursing report her knee incision is healing well; not viewed today due to pt positioning and knee secured with kerlix and ace wrap; changed daily by nursing. Ortho viewed incision on 9/20 with no concerns.     CODE STATUS/ADVANCE DIRECTIVES DISCUSSION:  No CPR- Do NOT Intubate  DNR / DNI  ALLERGIES: No  Known Allergies   PAST MEDICAL HISTORY:   Past Medical History:   Diagnosis Date     Anemia      Chronic kidney disease      Essential hypertension      Osteoarthritis      Stenosis of carotid artery       PAST SURGICAL HISTORY:   has a past surgical history that includes Open reduction internal fixation femur distal (Right, 9/8/2021).  FAMILY HISTORY: family history includes No Known Problems in her father and mother.  SOCIAL HISTORY:   reports that she has never smoked. She has never used smokeless tobacco. She reports previous alcohol use. She reports that she does not use drugs.  Patient's living condition: NICHOLE    Post Discharge Medication Reconciliation Status: discharge medications reconciled, continue medications without change  Current Outpatient Medications   Medication Sig     acetaminophen (TYLENOL) 325 MG tablet Take 325 mg by mouth every 6 hours as needed for fever or pain      apixaban ANTICOAGULANT (ELIQUIS) 5 MG tablet Take 1 tablet (5 mg) by mouth 2 times daily     collagenase (SANTYL) 250 UNIT/GM external ointment Apply topically daily     cyanocobalamin (CYANOCOBALAMIN) 100 MCG tablet Take 1 tablet (100 mcg) by mouth daily     diltiazem ER (CARDIZEM SR) 90 MG 12 hr capsule Take 1 capsule (90 mg) by mouth 2 times daily     famotidine (PEPCID) 20 MG tablet Take 1 tablet (20 mg) by mouth every morning     ferrous sulfate (FEROSUL) 325 (65 Fe) MG tablet Take 325 mg by mouth daily as needed      metoprolol tartrate (LOPRESSOR) 100 MG tablet Take 1 tablet (100 mg) by mouth 2 times daily     oxyCODONE (ROXICODONE) 5 MG tablet Take 0.5 tablets (2.5 mg) by mouth every 6 hours     polyethylene glycol (MIRALAX) 17 GM/Dose powder Take 17 g by mouth daily     Vitamin D3 (CHOLECALCIFEROL) 25 mcg (1000 units) tablet Take 1 tablet (25 mcg) by mouth daily     No current facility-administered medications for this visit.       ROS:  4 point ROS including Respiratory, CV, GI and , other than that noted in the HPI,   "is negative    Vitals:  /81   Pulse 70   Temp 96.8  F (36  C)   Resp 19   Ht 1.575 m (5' 2\")   Wt 68.4 kg (150 lb 11.2 oz)   SpO2 95%   BMI 27.56 kg/m    Exam:  Physical Exam   General appearance: alert, appears stated age and cooperative.   Head: Normocephalic, without obvious abnormality, atraumatic, Eyes: sclera anicteric.  Lungs: respirations without effort, LSCTA; no wheezing or rales.   Cardiovascular: S1, S2. Regular rate and rhythm.   ABDOMEN: Globular and soft, non tender.    Extremities: extremities normal, atraumatic, no cyanosis or edema  Skin: warm and dry where visible.   Neurologic:oriented. No focal deficits.   Psych: interacts well with caregivers, exhibits logical thought processes and connections, pleasant    Lab/Diagnostic data:  Recent labs in [facility] reviewed by me today.     ASSESSMENT/PLAN:    Pressure sores to coccyx, gluteal fold: Unstable.  -Wound care MD following, continue per their orders.    (W19.XXXA) Fall  (primary encounter diagnosis)  (S72.401A) Closed fracture of distal end of right femur, unspecified fracture morphology, initial encounter (H): Unstable.  Comment: No c/o pain; very jovial during visit.   Plan:   -Decrease oxycodone to 2.5 mg every 6 hours  -Ice to right knee 4 times daily as needed.  -Follow up with ortho 2-4 weeks.   -PT, OT at their discretion.   -Non weightbearing 6 weeks.   -Monitor incision site.     (R21) Rash and nonspecific skin eruption: Appears to have cleared; no s/sx of rash.  We will continue famotidine for GI protection due to blood thinner and recent prolonged hospitalization.  Plan:   -Continue famotidine 20mg daily.     (I48.91) Rapid atrial fibrillation (H)  Comment: new onset.   Plan:   -metoprolol 100mg  -diltiazem 90 daily.   -apixiban.       Anemia, postop: Hemoglobin today was 9.  Improved from discharge in the sevens.  -Continues on daily iron supplementation.    Electronically signed by:  Sherry Wu, CNP       "

## 2021-10-08 NOTE — LETTER
10/8/2021        RE: Elysia Lane  [address]  [facility] [address]        [facility]  PRIMARY CARE PROVIDER AND CLINIC:  Elif Alcantara MD, [facility] [address] /   Chief Complaint   Patient presents with     RALPHECK      [place] Medical Record Number:  [id number]  Place of Service where encounter took place:  [facility] (U) [[id number]]    Elysia Lane  is a 89 year old  (12/2/1923), admitted to the above facility from  [facility]. Hospital stay 9/6 through 9/16..   HPI:  Elysia has a history of HTN and anemia who had a fall at home and presented to [place]'s ED with  R leg pain. She had closed fracture of the distal femur and received ORIF and is non weight bearing. Completed IV ceftraixone for UTI with sepsis.   Also found in a fib with RVR, which improved on metoprolol 100mg and diltiazem, as well as apixaban.     Today: Seen today for follow-up to recent postop infection; nursing noted dehiscence and drainage of her lateral surgical wound with some maulik missing, Dr. Rosenstein did see her and recommended prompt Ortho follow-up which she did this morning.  Ortho started her on Keflex and Re stapled her incision.  She is not having any pain today and says it actually feels better, she is sitting up in her wheelchair.  Will attempt to reduce her oxycodone next week, for now we will refill her current 2.5 every 6 hours due to her recent infection.  She is tolerating it well without any notable delirium or confusion or excessive sleepiness.    CODE STATUS/ADVANCE DIRECTIVES DISCUSSION:  No CPR- Do NOT Intubate  DNR / DNI  ALLERGIES: No Known Allergies   PAST MEDICAL HISTORY:   Past Medical History:   Diagnosis Date     Anemia      Chronic kidney disease      Essential hypertension      Osteoarthritis      Stenosis of carotid artery       PAST SURGICAL HISTORY:   has a past surgical history that includes Open reduction internal  "fixation femur distal (Right, 9/8/2021).  FAMILY HISTORY: family history includes No Known Problems in her father and mother.  SOCIAL HISTORY:   reports that she has never smoked. She has never used smokeless tobacco. She reports previous alcohol use. She reports that she does not use drugs.  Patient's living condition: NICHOLE    Post Discharge Medication Reconciliation Status: discharge medications reconciled, continue medications without change  Current Outpatient Medications   Medication Sig     acetaminophen (TYLENOL) 325 MG tablet Take 325 mg by mouth every 6 hours as needed for fever or pain      apixaban ANTICOAGULANT (ELIQUIS) 5 MG tablet Take 1 tablet (5 mg) by mouth 2 times daily     collagenase (SANTYL) 250 UNIT/GM external ointment Apply topically daily     cyanocobalamin (CYANOCOBALAMIN) 100 MCG tablet Take 1 tablet (100 mcg) by mouth daily     diltiazem ER (CARDIZEM SR) 90 MG 12 hr capsule Take 1 capsule (90 mg) by mouth 2 times daily     famotidine (PEPCID) 20 MG tablet Take 1 tablet (20 mg) by mouth every morning     ferrous sulfate (FEROSUL) 325 (65 Fe) MG tablet Take 325 mg by mouth daily as needed      metoprolol tartrate (LOPRESSOR) 100 MG tablet Take 1 tablet (100 mg) by mouth 2 times daily     oxyCODONE (ROXICODONE) 5 MG tablet Take 0.5 tablets (2.5 mg) by mouth every 6 hours     polyethylene glycol (MIRALAX) 17 GM/Dose powder Take 17 g by mouth daily     Vitamin D3 (CHOLECALCIFEROL) 25 mcg (1000 units) tablet Take 1 tablet (25 mcg) by mouth daily     No current facility-administered medications for this visit.       ROS:  4 point ROS including Respiratory, CV, GI and , other than that noted in the HPI,  is negative    Vitals:  /76   Pulse 84   Temp 97.3  F (36.3  C)   Resp 18   Ht 1.575 m (5' 2\")   Wt 65.8 kg (145 lb)   SpO2 95%   BMI 26.52 kg/m    Exam:  Physical Exam   General appearance: alert, appears stated age and cooperative.   Head: Normocephalic, without obvious " abnormality, atraumatic, Eyes: sclera anicteric.  Lungs: respirations without effort, LSCTA; no wheezing or rales.   Cardiovascular: S1, S2. Regular rate and rhythm.   ABDOMEN: Globular and soft, non tender.    Skin: Reviewed incision to right knee, some small quarter centimeter opening to the distal end of the lateral incision. No surrounding erythema. No swelling. Scant drainage.  Neurologic:oriented. No focal deficits.   Psych: interacts well with caregivers, exhibits logical thought processes and connections, pleasant    Lab/Diagnostic data:  Recent labs in [facility] reviewed by me today.     ASSESSMENT/PLAN:    Pressure sores to coccyx, gluteal fold: Unstable.  -Wound care MD following, continue per their orders.    Cellulitis of postop incision (primary encounter diagnosis):  (W19.XXXA) Fall    (S72.401A) Closed fracture of distal end of right femur, unspecified fracture morphology, initial encounter (H): Unstable.  Comment: No c/o pain; very jovial during visit. Wound is approximated with a small less than quarter centimeter opening at the distal end of the lateral incision. Scant serous sanguinous drainage. No surrounding erythema.  Plan:   -Oxycodone to 2.5 mg every 6 hours  -Ice to right knee 4 times daily as needed.  -Follow up with ortho next week.  -PT, OT at their discretion.   -Non weightbearing 6 weeks.   -I will monitor incision twice weekly while in facility, nursing to continue monitoring daily.    (R21) Rash and nonspecific skin eruption: Appears to have cleared; no s/sx of rash.  We will continue famotidine for GI protection due to blood thinner and recent prolonged hospitalization.  Plan:   -Continue famotidine 20mg daily.     (I48.91) Rapid atrial fibrillation (H)  Comment: new onset.   Plan:   -metoprolol 100mg  -diltiazem 90 daily.   -apixiban.       Anemia, postop: Hemoglobin today was 9.  Improved from discharge in the sevens.  -Continues on daily iron supplementation.    Electronically signed  by:  Sherry Wu, CNP           Sincerely,        Sherry Wu, CNP

## 2021-10-12 DIAGNOSIS — S72.401S CLOSED FRACTURE OF DISTAL END OF RIGHT FEMUR, UNSPECIFIED FRACTURE MORPHOLOGY, SEQUELA: ICD-10-CM

## 2021-10-12 RX ORDER — OXYCODONE HYDROCHLORIDE 5 MG/1
2.5 TABLET ORAL EVERY 6 HOURS
Qty: 30 TABLET | Refills: 0 | Status: ON HOLD | OUTPATIENT
Start: 2021-10-12 | End: 2021-11-02

## 2021-10-13 ENCOUNTER — TRANSITIONAL CARE UNIT VISIT (OUTPATIENT)
Dept: GERIATRICS | Facility: CLINIC | Age: 86
End: 2021-10-13
Payer: MEDICARE

## 2021-10-13 VITALS
DIASTOLIC BLOOD PRESSURE: 79 MMHG | SYSTOLIC BLOOD PRESSURE: 157 MMHG | HEIGHT: 62 IN | TEMPERATURE: 98.2 F | HEART RATE: 78 BPM | OXYGEN SATURATION: 97 % | WEIGHT: 157.8 LBS | BODY MASS INDEX: 29.04 KG/M2 | RESPIRATION RATE: 18 BRPM

## 2021-10-13 DIAGNOSIS — T81.49XA CELLULITIS, WOUND, POST-OPERATIVE: ICD-10-CM

## 2021-10-13 DIAGNOSIS — S72.401A CLOSED FRACTURE OF DISTAL END OF RIGHT FEMUR, UNSPECIFIED FRACTURE MORPHOLOGY, INITIAL ENCOUNTER (H): Primary | Chronic | ICD-10-CM

## 2021-10-13 DIAGNOSIS — Z98.890 H/O MAJOR ORTHOPEDIC SURGERY: Chronic | ICD-10-CM

## 2021-10-13 PROCEDURE — 99309 SBSQ NF CARE MODERATE MDM 30: CPT | Performed by: NURSE PRACTITIONER

## 2021-10-13 ASSESSMENT — MIFFLIN-ST. JEOR: SCORE: 1054.03

## 2021-10-13 NOTE — LETTER
10/13/2021        RE: Elysia Lane  [address]  [facility] [address]        [facility]  PRIMARY CARE PROVIDER AND CLINIC:  Elif Alcantara MD, [facility] [address] /   Chief Complaint   Patient presents with     RECHECK      [place] Medical Record Number:  [id number]  Place of Service where encounter took place:  [facility] (U) [[id number]]    Elysia Lane  is a 89 year old  (12/2/1923), admitted to the above facility from  [facility]. Hospital stay 9/6 through 9/16..   HPI:  Elysia has a history of HTN and anemia who had a fall at home and presented to [place]'s ED with  R leg pain. She had closed fracture of the distal femur and received ORIF and is non weight bearing. Completed IV ceftraixone for UTI with sepsis.   Also found in a fib with RVR, which improved on metoprolol 100mg and diltiazem, as well as apixaban.     Today: Elysia was seen today to follow-up on postop infection and pain management.  Incision dehiscence which is improved, remains as it was last week with well approximated incision very small opening at the distal end of the lateral incision.  There is scant serous drainage on the ABD pad.  She is reporting improvement in pain and agrees to to reduce her oxycodone to every 8 hours as needed.  Vital signs are stable, she is in good spirits and is appropriate with interactions.    CODE STATUS/ADVANCE DIRECTIVES DISCUSSION:  No CPR- Do NOT Intubate  DNR / DNI  ALLERGIES: No Known Allergies   PAST MEDICAL HISTORY:   Past Medical History:   Diagnosis Date     Anemia      Chronic kidney disease      Essential hypertension      Osteoarthritis      Stenosis of carotid artery       PAST SURGICAL HISTORY:   has a past surgical history that includes Open reduction internal fixation femur distal (Right, 9/8/2021).  FAMILY HISTORY: family history includes No Known Problems in her father and mother.  SOCIAL HISTORY:   reports that  "she has never smoked. She has never used smokeless tobacco. She reports previous alcohol use. She reports that she does not use drugs.  Patient's living condition: NICHOLE    Post Discharge Medication Reconciliation Status: discharge medications reconciled, continue medications without change  Current Outpatient Medications   Medication Sig     acetaminophen (TYLENOL) 325 MG tablet Take 325 mg by mouth every 6 hours as needed for fever or pain      apixaban ANTICOAGULANT (ELIQUIS) 5 MG tablet Take 1 tablet (5 mg) by mouth 2 times daily     collagenase (SANTYL) 250 UNIT/GM external ointment Apply topically daily     cyanocobalamin (CYANOCOBALAMIN) 100 MCG tablet Take 1 tablet (100 mcg) by mouth daily     diltiazem ER (CARDIZEM SR) 90 MG 12 hr capsule Take 1 capsule (90 mg) by mouth 2 times daily     famotidine (PEPCID) 20 MG tablet Take 1 tablet (20 mg) by mouth every morning     ferrous sulfate (FEROSUL) 325 (65 Fe) MG tablet Take 325 mg by mouth daily as needed      metoprolol tartrate (LOPRESSOR) 100 MG tablet Take 1 tablet (100 mg) by mouth 2 times daily     oxyCODONE (ROXICODONE) 5 MG tablet Take 0.5 tablets (2.5 mg) by mouth every 6 hours     polyethylene glycol (MIRALAX) 17 GM/Dose powder Take 17 g by mouth daily     Vitamin D3 (CHOLECALCIFEROL) 25 mcg (1000 units) tablet Take 1 tablet (25 mcg) by mouth daily     No current facility-administered medications for this visit.       ROS:  4 point ROS including Respiratory, CV, GI and , other than that noted in the HPI,  is negative    Vitals:  BP (!) 157/79   Pulse 78   Temp 98.2  F (36.8  C)   Resp 18   Ht 1.575 m (5' 2\")   Wt 71.6 kg (157 lb 12.8 oz)   SpO2 97%   BMI 28.86 kg/m    Exam:  Physical Exam   General appearance: alert, appears stated age and cooperative.   Head: Normocephalic, without obvious abnormality, atraumatic, Eyes: sclera anicteric.  Lungs: respirations without effort, LSCTA; no wheezing or rales.   Cardiovascular: S1, S2. Regular rate " and rhythm.   ABDOMEN: Globular and soft, non tender.    Skin: Reviewed incision to right knee, some small quarter centimeter opening to the distal end of the lateral incision. No surrounding erythema. No swelling. Scant drainage.  Neurologic:oriented. No focal deficits.   Psych: interacts well with caregivers, exhibits logical thought processes and connections, pleasant    Lab/Diagnostic data:  Recent labs in [facility] reviewed by me today.     ASSESSMENT/PLAN:    Pressure sores to coccyx, gluteal fold: Unstable.  -Wound care MD following, continue per their orders.    Cellulitis of postop incision (primary encounter diagnosis):  (W19.XXXA) Fall    (S72.401A) Closed fracture of distal end of right femur, unspecified fracture morphology, initial encounter (H): Unstable.  Comment: No c/o pain; very jovial during visit. Wound remains the same: Is approximated with a small less than quarter centimeter opening at the distal end of the lateral incision. Scant serous sanguinous drainage. No surrounding erythema.  Discussed pain management, she is agreeable to decreasing the every 8 hours.  Plan:   -Decrease oxycodone 2.5 mg p.o. every 8 hours as needed.  -Ice to right knee 4 times daily as needed.  -Follow up with ortho next week.  -PT, OT at their discretion.   -Non weightbearing 6 weeks.     (R21) Rash and nonspecific skin eruption: Appears to have cleared; no s/sx of rash.  We will continue famotidine for GI protection due to blood thinner and recent prolonged hospitalization.  Plan:   -Continue famotidine 20mg daily.     (I48.91) Rapid atrial fibrillation (H)  Comment: new onset.   Plan:   -metoprolol 100mg  -diltiazem 90 daily.   -apixiban.       Anemia, postop: Hemoglobin up to 9.  Improved from discharge in the sevens.  -Continues on daily iron supplementation.    Electronically signed by:  Sherry Wu, SMOOTH         Sincerely,        Sherry Wu, CNP

## 2021-10-14 PROBLEM — T81.49XA CELLULITIS, WOUND, POST-OPERATIVE: Status: ACTIVE | Noted: 2021-10-14

## 2021-10-14 NOTE — PROGRESS NOTES
[facility] GERIATRIC SERVICES  PRIMARY CARE PROVIDER AND CLINIC:  Elif Alcantara MD, [facility] [address] /   Chief Complaint   Patient presents with     ARIANA [place] Medical Record Number:  [id number]  Place of Service where encounter took place:  [facility] (TCU) [[id number]]    Elysia Lane  is a 89 year old  (12/2/1923), admitted to the above facility from  [facility]. Hospital stay 9/6 through 9/16..   HPI:  Elysia has a history of HTN and anemia who had a fall at home and presented to [place]'s ED with  R leg pain. She had closed fracture of the distal femur and received ORIF and is non weight bearing. Completed IV ceftraixone for UTI with sepsis.   Also found in a fib with RVR, which improved on metoprolol 100mg and diltiazem, as well as apixaban.     Today: Seen today for follow-up to recent postop infection; nursing noted dehiscence and drainage of her lateral surgical wound with some maulik missing, Dr. Rosenstein did see her and recommended prompt Ortho follow-up which she did this morning.  Ortho started her on Keflex and Re stapled her incision.  She is not having any pain today and says it actually feels better, she is sitting up in her wheelchair.  Will attempt to reduce her oxycodone next week, for now we will refill her current 2.5 every 6 hours due to her recent infection.  She is tolerating it well without any notable delirium or confusion or excessive sleepiness.    CODE STATUS/ADVANCE DIRECTIVES DISCUSSION:  No CPR- Do NOT Intubate  DNR / DNI  ALLERGIES: No Known Allergies   PAST MEDICAL HISTORY:   Past Medical History:   Diagnosis Date     Anemia      Chronic kidney disease      Essential hypertension      Osteoarthritis      Stenosis of carotid artery       PAST SURGICAL HISTORY:   has a past surgical history that includes Open reduction internal fixation femur distal (Right, 9/8/2021).  FAMILY HISTORY: family history includes No Known  "Problems in her father and mother.  SOCIAL HISTORY:   reports that she has never smoked. She has never used smokeless tobacco. She reports previous alcohol use. She reports that she does not use drugs.  Patient's living condition: shelter    Post Discharge Medication Reconciliation Status: discharge medications reconciled, continue medications without change  Current Outpatient Medications   Medication Sig     acetaminophen (TYLENOL) 325 MG tablet Take 325 mg by mouth every 6 hours as needed for fever or pain      apixaban ANTICOAGULANT (ELIQUIS) 5 MG tablet Take 1 tablet (5 mg) by mouth 2 times daily     collagenase (SANTYL) 250 UNIT/GM external ointment Apply topically daily     cyanocobalamin (CYANOCOBALAMIN) 100 MCG tablet Take 1 tablet (100 mcg) by mouth daily     diltiazem ER (CARDIZEM SR) 90 MG 12 hr capsule Take 1 capsule (90 mg) by mouth 2 times daily     famotidine (PEPCID) 20 MG tablet Take 1 tablet (20 mg) by mouth every morning     ferrous sulfate (FEROSUL) 325 (65 Fe) MG tablet Take 325 mg by mouth daily as needed      metoprolol tartrate (LOPRESSOR) 100 MG tablet Take 1 tablet (100 mg) by mouth 2 times daily     oxyCODONE (ROXICODONE) 5 MG tablet Take 0.5 tablets (2.5 mg) by mouth every 6 hours     polyethylene glycol (MIRALAX) 17 GM/Dose powder Take 17 g by mouth daily     Vitamin D3 (CHOLECALCIFEROL) 25 mcg (1000 units) tablet Take 1 tablet (25 mcg) by mouth daily     No current facility-administered medications for this visit.       ROS:  4 point ROS including Respiratory, CV, GI and , other than that noted in the HPI,  is negative    Vitals:  /76   Pulse 84   Temp 97.3  F (36.3  C)   Resp 18   Ht 1.575 m (5' 2\")   Wt 65.8 kg (145 lb)   SpO2 95%   BMI 26.52 kg/m    Exam:  Physical Exam   General appearance: alert, appears stated age and cooperative.   Head: Normocephalic, without obvious abnormality, atraumatic, Eyes: sclera anicteric.  Lungs: respirations without effort, LSCTA; no " wheezing or rales.   Cardiovascular: S1, S2. Regular rate and rhythm.   ABDOMEN: Globular and soft, non tender.    Skin: Reviewed incision to right knee, some small quarter centimeter opening to the distal end of the lateral incision. No surrounding erythema. No swelling. Scant drainage.  Neurologic:oriented. No focal deficits.   Psych: interacts well with caregivers, exhibits logical thought processes and connections, pleasant    Lab/Diagnostic data:  Recent labs in [facility] reviewed by me today.     ASSESSMENT/PLAN:    Pressure sores to coccyx, gluteal fold: Unstable.  -Wound care MD following, continue per their orders.    Cellulitis of postop incision (primary encounter diagnosis):  (W19.XXXA) Fall    (S72.401A) Closed fracture of distal end of right femur, unspecified fracture morphology, initial encounter (H): Unstable.  Comment: No c/o pain; very jovial during visit. Wound is approximated with a small less than quarter centimeter opening at the distal end of the lateral incision. Scant serous sanguinous drainage. No surrounding erythema.  Plan:   -Oxycodone to 2.5 mg every 6 hours  -Ice to right knee 4 times daily as needed.  -Follow up with ortho next week.  -PT, OT at their discretion.   -Non weightbearing 6 weeks.   -I will monitor incision twice weekly while in facility, nursing to continue monitoring daily.    (R21) Rash and nonspecific skin eruption: Appears to have cleared; no s/sx of rash.  We will continue famotidine for GI protection due to blood thinner and recent prolonged hospitalization.  Plan:   -Continue famotidine 20mg daily.     (I48.91) Rapid atrial fibrillation (H)  Comment: new onset.   Plan:   -metoprolol 100mg  -diltiazem 90 daily.   -apixiban.       Anemia, postop: Hemoglobin today was 9.  Improved from discharge in the sevens.  -Continues on daily iron supplementation.    Electronically signed by:  Sherry Wu, CNP

## 2021-10-17 NOTE — PROGRESS NOTES
[facility] GERIATRIC SERVICES  PRIMARY CARE PROVIDER AND CLINIC:  Elif Alcantara MD, Count includes the [facility] [address] /   Chief Complaint   Patient presents with     ARIANA      [place] Medical Record Number:  [id number]  Place of Service where encounter took place:  [facility] (TCU) [[id number]]    Eylsia Lane  is a 89 year old  (12/2/1923), admitted to the above facility from  [facility]. Hospital stay 9/6 through 9/16..   HPI:  Elysia has a history of HTN and anemia who had a fall at home and presented to [place]'s ED with  R leg pain. She had closed fracture of the distal femur and received ORIF and is non weight bearing. Completed IV ceftraixone for UTI with sepsis.   Also found in a fib with RVR, which improved on metoprolol 100mg and diltiazem, as well as apixaban.     Today: Elysia was seen today to follow-up on postop infection and pain management.  Incision dehiscence which is improved, remains as it was last week with well approximated incision very small opening at the distal end of the lateral incision.  There is scant serous drainage on the ABD pad.  She is reporting improvement in pain and agrees to to reduce her oxycodone to every 8 hours as needed.  Vital signs are stable, she is in good spirits and is appropriate with interactions.    CODE STATUS/ADVANCE DIRECTIVES DISCUSSION:  No CPR- Do NOT Intubate  DNR / DNI  ALLERGIES: No Known Allergies   PAST MEDICAL HISTORY:   Past Medical History:   Diagnosis Date     Anemia      Chronic kidney disease      Essential hypertension      Osteoarthritis      Stenosis of carotid artery       PAST SURGICAL HISTORY:   has a past surgical history that includes Open reduction internal fixation femur distal (Right, 9/8/2021).  FAMILY HISTORY: family history includes No Known Problems in her father and mother.  SOCIAL HISTORY:   reports that she has never smoked. She has never used smokeless tobacco. She reports previous alcohol  "use. She reports that she does not use drugs.  Patient's living condition: detention    Post Discharge Medication Reconciliation Status: discharge medications reconciled, continue medications without change  Current Outpatient Medications   Medication Sig     acetaminophen (TYLENOL) 325 MG tablet Take 325 mg by mouth every 6 hours as needed for fever or pain      apixaban ANTICOAGULANT (ELIQUIS) 5 MG tablet Take 1 tablet (5 mg) by mouth 2 times daily     collagenase (SANTYL) 250 UNIT/GM external ointment Apply topically daily     cyanocobalamin (CYANOCOBALAMIN) 100 MCG tablet Take 1 tablet (100 mcg) by mouth daily     diltiazem ER (CARDIZEM SR) 90 MG 12 hr capsule Take 1 capsule (90 mg) by mouth 2 times daily     famotidine (PEPCID) 20 MG tablet Take 1 tablet (20 mg) by mouth every morning     ferrous sulfate (FEROSUL) 325 (65 Fe) MG tablet Take 325 mg by mouth daily as needed      metoprolol tartrate (LOPRESSOR) 100 MG tablet Take 1 tablet (100 mg) by mouth 2 times daily     oxyCODONE (ROXICODONE) 5 MG tablet Take 0.5 tablets (2.5 mg) by mouth every 6 hours     polyethylene glycol (MIRALAX) 17 GM/Dose powder Take 17 g by mouth daily     Vitamin D3 (CHOLECALCIFEROL) 25 mcg (1000 units) tablet Take 1 tablet (25 mcg) by mouth daily     No current facility-administered medications for this visit.       ROS:  4 point ROS including Respiratory, CV, GI and , other than that noted in the HPI,  is negative    Vitals:  BP (!) 157/79   Pulse 78   Temp 98.2  F (36.8  C)   Resp 18   Ht 1.575 m (5' 2\")   Wt 71.6 kg (157 lb 12.8 oz)   SpO2 97%   BMI 28.86 kg/m    Exam:  Physical Exam   General appearance: alert, appears stated age and cooperative.   Head: Normocephalic, without obvious abnormality, atraumatic, Eyes: sclera anicteric.  Lungs: respirations without effort, LSCTA; no wheezing or rales.   Cardiovascular: S1, S2. Regular rate and rhythm.   ABDOMEN: Globular and soft, non tender.    Skin: Reviewed incision to right " knee, some small quarter centimeter opening to the distal end of the lateral incision. No surrounding erythema. No swelling. Scant drainage.  Neurologic:oriented. No focal deficits.   Psych: interacts well with caregivers, exhibits logical thought processes and connections, pleasant    Lab/Diagnostic data:  Recent labs in [facility] reviewed by me today.     ASSESSMENT/PLAN:    Pressure sores to coccyx, gluteal fold: Unstable.  -Wound care MD following, continue per their orders.    Cellulitis of postop incision (primary encounter diagnosis):  (W19.XXXA) Fall    (S72.401A) Closed fracture of distal end of right femur, unspecified fracture morphology, initial encounter (H): Unstable.  Comment: No c/o pain; very jovial during visit. Wound remains the same: Is approximated with a small less than quarter centimeter opening at the distal end of the lateral incision. Scant serous sanguinous drainage. No surrounding erythema.  Discussed pain management, she is agreeable to decreasing the every 8 hours.  Plan:   -Decrease oxycodone 2.5 mg p.o. every 8 hours as needed.  -Ice to right knee 4 times daily as needed.  -Follow up with ortho next week.  -PT, OT at their discretion.   -Non weightbearing 6 weeks.     (R21) Rash and nonspecific skin eruption: Appears to have cleared; no s/sx of rash.  We will continue famotidine for GI protection due to blood thinner and recent prolonged hospitalization.  Plan:   -Continue famotidine 20mg daily.     (I48.91) Rapid atrial fibrillation (H)  Comment: new onset.   Plan:   -metoprolol 100mg  -diltiazem 90 daily.   -apixiban.       Anemia, postop: Hemoglobin up to 9.  Improved from discharge in the sevens.  -Continues on daily iron supplementation.    Electronically signed by:  Sherry Wu, CNP

## 2021-10-18 ENCOUNTER — TRANSITIONAL CARE UNIT VISIT (OUTPATIENT)
Dept: GERIATRICS | Facility: CLINIC | Age: 86
End: 2021-10-18
Payer: MEDICARE

## 2021-10-18 VITALS
HEIGHT: 62 IN | HEART RATE: 75 BPM | WEIGHT: 142.5 LBS | TEMPERATURE: 97.9 F | BODY MASS INDEX: 26.22 KG/M2 | RESPIRATION RATE: 16 BRPM | DIASTOLIC BLOOD PRESSURE: 82 MMHG | OXYGEN SATURATION: 94 % | SYSTOLIC BLOOD PRESSURE: 149 MMHG

## 2021-10-18 DIAGNOSIS — T81.49XA CELLULITIS, WOUND, POST-OPERATIVE: ICD-10-CM

## 2021-10-18 DIAGNOSIS — S72.401A CLOSED FRACTURE OF DISTAL END OF RIGHT FEMUR, UNSPECIFIED FRACTURE MORPHOLOGY, INITIAL ENCOUNTER (H): Primary | Chronic | ICD-10-CM

## 2021-10-18 PROCEDURE — 99309 SBSQ NF CARE MODERATE MDM 30: CPT | Performed by: NURSE PRACTITIONER

## 2021-10-18 ASSESSMENT — MIFFLIN-ST. JEOR: SCORE: 984.63

## 2021-10-18 NOTE — LETTER
10/18/2021        RE: Elysia Lane  [address]  [facility] [address]        [facility] GERIATRIC SERVICES  PRIMARY CARE PROVIDER AND CLINIC:  Elif Alcantara MD, [facility] [address] /   Chief Complaint   Patient presents with     RECHECK      [place] Medical Record Number:  [id number]  Place of Service where encounter took place:  [facility] (U) [[id number]]    Elysia Lane  is a 89 year old  (12/2/1923), admitted to the above facility from  [facility]. Hospital stay 9/6 through 9/16..   HPI:  Elysia has a history of HTN and anemia who had a fall at home and presented to [place]'s ED with  R leg pain. She had closed fracture of the distal femur and received ORIF and is non weight bearing. Completed IV ceftraixone for UTI with sepsis.   Also found in a fib with RVR, which improved on metoprolol 100mg and diltiazem, as well as apixaban.     Today: Elysia was seen today to review wounds to review right distal fracture open areas; staples to the lateral wound are intact, wound is approximating without dehiscence.  There is scant drainage however no erythema surrounding the incision.  She is denying acute pain and has tolerated decreasing oxycodone to every 8 hours as needed.  She is planning on transferring to long-term care today.  She does often try to get out of bed and has required one-to-one monitoring so she does not fall.  Very pleasant.    CODE STATUS/ADVANCE DIRECTIVES DISCUSSION:  No CPR- Do NOT Intubate  DNR / DNI  ALLERGIES: No Known Allergies   PAST MEDICAL HISTORY:   Past Medical History:   Diagnosis Date     Anemia      Chronic kidney disease      Essential hypertension      Osteoarthritis      Stenosis of carotid artery       PAST SURGICAL HISTORY:   has a past surgical history that includes Open reduction internal fixation femur distal (Right, 9/8/2021).  FAMILY HISTORY: family history includes No Known Problems in her father and  "mother.  SOCIAL HISTORY:   reports that she has never smoked. She has never used smokeless tobacco. She reports previous alcohol use. She reports that she does not use drugs.  Patient's living condition: long term    Post Discharge Medication Reconciliation Status: discharge medications reconciled, continue medications without change  Current Outpatient Medications   Medication Sig     acetaminophen (TYLENOL) 325 MG tablet Take 325 mg by mouth every 6 hours as needed for fever or pain      apixaban ANTICOAGULANT (ELIQUIS) 5 MG tablet Take 1 tablet (5 mg) by mouth 2 times daily     collagenase (SANTYL) 250 UNIT/GM external ointment Apply topically daily     cyanocobalamin (CYANOCOBALAMIN) 100 MCG tablet Take 1 tablet (100 mcg) by mouth daily     diltiazem ER (CARDIZEM SR) 90 MG 12 hr capsule Take 1 capsule (90 mg) by mouth 2 times daily     famotidine (PEPCID) 20 MG tablet Take 1 tablet (20 mg) by mouth every morning     ferrous sulfate (FEROSUL) 325 (65 Fe) MG tablet Take 325 mg by mouth daily as needed      metoprolol tartrate (LOPRESSOR) 100 MG tablet Take 1 tablet (100 mg) by mouth 2 times daily     oxyCODONE (ROXICODONE) 5 MG tablet Take 0.5 tablets (2.5 mg) by mouth every 6 hours     polyethylene glycol (MIRALAX) 17 GM/Dose powder Take 17 g by mouth daily     Vitamin D3 (CHOLECALCIFEROL) 25 mcg (1000 units) tablet Take 1 tablet (25 mcg) by mouth daily     No current facility-administered medications for this visit.       ROS:  4 point ROS including Respiratory, CV, GI and , other than that noted in the HPI,  is negative    Vitals:  BP (!) 149/82   Pulse 75   Temp 97.9  F (36.6  C)   Resp 16   Ht 1.575 m (5' 2\")   Wt 64.6 kg (142 lb 8 oz)   SpO2 94%   BMI 26.06 kg/m    Exam:  Physical Exam   General appearance: alert, appears stated age and cooperative.   Head: Normocephalic, without obvious abnormality, atraumatic, Eyes: sclera anicteric.  Lungs: respirations without effort, LSCTA; no wheezing or rales. "   Cardiovascular: S1, S2. Regular rate and rhythm.   ABDOMEN: Globular and soft, non tender.    Skin: Reviewed incision to right knee, some small quarter centimeter opening to the distal end of the lateral incision. No surrounding erythema. No swelling. Scant drainage.  Neurologic:oriented. No focal deficits.   Psych: interacts well with caregivers, exhibits logical thought processes and connections, pleasant    Lab/Diagnostic data:  Recent labs in [facility] reviewed by me today.     ASSESSMENT/PLAN:    Pressure sores to coccyx, gluteal fold: Unstable.  -Wound care MD following, continue per their orders.    Cellulitis of postop incision (primary encounter diagnosis):  (W19.XXXA) Fall    (S72.401A) Closed fracture of distal end of right femur, unspecified fracture morphology, initial encounter (H): Unstable.  Comment: No c/o pain; very jovial during visit. Wound remains the same: Is approximated with a small less than quarter centimeter opening at the distal end of the lateral incision. Scant serous sanguinous drainage. No surrounding erythema.  Discussed pain management, she is agreeable to decreasing the every 8 hours.  Plan:   -Decrease oxycodone 2.5 mg p.o. every 8 hours as needed.  -Ice to right knee 4 times daily as needed.  -Follow up with ortho next week.  -PT, OT at their discretion.   -Non weightbearing 6 weeks.     (R21) Rash and nonspecific skin eruption: Appears to have cleared; no s/sx of rash.  We will continue famotidine for GI protection due to blood thinner and recent prolonged hospitalization.  Plan:   -Continue famotidine 20mg daily.     (I48.91) Rapid atrial fibrillation (H)  Comment: new onset.   Plan:   -metoprolol 100mg  -diltiazem 90 daily.   -apixiban.       Anemia, postop: Hemoglobin up to 9.  Improved from discharge in the sevens.  -Continues on daily iron supplementation.    Electronically signed by:  Sherry Wu CNP         Sincerely,        Sherry Wu, CNP

## 2021-10-20 ENCOUNTER — LAB REQUISITION (OUTPATIENT)
Dept: LAB | Facility: CLINIC | Age: 86
End: 2021-10-20
Payer: MEDICARE

## 2021-10-20 DIAGNOSIS — K92.1 MELENA: ICD-10-CM

## 2021-10-21 LAB — HGB BLD-MCNC: 11.4 G/DL (ref 11.7–15.7)

## 2021-10-21 PROCEDURE — P9604 ONE-WAY ALLOW PRORATED TRIP: HCPCS | Performed by: NURSE PRACTITIONER

## 2021-10-21 PROCEDURE — 85018 HEMOGLOBIN: CPT | Performed by: NURSE PRACTITIONER

## 2021-10-21 PROCEDURE — 36415 COLL VENOUS BLD VENIPUNCTURE: CPT | Mod: ORL | Performed by: NURSE PRACTITIONER

## 2021-10-21 NOTE — PROGRESS NOTES
[facility] GERIATRIC SERVICES  PRIMARY CARE PROVIDER AND CLINIC:  Elif Alcantara MD, [facility] [address] /   Chief Complaint   Patient presents with     ARIANA      [place] Medical Record Number:  [id number]  Place of Service where encounter took place:  [facility] (TCU) [[id number]]    Elysia Lane  is a 89 year old  (12/2/1923), admitted to the above facility from  [facility]. Hospital stay 9/6 through 9/16..   HPI:  Elysia has a history of HTN and anemia who had a fall at home and presented to [place]'s ED with  R leg pain. She had closed fracture of the distal femur and received ORIF and is non weight bearing. Completed IV ceftraixone for UTI with sepsis.   Also found in a fib with RVR, which improved on metoprolol 100mg and diltiazem, as well as apixaban.     Today: Elysia was seen today to review wounds to review right distal fracture open areas; staples to the lateral wound are intact, wound is approximating without dehiscence.  There is scant drainage however no erythema surrounding the incision.  She is denying acute pain and has tolerated decreasing oxycodone to every 8 hours as needed.  She is planning on transferring to long-term care today.  She does often try to get out of bed and has required one-to-one monitoring so she does not fall.  Very pleasant.    CODE STATUS/ADVANCE DIRECTIVES DISCUSSION:  No CPR- Do NOT Intubate  DNR / DNI  ALLERGIES: No Known Allergies   PAST MEDICAL HISTORY:   Past Medical History:   Diagnosis Date     Anemia      Chronic kidney disease      Essential hypertension      Osteoarthritis      Stenosis of carotid artery       PAST SURGICAL HISTORY:   has a past surgical history that includes Open reduction internal fixation femur distal (Right, 9/8/2021).  FAMILY HISTORY: family history includes No Known Problems in her father and mother.  SOCIAL HISTORY:   reports that she has never smoked. She has never used smokeless tobacco.  "She reports previous alcohol use. She reports that she does not use drugs.  Patient's living condition: long term    Post Discharge Medication Reconciliation Status: discharge medications reconciled, continue medications without change  Current Outpatient Medications   Medication Sig     acetaminophen (TYLENOL) 325 MG tablet Take 325 mg by mouth every 6 hours as needed for fever or pain      apixaban ANTICOAGULANT (ELIQUIS) 5 MG tablet Take 1 tablet (5 mg) by mouth 2 times daily     collagenase (SANTYL) 250 UNIT/GM external ointment Apply topically daily     cyanocobalamin (CYANOCOBALAMIN) 100 MCG tablet Take 1 tablet (100 mcg) by mouth daily     diltiazem ER (CARDIZEM SR) 90 MG 12 hr capsule Take 1 capsule (90 mg) by mouth 2 times daily     famotidine (PEPCID) 20 MG tablet Take 1 tablet (20 mg) by mouth every morning     ferrous sulfate (FEROSUL) 325 (65 Fe) MG tablet Take 325 mg by mouth daily as needed      metoprolol tartrate (LOPRESSOR) 100 MG tablet Take 1 tablet (100 mg) by mouth 2 times daily     oxyCODONE (ROXICODONE) 5 MG tablet Take 0.5 tablets (2.5 mg) by mouth every 6 hours     polyethylene glycol (MIRALAX) 17 GM/Dose powder Take 17 g by mouth daily     Vitamin D3 (CHOLECALCIFEROL) 25 mcg (1000 units) tablet Take 1 tablet (25 mcg) by mouth daily     No current facility-administered medications for this visit.       ROS:  4 point ROS including Respiratory, CV, GI and , other than that noted in the HPI,  is negative    Vitals:  BP (!) 149/82   Pulse 75   Temp 97.9  F (36.6  C)   Resp 16   Ht 1.575 m (5' 2\")   Wt 64.6 kg (142 lb 8 oz)   SpO2 94%   BMI 26.06 kg/m    Exam:  Physical Exam   General appearance: alert, appears stated age and cooperative.   Head: Normocephalic, without obvious abnormality, atraumatic, Eyes: sclera anicteric.  Lungs: respirations without effort, LSCTA; no wheezing or rales.   Cardiovascular: S1, S2. Regular rate and rhythm.   ABDOMEN: Globular and soft, non tender.    Skin: " Reviewed incision to right knee, some small quarter centimeter opening to the distal end of the lateral incision. No surrounding erythema. No swelling. Scant drainage.  Neurologic:oriented. No focal deficits.   Psych: interacts well with caregivers, exhibits logical thought processes and connections, pleasant    Lab/Diagnostic data:  Recent labs in [facility] reviewed by me today.     ASSESSMENT/PLAN:    Pressure sores to coccyx, gluteal fold: Unstable.  -Wound care MD following, continue per their orders.    Cellulitis of postop incision (primary encounter diagnosis):  (W19.XXXA) Fall    (S72.401A) Closed fracture of distal end of right femur, unspecified fracture morphology, initial encounter (H): Unstable.  Comment: No c/o pain; very jovial during visit. Wound remains the same: Is approximated with a small less than quarter centimeter opening at the distal end of the lateral incision. Scant serous sanguinous drainage. No surrounding erythema.  Discussed pain management, she is agreeable to decreasing the every 8 hours.  Plan:   -Decrease oxycodone 2.5 mg p.o. every 8 hours as needed.  -Ice to right knee 4 times daily as needed.  -Follow up with ortho next week.  -PT, OT at their discretion.   -Non weightbearing 6 weeks.     (R21) Rash and nonspecific skin eruption: Appears to have cleared; no s/sx of rash.  We will continue famotidine for GI protection due to blood thinner and recent prolonged hospitalization.  Plan:   -Continue famotidine 20mg daily.     (I48.91) Rapid atrial fibrillation (H)  Comment: new onset.   Plan:   -metoprolol 100mg  -diltiazem 90 daily.   -apixiban.       Anemia, postop: Hemoglobin up to 9.  Improved from discharge in the sevens.  -Continues on daily iron supplementation.    Electronically signed by:  Sherry Wu, CNP

## 2021-10-22 DIAGNOSIS — Z11.59 ENCOUNTER FOR SCREENING FOR OTHER VIRAL DISEASES: ICD-10-CM

## 2021-10-23 ENCOUNTER — LAB REQUISITION (OUTPATIENT)
Dept: LAB | Facility: CLINIC | Age: 86
End: 2021-10-23
Payer: MEDICARE

## 2021-10-23 DIAGNOSIS — R53.1 WEAKNESS: ICD-10-CM

## 2021-10-23 PROCEDURE — 81003 URINALYSIS AUTO W/O SCOPE: CPT | Performed by: INTERNAL MEDICINE

## 2021-10-23 PROCEDURE — 87086 URINE CULTURE/COLONY COUNT: CPT | Performed by: INTERNAL MEDICINE

## 2021-10-23 PROCEDURE — 81003 URINALYSIS AUTO W/O SCOPE: CPT | Mod: ORL

## 2021-10-23 PROCEDURE — 87086 URINE CULTURE/COLONY COUNT: CPT | Mod: ORL

## 2021-10-24 LAB
ALBUMIN UR-MCNC: 100 MG/DL
APPEARANCE UR: ABNORMAL
BILIRUB UR QL STRIP: NEGATIVE
COLOR UR AUTO: YELLOW
GLUCOSE UR STRIP-MCNC: NEGATIVE MG/DL
HGB UR QL STRIP: ABNORMAL
KETONES UR STRIP-MCNC: NEGATIVE MG/DL
LEUKOCYTE ESTERASE UR QL STRIP: ABNORMAL
NITRATE UR QL: NEGATIVE
PH UR STRIP: 5.5 [PH] (ref 5–7)
SP GR UR STRIP: 1.03 (ref 1–1.03)
UROBILINOGEN UR STRIP-MCNC: <2 MG/DL

## 2021-10-26 ENCOUNTER — OFFICE VISIT (OUTPATIENT)
Dept: GERIATRICS | Facility: CLINIC | Age: 86
End: 2021-10-26
Payer: MEDICARE

## 2021-10-26 VITALS
BODY MASS INDEX: 23.85 KG/M2 | SYSTOLIC BLOOD PRESSURE: 132 MMHG | HEIGHT: 62 IN | RESPIRATION RATE: 17 BRPM | OXYGEN SATURATION: 95 % | WEIGHT: 129.6 LBS | HEART RATE: 69 BPM | TEMPERATURE: 96.9 F | DIASTOLIC BLOOD PRESSURE: 71 MMHG

## 2021-10-26 DIAGNOSIS — S72.401A CLOSED FRACTURE OF DISTAL END OF RIGHT FEMUR, UNSPECIFIED FRACTURE MORPHOLOGY, INITIAL ENCOUNTER (H): Primary | Chronic | ICD-10-CM

## 2021-10-26 DIAGNOSIS — I48.91 RAPID ATRIAL FIBRILLATION (H): Chronic | ICD-10-CM

## 2021-10-26 DIAGNOSIS — N18.32 CHRONIC KIDNEY DISEASE, STAGE 3B (H): ICD-10-CM

## 2021-10-26 DIAGNOSIS — Z79.01 CHRONIC ANTICOAGULATION: Chronic | ICD-10-CM

## 2021-10-26 PROCEDURE — 99309 SBSQ NF CARE MODERATE MDM 30: CPT | Performed by: NURSE PRACTITIONER

## 2021-10-26 ASSESSMENT — MIFFLIN-ST. JEOR: SCORE: 926.11

## 2021-10-26 NOTE — PROGRESS NOTES
[facility] GERIATRICS  [address] [place] [address]  Phone: [phone]  Fax: [phone]  Primary Provider: Sherry Rivas  Pre-op Performing Provider: SHERRY RIVAS    PREOPERATIVE EVALUATION:  Today's date: 10/26/2021    Elysia Lane is a 89 year old female who presents for a preoperative evaluation.    Surgical Information:  Surgery/Procedure: I&D of right lateral thigh wound.  Surgery Location: [facility].  Surgeon: Raissa.  Surgery Date: 11/1/21  Time of Surgery: 1500  Where patient plans to recover: At a nursing home  Fax number for surgical facility: Note does not need to be faxed, will be available electronically in Epic.    Type of Anesthesia Anticipated: Local with MAC    Assessment & Plan     The proposed surgical procedure is considered LOW risk.    Closed fracture of distal end of right femur, unspecified fracture morphology, initial encounter (H)  -Denies pain; I&D upcoming.     Rapid atrial fibrillation (H)  Chronic anticoagulation  -HOLD elaquis starting Thursday 10/28 PM.     Chronic kidney disease, stage 3b (H)  -BMP, hypokalemia, repalcing; recheck 10/29.     Risks and Recommendations:  The patient has the following additional risks and recommendations for perioperative complications:   - History of delirium or dementia    Medication Instructions: HOLD elaquis 10/28 PM to 11/1 AM  Patient is to take all scheduled medications on the day of surgery EXCEPT for modifications listed below:   - Beta Blockers: Continue taking on the day of surgery.   - Calcium Channel Blockers: May be continued on the day of surgery.    RECOMMENDATION:  APPROVAL GIVEN to proceed with proposed procedure, without further diagnostic evaluation.    >45 minutes Time spent doing chart review, history and exam, documentation and further activities per the note        Subjective     HPI related to upcoming procedure: Elysia has a history of HTN and anemia who had a fall at home and  presented to [facility] ED with  R leg pain. She had closed fracture of the distal femur and received ORIF and is non weight bearing. Completed IV ceftraixone for UTI with sepsis.   Also found in a fib with RVR, which improved on metoprolol 100mg and diltiazem, as well as apixaban.  She developed dehiscence of her surgical incisions midway through her TCU stay, treated for cellulitis with Keflex which resolved, and is now undergoing repair.      No flowsheet data found.    Health Care Directive:  Patient has a Health Care Directive on file      Preoperative Review of :   reviewed - controlled substances reflected in medication list.      Status of Chronic Conditions:  A-FIB - Patient has a longstanding history of chronic A-fib currently on rate and rhythm control. Current treatment regimen includes Apixaban for stroke prevention and denies significant symptoms of lightheadedness, palpitations or dyspnea.     ANEMIA - Patient has a recent history of moderate-severe anemia, which has not been symptomatic. 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 Treatment has been ferrous sulfate.    CAD - Patient has a longstanding history of moderate-severe CAD. Patient denies recent chest pain or NTG use, denies exercise induced dyspnea or PND. Last EKG 9/6/21, a fib, EF 45%    HYPERTENSION - Patient has longstanding history of HTN , currently denies any symptoms referable to elevated blood pressure. Specifically denies chest pain, palpitations, dyspnea, orthopnea, PND or peripheral edema. Blood pressure readings have been in normal range. Current medication regimen is as listed below. Patient denies any side effects of medication.       Review of Systems  CONSTITUTIONAL: NEGATIVE for fever, chills, change in weight  ENT/MOUTH: NEGATIVE for ear, mouth and throat problems  RESP: NEGATIVE for significant cough or SOB  CV: NEGATIVE for chest pain, palpitations or peripheral edema    Patient Active Problem List    Diagnosis Date Noted      Cellulitis, wound, post-operative 10/14/2021     Priority: Medium     Rash and nonspecific skin eruption 09/28/2021     Priority: Medium     Chronic kidney disease, stage 3b (H) 09/23/2021     Priority: Medium     Chronic anticoagulation 09/11/2021     Priority: Medium     Eliquis atrial fibrillation       H/O major orthopedic surgery 09/10/2021     Priority: Medium     Procedure(s): Dr. Liam Haji  Procedure Date:  9/6/2021 - 9/8/2021  retrograde IM nailing right supracondylar distal femur fracture ([place] retrograde nail 12 mm x 320 mm, interlocked (     Pre-Procedure Diagnosis:  Closed fracture of distal end of right femur, unspecified fracture morphology, initial encounter (H) [S72.401A]      Post-Procedure Diagnosis:    Supracondylar distal femur fracture right     Postoperative plan:  Up in chair all meals.  Bed chair status.  Patient can weight-bear with full assist for transfers and pivots only.  Nonambulatory however x6 weeks.  Anticoagulation per cardiology given her underlying cardiac arrhythmia: Recommend minimal aspirin twice daily or Coumadin.  Return to clinic 2 weeks            DNR (do not resuscitate) 09/10/2021     Priority: Medium     Rapid atrial fibrillation (H) 09/06/2021     Priority: Medium     Closed fracture of distal end of right femur, unspecified fracture morphology, initial encounter (H) 09/06/2021     Priority: Medium     Procedure(s): Procedure Date:  9/6/2021 - 9/8/2021; Dr Liam Haji  Retrograde IM nailing right supracondylar distal femur fracture ([place] retrograde nail 12 mm x 320 mm, interlocked (   Pre-Procedure Diagnosis:  Closed fracture of distal end of right femur, unspecified fracture morphology, initial encounter (H) [S72.401A]    Post-Procedure Diagnosis:    Supracondylar distal femur fracture right     Postoperative plan:  Up in chair all meals.  Bed chair status.  Patient can weight-bear with full assist for transfers and pivots only.  Nonambulatory however x6  weeks.  Anticoagulation per cardiology given her underlying cardiac arrhythmia: Recommend minimal aspirin twice daily or Coumadin.  Return to clinic 2 weeks           Past Medical History:   Diagnosis Date     Anemia      Chronic kidney disease      Essential hypertension      Osteoarthritis      Stenosis of carotid artery      Past Surgical History:   Procedure Laterality Date     OPEN REDUCTION INTERNAL FIXATION FEMUR DISTAL Right 9/8/2021    Procedure: OPEN REDUCTION INTERNAL FIXATION, FRACTURE, FEMUR, DISTAL;  Surgeon: Liam Haji MD;  Location: [facility]     Current Outpatient Medications   Medication Sig Dispense Refill     acetaminophen (TYLENOL) 325 MG tablet Take 325 mg by mouth every 6 hours as needed for fever or pain        apixaban ANTICOAGULANT (ELIQUIS) 5 MG tablet Take 1 tablet (5 mg) by mouth 2 times daily (Patient taking differently: Take 5 mg by mouth 2 times daily Ld 10-28) 60 tablet 1     collagenase (SANTYL) 250 UNIT/GM external ointment Apply topically daily       cyanocobalamin (CYANOCOBALAMIN) 100 MCG tablet Take 1 tablet (100 mcg) by mouth daily 30 tablet 1     diltiazem ER (CARDIZEM SR) 90 MG 12 hr capsule Take 1 capsule (90 mg) by mouth 2 times daily 30 capsule 1     famotidine (PEPCID) 20 MG tablet Take 1 tablet (20 mg) by mouth every morning       ferrous sulfate (FEROSUL) 325 (65 Fe) MG tablet Take 325 mg by mouth daily as needed        metoprolol tartrate (LOPRESSOR) 100 MG tablet Take 1 tablet (100 mg) by mouth 2 times daily 60 tablet 1     oxyCODONE (ROXICODONE) 5 MG tablet Take 0.5 tablets (2.5 mg) by mouth every 6 hours 30 tablet 0     polyethylene glycol (MIRALAX) 17 GM/Dose powder Take 17 g by mouth daily 510 g 1     Vitamin D3 (CHOLECALCIFEROL) 25 mcg (1000 units) tablet Take 1 tablet (25 mcg) by mouth daily 30 tablet 0       No Known Allergies     Social History     Tobacco Use     Smoking status: Never Smoker     Smokeless tobacco: Never Used   Substance Use Topics  "    Alcohol use: Not Currently     Family History   Problem Relation Age of Onset     No Known Problems Mother      No Known Problems Father      History   Drug Use Unknown         Objective     /71   Pulse 69   Temp 96.9  F (36.1  C)   Resp 17   Ht 1.575 m (5' 2\")   Wt 58.8 kg (129 lb 9.6 oz)   SpO2 95%   BMI 23.70 kg/m      Physical Exam  Physical Exam   General appearance: alert, appears stated age and cooperative.   Head: Normocephalic, without obvious abnormality, atraumatic, Eyes: sclera anicteric.  Lungs: respirations without effort, LSCTA; no wheezing or rales.   Cardiovascular: S1, S2. Regular rate and rhythm.   ABDOMEN: Globular and soft, non tender.    Extremities: R knee in immobilizer.   Skin: Skin color, texture, turgor normal. No rashes or lesions  Neurologic:oriented. No focal deficits.   Psych: interacts well with caregivers, exhibits logical thought processes and connections, pleasant    Recent Labs   Lab Test 09/24/21  0436 09/16/21  0457 09/15/21  0558 09/13/21  0542 09/12/21  0514 09/10/21  0535 09/09/21  0534 09/08/21  1800 09/08/21  0529 09/08/21  0457 09/08/21  0457   HGB 9.1*  --  7.6*  7.6*   < >  --    < > 7.9*   < > 8.9*   < > 8.4*  8.4*   PLT  --   --  271  271  --  195  --  121*  --  138*   < > 116*   INR  --   --   --   --   --   --  1.15  --  1.09  --   --    NA  --   --   --   --   --   --  142  --   --   --  142   POTASSIUM  --  3.8 3.7   < > 3.9   < > 3.5  --  3.6   < > 3.5   CR  --   --  0.72  --  0.81  --  0.86  --  0.83   < > 0.82    < > = values in this interval not displayed.        Diagnostics:  Recent Results (from the past 168 hour(s))   Urinalysis Macroscopic    Collection Time: 10/23/21  8:00 PM   Result Value Ref Range    Color Urine Yellow Colorless, Straw, Light Yellow, Yellow    Appearance Urine Turbid (A) Clear    Glucose Urine Negative Negative mg/dL    Bilirubin Urine Negative Negative    Ketones Urine Negative Negative mg/dL    Specific Gravity " Urine 1.032 (H) 1.001 - 1.030    Blood Urine 0.1 mg/dL (A) Negative    pH Urine 5.5 5.0 - 7.0    Protein Albumin Urine 100  (A) Negative mg/dL    Urobilinogen Urine <2.0 <2.0 mg/dL    Nitrite Urine Negative Negative    Leukocyte Esterase Urine 500 Castro/uL (A) Negative   Urine Culture    Collection Time: 10/23/21  8:00 PM    Specimen: Urine, Straight Catheter   Result Value Ref Range    Culture >100,000 CFU/mL Escherichia coli (A)     Culture 10,000-50,000 CFU/mL Enterobacter cloacae (A)        Susceptibility    Enterobacter cloacae - BIBIANA     Ampicillin >16 Resistant ug/mL     Cefazolin >16 Resistant ug/mL     Cefepime <=1 Susceptible ug/mL     Ceftriaxone <=1 Susceptible ug/mL     Ciprofloxacin <=0.25 Susceptible ug/mL     Gentamicin <=2 Susceptible ug/mL     Levofloxacin <=0.5 Susceptible ug/mL     Meropenem <=0.5 Susceptible ug/mL     Tetracycline <=2 Susceptible ug/mL     Tobramycin <=2 Susceptible ug/mL     Nitrofurantoin >64 Resistant ug/mL     Trimethoprim/Sulfamethoxazole <=0.5 Susceptible ug/mL    Escherichia coli - BIBIANA     Ampicillin <=4 Susceptible ug/mL     Cefazolin <=1 Susceptible ug/mL     Cefepime <=1 Susceptible ug/mL     Ceftriaxone <=1 Susceptible ug/mL     Ciprofloxacin <=0.25 Susceptible ug/mL     Gentamicin <=2 Susceptible ug/mL     Levofloxacin <=0.5 Susceptible ug/mL     Meropenem <=0.5 Susceptible ug/mL     Nitrofurantoin <=16 Susceptible ug/mL     Tetracycline <=2 Susceptible ug/mL     Tobramycin <=2 Susceptible ug/mL     Trimethoprim/Sulfamethoxazole <=0.5 Susceptible ug/mL   CBC with platelets    Collection Time: 10/28/21  5:32 AM   Result Value Ref Range    WBC Count 7.0 4.0 - 11.0 10e3/uL    RBC Count 3.51 (L) 3.80 - 5.20 10e6/uL    Hemoglobin 10.9 (L) 11.7 - 15.7 g/dL    Hematocrit 33.3 (L) 35.0 - 47.0 %    MCV 95 78 - 100 fL    MCH 31.1 26.5 - 33.0 pg    MCHC 32.7 31.5 - 36.5 g/dL    RDW 14.0 10.0 - 15.0 %    Platelet Count 232 150 - 450 10e3/uL   Basic metabolic panel    Collection  Time: 10/28/21  5:32 AM   Result Value Ref Range    Sodium 143 136 - 145 mmol/L    Potassium 2.6 (LL) 3.5 - 5.0 mmol/L    Chloride 105 98 - 107 mmol/L    Carbon Dioxide (CO2) 28 22 - 31 mmol/L    Anion Gap 10 5 - 18 mmol/L    Urea Nitrogen 19 8 - 28 mg/dL    Creatinine 0.76 0.60 - 1.10 mg/dL    Calcium 9.0 8.5 - 10.5 mg/dL    Glucose 92 70 - 125 mg/dL    GFR Estimate 66 >60 mL/min/1.73m2      No EKG this visit, completed in the last 90 days.    Revised Cardiac Risk Index (RCRI):  The patient has the following serious cardiovascular risks for perioperative complications:   - Coronary Artery Disease (MI, positive stress test, angina, Qs on EKG) = 1 point   - Congestive Heart Failure (pulmonary edema, PND, s3 jaimee, CXR with pulmonary congestion, basilar rales) = 1 point     RCRI Interpretation: 1 point: Class II (low risk - 0.9% complication rate)           Signed Electronically by: Sherry Wu CNP  Copy of this evaluation report is provided to requesting physician.

## 2021-10-26 NOTE — LETTER
10/26/2021        RE: Elysia Lane  [address]  [facility] [address]        [facility]  [address] [place] [address]  Phone: [phone]  Fax: [phone]  Primary Provider: Sherry Rivas  Pre-op Performing Provider: SHERRY RIVAS    PREOPERATIVE EVALUATION:  Today's date: 10/26/2021    Elysia Lane is a 89 year old female who presents for a preoperative evaluation.    Surgical Information:  Surgery/Procedure: I&D of right lateral thigh wound.  Surgery Location: [facility].  Surgeon: Raissa.  Surgery Date: 11/1/21  Time of Surgery: 1500  Where patient plans to recover: At a nursing home  Fax number for surgical facility: Note does not need to be faxed, will be available electronically in Epic.    Type of Anesthesia Anticipated: Local with MAC    Assessment & Plan     The proposed surgical procedure is considered LOW risk.    Closed fracture of distal end of right femur, unspecified fracture morphology, initial encounter (H)  -Denies pain; I&D upcoming.     Rapid atrial fibrillation (H)  Chronic anticoagulation  -HOLD elaquis starting Thursday 10/28 PM.     Chronic kidney disease, stage 3b (H)  -BMP, hypokalemia, repalcing; recheck 10/29.     Risks and Recommendations:  The patient has the following additional risks and recommendations for perioperative complications:   - History of delirium or dementia    Medication Instructions: HOLD elaquis 10/28 PM to 11/1 AM  Patient is to take all scheduled medications on the day of surgery EXCEPT for modifications listed below:   - Beta Blockers: Continue taking on the day of surgery.   - Calcium Channel Blockers: May be continued on the day of surgery.    RECOMMENDATION:  APPROVAL GIVEN to proceed with proposed procedure, without further diagnostic evaluation.    >45 minutes Time spent doing chart review, history and exam, documentation and further activities per the note        Subjective     HPI related to  upcoming procedure: Elysia has a history of HTN and anemia who had a fall at home and presented to [facility] ED with  R leg pain. She had closed fracture of the distal femur and received ORIF and is non weight bearing. Completed IV ceftraixone for UTI with sepsis.   Also found in a fib with RVR, which improved on metoprolol 100mg and diltiazem, as well as apixaban.  She developed dehiscence of her surgical incisions midway through her TCU stay, treated for cellulitis with Keflex which resolved, and is now undergoing repair.      No flowsheet data found.    Health Care Directive:  Patient has a Health Care Directive on file      Preoperative Review of :   reviewed - controlled substances reflected in medication list.      Status of Chronic Conditions:  A-FIB - Patient has a longstanding history of chronic A-fib currently on rate and rhythm control. Current treatment regimen includes Apixaban for stroke prevention and denies significant symptoms of lightheadedness, palpitations or dyspnea.     ANEMIA - Patient has a recent history of moderate-severe anemia, which has not been symptomatic. 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 Treatment has been ferrous sulfate.    CAD - Patient has a longstanding history of moderate-severe CAD. Patient denies recent chest pain or NTG use, denies exercise induced dyspnea or PND. Last EKG 9/6/21, a fib, EF 45%    HYPERTENSION - Patient has longstanding history of HTN , currently denies any symptoms referable to elevated blood pressure. Specifically denies chest pain, palpitations, dyspnea, orthopnea, PND or peripheral edema. Blood pressure readings have been in normal range. Current medication regimen is as listed below. Patient denies any side effects of medication.       Review of Systems  CONSTITUTIONAL: NEGATIVE for fever, chills, change in weight  ENT/MOUTH: NEGATIVE for ear, mouth and throat problems  RESP: NEGATIVE for significant cough or SOB  CV: NEGATIVE for chest pain,  palpitations or peripheral edema    Patient Active Problem List    Diagnosis Date Noted     Cellulitis, wound, post-operative 10/14/2021     Priority: Medium     Rash and nonspecific skin eruption 09/28/2021     Priority: Medium     Chronic kidney disease, stage 3b (H) 09/23/2021     Priority: Medium     Chronic anticoagulation 09/11/2021     Priority: Medium     Eliquis atrial fibrillation       H/O major orthopedic surgery 09/10/2021     Priority: Medium     Procedure(s): Dr. Liam Haji  Procedure Date:  9/6/2021 - 9/8/2021  retrograde IM nailing right supracondylar distal femur fracture (Garretson retrograde nail 12 mm x 320 mm, interlocked (     Pre-Procedure Diagnosis:  Closed fracture of distal end of right femur, unspecified fracture morphology, initial encounter (H) [S72.401A]      Post-Procedure Diagnosis:    Supracondylar distal femur fracture right     Postoperative plan:  Up in chair all meals.  Bed chair status.  Patient can weight-bear with full assist for transfers and pivots only.  Nonambulatory however x6 weeks.  Anticoagulation per cardiology given her underlying cardiac arrhythmia: Recommend minimal aspirin twice daily or Coumadin.  Return to clinic 2 weeks            DNR (do not resuscitate) 09/10/2021     Priority: Medium     Rapid atrial fibrillation (H) 09/06/2021     Priority: Medium     Closed fracture of distal end of right femur, unspecified fracture morphology, initial encounter (H) 09/06/2021     Priority: Medium     Procedure(s): Procedure Date:  9/6/2021 - 9/8/2021; Dr Liam Haji  Retrograde IM nailing right supracondylar distal femur fracture (Rojelio retrograde nail 12 mm x 320 mm, interlocked (   Pre-Procedure Diagnosis:  Closed fracture of distal end of right femur, unspecified fracture morphology, initial encounter (H) [S72.401A]    Post-Procedure Diagnosis:    Supracondylar distal femur fracture right     Postoperative plan:  Up in chair all meals.  Bed chair status.  Patient can  weight-bear with full assist for transfers and pivots only.  Nonambulatory however x6 weeks.  Anticoagulation per cardiology given her underlying cardiac arrhythmia: Recommend minimal aspirin twice daily or Coumadin.  Return to clinic 2 weeks           Past Medical History:   Diagnosis Date     Anemia      Chronic kidney disease      Essential hypertension      Osteoarthritis      Stenosis of carotid artery      Past Surgical History:   Procedure Laterality Date     OPEN REDUCTION INTERNAL FIXATION FEMUR DISTAL Right 9/8/2021    Procedure: OPEN REDUCTION INTERNAL FIXATION, FRACTURE, FEMUR, DISTAL;  Surgeon: Liam Haji MD;  [place] [facility] [place]     Current Outpatient Medications   Medication Sig Dispense Refill     acetaminophen (TYLENOL) 325 MG tablet Take 325 mg by mouth every 6 hours as needed for fever or pain        apixaban ANTICOAGULANT (ELIQUIS) 5 MG tablet Take 1 tablet (5 mg) by mouth 2 times daily (Patient taking differently: Take 5 mg by mouth 2 times daily Ld 10-28) 60 tablet 1     collagenase (SANTYL) 250 UNIT/GM external ointment Apply topically daily       cyanocobalamin (CYANOCOBALAMIN) 100 MCG tablet Take 1 tablet (100 mcg) by mouth daily 30 tablet 1     diltiazem ER (CARDIZEM SR) 90 MG 12 hr capsule Take 1 capsule (90 mg) by mouth 2 times daily 30 capsule 1     famotidine (PEPCID) 20 MG tablet Take 1 tablet (20 mg) by mouth every morning       ferrous sulfate (FEROSUL) 325 (65 Fe) MG tablet Take 325 mg by mouth daily as needed        metoprolol tartrate (LOPRESSOR) 100 MG tablet Take 1 tablet (100 mg) by mouth 2 times daily 60 tablet 1     oxyCODONE (ROXICODONE) 5 MG tablet Take 0.5 tablets (2.5 mg) by mouth every 6 hours 30 tablet 0     polyethylene glycol (MIRALAX) 17 GM/Dose powder Take 17 g by mouth daily 510 g 1     Vitamin D3 (CHOLECALCIFEROL) 25 mcg (1000 units) tablet Take 1 tablet (25 mcg) by mouth daily 30 tablet 0       No Known Allergies     Social History     Tobacco Use      "Smoking status: Never Smoker     Smokeless tobacco: Never Used   Substance Use Topics     Alcohol use: Not Currently     Family History   Problem Relation Age of Onset     No Known Problems Mother      No Known Problems Father      History   Drug Use Unknown         Objective     /71   Pulse 69   Temp 96.9  F (36.1  C)   Resp 17   Ht 1.575 m (5' 2\")   Wt 58.8 kg (129 lb 9.6 oz)   SpO2 95%   BMI 23.70 kg/m      Physical Exam  Physical Exam   General appearance: alert, appears stated age and cooperative.   Head: Normocephalic, without obvious abnormality, atraumatic, Eyes: sclera anicteric.  Lungs: respirations without effort, LSCTA; no wheezing or rales.   Cardiovascular: S1, S2. Regular rate and rhythm.   ABDOMEN: Globular and soft, non tender.    Extremities: R knee in immobilizer.   Skin: Skin color, texture, turgor normal. No rashes or lesions  Neurologic:oriented. No focal deficits.   Psych: interacts well with caregivers, exhibits logical thought processes and connections, pleasant    Recent Labs   Lab Test 09/24/21  0436 09/16/21  0457 09/15/21  0558 09/13/21  0542 09/12/21  0514 09/10/21  0535 09/09/21  0534 09/08/21  1800 09/08/21  0529 09/08/21  0457 09/08/21  0457   HGB 9.1*  --  7.6*  7.6*   < >  --    < > 7.9*   < > 8.9*   < > 8.4*  8.4*   PLT  --   --  271  271  --  195  --  121*  --  138*   < > 116*   INR  --   --   --   --   --   --  1.15  --  1.09  --   --    NA  --   --   --   --   --   --  142  --   --   --  142   POTASSIUM  --  3.8 3.7   < > 3.9   < > 3.5  --  3.6   < > 3.5   CR  --   --  0.72  --  0.81  --  0.86  --  0.83   < > 0.82    < > = values in this interval not displayed.        Diagnostics:  Recent Results (from the past 168 hour(s))   Urinalysis Macroscopic    Collection Time: 10/23/21  8:00 PM   Result Value Ref Range    Color Urine Yellow Colorless, Straw, Light Yellow, Yellow    Appearance Urine Turbid (A) Clear    Glucose Urine Negative Negative mg/dL    Bilirubin " Urine Negative Negative    Ketones Urine Negative Negative mg/dL    Specific Gravity Urine 1.032 (H) 1.001 - 1.030    Blood Urine 0.1 mg/dL (A) Negative    pH Urine 5.5 5.0 - 7.0    Protein Albumin Urine 100  (A) Negative mg/dL    Urobilinogen Urine <2.0 <2.0 mg/dL    Nitrite Urine Negative Negative    Leukocyte Esterase Urine 500 Castro/uL (A) Negative   Urine Culture    Collection Time: 10/23/21  8:00 PM    Specimen: Urine, Straight Catheter   Result Value Ref Range    Culture >100,000 CFU/mL Escherichia coli (A)     Culture 10,000-50,000 CFU/mL Enterobacter cloacae (A)        Susceptibility    Enterobacter cloacae - BIBIANA     Ampicillin >16 Resistant ug/mL     Cefazolin >16 Resistant ug/mL     Cefepime <=1 Susceptible ug/mL     Ceftriaxone <=1 Susceptible ug/mL     Ciprofloxacin <=0.25 Susceptible ug/mL     Gentamicin <=2 Susceptible ug/mL     Levofloxacin <=0.5 Susceptible ug/mL     Meropenem <=0.5 Susceptible ug/mL     Tetracycline <=2 Susceptible ug/mL     Tobramycin <=2 Susceptible ug/mL     Nitrofurantoin >64 Resistant ug/mL     Trimethoprim/Sulfamethoxazole <=0.5 Susceptible ug/mL    Escherichia coli - BIBIANA     Ampicillin <=4 Susceptible ug/mL     Cefazolin <=1 Susceptible ug/mL     Cefepime <=1 Susceptible ug/mL     Ceftriaxone <=1 Susceptible ug/mL     Ciprofloxacin <=0.25 Susceptible ug/mL     Gentamicin <=2 Susceptible ug/mL     Levofloxacin <=0.5 Susceptible ug/mL     Meropenem <=0.5 Susceptible ug/mL     Nitrofurantoin <=16 Susceptible ug/mL     Tetracycline <=2 Susceptible ug/mL     Tobramycin <=2 Susceptible ug/mL     Trimethoprim/Sulfamethoxazole <=0.5 Susceptible ug/mL   CBC with platelets    Collection Time: 10/28/21  5:32 AM   Result Value Ref Range    WBC Count 7.0 4.0 - 11.0 10e3/uL    RBC Count 3.51 (L) 3.80 - 5.20 10e6/uL    Hemoglobin 10.9 (L) 11.7 - 15.7 g/dL    Hematocrit 33.3 (L) 35.0 - 47.0 %    MCV 95 78 - 100 fL    MCH 31.1 26.5 - 33.0 pg    MCHC 32.7 31.5 - 36.5 g/dL    RDW 14.0 10.0 -  15.0 %    Platelet Count 232 150 - 450 10e3/uL   Basic metabolic panel    Collection Time: 10/28/21  5:32 AM   Result Value Ref Range    Sodium 143 136 - 145 mmol/L    Potassium 2.6 (LL) 3.5 - 5.0 mmol/L    Chloride 105 98 - 107 mmol/L    Carbon Dioxide (CO2) 28 22 - 31 mmol/L    Anion Gap 10 5 - 18 mmol/L    Urea Nitrogen 19 8 - 28 mg/dL    Creatinine 0.76 0.60 - 1.10 mg/dL    Calcium 9.0 8.5 - 10.5 mg/dL    Glucose 92 70 - 125 mg/dL    GFR Estimate 66 >60 mL/min/1.73m2      No EKG this visit, completed in the last 90 days.    Revised Cardiac Risk Index (RCRI):  The patient has the following serious cardiovascular risks for perioperative complications:   - Coronary Artery Disease (MI, positive stress test, angina, Qs on EKG) = 1 point   - Congestive Heart Failure (pulmonary edema, PND, s3 jaimee, CXR with pulmonary congestion, basilar rales) = 1 point     RCRI Interpretation: 1 point: Class II (low risk - 0.9% complication rate)           Signed Electronically by: Sherry Wu CNP  Copy of this evaluation report is provided to requesting physician.            Sincerely,        Sherry Wu CNP

## 2021-10-26 NOTE — H&P (VIEW-ONLY)
[facility]  [address] [place] [address]  Phone: [phone]  Fax: [phone]  Primary Provider: Sherry Rivas  Pre-op Performing Provider: SHERRY RIVAS    PREOPERATIVE EVALUATION:  Today's date: 10/26/2021    Elysia Lane is a 89 year old female who presents for a preoperative evaluation.    Surgical Information:  Surgery/Procedure: I&D of right lateral thigh wound.  Surgery Location: [facility].  Surgeon: Raissa.  Surgery Date: 11/1/21  Time of Surgery: 1500  Where patient plans to recover: At a nursing home  Fax number for surgical facility: Note does not need to be faxed, will be available electronically in Epic.    Type of Anesthesia Anticipated: Local with MAC    Assessment & Plan     The proposed surgical procedure is considered LOW risk.    Closed fracture of distal end of right femur, unspecified fracture morphology, initial encounter (H)  -Denies pain; I&D upcoming.     Rapid atrial fibrillation (H)  Chronic anticoagulation  -HOLD elaquis starting Thursday 10/28 PM.     Chronic kidney disease, stage 3b (H)  -BMP, hypokalemia, repalcing; recheck 10/29.     Risks and Recommendations:  The patient has the following additional risks and recommendations for perioperative complications:   - History of delirium or dementia    Medication Instructions: HOLD elaquis 10/28 PM to 11/1 AM  Patient is to take all scheduled medications on the day of surgery EXCEPT for modifications listed below:   - Beta Blockers: Continue taking on the day of surgery.   - Calcium Channel Blockers: May be continued on the day of surgery.    RECOMMENDATION:  APPROVAL GIVEN to proceed with proposed procedure, without further diagnostic evaluation.    >45 minutes Time spent doing chart review, history and exam, documentation and further activities per the note        Subjective     HPI related to upcoming procedure: Elysia has a history of HTN and anemia who had a fall at home and  presented to [facility] ED with  R leg pain. She had closed fracture of the distal femur and received ORIF and is non weight bearing. Completed IV ceftraixone for UTI with sepsis.   Also found in a fib with RVR, which improved on metoprolol 100mg and diltiazem, as well as apixaban.  She developed dehiscence of her surgical incisions midway through her TCU stay, treated for cellulitis with Keflex which resolved, and is now undergoing repair.      No flowsheet data found.    Health Care Directive:  Patient has a Health Care Directive on file      Preoperative Review of :   reviewed - controlled substances reflected in medication list.      Status of Chronic Conditions:  A-FIB - Patient has a longstanding history of chronic A-fib currently on rate and rhythm control. Current treatment regimen includes Apixaban for stroke prevention and denies significant symptoms of lightheadedness, palpitations or dyspnea.     ANEMIA - Patient has a recent history of moderate-severe anemia, which has not been symptomatic. 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 Treatment has been ferrous sulfate.    CAD - Patient has a longstanding history of moderate-severe CAD. Patient denies recent chest pain or NTG use, denies exercise induced dyspnea or PND. Last EKG 9/6/21, a fib, EF 45%    HYPERTENSION - Patient has longstanding history of HTN , currently denies any symptoms referable to elevated blood pressure. Specifically denies chest pain, palpitations, dyspnea, orthopnea, PND or peripheral edema. Blood pressure readings have been in normal range. Current medication regimen is as listed below. Patient denies any side effects of medication.       Review of Systems  CONSTITUTIONAL: NEGATIVE for fever, chills, change in weight  ENT/MOUTH: NEGATIVE for ear, mouth and throat problems  RESP: NEGATIVE for significant cough or SOB  CV: NEGATIVE for chest pain, palpitations or peripheral edema    Patient Active Problem List    Diagnosis Date Noted      Cellulitis, wound, post-operative 10/14/2021     Priority: Medium     Rash and nonspecific skin eruption 09/28/2021     Priority: Medium     Chronic kidney disease, stage 3b (H) 09/23/2021     Priority: Medium     Chronic anticoagulation 09/11/2021     Priority: Medium     Eliquis atrial fibrillation       H/O major orthopedic surgery 09/10/2021     Priority: Medium     Procedure(s): Dr. Liam Haji  Procedure Date:  9/6/2021 - 9/8/2021  retrograde IM nailing right supracondylar distal femur fracture ([place] retrograde nail 12 mm x 320 mm, interlocked (     Pre-Procedure Diagnosis:  Closed fracture of distal end of right femur, unspecified fracture morphology, initial encounter (H) [S72.401A]      Post-Procedure Diagnosis:    Supracondylar distal femur fracture right     Postoperative plan:  Up in chair all meals.  Bed chair status.  Patient can weight-bear with full assist for transfers and pivots only.  Nonambulatory however x6 weeks.  Anticoagulation per cardiology given her underlying cardiac arrhythmia: Recommend minimal aspirin twice daily or Coumadin.  Return to clinic 2 weeks            DNR (do not resuscitate) 09/10/2021     Priority: Medium     Rapid atrial fibrillation (H) 09/06/2021     Priority: Medium     Closed fracture of distal end of right femur, unspecified fracture morphology, initial encounter (H) 09/06/2021     Priority: Medium     Procedure(s): Procedure Date:  9/6/2021 - 9/8/2021; Dr Liam Haji  Retrograde IM nailing right supracondylar distal femur fracture (Rojelio retrograde nail 12 mm x 320 mm, interlocked (   Pre-Procedure Diagnosis:  Closed fracture of distal end of right femur, unspecified fracture morphology, initial encounter (H) [S72.401A]    Post-Procedure Diagnosis:    Supracondylar distal femur fracture right     Postoperative plan:  Up in chair all meals.  Bed chair status.  Patient can weight-bear with full assist for transfers and pivots only.  Nonambulatory however x6  weeks.  Anticoagulation per cardiology given her underlying cardiac arrhythmia: Recommend minimal aspirin twice daily or Coumadin.  Return to clinic 2 weeks           Past Medical History:   Diagnosis Date     Anemia      Chronic kidney disease      Essential hypertension      Osteoarthritis      Stenosis of carotid artery      Past Surgical History:   Procedure Laterality Date     OPEN REDUCTION INTERNAL FIXATION FEMUR DISTAL Right 9/8/2021    Procedure: OPEN REDUCTION INTERNAL FIXATION, FRACTURE, FEMUR, DISTAL;  Surgeon: Liam Haji MD;  Location: [facility] OR     Current Outpatient Medications   Medication Sig Dispense Refill     acetaminophen (TYLENOL) 325 MG tablet Take 325 mg by mouth every 6 hours as needed for fever or pain        apixaban ANTICOAGULANT (ELIQUIS) 5 MG tablet Take 1 tablet (5 mg) by mouth 2 times daily (Patient taking differently: Take 5 mg by mouth 2 times daily Ld 10-28) 60 tablet 1     collagenase (SANTYL) 250 UNIT/GM external ointment Apply topically daily       cyanocobalamin (CYANOCOBALAMIN) 100 MCG tablet Take 1 tablet (100 mcg) by mouth daily 30 tablet 1     diltiazem ER (CARDIZEM SR) 90 MG 12 hr capsule Take 1 capsule (90 mg) by mouth 2 times daily 30 capsule 1     famotidine (PEPCID) 20 MG tablet Take 1 tablet (20 mg) by mouth every morning       ferrous sulfate (FEROSUL) 325 (65 Fe) MG tablet Take 325 mg by mouth daily as needed        metoprolol tartrate (LOPRESSOR) 100 MG tablet Take 1 tablet (100 mg) by mouth 2 times daily 60 tablet 1     oxyCODONE (ROXICODONE) 5 MG tablet Take 0.5 tablets (2.5 mg) by mouth every 6 hours 30 tablet 0     polyethylene glycol (MIRALAX) 17 GM/Dose powder Take 17 g by mouth daily 510 g 1     Vitamin D3 (CHOLECALCIFEROL) 25 mcg (1000 units) tablet Take 1 tablet (25 mcg) by mouth daily 30 tablet 0       No Known Allergies     Social History     Tobacco Use     Smoking status: Never Smoker     Smokeless tobacco: Never Used   Substance Use Topics  "    Alcohol use: Not Currently     Family History   Problem Relation Age of Onset     No Known Problems Mother      No Known Problems Father      History   Drug Use Unknown         Objective     /71   Pulse 69   Temp 96.9  F (36.1  C)   Resp 17   Ht 1.575 m (5' 2\")   Wt 58.8 kg (129 lb 9.6 oz)   SpO2 95%   BMI 23.70 kg/m      Physical Exam  Physical Exam   General appearance: alert, appears stated age and cooperative.   Head: Normocephalic, without obvious abnormality, atraumatic, Eyes: sclera anicteric.  Lungs: respirations without effort, LSCTA; no wheezing or rales.   Cardiovascular: S1, S2. Regular rate and rhythm.   ABDOMEN: Globular and soft, non tender.    Extremities: R knee in immobilizer.   Skin: Skin color, texture, turgor normal. No rashes or lesions  Neurologic:oriented. No focal deficits.   Psych: interacts well with caregivers, exhibits logical thought processes and connections, pleasant    Recent Labs   Lab Test 09/24/21  0436 09/16/21  0457 09/15/21  0558 09/13/21  0542 09/12/21  0514 09/10/21  0535 09/09/21  0534 09/08/21  1800 09/08/21  0529 09/08/21  0457 09/08/21  0457   HGB 9.1*  --  7.6*  7.6*   < >  --    < > 7.9*   < > 8.9*   < > 8.4*  8.4*   PLT  --   --  271  271  --  195  --  121*  --  138*   < > 116*   INR  --   --   --   --   --   --  1.15  --  1.09  --   --    NA  --   --   --   --   --   --  142  --   --   --  142   POTASSIUM  --  3.8 3.7   < > 3.9   < > 3.5  --  3.6   < > 3.5   CR  --   --  0.72  --  0.81  --  0.86  --  0.83   < > 0.82    < > = values in this interval not displayed.        Diagnostics:  Recent Results (from the past 168 hour(s))   Urinalysis Macroscopic    Collection Time: 10/23/21  8:00 PM   Result Value Ref Range    Color Urine Yellow Colorless, Straw, Light Yellow, Yellow    Appearance Urine Turbid (A) Clear    Glucose Urine Negative Negative mg/dL    Bilirubin Urine Negative Negative    Ketones Urine Negative Negative mg/dL    Specific Gravity " Urine 1.032 (H) 1.001 - 1.030    Blood Urine 0.1 mg/dL (A) Negative    pH Urine 5.5 5.0 - 7.0    Protein Albumin Urine 100  (A) Negative mg/dL    Urobilinogen Urine <2.0 <2.0 mg/dL    Nitrite Urine Negative Negative    Leukocyte Esterase Urine 500 Castro/uL (A) Negative   Urine Culture    Collection Time: 10/23/21  8:00 PM    Specimen: Urine, Straight Catheter   Result Value Ref Range    Culture >100,000 CFU/mL Escherichia coli (A)     Culture 10,000-50,000 CFU/mL Enterobacter cloacae (A)        Susceptibility    Enterobacter cloacae - BIBIANA     Ampicillin >16 Resistant ug/mL     Cefazolin >16 Resistant ug/mL     Cefepime <=1 Susceptible ug/mL     Ceftriaxone <=1 Susceptible ug/mL     Ciprofloxacin <=0.25 Susceptible ug/mL     Gentamicin <=2 Susceptible ug/mL     Levofloxacin <=0.5 Susceptible ug/mL     Meropenem <=0.5 Susceptible ug/mL     Tetracycline <=2 Susceptible ug/mL     Tobramycin <=2 Susceptible ug/mL     Nitrofurantoin >64 Resistant ug/mL     Trimethoprim/Sulfamethoxazole <=0.5 Susceptible ug/mL    Escherichia coli - BIBIANA     Ampicillin <=4 Susceptible ug/mL     Cefazolin <=1 Susceptible ug/mL     Cefepime <=1 Susceptible ug/mL     Ceftriaxone <=1 Susceptible ug/mL     Ciprofloxacin <=0.25 Susceptible ug/mL     Gentamicin <=2 Susceptible ug/mL     Levofloxacin <=0.5 Susceptible ug/mL     Meropenem <=0.5 Susceptible ug/mL     Nitrofurantoin <=16 Susceptible ug/mL     Tetracycline <=2 Susceptible ug/mL     Tobramycin <=2 Susceptible ug/mL     Trimethoprim/Sulfamethoxazole <=0.5 Susceptible ug/mL   CBC with platelets    Collection Time: 10/28/21  5:32 AM   Result Value Ref Range    WBC Count 7.0 4.0 - 11.0 10e3/uL    RBC Count 3.51 (L) 3.80 - 5.20 10e6/uL    Hemoglobin 10.9 (L) 11.7 - 15.7 g/dL    Hematocrit 33.3 (L) 35.0 - 47.0 %    MCV 95 78 - 100 fL    MCH 31.1 26.5 - 33.0 pg    MCHC 32.7 31.5 - 36.5 g/dL    RDW 14.0 10.0 - 15.0 %    Platelet Count 232 150 - 450 10e3/uL   Basic metabolic panel    Collection  Time: 10/28/21  5:32 AM   Result Value Ref Range    Sodium 143 136 - 145 mmol/L    Potassium 2.6 (LL) 3.5 - 5.0 mmol/L    Chloride 105 98 - 107 mmol/L    Carbon Dioxide (CO2) 28 22 - 31 mmol/L    Anion Gap 10 5 - 18 mmol/L    Urea Nitrogen 19 8 - 28 mg/dL    Creatinine 0.76 0.60 - 1.10 mg/dL    Calcium 9.0 8.5 - 10.5 mg/dL    Glucose 92 70 - 125 mg/dL    GFR Estimate 66 >60 mL/min/1.73m2      No EKG this visit, completed in the last 90 days.    Revised Cardiac Risk Index (RCRI):  The patient has the following serious cardiovascular risks for perioperative complications:   - Coronary Artery Disease (MI, positive stress test, angina, Qs on EKG) = 1 point   - Congestive Heart Failure (pulmonary edema, PND, s3 jaimee, CXR with pulmonary congestion, basilar rales) = 1 point     RCRI Interpretation: 1 point: Class II (low risk - 0.9% complication rate)           Signed Electronically by: Sherry Wu CNP  Copy of this evaluation report is provided to requesting physician.

## 2021-10-27 ENCOUNTER — LAB REQUISITION (OUTPATIENT)
Dept: LAB | Facility: CLINIC | Age: 86
End: 2021-10-27
Payer: MEDICARE

## 2021-10-27 DIAGNOSIS — I12.9 HYPERTENSIVE CHRONIC KIDNEY DISEASE WITH STAGE 1 THROUGH STAGE 4 CHRONIC KIDNEY DISEASE, OR UNSPECIFIED CHRONIC KIDNEY DISEASE: ICD-10-CM

## 2021-10-28 ENCOUNTER — TRANSFERRED RECORDS (OUTPATIENT)
Dept: HEALTH INFORMATION MANAGEMENT | Facility: CLINIC | Age: 86
End: 2021-10-28
Payer: MEDICARE

## 2021-10-28 ENCOUNTER — LAB REQUISITION (OUTPATIENT)
Dept: LAB | Facility: CLINIC | Age: 86
End: 2021-10-28
Payer: MEDICARE

## 2021-10-28 DIAGNOSIS — I10 ESSENTIAL (PRIMARY) HYPERTENSION: ICD-10-CM

## 2021-10-28 LAB
ANION GAP SERPL CALCULATED.3IONS-SCNC: 10 MMOL/L (ref 5–18)
BACTERIA UR CULT: ABNORMAL
BACTERIA UR CULT: ABNORMAL
BUN SERPL-MCNC: 19 MG/DL (ref 8–28)
CALCIUM SERPL-MCNC: 9 MG/DL (ref 8.5–10.5)
CHLORIDE BLD-SCNC: 105 MMOL/L (ref 98–107)
CO2 SERPL-SCNC: 28 MMOL/L (ref 22–31)
CREAT SERPL-MCNC: 0.76 MG/DL (ref 0.6–1.1)
ERYTHROCYTE [DISTWIDTH] IN BLOOD BY AUTOMATED COUNT: 14 % (ref 10–15)
GFR SERPL CREATININE-BSD FRML MDRD: 66 ML/MIN/1.73M2
GLUCOSE BLD-MCNC: 92 MG/DL (ref 70–125)
HCT VFR BLD AUTO: 33.3 % (ref 35–47)
HGB BLD-MCNC: 10.9 G/DL (ref 11.7–15.7)
MCH RBC QN AUTO: 31.1 PG (ref 26.5–33)
MCHC RBC AUTO-ENTMCNC: 32.7 G/DL (ref 31.5–36.5)
MCV RBC AUTO: 95 FL (ref 78–100)
PLATELET # BLD AUTO: 232 10E3/UL (ref 150–450)
POTASSIUM BLD-SCNC: 2.6 MMOL/L (ref 3.5–5)
RBC # BLD AUTO: 3.51 10E6/UL (ref 3.8–5.2)
SODIUM SERPL-SCNC: 143 MMOL/L (ref 136–145)
WBC # BLD AUTO: 7 10E3/UL (ref 4–11)

## 2021-10-28 PROCEDURE — 85027 COMPLETE CBC AUTOMATED: CPT

## 2021-10-28 PROCEDURE — 82310 ASSAY OF CALCIUM: CPT

## 2021-10-28 PROCEDURE — 36415 COLL VENOUS BLD VENIPUNCTURE: CPT

## 2021-10-29 ENCOUNTER — NURSING HOME VISIT (OUTPATIENT)
Dept: GERIATRICS | Facility: CLINIC | Age: 86
End: 2021-10-29
Payer: MEDICARE

## 2021-10-29 VITALS
WEIGHT: 133.2 LBS | OXYGEN SATURATION: 98 % | DIASTOLIC BLOOD PRESSURE: 84 MMHG | BODY MASS INDEX: 24.51 KG/M2 | HEIGHT: 62 IN | HEART RATE: 79 BPM | TEMPERATURE: 96.5 F | SYSTOLIC BLOOD PRESSURE: 145 MMHG | RESPIRATION RATE: 18 BRPM

## 2021-10-29 DIAGNOSIS — N30.00 ACUTE CYSTITIS WITHOUT HEMATURIA: ICD-10-CM

## 2021-10-29 LAB — POTASSIUM BLD-SCNC: 3.5 MMOL/L (ref 3.5–5)

## 2021-10-29 PROCEDURE — 36415 COLL VENOUS BLD VENIPUNCTURE: CPT | Performed by: FAMILY MEDICINE

## 2021-10-29 PROCEDURE — 99309 SBSQ NF CARE MODERATE MDM 30: CPT | Performed by: NURSE PRACTITIONER

## 2021-10-29 PROCEDURE — 84132 ASSAY OF SERUM POTASSIUM: CPT | Performed by: FAMILY MEDICINE

## 2021-10-29 ASSESSMENT — MIFFLIN-ST. JEOR: SCORE: 942.44

## 2021-10-29 NOTE — LETTER
10/29/2021        RE: Elysia Lane  [address]  [facility] [address]        [facility]  PRIMARY CARE PROVIDER AND CLINIC:  Elif Alcantara MD, [facility] [address] [address] /   Chief Complaint   Patient presents with     RECHECK      [facility] Medical Record Number:  [id number]  Place of Service where encounter took place:  [facility] () [[id number]]    Elysia Lane  is a 89 year old  (12/2/1923), admitted to the above facility from  [facility]. Hospital stay 9/6 through 9/16..   HPI:  Elysia has a history of HTN and anemia who had a fall at home and presented to [facility] ED with  R leg pain. She had closed fracture of the distal femur and received ORIF and is non weight bearing. Completed IV ceftraixone for UTI with sepsis.   Also found in a fib with RVR, which improved on metoprolol 100mg and diltiazem, as well as apixaban.     Today: patient had UA/UC for c/o dysuria last week which has resolved; no longer with any complaints. Culture grew our e coli, however now asymptomatic bacturia. Surgeon's preference is to treat this prior to surgery on 11/2.    CODE STATUS/ADVANCE DIRECTIVES DISCUSSION:  Prior  DNR / DNI  ALLERGIES: No Known Allergies   PAST MEDICAL HISTORY:   Past Medical History:   Diagnosis Date     Anemia      Chronic kidney disease      Essential hypertension      Osteoarthritis      Stenosis of carotid artery       PAST SURGICAL HISTORY:   has a past surgical history that includes Open reduction internal fixation femur distal (Right, 9/8/2021) and Irrigation and debridement lower extremity, combined (Right, 11/1/2021).  FAMILY HISTORY: family history includes No Known Problems in her father and mother.  SOCIAL HISTORY:   reports that she has never smoked. She has never used smokeless tobacco. She reports previous alcohol use. She reports that she does not use drugs.  Patient's living condition: NICHOLE    Post Discharge Medication  "Reconciliation Status: discharge medications reconciled, continue medications without change  Current Outpatient Medications   Medication Sig     acetaminophen (TYLENOL) 500 MG tablet Take 1,000 mg by mouth 3 times daily     apixaban ANTICOAGULANT (ELIQUIS) 5 MG tablet Take 1 tablet (5 mg) by mouth 2 times daily     collagenase (SANTYL) 250 UNIT/GM external ointment Apply topically daily     cyanocobalamin (CYANOCOBALAMIN) 100 MCG tablet Take 1 tablet (100 mcg) by mouth daily     diltiazem ER (CARDIZEM SR) 90 MG 12 hr capsule Take 1 capsule (90 mg) by mouth 2 times daily     famotidine (PEPCID) 20 MG tablet Take 1 tablet (20 mg) by mouth every morning     ferrous sulfate (FEROSUL) 325 (65 Fe) MG tablet Take 325 mg by mouth daily (with breakfast)      metoprolol tartrate (LOPRESSOR) 100 MG tablet Take 1 tablet (100 mg) by mouth 2 times daily     polyethylene glycol (MIRALAX) 17 GM/Dose powder Take 17 g by mouth daily     potassium chloride ER (KLOR-CON M) 20 MEQ CR tablet Take 20 mEq by mouth daily     traMADol (ULTRAM) 50 MG tablet Take 0.5 tablets (25 mg) by mouth every 6 hours as needed for severe pain     Vitamin D3 (CHOLECALCIFEROL) 25 mcg (1000 units) tablet Take 1 tablet (25 mcg) by mouth daily     No current facility-administered medications for this visit.       ROS:  4 point ROS including Respiratory, CV, GI and , other than that noted in the HPI,  is negative    Vitals:  BP (!) 145/84   Pulse 79   Temp (!) 96.5  F (35.8  C)   Resp 18   Ht 1.575 m (5' 2\")   Wt 60.4 kg (133 lb 3.2 oz)   SpO2 98%   BMI 24.36 kg/m    Exam:  Physical Exam   General appearance: alert, appears stated age and cooperative.   Head: Normocephalic, without obvious abnormality, atraumatic, Eyes: sclera anicteric.  Lungs: respirations without effort, LSCTA; no wheezing or rales.   Cardiovascular: S1, S2. Regular rate and rhythm.   ABDOMEN: Globular and soft, non tender.    Skin: Reviewed incision to right knee, some small " quarter centimeter opening to the distal end of the lateral incision. No surrounding erythema. No swelling. Scant drainage.  Neurologic:oriented. No focal deficits.   Psych: interacts well with caregivers, exhibits logical thought processes and connections, pleasant    Lab/Diagnostic data:  Recent labs in [facility] reviewed by me today.     ASSESSMENT/PLAN:    UTI: patient had UA/UC for c/o dysuria last week which has resolved; no longer with any complaints. Culture grew our e coli, however now asymptomatic bacturia. Surgeon's preference is to treat this prior to surgery on 11/2.  -Keflex 500mg po bid x 5 days.     Pressure sores to coccyx, gluteal fold: Unstable.  -Wound care MD following, continue per their orders.    Cellulitis of postop incision (primary encounter diagnosis):  (W19.XXXA) Fall    (S72.401A) Closed fracture of distal end of right femur, unspecified fracture morphology, initial encounter (H): Unstable.  Comment: No c/o pain; very jovial during visit. Wound remains the same: Is approximated with a small less than quarter centimeter opening at the distal end of the lateral incision. Scant serous sanguinous drainage. No surrounding erythema.  Discussed pain management, she is agreeable to decreasing the every 8 hours.  Plan:   -Decrease oxycodone 2.5 mg p.o. every 8 hours as needed.  -Ice to right knee 4 times daily as needed.  -Follow up with ortho next week.  -PT, OT at their discretion.   -Non weightbearing 6 weeks.     (R21) Rash and nonspecific skin eruption: Appears to have cleared; no s/sx of rash.  We will continue famotidine for GI protection due to blood thinner and recent prolonged hospitalization.  Plan:   -Continue famotidine 20mg daily.     (I48.91) Rapid atrial fibrillation (H)  Comment: new onset.   Plan:   -metoprolol 100mg  -diltiazem 90 daily.   -apixiban.       Anemia, postop: Hemoglobin up to 9.  Improved from discharge in the sevens.  -Continues on daily iron  supplementation.    Electronically signed by:  Sherry Wu, SMOOTH         Sincerely,        Sherry Wu, CNP

## 2021-10-30 ENCOUNTER — LAB REQUISITION (OUTPATIENT)
Dept: LAB | Facility: CLINIC | Age: 86
End: 2021-10-30
Payer: MEDICARE

## 2021-10-30 DIAGNOSIS — U07.1 COVID-19: ICD-10-CM

## 2021-10-30 PROCEDURE — U0005 INFEC AGEN DETEC AMPLI PROBE: HCPCS

## 2021-10-31 LAB — SARS-COV-2 RNA RESP QL NAA+PROBE: NEGATIVE

## 2021-11-01 ENCOUNTER — ANCILLARY PROCEDURE (OUTPATIENT)
Dept: ULTRASOUND IMAGING | Facility: CLINIC | Age: 86
End: 2021-11-01
Attending: ANESTHESIOLOGY
Payer: MEDICARE

## 2021-11-01 ENCOUNTER — ANESTHESIA (OUTPATIENT)
Dept: SURGERY | Facility: CLINIC | Age: 86
End: 2021-11-01
Payer: MEDICARE

## 2021-11-01 ENCOUNTER — MEDICAL CORRESPONDENCE (OUTPATIENT)
Dept: HEALTH INFORMATION MANAGEMENT | Facility: CLINIC | Age: 86
End: 2021-11-01
Payer: MEDICARE

## 2021-11-01 ENCOUNTER — HOSPITAL ENCOUNTER (OUTPATIENT)
Facility: CLINIC | Age: 86
Discharge: NURSING, REHAB OR LONG-TERM CARE | End: 2021-11-02
Attending: ORTHOPAEDIC SURGERY | Admitting: ORTHOPAEDIC SURGERY
Payer: MEDICARE

## 2021-11-01 ENCOUNTER — ANESTHESIA EVENT (OUTPATIENT)
Dept: SURGERY | Facility: CLINIC | Age: 86
End: 2021-11-01
Payer: MEDICARE

## 2021-11-01 DIAGNOSIS — T81.49XA CELLULITIS, WOUND, POST-OPERATIVE: Primary | ICD-10-CM

## 2021-11-01 PROBLEM — Z98.890 HISTORY OF INCISION AND DRAINAGE: Status: ACTIVE | Noted: 2021-11-01

## 2021-11-01 LAB
CREAT SERPL-MCNC: 0.83 MG/DL (ref 0.6–1.1)
CRP SERPL HS-MCNC: 37 MG/L (ref 0–3)
ERYTHROCYTE [DISTWIDTH] IN BLOOD BY AUTOMATED COUNT: 14.5 % (ref 10–15)
ERYTHROCYTE [SEDIMENTATION RATE] IN BLOOD BY WESTERGREN METHOD: 27 MM/HR (ref 0–20)
GFR SERPL CREATININE-BSD FRML MDRD: 59 ML/MIN/1.73M2
HCT VFR BLD AUTO: 38.1 % (ref 35–47)
HGB BLD-MCNC: 11.8 G/DL (ref 11.7–15.7)
HOLD SPECIMEN: NORMAL
INR PPP: 1.02 (ref 0.85–1.15)
MCH RBC QN AUTO: 30 PG (ref 26.5–33)
MCHC RBC AUTO-ENTMCNC: 31 G/DL (ref 31.5–36.5)
MCV RBC AUTO: 97 FL (ref 78–100)
PLATELET # BLD AUTO: 243 10E3/UL (ref 150–450)
POTASSIUM BLD-SCNC: 4.1 MMOL/L (ref 3.5–5)
RBC # BLD AUTO: 3.93 10E6/UL (ref 3.8–5.2)
WBC # BLD AUTO: 7.4 10E3/UL (ref 4–11)

## 2021-11-01 PROCEDURE — 250N000011 HC RX IP 250 OP 636: Performed by: ANESTHESIOLOGY

## 2021-11-01 PROCEDURE — 250N000011 HC RX IP 250 OP 636: Performed by: PHYSICIAN ASSISTANT

## 2021-11-01 PROCEDURE — 86141 C-REACTIVE PROTEIN HS: CPT | Performed by: PHYSICIAN ASSISTANT

## 2021-11-01 PROCEDURE — 250N000013 HC RX MED GY IP 250 OP 250 PS 637: Performed by: HOSPITALIST

## 2021-11-01 PROCEDURE — 85027 COMPLETE CBC AUTOMATED: CPT | Performed by: PHYSICIAN ASSISTANT

## 2021-11-01 PROCEDURE — 82565 ASSAY OF CREATININE: CPT | Performed by: PHYSICIAN ASSISTANT

## 2021-11-01 PROCEDURE — 258N000001 HC RX 258: Performed by: PHYSICIAN ASSISTANT

## 2021-11-01 PROCEDURE — 370N000017 HC ANESTHESIA TECHNICAL FEE, PER MIN: Performed by: ORTHOPAEDIC SURGERY

## 2021-11-01 PROCEDURE — 85610 PROTHROMBIN TIME: CPT | Performed by: PHYSICIAN ASSISTANT

## 2021-11-01 PROCEDURE — 250N000013 HC RX MED GY IP 250 OP 250 PS 637: Performed by: PHYSICIAN ASSISTANT

## 2021-11-01 PROCEDURE — 250N000009 HC RX 250: Performed by: ANESTHESIOLOGY

## 2021-11-01 PROCEDURE — 258N000003 HC RX IP 258 OP 636: Performed by: NURSE ANESTHETIST, CERTIFIED REGISTERED

## 2021-11-01 PROCEDURE — 85652 RBC SED RATE AUTOMATED: CPT | Performed by: PHYSICIAN ASSISTANT

## 2021-11-01 PROCEDURE — 36415 COLL VENOUS BLD VENIPUNCTURE: CPT | Performed by: PHYSICIAN ASSISTANT

## 2021-11-01 PROCEDURE — 999N000141 HC STATISTIC PRE-PROCEDURE NURSING ASSESSMENT: Performed by: ORTHOPAEDIC SURGERY

## 2021-11-01 PROCEDURE — 250N000009 HC RX 250: Performed by: NURSE ANESTHETIST, CERTIFIED REGISTERED

## 2021-11-01 PROCEDURE — 272N000001 HC OR GENERAL SUPPLY STERILE: Performed by: ORTHOPAEDIC SURGERY

## 2021-11-01 PROCEDURE — 710N000010 HC RECOVERY PHASE 1, LEVEL 2, PER MIN: Performed by: ORTHOPAEDIC SURGERY

## 2021-11-01 PROCEDURE — 84132 ASSAY OF SERUM POTASSIUM: CPT | Performed by: PHYSICIAN ASSISTANT

## 2021-11-01 PROCEDURE — 258N000003 HC RX IP 258 OP 636: Performed by: ANESTHESIOLOGY

## 2021-11-01 PROCEDURE — 258N000003 HC RX IP 258 OP 636: Performed by: PHYSICIAN ASSISTANT

## 2021-11-01 PROCEDURE — 250N000011 HC RX IP 250 OP 636: Performed by: NURSE ANESTHETIST, CERTIFIED REGISTERED

## 2021-11-01 PROCEDURE — 360N000075 HC SURGERY LEVEL 2, PER MIN: Performed by: ORTHOPAEDIC SURGERY

## 2021-11-01 RX ORDER — SODIUM CHLORIDE, SODIUM LACTATE, POTASSIUM CHLORIDE, CALCIUM CHLORIDE 600; 310; 30; 20 MG/100ML; MG/100ML; MG/100ML; MG/100ML
INJECTION, SOLUTION INTRAVENOUS CONTINUOUS
Status: DISCONTINUED | OUTPATIENT
Start: 2021-11-01 | End: 2021-11-01 | Stop reason: HOSPADM

## 2021-11-01 RX ORDER — POLYETHYLENE GLYCOL 3350 17 G/17G
17 POWDER, FOR SOLUTION ORAL DAILY
Status: DISCONTINUED | OUTPATIENT
Start: 2021-11-02 | End: 2021-11-02 | Stop reason: HOSPADM

## 2021-11-01 RX ORDER — NALOXONE HYDROCHLORIDE 0.4 MG/ML
0.4 INJECTION, SOLUTION INTRAMUSCULAR; INTRAVENOUS; SUBCUTANEOUS
Status: DISCONTINUED | OUTPATIENT
Start: 2021-11-01 | End: 2021-11-02 | Stop reason: HOSPADM

## 2021-11-01 RX ORDER — DEXAMETHASONE SODIUM PHOSPHATE 4 MG/ML
INJECTION, SOLUTION INTRA-ARTICULAR; INTRALESIONAL; INTRAMUSCULAR; INTRAVENOUS; SOFT TISSUE PRN
Status: DISCONTINUED | OUTPATIENT
Start: 2021-11-01 | End: 2021-11-01

## 2021-11-01 RX ORDER — OXYCODONE HYDROCHLORIDE 5 MG/1
5 TABLET ORAL EVERY 4 HOURS PRN
Status: DISCONTINUED | OUTPATIENT
Start: 2021-11-01 | End: 2021-11-01 | Stop reason: HOSPADM

## 2021-11-01 RX ORDER — NALOXONE HYDROCHLORIDE 0.4 MG/ML
0.2 INJECTION, SOLUTION INTRAMUSCULAR; INTRAVENOUS; SUBCUTANEOUS
Status: DISCONTINUED | OUTPATIENT
Start: 2021-11-01 | End: 2021-11-02 | Stop reason: HOSPADM

## 2021-11-01 RX ORDER — PROPOFOL 10 MG/ML
INJECTION, EMULSION INTRAVENOUS PRN
Status: DISCONTINUED | OUTPATIENT
Start: 2021-11-01 | End: 2021-11-01

## 2021-11-01 RX ORDER — LIDOCAINE 40 MG/G
CREAM TOPICAL
Status: DISCONTINUED | OUTPATIENT
Start: 2021-11-01 | End: 2021-11-01 | Stop reason: HOSPADM

## 2021-11-01 RX ORDER — SCOLOPAMINE TRANSDERMAL SYSTEM 1 MG/1
1 PATCH, EXTENDED RELEASE TRANSDERMAL ONCE
Status: DISCONTINUED | OUTPATIENT
Start: 2021-11-01 | End: 2021-11-01 | Stop reason: HOSPADM

## 2021-11-01 RX ORDER — FENTANYL CITRATE 50 UG/ML
25 INJECTION, SOLUTION INTRAMUSCULAR; INTRAVENOUS EVERY 5 MIN PRN
Status: DISCONTINUED | OUTPATIENT
Start: 2021-11-01 | End: 2021-11-01 | Stop reason: HOSPADM

## 2021-11-01 RX ORDER — ONDANSETRON 2 MG/ML
4 INJECTION INTRAMUSCULAR; INTRAVENOUS EVERY 6 HOURS PRN
Status: DISCONTINUED | OUTPATIENT
Start: 2021-11-01 | End: 2021-11-02 | Stop reason: HOSPADM

## 2021-11-01 RX ORDER — CEFAZOLIN SODIUM 2 G/100ML
2 INJECTION, SOLUTION INTRAVENOUS
Status: COMPLETED | OUTPATIENT
Start: 2021-11-01 | End: 2021-11-01

## 2021-11-01 RX ORDER — AMOXICILLIN 250 MG
1 CAPSULE ORAL 2 TIMES DAILY
Status: DISCONTINUED | OUTPATIENT
Start: 2021-11-01 | End: 2021-11-02 | Stop reason: HOSPADM

## 2021-11-01 RX ORDER — ACETAMINOPHEN 500 MG
1000 TABLET ORAL
COMMUNITY

## 2021-11-01 RX ORDER — METOPROLOL TARTRATE 50 MG
100 TABLET ORAL 2 TIMES DAILY
Status: DISCONTINUED | OUTPATIENT
Start: 2021-11-01 | End: 2021-11-02 | Stop reason: HOSPADM

## 2021-11-01 RX ORDER — ACETAMINOPHEN 325 MG/1
975 TABLET ORAL EVERY 8 HOURS
Status: DISCONTINUED | OUTPATIENT
Start: 2021-11-01 | End: 2021-11-02 | Stop reason: HOSPADM

## 2021-11-01 RX ORDER — BISACODYL 10 MG
10 SUPPOSITORY, RECTAL RECTAL DAILY PRN
Status: DISCONTINUED | OUTPATIENT
Start: 2021-11-01 | End: 2021-11-02 | Stop reason: HOSPADM

## 2021-11-01 RX ORDER — ONDANSETRON 2 MG/ML
INJECTION INTRAMUSCULAR; INTRAVENOUS PRN
Status: DISCONTINUED | OUTPATIENT
Start: 2021-11-01 | End: 2021-11-01

## 2021-11-01 RX ORDER — SODIUM CHLORIDE, SODIUM LACTATE, POTASSIUM CHLORIDE, CALCIUM CHLORIDE 600; 310; 30; 20 MG/100ML; MG/100ML; MG/100ML; MG/100ML
INJECTION, SOLUTION INTRAVENOUS CONTINUOUS
Status: DISCONTINUED | OUTPATIENT
Start: 2021-11-01 | End: 2021-11-02 | Stop reason: HOSPADM

## 2021-11-01 RX ORDER — CEPHALEXIN 500 MG/1
500 CAPSULE ORAL 2 TIMES DAILY
COMMUNITY
Start: 2021-10-29 | End: 2021-11-02

## 2021-11-01 RX ORDER — CEFAZOLIN SODIUM 1 G/3ML
1 INJECTION, POWDER, FOR SOLUTION INTRAMUSCULAR; INTRAVENOUS EVERY 12 HOURS
Status: COMPLETED | OUTPATIENT
Start: 2021-11-02 | End: 2021-11-02

## 2021-11-01 RX ORDER — BUPIVACAINE HYDROCHLORIDE 5 MG/ML
INJECTION, SOLUTION EPIDURAL; INTRACAUDAL
Status: COMPLETED | OUTPATIENT
Start: 2021-11-01 | End: 2021-11-01

## 2021-11-01 RX ORDER — POTASSIUM CHLORIDE 1500 MG/1
20 TABLET, EXTENDED RELEASE ORAL DAILY
COMMUNITY
End: 2022-03-14

## 2021-11-01 RX ORDER — LIDOCAINE 40 MG/G
CREAM TOPICAL
Status: DISCONTINUED | OUTPATIENT
Start: 2021-11-01 | End: 2021-11-02 | Stop reason: HOSPADM

## 2021-11-01 RX ORDER — DILTIAZEM HYDROCHLORIDE 90 MG/1
90 CAPSULE, EXTENDED RELEASE ORAL 2 TIMES DAILY
Status: DISCONTINUED | OUTPATIENT
Start: 2021-11-01 | End: 2021-11-02 | Stop reason: HOSPADM

## 2021-11-01 RX ORDER — MAGNESIUM SULFATE 4 G/50ML
4 INJECTION INTRAVENOUS ONCE
Status: COMPLETED | OUTPATIENT
Start: 2021-11-01 | End: 2021-11-01

## 2021-11-01 RX ORDER — FENTANYL CITRATE 50 UG/ML
50 INJECTION, SOLUTION INTRAMUSCULAR; INTRAVENOUS
Status: DISCONTINUED | OUTPATIENT
Start: 2021-11-01 | End: 2021-11-01 | Stop reason: HOSPADM

## 2021-11-01 RX ORDER — PROCHLORPERAZINE MALEATE 5 MG
5 TABLET ORAL EVERY 6 HOURS PRN
Status: DISCONTINUED | OUTPATIENT
Start: 2021-11-01 | End: 2021-11-02 | Stop reason: HOSPADM

## 2021-11-01 RX ORDER — CEFAZOLIN SODIUM 2 G/100ML
2 INJECTION, SOLUTION INTRAVENOUS SEE ADMIN INSTRUCTIONS
Status: DISCONTINUED | OUTPATIENT
Start: 2021-11-01 | End: 2021-11-01 | Stop reason: HOSPADM

## 2021-11-01 RX ORDER — HYDROMORPHONE HCL IN WATER/PF 6 MG/30 ML
0.2 PATIENT CONTROLLED ANALGESIA SYRINGE INTRAVENOUS EVERY 5 MIN PRN
Status: DISCONTINUED | OUTPATIENT
Start: 2021-11-01 | End: 2021-11-01 | Stop reason: HOSPADM

## 2021-11-01 RX ORDER — ONDANSETRON 4 MG/1
4 TABLET, ORALLY DISINTEGRATING ORAL EVERY 6 HOURS PRN
Status: DISCONTINUED | OUTPATIENT
Start: 2021-11-01 | End: 2021-11-02 | Stop reason: HOSPADM

## 2021-11-01 RX ORDER — PROPOFOL 10 MG/ML
INJECTION, EMULSION INTRAVENOUS CONTINUOUS PRN
Status: DISCONTINUED | OUTPATIENT
Start: 2021-11-01 | End: 2021-11-01

## 2021-11-01 RX ORDER — ACETAMINOPHEN 325 MG/1
650 TABLET ORAL EVERY 4 HOURS PRN
Status: DISCONTINUED | OUTPATIENT
Start: 2021-11-04 | End: 2021-11-02 | Stop reason: HOSPADM

## 2021-11-01 RX ORDER — FAMOTIDINE 20 MG/1
20 TABLET, FILM COATED ORAL EVERY MORNING
Status: DISCONTINUED | OUTPATIENT
Start: 2021-11-02 | End: 2021-11-02 | Stop reason: HOSPADM

## 2021-11-01 RX ORDER — HYDROMORPHONE HCL IN WATER/PF 6 MG/30 ML
0.2 PATIENT CONTROLLED ANALGESIA SYRINGE INTRAVENOUS
Status: DISCONTINUED | OUTPATIENT
Start: 2021-11-01 | End: 2021-11-02 | Stop reason: HOSPADM

## 2021-11-01 RX ORDER — ONDANSETRON 2 MG/ML
4 INJECTION INTRAMUSCULAR; INTRAVENOUS EVERY 30 MIN PRN
Status: DISCONTINUED | OUTPATIENT
Start: 2021-11-01 | End: 2021-11-01 | Stop reason: HOSPADM

## 2021-11-01 RX ORDER — ACETAMINOPHEN 325 MG/1
975 TABLET ORAL ONCE
Status: COMPLETED | OUTPATIENT
Start: 2021-11-01 | End: 2021-11-01

## 2021-11-01 RX ORDER — ONDANSETRON 4 MG/1
4 TABLET, ORALLY DISINTEGRATING ORAL EVERY 30 MIN PRN
Status: DISCONTINUED | OUTPATIENT
Start: 2021-11-01 | End: 2021-11-01 | Stop reason: HOSPADM

## 2021-11-01 RX ORDER — KETAMINE HYDROCHLORIDE 50 MG/ML
INJECTION, SOLUTION INTRAMUSCULAR; INTRAVENOUS PRN
Status: DISCONTINUED | OUTPATIENT
Start: 2021-11-01 | End: 2021-11-01

## 2021-11-01 RX ADMIN — SENNOSIDES AND DOCUSATE SODIUM 1 TABLET: 50; 8.6 TABLET ORAL at 20:39

## 2021-11-01 RX ADMIN — ACETAMINOPHEN 975 MG: 325 TABLET ORAL at 13:32

## 2021-11-01 RX ADMIN — ONDANSETRON 4 MG: 2 INJECTION INTRAMUSCULAR; INTRAVENOUS at 15:45

## 2021-11-01 RX ADMIN — DEXAMETHASONE SODIUM PHOSPHATE 4 MG: 4 INJECTION, SOLUTION INTRA-ARTICULAR; INTRALESIONAL; INTRAMUSCULAR; INTRAVENOUS; SOFT TISSUE at 15:45

## 2021-11-01 RX ADMIN — SODIUM CHLORIDE, POTASSIUM CHLORIDE, SODIUM LACTATE AND CALCIUM CHLORIDE 250 ML: 600; 310; 30; 20 INJECTION, SOLUTION INTRAVENOUS at 13:32

## 2021-11-01 RX ADMIN — METOPROLOL TARTRATE 100 MG: 50 TABLET, FILM COATED ORAL at 22:21

## 2021-11-01 RX ADMIN — PROPOFOL 50 MCG/KG/MIN: 10 INJECTION, EMULSION INTRAVENOUS at 15:37

## 2021-11-01 RX ADMIN — PROPOFOL 30 MG: 10 INJECTION, EMULSION INTRAVENOUS at 15:36

## 2021-11-01 RX ADMIN — PHENYLEPHRINE HYDROCHLORIDE 0.2 MCG/KG/MIN: 10 INJECTION INTRAVENOUS at 15:50

## 2021-11-01 RX ADMIN — ACETAMINOPHEN 975 MG: 325 TABLET ORAL at 20:38

## 2021-11-01 RX ADMIN — KETAMINE HYDROCHLORIDE 20 MG: 50 INJECTION, SOLUTION INTRAMUSCULAR; INTRAVENOUS at 15:32

## 2021-11-01 RX ADMIN — PHENYLEPHRINE HYDROCHLORIDE 100 MCG: 10 INJECTION INTRAVENOUS at 15:59

## 2021-11-01 RX ADMIN — DILTIAZEM HYDROCHLORIDE 90 MG: 90 CAPSULE, EXTENDED RELEASE ORAL at 22:21

## 2021-11-01 RX ADMIN — CEFAZOLIN SODIUM 2 G: 2 INJECTION, SOLUTION INTRAVENOUS at 15:45

## 2021-11-01 RX ADMIN — SODIUM CHLORIDE, POTASSIUM CHLORIDE, SODIUM LACTATE AND CALCIUM CHLORIDE: 600; 310; 30; 20 INJECTION, SOLUTION INTRAVENOUS at 19:32

## 2021-11-01 RX ADMIN — BUPIVACAINE HYDROCHLORIDE 2.8 ML: 5 INJECTION, SOLUTION EPIDURAL; INTRACAUDAL; PERINEURAL at 15:34

## 2021-11-01 RX ADMIN — MAGNESIUM SULFATE HEPTAHYDRATE 4 G: 80 INJECTION, SOLUTION INTRAVENOUS at 13:40

## 2021-11-01 ASSESSMENT — ENCOUNTER SYMPTOMS: DYSRHYTHMIAS: 1

## 2021-11-01 ASSESSMENT — MIFFLIN-ST. JEOR: SCORE: 942.38

## 2021-11-01 NOTE — ANESTHESIA POSTPROCEDURE EVALUATION
Patient: Elysia Lane    Procedure: Procedure(s):  IRRIGATION AND DEBRIDEMENT OF RIGHT LATERAL THIGH WOUND       Diagnosis:Open thigh wound [S71.109A]  Diagnosis Additional Information: No value filed.    Anesthesia Type:  Spinal    Note:  Disposition: Outpatient   Postop Pain Control: Uneventful            Sign Out: Well controlled pain   PONV: No   Neuro/Psych: Uneventful            Sign Out: Acceptable/Baseline neuro status   Airway/Respiratory: Uneventful            Sign Out: Acceptable/Baseline resp. status   CV/Hemodynamics: Uneventful            Sign Out: Acceptable CV status; No obvious hypovolemia; No obvious fluid overload   Other NRE: NONE   DID A NON-ROUTINE EVENT OCCUR? No           Last vitals:  Vitals Value Taken Time   /70 11/01/21 1800   Temp 36.6  C (97.9  F) 11/01/21 1800   Pulse 68 11/01/21 1805   Resp 14 11/01/21 1800   SpO2 97 % 11/01/21 1805   Vitals shown include unvalidated device data.    Electronically Signed By: MICHELLE AVINA MD  November 1, 2021  6:05 PM

## 2021-11-01 NOTE — OP NOTE
Operative Note    Name:  Elysia Lane  PCP:  Sherry Wu  Procedure Date:  11/1/2021      Procedure(s):  IRRIGATION AND DEBRIDEMENT OF RIGHT LATERAL THIGH WOUND     Pre-Procedure Diagnosis:  Superficial wound dehiscence right distal lateral leg    Post-Procedure Diagnosis:    Same    Surgeon(s):  Liam Haji MD    Assist:  TINY Dupont assist  was required for this operation due to the complexity of the procedure, for proper positioning of the limb during the operation, and for patient safety.    Anesthesia Type:  Regional    Antibiotics:   Ancef 2 g IVPB on induction    Condition on discharge from OR:  stable    Indications:  Superficial wound dehiscence    Findings:  Superficial wound dehiscence with no communication deep.  No evidence for purulence.    Operative Report:     Upon informed consent having reviewed the procedure along with attendant risk of complication informed collaborative decision was made to proceed.  All protocols and regimens were followed.  Leg was marked.  Labs were checked.  Consent obtained for proxy.  Patient was brought to the operating undergoing anesthetic induction.  Lower extremity was suspended prepped x8 minutes Betadine scrub, alcohol wash and DuraPrep in its entirety.    We paused for patient identification, limb and procedure confirmation.    I elliptically excised the superficial wound dehiscence.  It went down to the subcutaneous tissue only and did not communicate with deep tissues.  I thoroughly irrigated the area with multiple liters of antibiotic laden irrigant.  The incision was 5 cm in length.  No gross purulence was encountered.  Upon completion of irrigation and an appropriate debridement commensurate with the superficial nature of the wound, absorbable suture was used to reapproximate the subcutaneous tissue followed by the use of 3-0 nylon in a vertical mattress style stitch to close skin.  A compressive soft dressing was applied and the lower  extremity was wrapped with a Ace wrap.  There were no complications.    EBL less than 20 cc.  Counts were correct.  No complications.  Patient tolerated the procedure well was brought to the PAR stable condition.      Estimated Blood Loss:   Less than 20 cc    Specimens:    Elliptical wound margins       Drains: None indicated       Complications: None       Liam Haji MD     Date: 11/1/2021  Time: 4:21 PM      * No implants in log *

## 2021-11-01 NOTE — PHARMACY-ADMISSION MEDICATION HISTORY
Pharmacist completed medication history with the patient while in the [facility].  Prior to admission (PTA) med list completed and updated in the electronic medical record (EMR).  Pharmacy Note - Admission Medication History  Pertinent Provider Information: according to MAR from [facility], oxycodone frequency is now q8h prn   ______________________________________________________________________  Prior To Admission (PTA) med list completed and updated in EMR.     Medications Prior to Admission   Medication Sig Dispense Refill Last Dose     acetaminophen (TYLENOL) 500 MG tablet Take 1,000 mg by mouth 3 times daily   10/31/2021 at Unknown time     apixaban ANTICOAGULANT (ELIQUIS) 5 MG tablet Take 1 tablet (5 mg) by mouth 2 times daily 60 tablet 1 10/28/2021 at am     cephALEXin (KEFLEX) 500 MG capsule Take 500 mg by mouth 2 times daily 10/29/2021 X 5 days   11/1/2021 at am     collagenase (SANTYL) 250 UNIT/GM external ointment Apply topically daily   10/31/2021 at Unknown time     cyanocobalamin (CYANOCOBALAMIN) 100 MCG tablet Take 1 tablet (100 mcg) by mouth daily 30 tablet 1 10/31/2021 at Unknown time     diltiazem ER (CARDIZEM SR) 90 MG 12 hr capsule Take 1 capsule (90 mg) by mouth 2 times daily 30 capsule 1 11/1/2021 at am     famotidine (PEPCID) 20 MG tablet Take 1 tablet (20 mg) by mouth every morning   10/31/2021 at Unknown time     ferrous sulfate (FEROSUL) 325 (65 Fe) MG tablet Take 325 mg by mouth daily (with breakfast)    10/31/2021 at Unknown time     metoprolol tartrate (LOPRESSOR) 100 MG tablet Take 1 tablet (100 mg) by mouth 2 times daily 60 tablet 1 11/1/2021 at am     oxyCODONE (ROXICODONE) 5 MG tablet Take 0.5 tablets (2.5 mg) by mouth every 6 hours (Patient taking differently: Take 2.5 mg by mouth every 8 hours as needed ) 30 tablet 0 10/24/2021     polyethylene glycol (MIRALAX) 17 GM/Dose powder Take 17 g by mouth daily 510 g 1 10/31/2021 at Unknown time     potassium chloride ER  (KLOR-CON M) 20 MEQ CR tablet Take 20 mEq by mouth daily   11/1/2021 at Unknown time     Vitamin D3 (CHOLECALCIFEROL) 25 mcg (1000 units) tablet Take 1 tablet (25 mcg) by mouth daily 30 tablet 0 10/31/2021 at Unknown time         Information source(s): Patient and CareEverywhere/SureScripts  Patient was asked about OTC/herbal products specifically.  PTA med list reflects this.  Based on the pharmacist s assessment, the PTA med list information appears reliable  Allergies were reviewed, assessed, and updated with the patient.    Medications available for use during hospital stay: None  Thank you for the opportunity to participate in the care of this patient.    The patient was asked about OTC/herbal products specifically and the PTA med list reflects the patient's response.    Allergies were reviewed and assessed with the patient, responses were updated in the EMR.    Thank you for the opportunity to participate in the care of this patient.    Jesse Olguin RP 11/1/2021 2:22 PM

## 2021-11-01 NOTE — ANESTHESIA PREPROCEDURE EVALUATION
Anesthesia Pre-Procedure Evaluation    Patient: Elysia Lane   MRN: [id number] : 1923        Preoperative Diagnosis: Open thigh wound [S71.109A]    Procedure : Procedure(s):  IRRIGATION AND DEBRIDEMENT OF RIGHT LATERAL THIGH WOUND          Past Medical History:   Diagnosis Date     Anemia      Chronic kidney disease      Essential hypertension      Osteoarthritis      Stenosis of carotid artery       Past Surgical History:   Procedure Laterality Date     OPEN REDUCTION INTERNAL FIXATION FEMUR DISTAL Right 2021    Procedure: OPEN REDUCTION INTERNAL FIXATION, FRACTURE, FEMUR, DISTAL;  Surgeon: Liam Haji MD;  Location: [facility] [place]      No Known Allergies   Social History     Tobacco Use     Smoking status: Never Smoker     Smokeless tobacco: Never Used   Substance Use Topics     Alcohol use: Not Currently      Wt Readings from Last 1 Encounters:   10/29/21 60.4 kg (133 lb 3.2 oz)        Anesthesia Evaluation            ROS/MED HX  ENT/Pulmonary:  - neg pulmonary ROS     Neurologic: Comment: Short term memory loss    (+) dementia,     Cardiovascular:     (+) hypertension-----Taking blood thinners Instructions Given to patient: has held eloquis for 72 hours. dysrhythmias, a-fib,     METS/Exercise Tolerance:     Hematologic:  - neg hematologic  ROS     Musculoskeletal:  - neg musculoskeletal ROS     GI/Hepatic:  - neg GI/hepatic ROS     Renal/Genitourinary:     (+) renal disease (stage 3), type: CRI,     Endo:  - neg endo ROS     Psychiatric/Substance Use:  - neg psychiatric ROS     Infectious Disease:  - neg infectious disease ROS     Malignancy:  - neg malignancy ROS     Other:  - neg other ROS          Physical Exam    Airway  airway exam normal      Mallampati: II    Neck ROM: full     Respiratory Devices and Support         Dental       (+) missing and chipped    B=Bridge, C=Chipped, L=Loose, M=Missing    Cardiovascular          Rhythm and rate: irregular and normal     Pulmonary   pulmonary  exam normal                OUTSIDE LABS:  CBC:   Lab Results   Component Value Date    WBC 7.4 11/01/2021    WBC 7.0 10/28/2021    HGB 11.8 11/01/2021    HGB 10.9 (L) 10/28/2021    HCT 38.1 11/01/2021    HCT 33.3 (L) 10/28/2021     11/01/2021     10/28/2021     BMP:   Lab Results   Component Value Date     10/28/2021     09/09/2021    POTASSIUM 3.5 10/29/2021    POTASSIUM 2.6 (LL) 10/28/2021    CHLORIDE 105 10/28/2021    CHLORIDE 108 (H) 09/09/2021    CO2 28 10/28/2021    CO2 26 09/09/2021    BUN 19 10/28/2021    BUN 26 09/09/2021    CR 0.76 10/28/2021    CR 0.72 09/15/2021    GLC 92 10/28/2021     (H) 09/14/2021     COAGS:   Lab Results   Component Value Date    INR 1.02 11/01/2021     POC: No results found for: BGM, HCG, HCGS  HEPATIC:   Lab Results   Component Value Date    ALBUMIN 3.0 (L) 09/09/2021    PROTTOTAL 5.5 (L) 09/09/2021    ALT 21 09/09/2021    AST 45 (H) 09/09/2021    ALKPHOS 36 (L) 09/09/2021    BILITOTAL 0.7 09/09/2021     OTHER:   Lab Results   Component Value Date    LACT 1.8 09/09/2021    ADAM 9.0 10/28/2021    MAG 1.6 (L) 09/16/2021    TSH 1.55 09/08/2021       Anesthesia Plan    ASA Status:  3   NPO Status:  NPO Appropriate    Anesthesia Type: Spinal.              Consents    Anesthesia Plan(s) and associated risks, benefits, and realistic alternatives discussed. Questions answered and patient/representative(s) expressed understanding.     - Discussed with:  Patient, Healthcare Power of       - Extended Intubation/Ventilatory Support Discussed: No.         Postoperative Care    Pain management: IV analgesics.   PONV prophylaxis: Ondansetron (or other 5HT-3), Dexamethasone or Solumedrol     Comments:    Light general anesthetic LMA  COVID negative  Hgb=11.8            Ricki Valencia MD

## 2021-11-01 NOTE — ANESTHESIA PROCEDURE NOTES
Intrathecal injection Procedure Note    Pre-Procedure   Staff -        Anesthesiologist:  Ricki Carbajal MD       Performed By: anesthesiologist       Location: OR       Procedure Start/Stop Times: 11/1/2021 3:31 PM and 11/1/2021 3:34 PM       Pre-Anesthestic Checklist: patient identified, IV checked, risks and benefits discussed, informed consent, monitors and equipment checked, pre-op evaluation, at physician/surgeon's request and post-op pain management  Timeout:       Correct Patient: Yes        Correct Procedure: Yes        Correct Site: Yes        Correct Position: Yes   Procedure Documentation  Procedure: intrathecal injection       Patient Position: sitting       Skin prep: Chloraprep       Insertion Site: L2-3. (midline approach).       Needle Gauge: 24.        Needle Length (Inches): 3.5        Spinal Needle Type: Pencan       Introducer used      # of attempts: 1 and  # of redirects:     Assessment/Narrative         Paresthesias: No.       CSF fluid: clear.    Medication(s) Administered   0.5% Bupivacaine PF (Intrathecal), 2.8 mL  Medication Administration Time: 11/1/2021 3:34 PM

## 2021-11-01 NOTE — ANESTHESIA CARE TRANSFER NOTE
Patient: Elysia Lane    Procedure: Procedure(s):  IRRIGATION AND DEBRIDEMENT OF RIGHT LATERAL THIGH WOUND       Diagnosis: Open thigh wound [S71.109A]  Diagnosis Additional Information: No value filed.    Anesthesia Type:   Spinal     Note:    Oropharynx: oropharynx clear of all foreign objects  Level of Consciousness: drowsy  Oxygen Supplementation: face mask  Level of Supplemental Oxygen (L/min / FiO2): 6    Dentition: dentition unchanged  Vital Signs Stable: post-procedure vital signs reviewed and stable  Report to RN Given: handoff report given  Patient transferred to: PACU    Handoff Report: Identifed the Patient, Identified the Reponsible Provider, Reviewed the pertinent medical history, Discussed the surgical course, Reviewed Intra-OP anesthesia mangement and issues during anesthesia, Set expectations for post-procedure period and Allowed opportunity for questions and acknowledgement of understanding      Vitals:  Vitals Value Taken Time   BP     Temp     Pulse 66 11/01/21 1637   Resp 43 11/01/21 1637   SpO2 99 % 11/01/21 1637   Vitals shown include unvalidated device data.    Electronically Signed By: NICOLÁS Pineda CRNA  November 1, 2021  4:37 PM

## 2021-11-01 NOTE — OR NURSING
PATIENT HAD SEVERAL BRUISES DOWN LOWER EXTREMITIES, STAGE 1 RED AREA RIGHT SACRUM- SACRAL MEPILEX PLACED DUE TO LIMITED WEIGHT BEARING AND POOR SKIN TURGOR, RIGHT ISCHIUM RED AREA, RIGHT SHIN SKIN TEAR  HAD STERI STRIP ON REPLACED WITH 2X2 TEGADERM, RIGHT HEEL LATERAL AREA BLISTER FORMING WITH REDNESS- FOAM PLACED WITH ACE WRAP TO DECREASE PRESSURE

## 2021-11-02 ENCOUNTER — APPOINTMENT (OUTPATIENT)
Dept: PHYSICAL THERAPY | Facility: CLINIC | Age: 86
End: 2021-11-02
Attending: PHYSICIAN ASSISTANT
Payer: MEDICARE

## 2021-11-02 VITALS
DIASTOLIC BLOOD PRESSURE: 96 MMHG | BODY MASS INDEX: 24.5 KG/M2 | HEART RATE: 79 BPM | RESPIRATION RATE: 18 BRPM | WEIGHT: 133.16 LBS | TEMPERATURE: 97.6 F | OXYGEN SATURATION: 94 % | SYSTOLIC BLOOD PRESSURE: 158 MMHG | HEIGHT: 62 IN

## 2021-11-02 LAB
ALBUMIN SERPL-MCNC: 3 G/DL (ref 3.5–5)
ALP SERPL-CCNC: 71 U/L (ref 45–120)
ALT SERPL W P-5'-P-CCNC: 31 U/L (ref 0–45)
ANION GAP SERPL CALCULATED.3IONS-SCNC: 11 MMOL/L (ref 5–18)
AST SERPL W P-5'-P-CCNC: 30 U/L (ref 0–40)
BASOPHILS # BLD AUTO: 0 10E3/UL (ref 0–0.2)
BASOPHILS NFR BLD AUTO: 0 %
BILIRUB SERPL-MCNC: 0.6 MG/DL (ref 0–1)
BUN SERPL-MCNC: 13 MG/DL (ref 8–28)
CALCIUM SERPL-MCNC: 8.9 MG/DL (ref 8.5–10.5)
CHLORIDE BLD-SCNC: 104 MMOL/L (ref 98–107)
CO2 SERPL-SCNC: 24 MMOL/L (ref 22–31)
CREAT SERPL-MCNC: 0.75 MG/DL (ref 0.6–1.1)
EOSINOPHIL # BLD AUTO: 0 10E3/UL (ref 0–0.7)
EOSINOPHIL NFR BLD AUTO: 0 %
ERYTHROCYTE [DISTWIDTH] IN BLOOD BY AUTOMATED COUNT: 14.1 % (ref 10–15)
GFR SERPL CREATININE-BSD FRML MDRD: 67 ML/MIN/1.73M2
GLUCOSE BLD-MCNC: 132 MG/DL (ref 70–125)
HCT VFR BLD AUTO: 35.3 % (ref 35–47)
HGB BLD-MCNC: 11.2 G/DL (ref 11.7–15.7)
HOLD SPECIMEN: NORMAL
IMM GRANULOCYTES # BLD: 0 10E3/UL
IMM GRANULOCYTES NFR BLD: 0 %
LYMPHOCYTES # BLD AUTO: 0.5 10E3/UL (ref 0.8–5.3)
LYMPHOCYTES NFR BLD AUTO: 8 %
MAGNESIUM SERPL-MCNC: 1.9 MG/DL (ref 1.8–2.6)
MCH RBC QN AUTO: 30.5 PG (ref 26.5–33)
MCHC RBC AUTO-ENTMCNC: 31.7 G/DL (ref 31.5–36.5)
MCV RBC AUTO: 96 FL (ref 78–100)
MONOCYTES # BLD AUTO: 0.6 10E3/UL (ref 0–1.3)
MONOCYTES NFR BLD AUTO: 9 %
NEUTROPHILS # BLD AUTO: 5.8 10E3/UL (ref 1.6–8.3)
NEUTROPHILS NFR BLD AUTO: 83 %
NRBC # BLD AUTO: 0 10E3/UL
NRBC BLD AUTO-RTO: 0 /100
PLATELET # BLD AUTO: 232 10E3/UL (ref 150–450)
POTASSIUM BLD-SCNC: 4.1 MMOL/L (ref 3.5–5)
PROT SERPL-MCNC: 6.3 G/DL (ref 6–8)
RBC # BLD AUTO: 3.67 10E6/UL (ref 3.8–5.2)
SODIUM SERPL-SCNC: 139 MMOL/L (ref 136–145)
WBC # BLD AUTO: 7.1 10E3/UL (ref 4–11)

## 2021-11-02 PROCEDURE — 83735 ASSAY OF MAGNESIUM: CPT | Performed by: INTERNAL MEDICINE

## 2021-11-02 PROCEDURE — 85004 AUTOMATED DIFF WBC COUNT: CPT | Performed by: INTERNAL MEDICINE

## 2021-11-02 PROCEDURE — 80053 COMPREHEN METABOLIC PANEL: CPT | Performed by: INTERNAL MEDICINE

## 2021-11-02 PROCEDURE — 99214 OFFICE O/P EST MOD 30 MIN: CPT | Performed by: INTERNAL MEDICINE

## 2021-11-02 PROCEDURE — 36415 COLL VENOUS BLD VENIPUNCTURE: CPT | Performed by: INTERNAL MEDICINE

## 2021-11-02 PROCEDURE — 250N000013 HC RX MED GY IP 250 OP 250 PS 637: Performed by: HOSPITALIST

## 2021-11-02 PROCEDURE — G0463 HOSPITAL OUTPT CLINIC VISIT: HCPCS | Mod: 25

## 2021-11-02 PROCEDURE — 97162 PT EVAL MOD COMPLEX 30 MIN: CPT | Mod: GP

## 2021-11-02 PROCEDURE — 250N000011 HC RX IP 250 OP 636: Performed by: PHYSICIAN ASSISTANT

## 2021-11-02 PROCEDURE — 250N000013 HC RX MED GY IP 250 OP 250 PS 637: Performed by: PHYSICIAN ASSISTANT

## 2021-11-02 PROCEDURE — 99207 PR CDG-CODE CATEGORY CHANGED: CPT | Performed by: INTERNAL MEDICINE

## 2021-11-02 PROCEDURE — 97530 THERAPEUTIC ACTIVITIES: CPT | Mod: GP

## 2021-11-02 RX ORDER — TRAMADOL HYDROCHLORIDE 50 MG/1
25 TABLET ORAL EVERY 6 HOURS PRN
Qty: 10 TABLET | Refills: 0 | Status: SHIPPED | OUTPATIENT
Start: 2021-11-02 | End: 2021-11-29

## 2021-11-02 RX ADMIN — ACETAMINOPHEN 975 MG: 325 TABLET ORAL at 04:36

## 2021-11-02 RX ADMIN — METOPROLOL TARTRATE 100 MG: 50 TABLET, FILM COATED ORAL at 09:05

## 2021-11-02 RX ADMIN — ACETAMINOPHEN 975 MG: 325 TABLET ORAL at 13:36

## 2021-11-02 RX ADMIN — FAMOTIDINE 20 MG: 20 TABLET ORAL at 07:01

## 2021-11-02 RX ADMIN — DILTIAZEM HYDROCHLORIDE 90 MG: 90 CAPSULE, EXTENDED RELEASE ORAL at 09:05

## 2021-11-02 RX ADMIN — CEFAZOLIN 1 G: 1 INJECTION, POWDER, FOR SOLUTION INTRAMUSCULAR; INTRAVENOUS at 04:40

## 2021-11-02 NOTE — PLAN OF CARE
Pt is not appropriate for skilled OT services at this time due to Pt discharging to TCU prior to her OT eval.  Will defer to PT for Pt's current status.  Plan was discussed with PT and RN.  Will D/C current OT orders. Thank you.    11/2/2021 by Juaquin Martinez, OTR, OTR/L

## 2021-11-02 NOTE — PLAN OF CARE
Problem: Dysrhythmia  Goal: Normalized Cardiac Rhythm  Outcome: No Change  Intervention: Monitor and Manage Cardiac Rhythm Effect  Recent Flowsheet Documentation  Taken 11/1/2021 1830 by Kristan Vicente RN  VTE Prevention/Management: fluids promoted   Heart rate irregular (hx of afib) but rate currently controlled.     Problem: Adult Inpatient Plan of Care  Goal: Plan of Care Review  Outcome: Improving   Repositioning Q2H with assistance of 2. Patient denies any pain and non-verbally appears comfortable. Right leg dressing remains CDI.

## 2021-11-02 NOTE — PROGRESS NOTES
11/02/21 1130   Quick Adds   Quick Adds Certification   Type of Visit Initial PT Evaluation   Living Environment   People in home alone   Current Living Arrangements   (from TCU, poor historian)   Home Accessibility no concerns   Self-Care   Usual Activity Tolerance fair   Current Activity Tolerance fair   Equipment Currently Used at Home lift device;wheelchair, manual;walker, rolling   Disability/Function   Fall history within last six months yes   General Information   Onset of Illness/Injury or Date of Surgery 11/01/21   Referring Physician Liam Haji MD   Patient/Family Therapy Goals Statement (PT) TCU   Pertinent History of Current Problem (include personal factors and/or comorbidities that impact the POC) s/p I&D R lateral thigh, ORIF 9/8/21   Existing Precautions/Restrictions no known precautions/restrictions   Weight-Bearing Status - LLE full weight-bearing   Weight-Bearing Status - RLE weight-bearing as tolerated   Bed Mobility   Bed Mobility supine-sit   Supine-Sit [place] (Bed Mobility) supervision;verbal cues  (HOB Elevated)   Transfers   Transfers sit-stand transfer   Sit-Stand Transfer   Sit-Stand [place] (Transfers) minimum assist (75% patient effort);verbal cues   Assistive Device (Sit-Stand Transfers) walker, front-wheeled   Sit/Stand Transfer Comments cueing for hand placement, transition x3   Gait/Stairs (Locomotion)   [place] Level (Gait) minimum assist (75% patient effort)   Assistive Device (Gait) walker, front-wheeled   Distance in Feet (Required for LE Total Joints) 1xSPT, 1x3'   Pattern (Gait) step-to   Deviations/Abnormal Patterns (Gait) antalgic;festinating/shuffling;gait speed decreased;stride length decreased   Clinical Impression   Criteria for Skilled Therapeutic Intervention yes, treatment indicated   PT Diagnosis (PT) impaired functional mobility   Influenced by the following impairments weakness, pain   Functional limitations due to impairments gait, transfers    Clinical Presentation Stable/Uncomplicated   Clinical Presentation Rationale pt presents as medically diagnosed   Clinical Decision Making (Complexity) moderate complexity   Therapy Frequency (PT) Daily   Predicted Duration of Therapy Intervention (days/wks) 1 day   Planned Therapy Interventions (PT) gait training;home exercise program;patient/family education;stair training;strengthening;transfer training   Anticipated Equipment Needs at Discharge (PT) walker, rolling;wheelchair   Risk & Benefits of therapy have been explained patient;care plan/treatment goals reviewed   PT Discharge Planning    PT Discharge Recommendation (DC Rec) Transitional Care Facility   PT Rationale for DC Rec requires Ax1-2 for functional mobility   Therapy Certification   Start of care date 11/02/21   Certification date from 11/02/21   Certification date to 12/02/21   Medical Diagnosis hx of I&D   Total Evaluation Time   Total Evaluation Time (Minutes) 5

## 2021-11-02 NOTE — CONSULTS
Wound Ostomy  [facility] Assessment       Allergies:  No Known Allergies    Diagnosis:   Patient Active Problem List    Diagnosis Date Noted     History of incision and drainage 11/01/2021     Priority: Medium     Cellulitis, wound, post-operative 10/14/2021     Priority: Medium     Rash and nonspecific skin eruption 09/28/2021     Priority: Medium     Chronic kidney disease, stage 3b (H) 09/23/2021     Priority: Medium     Chronic anticoagulation 09/11/2021     Priority: Medium     Eliquis atrial fibrillation       H/O major orthopedic surgery 09/10/2021     Priority: Medium     Procedure(s): Dr. Liam Haji  Procedure Date:  9/6/2021 - 9/8/2021  retrograde IM nailing right supracondylar distal femur fracture ([place] retrograde nail 12 mm x 320 mm, interlocked (     Pre-Procedure Diagnosis:  Closed fracture of distal end of right femur, unspecified fracture morphology, initial encounter (H) [S72.401A]      Post-Procedure Diagnosis:    Supracondylar distal femur fracture right     Postoperative plan:  Up in chair all meals.  Bed chair status.  Patient can weight-bear with full assist for transfers and pivots only.  Nonambulatory however x6 weeks.  Anticoagulation per cardiology given her underlying cardiac arrhythmia: Recommend minimal aspirin twice daily or Coumadin.  Return to clinic 2 weeks            DNR (do not resuscitate) 09/10/2021     Priority: Medium     Rapid atrial fibrillation (H) 09/06/2021     Priority: Medium     Closed fracture of distal end of right femur, unspecified fracture morphology, initial encounter (H) 09/06/2021     Priority: Medium     Procedure(s): Procedure Date:  9/6/2021 - 9/8/2021; Dr Liam Haji  Retrograde IM nailing right supracondylar distal femur fracture (Arthur retrograde nail 12 mm x 320 mm, interlocked (   Pre-Procedure Diagnosis:  Closed fracture of distal end of right femur, unspecified fracture morphology, initial encounter (H) [S72.401A]    Post-Procedure Diagnosis:     Supracondylar distal femur fracture right     Postoperative plan:  Up in chair all meals.  Bed chair status.  Patient can weight-bear with full assist for transfers and pivots only.  Nonambulatory however x6 weeks.  Anticoagulation per cardiology given her underlying cardiac arrhythmia: Recommend minimal aspirin twice daily or Coumadin.  Return to clinic 2 weeks            Height:  [unfilled]    Weight:  [unfilled]    Labs:  Recent Labs   Lab Test 11/02/21  1129 11/01/21  1325 09/09/21  0534   CRP  --  37.0*  --    HGB 11.2* 11.8   < >   ALBUMIN 3.0*  --    < >    < > = values in this interval not displayed.       Shahab:  Shahab Score: 14    Specialty Bed:       Wound culture obtained: No    Edema:  No    Date of most recent photo: NA    Anatomic Site/Laterality: Sacrum/buttocks    Reason for ongoing care:   Wound assessment and plan of care     Encounter Type:  Initial Wound Type:   Denudement:  Foreign body present? No    Tissue Damage:   Breakdown of skin    Related trauma: Patient with impaired mobility and cognitive impair    Assessment:    Area of blanchable erythema 5 x 8cm, and some partial thickness breakdown right buttock 3x3cm - friction/chronic tissue injry    Tunneling/Undermining: No    Wound Bed: see above    Exudate: No    Periwound Skin: Erythema    Treatment Plan: Silicone foam           Nursing care provided was NA.    Discussed plan of care with nurse and patient.    Outcomes and treatment recommendations are to promote skin integrity and promote wound healing.    Actions taken by WOC RN: 2 minutes of education.    Planned Follow Up: Weekly.    Plan for next visit: Reassess wound(s) and Reassess skin integrity

## 2021-11-02 NOTE — PLAN OF CARE
Physical Therapy Discharge Summary    Reason for therapy discharge:    Discharged to transitional care facility.    Progress towards therapy goal(s). See goals on Care Plan in [facility] electronic health record for goal details.  Goals not met.  Barriers to achieving goals:   eval only.    Therapy recommendation(s):    Continued therapy is recommended.  Rationale/Recommendations:  TCU for further rehabilitation.

## 2021-11-02 NOTE — PROVIDER NOTIFICATION
Paged Dr. Petty r/t Hospitalist - patient's home meds not be resumed post-op. Pending response.     **ADDENDUM 9:49 PM 11/01/21 - Paged Dr. Cota @ [phone] to resume home meds and re-ordered.   **ADDENDUM 10:16 PM 11/01/21 - Discussed with Dr. Petty and patient to be eval'd in morning by hospitalist

## 2021-11-02 NOTE — DISCHARGE SUMMARY
ORTHOPEDIC DISCHARGE SUMMARY       Elysia Lane,  1923, MRN [id number]    Admission Date: 2021  Admission Diagnoses: Open thigh wound [S71.109A]  History of incision and drainage [Z98.890]     Discharge Date:  21    Post-operative Day:  1 Day Post-Op    Reason for Admission: The patient was admitted for the following:  Procedure(s):  IRRIGATION AND DEBRIDEMENT OF RIGHT LATERAL THIGH WOUND      BRIEF HOSPITAL COURSE   This 89 year old female underwent the aforementioned procedure with Dr. Haji on 21. There were no intraoperative complications and the patient was transferred to the recovery room and later the orthopedic unit in stable condition. Once the patient reached the orthopedic floor our orthopedic pain protocol was implemented along with the following:    Anticoagulation Medications: Eliquis  Therapy: PT and OT  Activity: WBAT  Bracing: None    Consultations during Admission: Hospitalist service for medical management     COMPLICATIONS/SIGNIFICANT FINDINGS    None    DISCHARGE INFORMATION   Condition at discharge: Good  Discharge destination: Skilled nursing facility  Patient was seen by myself on the date of discharge.    FOLLOW UP CARE   Follow up with orthopedics in 7-10 days or sooner should the need arise. Ortho will continue to manage pain control, post op anticoagulation and incision care.     Follow up with your PCP for management of chronic medical problems and to evaluate for post op medical complications including constipation, nausea/vomiting, DVT/PE, anemia, changes in blood pressure, fevers/chills, urinary retention and atelectasis/pneumonia.     Subjective   Patient is confused but appears happy during my visit. She does not complain of any pain for me. She will return to her facility and leave the dressing in place until her follow up appointment next week with Dr. Haji.     Physical Exam   The patient is A&Ox3.  Sensation is intact.  Dorsiflexion and plantar flexion  "is intact.  Dorsalis pedis pulse intact.  Calves are soft and non-tender.   The incision is covered. Dressing C/D/I    BP (!) 158/96 (BP Location: Right arm)   Pulse 79   Temp 97.6  F (36.4  C) (Oral)   Resp 18   Ht 1.575 m (5' 2.01\")   Wt 60.4 kg (133 lb 2.5 oz)   SpO2 94%   BMI 24.35 kg/m      Pertinent Results at Discharge     Hemoglobin   Date/Time Value Ref Range Status   11/02/2021 11:29 AM 11.2 (L) 11.7 - 15.7 g/dL Final   11/01/2021 01:25 PM 11.8 11.7 - 15.7 g/dL Final   10/28/2021 05:32 AM 10.9 (L) 11.7 - 15.7 g/dL Final     INR   Date/Time Value Ref Range Status   11/01/2021 01:25 PM 1.02 0.85 - 1.15 Final   09/09/2021 05:34 AM 1.15 0.85 - 1.15 Final     Comment:     Effective 7/11/2021, the reference range for this assay has changed.   09/08/2021 05:29 AM 1.09 0.90 - 1.15 Final     Comment:     Effective 7/11/2021, the reference range for this assay has changed.     Platelet Count   Date/Time Value Ref Range Status   11/02/2021 11:29  150 - 450 10e3/uL Final   11/01/2021 01:25  150 - 450 10e3/uL Final   10/28/2021 05:32  150 - 450 10e3/uL Final       Medications at Discharge      Elysia Lane   Home Medication Instructions WENDI:[id number]    Printed on:11/02/21 1150   Medication Information                      acetaminophen (TYLENOL) 500 MG tablet  Take 1,000 mg by mouth 3 times daily             apixaban ANTICOAGULANT (ELIQUIS) 5 MG tablet  Take 1 tablet (5 mg) by mouth 2 times daily             cephALEXin (KEFLEX) 500 MG capsule  Take 500 mg by mouth 2 times daily 10/29/2021 X 5 days             collagenase (SANTYL) 250 UNIT/GM external ointment  Apply topically daily             cyanocobalamin (CYANOCOBALAMIN) 100 MCG tablet  Take 1 tablet (100 mcg) by mouth daily             diltiazem ER (CARDIZEM SR) 90 MG 12 hr capsule  Take 1 capsule (90 mg) by mouth 2 times daily             famotidine (PEPCID) 20 MG tablet  Take 1 tablet (20 mg) by mouth every morning           "   ferrous sulfate (FEROSUL) 325 (65 Fe) MG tablet  Take 325 mg by mouth daily (with breakfast)              metoprolol tartrate (LOPRESSOR) 100 MG tablet  Take 1 tablet (100 mg) by mouth 2 times daily             polyethylene glycol (MIRALAX) 17 GM/Dose powder  Take 17 g by mouth daily             potassium chloride ER (KLOR-CON M) 20 MEQ CR tablet  Take 20 mEq by mouth daily             traMADol (ULTRAM) 50 MG tablet  Take 0.5 tablets (25 mg) by mouth every 6 hours as needed for severe pain             Vitamin D3 (CHOLECALCIFEROL) 25 mcg (1000 units) tablet  Take 1 tablet (25 mcg) by mouth daily                 Problem List   Active Problems:    History of incision and drainage        Dyan Blackburn PA-C, JOBY  Date: 11/2/2021  Time: 11:50 AM

## 2021-11-02 NOTE — PROGRESS NOTES
[facility]      OUTPATIENT PHYSICAL THERAPY EVALUATION  PLAN OF TREATMENT FOR OUTPATIENT REHABILITATION  (COMPLETE FOR INITIAL CLAIMS ONLY)  Patient's Last Name, First Name, M.I.  YOB: 1923  Elysia Lane                        Provider's Name  [facility] Medical Record No.  [id number]                               Onset Date:  11/01/21   Start of Care Date:  11/02/21      Type:     _X_PT   ___OT   ___SLP Medical Diagnosis:  hx of I&D                        PT Diagnosis:  impaired functional mobility   Visits from SOC:  1   _________________________________________________________________________________  Plan of Treatment/Functional Goals    Planned Interventions: gait training, home exercise program, patient/family education, stair training, strengthening, transfer training     Goals: See Physical Therapy Goals on Care Plan in [facility] electronic health record.    Therapy Frequency: Daily  Predicted Duration of Therapy Intervention: 1 day  _________________________________________________________________________________    I CERTIFY THE NEED FOR THESE SERVICES FURNISHED UNDER        THIS PLAN OF TREATMENT AND WHILE UNDER MY CARE     (Physician co-signature of this document indicates review and certification of the therapy plan).                Certification date from: 11/02/21, Certification date to: 12/02/21    Referring Physician: Liam Haji MD            Initial Assessment        See Physical Therapy evaluation dated 11/02/21 in Epic electronic health record.

## 2021-11-02 NOTE — CONSULTS
[facility] MEDICINE CONSULT/PROGRESS NOTE      Identification/Summary: Elysia Lane is a 89 year old female with a past medical history of (see below)   who was admitted for a procedure on 11/1/2021.    S was consulted for the management of the medical issues (as specified in the Assessment and Plan below).    Assessment and Plan: Length of stay day # 0.     HTN, controlled  Atrial fibrillation, rate controlled and anticoagulated  Home Diltiazem ER resumed  Home Metoprolol resumed  Chronic AC (Apixaban) - defer to ortho when ok to resume this; they have held it for now.    History of anemia  On iron supplement    Hypokalemia  Was on home ppotassium  Recheck    Cognitive impairment, probably chronic?  Patient is not really very reliable mentally.  I do not see any psych medications.     History of carotid stenosis  CKD    S/p irrigation and debridement of right lateral thigh wound 11/1/2021  Postop management with surgery including pain management, DVT ppx, and physical and occupational therapy.  Med rec will be reviewed and completed once completed and validated by pharmacy.  [facility] consult for other pressure lesions in the sacral and extremities  Continue Keflex which was initiated as an outpatient.    --------------------------------------------------------------------    Diet: Advance Diet as Tolerated: Regular Diet Adult  DVT Prophylaxis: defer to ortho postop - was on apixaban - resume when ok with ortho  Code Status: No CPR- Do NOT Intubate  Living situation: --  Anticipated possible discharge:  defer to orthopedics    ----------------------------------------------------------------------    Cumulative essential/pertinent data reviewed:  Labs:  Electrolytes nl  Cbc ok - hgb 11.8  covid 19 negative  CRP 37     Imaging:  --    EKG: --    ----------------------------------------------------------------------    Interval History/Subjective:  Looking comfortable postoperatively. She is  smiling. She seems to be eating well with no complaints of chest pain shortness of breath abdominal pain or nausea.  Rest of review of systems unremarkable.    Physical Exam/Objective:  Temp:  [94.2  F (34.6  C)-99.2  F (37.3  C)] 97.1  F (36.2  C)  Pulse:  [62-82] 80  Resp:  [12-18] 16  BP: ()/(53-88) 137/84  SpO2:  [94 %-100 %] 94 %  VS stable --> off oxygen  Body mass index is 24.35 kg/m .    GENERAL:   Alert and verbally conversant.; Smiling and greeting me, appears comfortable, in no acute distress,   HEAD:  Normocephalic, without obvious abnormality, atraumatic   EYES:  Grossly normal; pupils unremarkable   NOSE: Grossly normal   THROAT: Lips and mouth external: grossly normal,    NECK: No mass noted; no stiffness   BACK:      LUNGS:   Auscultation: Negative for wheezing or crackles, symmetric chest rise on inhalation, respirations unlabored    CHEST WALL:  No tenderness or deformity   HEART:  Regular rate and rhythm, significant murmurs: Negative,    ABDOMEN:   Soft, non-tender, bowel sounds normal; no masses, no peritoneal signs   EXTREMITIES: Defer examination of the operative site to surgery; status post irrigation and debridement right lateral wound, the right lower extremity is dressed and with elastic bandage.  No tenderness of the calves.  No significant ankle edema   SKIN:  see extremities   NEURO: no signs of acute stroke or change from prior state, moving all extremities.   PSYCH: Cooperative, behavior is appropriate; not sure about memory and cognition. (Need further evaluation).     Medications:   Personally Reviewed.    acetaminophen  975 mg Oral Q8H     diltiazem ER  90 mg Oral BID     famotidine  20 mg Oral QAM     metoprolol tartrate  100 mg Oral BID     polyethylene glycol  17 g Oral Daily     senna-docusate  1 tablet Oral BID     sodium chloride (PF)  3 mL Intracatheter Q8H     Current Facility-Administered Medications   Medication Dose Route Frequency     [START ON 11/4/2021]  acetaminophen  650 mg Oral Q4H PRN     sore throat lozenge  1 lozenge Buccal Q1H PRN     bisacodyl  10 mg Rectal Daily PRN     HYDROmorphone  0.2 mg Intravenous Q2H PRN     lidocaine 4%   Topical Q1H PRN     lidocaine (buffered or not buffered)  0.1-1 mL Other Q1H PRN     magnesium hydroxide  30 mL Oral Daily PRN     naloxone  0.2 mg Intravenous Q2 Min PRN    Or     naloxone  0.4 mg Intravenous Q2 Min PRN    Or     naloxone  0.2 mg Intramuscular Q2 Min PRN    Or     naloxone  0.4 mg Intramuscular Q2 Min PRN     ondansetron  4 mg Oral Q6H PRN    Or     ondansetron  4 mg Intravenous Q6H PRN     prochlorperazine  5 mg Intravenous Q6H PRN    Or     prochlorperazine  5 mg Oral Q6H PRN     sodium chloride (PF)  3 mL Intracatheter q1 min prn     traMADol  25 mg Oral Q6H PRN           Waldo Carr MD  [facility]  Phone: #[phone]

## 2021-11-02 NOTE — CONSULTS
Care Management Initial Consult    General Information  Assessment completed with: Friend, Miroslava  Type of CM/SW Visit: Initial Assessment       Cognitive  Cognitive/Neuro/Behavioral: .WDL except, level of consciousness  Level of Consciousness: alert  Arousal Level: arouses to voice  Orientation: disoriented to, place, time, situation        Speech: slow, other (see comments) (minimal speech)    Living Environment:   People in home:       Current living Arrangements: other (see comments)  [facility]   Able to return to prior arrangements: yes       Family/Social Support:  Care provided by:    Provides care for:       Facility resident(s)/Staff, Other (specify)          Description of Support System: Supportive         Current Resources:   Patient receiving home care services:       Community Resources:    Equipment currently used at home: lift device, wheelchair, manual, walker, rolling  Supplies currently used at home:        Lifestyle & Psychosocial Needs:  Social Determinants of Health     Tobacco Use: Low Risk      Smoking Tobacco Use: Never Smoker     Smokeless Tobacco Use: Never Used   Alcohol Use:      Frequency of Alcohol Consumption:      Average Number of Drinks:      Frequency of Binge Drinking:    Financial Resource Strain:      Difficulty of Paying Living Expenses:    Food Insecurity:      Worried About Running Out of Food in the Last Year:      Ran Out of Food in the Last Year:    Transportation Needs:      Lack of Transportation (Medical):      Lack of Transportation (Non-Medical):    Physical Activity:      Days of Exercise per Week:      Minutes of Exercise per Session:    Stress:      Feeling of Stress :    Social Connections:      Frequency of Communication with Friends and Family:      Frequency of Social Gatherings with Friends and Family:      Attends [employer] Services:      Active Member of Clubs or Organizations:      Attends Club or Organization Meetings:       Marital Status:    Intimate Partner Violence:      Fear of Current or Ex-Partner:      Emotionally Abused:      Physically Abused:      Sexually Abused:    Depression:      PHQ-2 Score:    Housing Stability:      Unable to Pay for Housing in the Last Year:      Number of Places Lived in the Last Year:      Unstable Housing in the Last Year:        Care Management Discharge Note    Discharge Date: 11/02/2021       Discharge Disposition:  Return to [facility] TCU        Discharge Transportation:  HE  (has Wheelchair in room)       Additional Information:  CM visited with the RN and with the Beba ROBERT about the Pt who state that the Pt is able to discharge back to [facility] today for continued cares. CM called the [facility] and spoke with Mallika at [facility] who states that the Pt is able to return at any time. RN confirms that the Pt's wheelchair is in the room and requests the CM call Kenia for discharge planning. CM called Kenia who states that she is the pt's friend who is able to help with discharge planning as she has been for the last 30yrs. Kenia consents to transport stating that the Pt is able to due wheelchair transport as she is able to do so in the past. Kenia consents to private pay transport. Kenia also confirmed that the Pt is from [facility] or [facility] currently and consents to return back. Kenia states that she will plan to see the Pt in the hospital before 3PM. CM called [facility] and was able to get a ride as needed for 3:30PM.    No PAS needed as the Pt is returning back to same TCU.     2:51 PM  CM called Sammi at [facility] who confirms that 3:30PM still works for discharge as planned.     3:06 PM  CM spoke with Sammi at [facility] who states that they can see the orders now and should be good to go.CM updated JAKOB ryan and RN as needed.      David Farrell, ALICE

## 2021-11-02 NOTE — PLAN OF CARE
Patient denies pain. No nonverbal signs of pain noted. Turning Q2 hours. Voiding adequately with purewick in place. Takes pills crushed in pudding. Heart shaped mepilex was changed during shift.

## 2021-11-03 PROBLEM — N30.00 ACUTE CYSTITIS WITHOUT HEMATURIA: Status: ACTIVE | Noted: 2021-11-03

## 2021-11-04 ENCOUNTER — TELEPHONE (OUTPATIENT)
Dept: CARDIOLOGY | Facility: CLINIC | Age: 86
End: 2021-11-04

## 2021-11-04 NOTE — TELEPHONE ENCOUNTER
----- Message from Shahnaz Curtis sent at 11/4/2021  3:34 PM CDT -----  Regarding: CLL pt  General phone call:    Caller: Colleen GILLESPIE   Primary cardiologist: ARACELIS  Detailed reason for call:  She is in a nursing home and under the care of the nursing home doctor.  Can she be monitored by the doctor at the nursing home since she has dementia?  She is not sure why she needs a cardiologist any more.  Patient has an appt 11/19    Best phone number: [phone]  Best time to contact: any  Ok to leave a detailedmessage? yes  Device? no    Additional Info:

## 2021-11-04 NOTE — PROGRESS NOTES
[facility] GERIATRIC SERVICES  PRIMARY CARE PROVIDER AND CLINIC:  Elif Alcantara MD, [facility] [address] /   Chief Complaint   Patient presents with     ARIANA      [place] Medical Record Number:  [id number]  Place of Service where encounter took place:  [facility] () [[id number]]    Elysia Lane  is a 89 year old  (12/2/1923), admitted to the above facility from  [facility]. Hospital stay 9/6 through 9/16..   HPI:  Elysia has a history of HTN and anemia who had a fall at home and presented to [facility] ED with  R leg pain. She had closed fracture of the distal femur and received ORIF and is non weight bearing. Completed IV ceftraixone for UTI with sepsis.   Also found in a fib with RVR, which improved on metoprolol 100mg and diltiazem, as well as apixaban.     Today: patient had UA/UC for c/o dysuria last week which has resolved; no longer with any complaints. Culture grew our e coli, however now asymptomatic bacturia. Surgeon's preference is to treat this prior to surgery on 11/2.    CODE STATUS/ADVANCE DIRECTIVES DISCUSSION:  Prior  DNR / DNI  ALLERGIES: No Known Allergies   PAST MEDICAL HISTORY:   Past Medical History:   Diagnosis Date     Anemia      Chronic kidney disease      Essential hypertension      Osteoarthritis      Stenosis of carotid artery       PAST SURGICAL HISTORY:   has a past surgical history that includes Open reduction internal fixation femur distal (Right, 9/8/2021) and Irrigation and debridement lower extremity, combined (Right, 11/1/2021).  FAMILY HISTORY: family history includes No Known Problems in her father and mother.  SOCIAL HISTORY:   reports that she has never smoked. She has never used smokeless tobacco. She reports previous alcohol use. She reports that she does not use drugs.  Patient's living condition: NICHOLE    Post Discharge Medication Reconciliation Status: discharge medications reconciled, continue medications without  "change  Current Outpatient Medications   Medication Sig     acetaminophen (TYLENOL) 500 MG tablet Take 1,000 mg by mouth 3 times daily     apixaban ANTICOAGULANT (ELIQUIS) 5 MG tablet Take 1 tablet (5 mg) by mouth 2 times daily     collagenase (SANTYL) 250 UNIT/GM external ointment Apply topically daily     cyanocobalamin (CYANOCOBALAMIN) 100 MCG tablet Take 1 tablet (100 mcg) by mouth daily     diltiazem ER (CARDIZEM SR) 90 MG 12 hr capsule Take 1 capsule (90 mg) by mouth 2 times daily     famotidine (PEPCID) 20 MG tablet Take 1 tablet (20 mg) by mouth every morning     ferrous sulfate (FEROSUL) 325 (65 Fe) MG tablet Take 325 mg by mouth daily (with breakfast)      metoprolol tartrate (LOPRESSOR) 100 MG tablet Take 1 tablet (100 mg) by mouth 2 times daily     polyethylene glycol (MIRALAX) 17 GM/Dose powder Take 17 g by mouth daily     potassium chloride ER (KLOR-CON M) 20 MEQ CR tablet Take 20 mEq by mouth daily     traMADol (ULTRAM) 50 MG tablet Take 0.5 tablets (25 mg) by mouth every 6 hours as needed for severe pain     Vitamin D3 (CHOLECALCIFEROL) 25 mcg (1000 units) tablet Take 1 tablet (25 mcg) by mouth daily     No current facility-administered medications for this visit.       ROS:  4 point ROS including Respiratory, CV, GI and , other than that noted in the HPI,  is negative    Vitals:  BP (!) 145/84   Pulse 79   Temp (!) 96.5  F (35.8  C)   Resp 18   Ht 1.575 m (5' 2\")   Wt 60.4 kg (133 lb 3.2 oz)   SpO2 98%   BMI 24.36 kg/m    Exam:  Physical Exam   General appearance: alert, appears stated age and cooperative.   Head: Normocephalic, without obvious abnormality, atraumatic, Eyes: sclera anicteric.  Lungs: respirations without effort, LSCTA; no wheezing or rales.   Cardiovascular: S1, S2. Regular rate and rhythm.   ABDOMEN: Globular and soft, non tender.    Skin: Reviewed incision to right knee, some small quarter centimeter opening to the distal end of the lateral incision. No surrounding " erythema. No swelling. Scant drainage.  Neurologic:oriented. No focal deficits.   Psych: interacts well with caregivers, exhibits logical thought processes and connections, pleasant    Lab/Diagnostic data:  Recent labs in [facility] reviewed by me today.     ASSESSMENT/PLAN:    UTI: patient had UA/UC for c/o dysuria last week which has resolved; no longer with any complaints. Culture grew our e coli, however now asymptomatic bacturia. Surgeon's preference is to treat this prior to surgery on 11/2.  -Keflex 500mg po bid x 5 days.     Pressure sores to coccyx, gluteal fold: Unstable.  -Wound care MD following, continue per their orders.    Cellulitis of postop incision (primary encounter diagnosis):  (W19.XXXA) Fall    (S72.401A) Closed fracture of distal end of right femur, unspecified fracture morphology, initial encounter (H): Unstable.  Comment: No c/o pain; very jovial during visit. Wound remains the same: Is approximated with a small less than quarter centimeter opening at the distal end of the lateral incision. Scant serous sanguinous drainage. No surrounding erythema.  Discussed pain management, she is agreeable to decreasing the every 8 hours.  Plan:   -Decrease oxycodone 2.5 mg p.o. every 8 hours as needed.  -Ice to right knee 4 times daily as needed.  -Follow up with ortho next week.  -PT, OT at their discretion.   -Non weightbearing 6 weeks.     (R21) Rash and nonspecific skin eruption: Appears to have cleared; no s/sx of rash.  We will continue famotidine for GI protection due to blood thinner and recent prolonged hospitalization.  Plan:   -Continue famotidine 20mg daily.     (I48.91) Rapid atrial fibrillation (H)  Comment: new onset.   Plan:   -metoprolol 100mg  -diltiazem 90 daily.   -apixiban.       Anemia, postop: Hemoglobin up to 9.  Improved from discharge in the sevens.  -Continues on daily iron supplementation.    Electronically signed by:  Sherry Wu, CNP

## 2021-11-04 NOTE — TELEPHONE ENCOUNTER
"Noted patient was seen by Dr. Marshall as inpt consult on 9-7-21 for AF - per 9-16-21 DCS, \"Cardiology 4-6 wks\".    Return call to patient's POA Miroslava - explained reason for follow-up appt after hospitalization and that she should discuss with nursing home physician if cardiology follow-up needed - encouraged Miroslava to keep appt as scheduled so Dr. Marshall can address further AF mgmt including use of long term anticoagulation if patient is fall risk and need for further follow-up - Miroslava verbalized understanding and stated \"she knows what she needs to do\".  mg  "

## 2021-11-08 ENCOUNTER — LAB REQUISITION (OUTPATIENT)
Dept: LAB | Facility: CLINIC | Age: 86
End: 2021-11-08
Payer: MEDICARE

## 2021-11-08 DIAGNOSIS — I48.91 UNSPECIFIED ATRIAL FIBRILLATION (H): ICD-10-CM

## 2021-11-09 LAB
ALBUMIN SERPL-MCNC: 2.7 G/DL (ref 3.5–5)
ALP SERPL-CCNC: 65 U/L (ref 45–120)
ALT SERPL W P-5'-P-CCNC: 12 U/L (ref 0–45)
ANION GAP SERPL CALCULATED.3IONS-SCNC: 10 MMOL/L (ref 5–18)
AST SERPL W P-5'-P-CCNC: 12 U/L (ref 0–40)
BILIRUB SERPL-MCNC: 1.1 MG/DL (ref 0–1)
BUN SERPL-MCNC: 11 MG/DL (ref 8–28)
CALCIUM SERPL-MCNC: 8.7 MG/DL (ref 8.5–10.5)
CHLORIDE BLD-SCNC: 104 MMOL/L (ref 98–107)
CO2 SERPL-SCNC: 27 MMOL/L (ref 22–31)
CREAT SERPL-MCNC: 0.77 MG/DL (ref 0.6–1.1)
GFR SERPL CREATININE-BSD FRML MDRD: 65 ML/MIN/1.73M2
GLUCOSE BLD-MCNC: 89 MG/DL (ref 70–125)
POTASSIUM BLD-SCNC: 3.4 MMOL/L (ref 3.5–5)
PROT SERPL-MCNC: 5.9 G/DL (ref 6–8)
SODIUM SERPL-SCNC: 141 MMOL/L (ref 136–145)

## 2021-11-09 PROCEDURE — P9604 ONE-WAY ALLOW PRORATED TRIP: HCPCS | Performed by: NURSE PRACTITIONER

## 2021-11-09 PROCEDURE — 36415 COLL VENOUS BLD VENIPUNCTURE: CPT | Performed by: NURSE PRACTITIONER

## 2021-11-09 PROCEDURE — 82374 ASSAY BLOOD CARBON DIOXIDE: CPT | Performed by: NURSE PRACTITIONER

## 2021-11-10 NOTE — PROGRESS NOTES
[facility] GERIATRIC SERVICES  Chief Complaint   Patient presents with     long-term Regulatory     [place] Medical Record Number:  [id number]  Place of Service where encounter took place:  [facility] () [[id number]]    HPI:    Elysia Lane  is 89 year old F (12/2/1923), who enjoys reading and [employer], with medical hx including atrial fibrillation, HTN, falls with recent R distal femoral fracture for which she was admitted to facility initially in [facility] [place], and dementia  who is being seen today for a federally mandated E/M visit.     Today's concerns are:    She has no specific concerns today.  She is overall feeling well.  She is acclimating to the long-term care unit well.  She is known to me from being seen in the subacute rehab, however she does not remember me.  She did say with review that her appetite has not been that good, so she is not feeling very hungry.  She has been having some loose stools and thought that may be contributing to it.  She does have pain in her leg at times still.  She notes she enjoys spending her time reading and with [employer] activities.    NH ROS      Nursing concerns loose stools which have been more frequent.  Also concern for ongoing drainage from her wound.      Appetite: Reports poor appetite, however she has been eating adequately.      Weight changes: Her weight overall is currently stable.  It is decreased from September when she was initially admitted, though has been increasing in recent weeks.     Bowel having looser stools, denies incontinence.      Bladder can be incontinent of bladder.  Requires assistance to the restroom.      Skin drainage from surgical wound site.  She recently underwent repeat I&D due to wound dehiscence.      Sleep she has been sleeping well.      Mood no mood concerns at this time.  She denies feeling down depressed, worried or anxious    Behavior no behavioral concerns.      Mobility recently elevated to weightbearing as tolerated.      ADL  assistance extensive assistance with most ADLs, she is independent with eating.      Pain continues to have intermittent pain at her surgical site.    Falls no falls at the facility.    Resp denies shortness of breath, cough.    Cardio denies chest pain or chest pressure, palpitations or tachycardia      ALLERGIES:Patient has no known allergies.  PAST MEDICAL HISTORY:   Past Medical History:   Diagnosis Date     Anemia      Chronic kidney disease      Essential hypertension      Osteoarthritis      Stenosis of carotid artery      PAST SURGICAL HISTORY:   has a past surgical history that includes Open reduction internal fixation femur distal (Right, 9/8/2021) and Irrigation and debridement lower extremity, combined (Right, 11/1/2021).  FAMILY HISTORY: family history includes No Known Problems in her father and mother.  SOCIAL HISTORY:  reports that she has never smoked. She has never used smokeless tobacco. She reports previous alcohol use. She reports that she does not use drugs.    MEDICATIONS:     Review of your medicines          Accurate as of November 11, 2021  6:35 AM. If you have any questions, ask your nurse or doctor.            CONTINUE these medicines which have NOT CHANGED      Dose / Directions   acetaminophen 500 MG tablet  Commonly known as: TYLENOL      Dose: 1,000 mg  Take 1,000 mg by mouth 3 times daily  Refills: 0     apixaban ANTICOAGULANT 5 MG tablet  Commonly known as: ELIQUIS  Indication: Atrial Fibrillation Not Caused By A Heart Valve Problem  Used for: Closed fracture of distal end of right femur, unspecified fracture morphology, initial encounter (H)      Dose: 5 mg  Take 1 tablet (5 mg) by mouth 2 times daily  Quantity: 60 tablet  Refills: 1     collagenase 250 UNIT/GM external ointment  Commonly known as: SANTYL      Apply topically daily  Refills: 0     diltiazem ER 90 MG 12 hr capsule  Commonly known as: CARDIZEM SR  Used for: Closed fracture of distal end of right femur, unspecified  fracture morphology, initial encounter (H)      Dose: 90 mg  Take 1 capsule (90 mg) by mouth 2 times daily  Quantity: 30 capsule  Refills: 1     famotidine 20 MG tablet  Commonly known as: PEPCID      Dose: 20 mg  Take 1 tablet (20 mg) by mouth every morning  Refills: 0     ferrous sulfate 325 (65 Fe) MG tablet  Commonly known as: FEROSUL      Dose: 325 mg  Take 325 mg by mouth daily (with breakfast)  Refills: 0     metoprolol tartrate 100 MG tablet  Commonly known as: LOPRESSOR  Used for: Closed fracture of distal end of right femur, unspecified fracture morphology, initial encounter (H)      Dose: 100 mg  Take 1 tablet (100 mg) by mouth 2 times daily  Quantity: 60 tablet  Refills: 1     polyethylene glycol 17 GM/Dose powder  Commonly known as: MIRALAX  Used for: Closed fracture of distal end of right femur, unspecified fracture morphology, initial encounter (H)      Dose: 17 g  Take 17 g by mouth daily  Quantity: 510 g  Refills: 1     potassium chloride ER 20 MEQ CR tablet  Commonly known as: KLOR-CON M      Dose: 20 mEq  Take 20 mEq by mouth daily  Refills: 0     traMADol 50 MG tablet  Commonly known as: ULTRAM  Used for: Cellulitis, wound, post-operative      Dose: 25 mg  Take 0.5 tablets (25 mg) by mouth every 6 hours as needed for severe pain  Quantity: 10 tablet  Refills: 0     vitamin B-12 100 MCG tablet  Commonly known as: CYANOCOBALAMIN  Used for: Closed fracture of distal end of right femur, unspecified fracture morphology, initial encounter (H)      Dose: 100 mcg  Take 1 tablet (100 mcg) by mouth daily  Quantity: 30 tablet  Refills: 1     Vitamin D3 25 mcg (1000 units) tablet  Commonly known as: CHOLECALCIFEROL  Used for: Closed fracture of distal end of right femur, unspecified fracture morphology, initial encounter (H)      Dose: 25 mcg  Take 1 tablet (25 mcg) by mouth daily  Quantity: 30 tablet  Refills: 0           Case Management:  I have reviewed the care plan and MDS and do agree with the plan.  "Patient's desire to return to the community is not present. Information reviewed:  Medications, vital signs, orders, and nursing notes.    ROS:  Limited secondary to cognitive impairment, negative except as noted in HPI    Vitals:  BP (!) 152/82   Pulse 77   Temp 97.7  F (36.5  C)   Resp 18   Ht 1.575 m (5' 2\")   Wt 60.6 kg (133 lb 11.2 oz)   SpO2 97%   BMI 24.45 kg/m    Body mass index is 24.45 kg/m .  Exam:    General: Seated in her wheelchair comfortably, appears well, no acute distress  HENT: NCAT, moderately hard of hearing, good dentition  CV: Irregular, S1/S2 normal, no murmurs, no lower extremity edema  Pulm: Normal WOB, lungs CTAB, no wheezes or crackles, good air movement   GI: Abdomen soft, not tender, not distended, BS normal and active throughout   Skin: Right leg wrapped and use Ace bandage, appears clean dry and intact.  Did not remove during exam today as had recently been replaced  Neuro: Alert, disoriented (she is able to orient by finding the clock in her room); CN II-XII grossly intact except for decreased hearing, normal upper extremity strength bilaterally    Lab/Diagnostic data:   Recent labs in [facility] reviewed by me today.     ASSESSMENT/PLAN  (S72.401S) Closed fracture of distal end of right femur, unspecified fracture morphology, sequela  (primary encounter diagnosis)  Comment: Progressing well with healing, continues to work with therapies.  Orthopedics recently recommended to increase to weightbearing as tolerated  Plan: Continue regular therapy    (T81.31XS) Postoperative wound dehiscence, sequela  Comment: Recently underwent I&D with primarily superficial infection.  Likely normal drainage from wound related to healing, no evidence of infection at this time.  Plan: Continue regular dressing changes, symptomatic monitoring    (F03.90) Dementia without behavioral disturbance, unspecified dementia type (H)  Comment: Significant cognitive deficits noted when she was first admitted to " facility, likely contributing to her falls. SLUMS here of 2/30 -significant global decreases, consider participation effective but unlikely to explain all deficits.  Plan: Recently moved to long-term care, continue supportive cares and engagement in the facility    (I48.91) Atrial fibrillation, unspecified type (H)  (Z79.01) Chronic anticoagulation  Comment: Diagnosed during admission following her fracture, with episode of RVR.  Continues on chronic anticoagulation.    (R19.5) Loose stools  Comment: Likely normal bowel changes.  She is on daily MiraLAX which may be contributing as well.  She was recently on antibiotics related to cellulitis, and in preparation for surgery.  Additionally, increased healthcare exposure with recent surgery.  However she has no significant abdominal pain, no fevers to suggest C. difficile.  Plan: Decrease MiraLAX to as needed.  If continues to have looser stools or diarrhea without laxatives, consider testing for C. difficile    Total time spent with patient visit at the [facility] nursing facility was 35 min including patient visit and review of past records. Greater than 50% of total time spent with counseling and coordinating care due to complex medical care.    Electronically signed by:    Benjamin Rosenstein, MD, MA  [facility] Faculty

## 2021-11-11 ENCOUNTER — NURSING HOME VISIT (OUTPATIENT)
Dept: GERIATRICS | Facility: CLINIC | Age: 86
End: 2021-11-11
Payer: MEDICARE

## 2021-11-11 VITALS
OXYGEN SATURATION: 97 % | DIASTOLIC BLOOD PRESSURE: 82 MMHG | BODY MASS INDEX: 24.61 KG/M2 | HEART RATE: 77 BPM | HEIGHT: 62 IN | SYSTOLIC BLOOD PRESSURE: 152 MMHG | TEMPERATURE: 97.7 F | RESPIRATION RATE: 18 BRPM | WEIGHT: 133.7 LBS

## 2021-11-11 DIAGNOSIS — T81.31XS POSTOPERATIVE WOUND DEHISCENCE, SEQUELA: ICD-10-CM

## 2021-11-11 DIAGNOSIS — S72.401S CLOSED FRACTURE OF DISTAL END OF RIGHT FEMUR, UNSPECIFIED FRACTURE MORPHOLOGY, SEQUELA: Primary | ICD-10-CM

## 2021-11-11 DIAGNOSIS — I48.91 ATRIAL FIBRILLATION, UNSPECIFIED TYPE (H): ICD-10-CM

## 2021-11-11 DIAGNOSIS — F03.90 DEMENTIA WITHOUT BEHAVIORAL DISTURBANCE, UNSPECIFIED DEMENTIA TYPE: ICD-10-CM

## 2021-11-11 DIAGNOSIS — R19.5 LOOSE STOOLS: ICD-10-CM

## 2021-11-11 DIAGNOSIS — Z79.01 CHRONIC ANTICOAGULATION: Chronic | ICD-10-CM

## 2021-11-11 PROCEDURE — 99309 SBSQ NF CARE MODERATE MDM 30: CPT | Performed by: FAMILY MEDICINE

## 2021-11-11 ASSESSMENT — MIFFLIN-ST. JEOR: SCORE: 944.71

## 2021-11-11 NOTE — LETTER
11/11/2021        RE: Elysia Lane  [address]  [facility] [address]        [facility] GERIATRIC SERVICES  Chief Complaint   Patient presents with     [facility] Medical Record Number:  [id number]  Place of Service where encounter took place:  [facility] () [[id number]]    HPI:    Elysia Lane  is 89 year old F (12/2/1923), who enjoys reading and [employer], with medical hx including atrial fibrillation, HTN, falls with recent R distal femoral fracture for which she was admitted to facility initially in [facility], and dementia  who is being seen today for a federally mandated E/M visit.     Today's concerns are:    She has no specific concerns today.  She is overall feeling well.  She is acclimating to the long-term care unit well.  She is known to me from being seen in the subacute rehab, however she does not remember me.  She did say with review that her appetite has not been that good, so she is not feeling very hungry.  She has been having some loose stools and thought that may be contributing to it.  She does have pain in her leg at times still.  She notes she enjoys spending her time reading and with [employer] activities.    NH ROS      Nursing concerns loose stools which have been more frequent.  Also concern for ongoing drainage from her wound.      Appetite: Reports poor appetite, however she has been eating adequately.      Weight changes: Her weight overall is currently stable.  It is decreased from September when she was initially admitted, though has been increasing in recent weeks.     Bowel having looser stools, denies incontinence.      Bladder can be incontinent of bladder.  Requires assistance to the restroom.      Skin drainage from surgical wound site.  She recently underwent repeat I&D due to wound dehiscence.      Sleep she has been sleeping well.      Mood no mood concerns at this time.  She denies feeling down depressed, worried or anxious    Behavior no  behavioral concerns.      Mobility recently elevated to weightbearing as tolerated.      ADL assistance extensive assistance with most ADLs, she is independent with eating.      Pain continues to have intermittent pain at her surgical site.    Falls no falls at the facility.    Resp denies shortness of breath, cough.    Cardio denies chest pain or chest pressure, palpitations or tachycardia      ALLERGIES:Patient has no known allergies.  PAST MEDICAL HISTORY:   Past Medical History:   Diagnosis Date     Anemia      Chronic kidney disease      Essential hypertension      Osteoarthritis      Stenosis of carotid artery      PAST SURGICAL HISTORY:   has a past surgical history that includes Open reduction internal fixation femur distal (Right, 9/8/2021) and Irrigation and debridement lower extremity, combined (Right, 11/1/2021).  FAMILY HISTORY: family history includes No Known Problems in her father and mother.  SOCIAL HISTORY:  reports that she has never smoked. She has never used smokeless tobacco. She reports previous alcohol use. She reports that she does not use drugs.    MEDICATIONS:     Review of your medicines          Accurate as of November 11, 2021  6:35 AM. If you have any questions, ask your nurse or doctor.            CONTINUE these medicines which have NOT CHANGED      Dose / Directions   acetaminophen 500 MG tablet  Commonly known as: TYLENOL      Dose: 1,000 mg  Take 1,000 mg by mouth 3 times daily  Refills: 0     apixaban ANTICOAGULANT 5 MG tablet  Commonly known as: ELIQUIS  Indication: Atrial Fibrillation Not Caused By A Heart Valve Problem  Used for: Closed fracture of distal end of right femur, unspecified fracture morphology, initial encounter (H)      Dose: 5 mg  Take 1 tablet (5 mg) by mouth 2 times daily  Quantity: 60 tablet  Refills: 1     collagenase 250 UNIT/GM external ointment  Commonly known as: SANTYL      Apply topically daily  Refills: 0     diltiazem ER 90 MG 12 hr capsule  Commonly  known as: CARDIZEM SR  Used for: Closed fracture of distal end of right femur, unspecified fracture morphology, initial encounter (H)      Dose: 90 mg  Take 1 capsule (90 mg) by mouth 2 times daily  Quantity: 30 capsule  Refills: 1     famotidine 20 MG tablet  Commonly known as: PEPCID      Dose: 20 mg  Take 1 tablet (20 mg) by mouth every morning  Refills: 0     ferrous sulfate 325 (65 Fe) MG tablet  Commonly known as: FEROSUL      Dose: 325 mg  Take 325 mg by mouth daily (with breakfast)  Refills: 0     metoprolol tartrate 100 MG tablet  Commonly known as: LOPRESSOR  Used for: Closed fracture of distal end of right femur, unspecified fracture morphology, initial encounter (H)      Dose: 100 mg  Take 1 tablet (100 mg) by mouth 2 times daily  Quantity: 60 tablet  Refills: 1     polyethylene glycol 17 GM/Dose powder  Commonly known as: MIRALAX  Used for: Closed fracture of distal end of right femur, unspecified fracture morphology, initial encounter (H)      Dose: 17 g  Take 17 g by mouth daily  Quantity: 510 g  Refills: 1     potassium chloride ER 20 MEQ CR tablet  Commonly known as: KLOR-CON M      Dose: 20 mEq  Take 20 mEq by mouth daily  Refills: 0     traMADol 50 MG tablet  Commonly known as: ULTRAM  Used for: Cellulitis, wound, post-operative      Dose: 25 mg  Take 0.5 tablets (25 mg) by mouth every 6 hours as needed for severe pain  Quantity: 10 tablet  Refills: 0     vitamin B-12 100 MCG tablet  Commonly known as: CYANOCOBALAMIN  Used for: Closed fracture of distal end of right femur, unspecified fracture morphology, initial encounter (H)      Dose: 100 mcg  Take 1 tablet (100 mcg) by mouth daily  Quantity: 30 tablet  Refills: 1     Vitamin D3 25 mcg (1000 units) tablet  Commonly known as: CHOLECALCIFEROL  Used for: Closed fracture of distal end of right femur, unspecified fracture morphology, initial encounter (H)      Dose: 25 mcg  Take 1 tablet (25 mcg) by mouth daily  Quantity: 30 tablet  Refills: 0       "     Case Management:  I have reviewed the care plan and MDS and do agree with the plan. Patient's desire to return to the community is not present. Information reviewed:  Medications, vital signs, orders, and nursing notes.    ROS:  Limited secondary to cognitive impairment, negative except as noted in HPI    Vitals:  BP (!) 152/82   Pulse 77   Temp 97.7  F (36.5  C)   Resp 18   Ht 1.575 m (5' 2\")   Wt 60.6 kg (133 lb 11.2 oz)   SpO2 97%   BMI 24.45 kg/m    Body mass index is 24.45 kg/m .  Exam:    General: Seated in her wheelchair comfortably, appears well, no acute distress  HENT: NCAT, moderately hard of hearing, good dentition  CV: Irregular, S1/S2 normal, no murmurs, no lower extremity edema  Pulm: Normal WOB, lungs CTAB, no wheezes or crackles, good air movement   GI: Abdomen soft, not tender, not distended, BS normal and active throughout   Skin: Right leg wrapped and use Ace bandage, appears clean dry and intact.  Did not remove during exam today as had recently been replaced  Neuro: Alert, disoriented (she is able to orient by finding the clock in her room); CN II-XII grossly intact except for decreased hearing, normal upper extremity strength bilaterally    Lab/Diagnostic data:   Recent labs in [facility] reviewed by me today.     ASSESSMENT/PLAN  (S72.401S) Closed fracture of distal end of right femur, unspecified fracture morphology, sequela  (primary encounter diagnosis)  Comment: Progressing well with healing, continues to work with therapies.  Orthopedics recently recommended to increase to weightbearing as tolerated  Plan: Continue regular therapy    (T81.31XS) Postoperative wound dehiscence, sequela  Comment: Recently underwent I&D with primarily superficial infection.  Likely normal drainage from wound related to healing, no evidence of infection at this time.  Plan: Continue regular dressing changes, symptomatic monitoring    (F03.90) Dementia without behavioral disturbance, unspecified dementia " type (H)  Comment: Significant cognitive deficits noted when she was first admitted to facility, likely contributing to her falls. SLUMS here of 2/30 -significant global decreases, consider participation effective but unlikely to explain all deficits.  Plan: Recently moved to long-term care, continue supportive cares and engagement in the facility    (I48.91) Atrial fibrillation, unspecified type (H)  (Z79.01) Chronic anticoagulation  Comment: Diagnosed during admission following her fracture, with episode of RVR.  Continues on chronic anticoagulation.    (R19.5) Loose stools  Comment: Likely normal bowel changes.  She is on daily MiraLAX which may be contributing as well.  She was recently on antibiotics related to cellulitis, and in preparation for surgery.  Additionally, increased healthcare exposure with recent surgery.  However she has no significant abdominal pain, no fevers to suggest C. difficile.  Plan: Decrease MiraLAX to as needed.  If continues to have looser stools or diarrhea without laxatives, consider testing for C. difficile    Total time spent with patient visit at the [facility] nursing facility was 35 min including patient visit and review of past records. Greater than 50% of total time spent with counseling and coordinating care due to complex medical care.    Electronically signed by:    Benjamin Rosenstein, MD, MA  [facility] Faculty                  Sincerely,        Benjamin Rosenstein, MD

## 2021-11-12 ENCOUNTER — TELEPHONE (OUTPATIENT)
Dept: GERIATRICS | Facility: CLINIC | Age: 86
End: 2021-11-12
Payer: MEDICARE

## 2021-11-12 NOTE — TELEPHONE ENCOUNTER
[facility] Nurse Triage Telephone Note    Provider: SANDRINE Cline  Facility: [facility] Facility Type:  LT    Caller: Kenia (friend and POA)  Call Back Number: [phone]    Allergies:  No Known Allergies     Reason for call: Pt's friend and POA was calling to request to have Primary NP and MD to follow the pt's afib and to cancel the pt's appointment with cardiology as she feels that there is no need for a f/u appointment and that it is hard for her to take her to and from her appointments.   Cardiology was ok with PCP's following the pt's afib.     Verbal Order/Direction given by Provider: OK to cancel cardiology appointment.     Provider giving Order:  SANDRINE Cline    Verbal Order given to: Cora Abbott RN

## 2021-11-15 ENCOUNTER — LAB REQUISITION (OUTPATIENT)
Dept: LAB | Facility: CLINIC | Age: 86
End: 2021-11-15
Payer: MEDICARE

## 2021-11-15 DIAGNOSIS — E87.6 HYPOKALEMIA: ICD-10-CM

## 2021-11-16 LAB — POTASSIUM BLD-SCNC: 4 MMOL/L (ref 3.5–5)

## 2021-11-16 PROCEDURE — 36415 COLL VENOUS BLD VENIPUNCTURE: CPT | Performed by: NURSE PRACTITIONER

## 2021-11-16 PROCEDURE — P9604 ONE-WAY ALLOW PRORATED TRIP: HCPCS | Performed by: NURSE PRACTITIONER

## 2021-11-16 PROCEDURE — 84132 ASSAY OF SERUM POTASSIUM: CPT | Performed by: NURSE PRACTITIONER

## 2021-11-29 ENCOUNTER — LAB REQUISITION (OUTPATIENT)
Dept: LAB | Facility: CLINIC | Age: 86
End: 2021-11-29
Payer: MEDICARE

## 2021-11-29 DIAGNOSIS — E87.0 HYPEROSMOLALITY AND HYPERNATREMIA: ICD-10-CM

## 2021-11-29 DIAGNOSIS — T81.49XA CELLULITIS, WOUND, POST-OPERATIVE: ICD-10-CM

## 2021-11-29 RX ORDER — TRAMADOL HYDROCHLORIDE 50 MG/1
TABLET ORAL
Qty: 10 TABLET | Refills: 1 | Status: SHIPPED | OUTPATIENT
Start: 2021-11-29 | End: 2022-02-02

## 2021-11-30 ENCOUNTER — NURSING HOME VISIT (OUTPATIENT)
Dept: GERIATRICS | Facility: CLINIC | Age: 86
End: 2021-11-30
Payer: MEDICARE

## 2021-11-30 VITALS
RESPIRATION RATE: 16 BRPM | SYSTOLIC BLOOD PRESSURE: 118 MMHG | HEART RATE: 88 BPM | TEMPERATURE: 97.8 F | DIASTOLIC BLOOD PRESSURE: 75 MMHG | HEIGHT: 62 IN | WEIGHT: 119.8 LBS | OXYGEN SATURATION: 97 % | BODY MASS INDEX: 22.05 KG/M2

## 2021-11-30 DIAGNOSIS — M25.561 ACUTE PAIN OF RIGHT KNEE: Primary | ICD-10-CM

## 2021-11-30 DIAGNOSIS — S72.401A CLOSED FRACTURE OF DISTAL END OF RIGHT FEMUR, UNSPECIFIED FRACTURE MORPHOLOGY, INITIAL ENCOUNTER (H): Chronic | ICD-10-CM

## 2021-11-30 LAB — POTASSIUM BLD-SCNC: 4.1 MMOL/L (ref 3.5–5)

## 2021-11-30 PROCEDURE — P9604 ONE-WAY ALLOW PRORATED TRIP: HCPCS | Performed by: NURSE PRACTITIONER

## 2021-11-30 PROCEDURE — 99309 SBSQ NF CARE MODERATE MDM 30: CPT | Performed by: NURSE PRACTITIONER

## 2021-11-30 PROCEDURE — 36415 COLL VENOUS BLD VENIPUNCTURE: CPT | Performed by: NURSE PRACTITIONER

## 2021-11-30 PROCEDURE — 84132 ASSAY OF SERUM POTASSIUM: CPT | Performed by: NURSE PRACTITIONER

## 2021-11-30 ASSESSMENT — MIFFLIN-ST. JEOR: SCORE: 881.66

## 2021-11-30 NOTE — LETTER
11/30/2021        RE: Elysia Lane  [address]  [facility] [address]        [facility]  PRIMARY CARE PROVIDER AND CLINIC:  Elif Alcantara MD, [facility] [address] /   Chief Complaint   Patient presents with     Knee Pain      [place] Medical Record Number:  [id number]  Place of Service where encounter took place:  [facility] () [[id number]]    Elysia Lane  is a 89 year old  (12/2/1923), admitted to the above facility from  [facility]. Hospital stay 9/6 through 9/16..   HPI:  Elysia has a history of HTN and anemia who had a fall at home and presented to [facility] ED with  R leg pain. She had closed fracture of the distal femur and received ORIF and is non weight bearing. Completed IV ceftraixone for UTI with sepsis.   Also found in a fib with RVR, which improved on metoprolol 100mg and diltiazem, as well as apixaban.     Today: Seen today per nursing request for reports of increased pain to the right knee, she has had difficulty performing therapies despite tramadol use.  Tramadol seems to be quite effective for her.  She is having increased warmth under the scabbed area of the right knee.  Inability to walk or perform therapies.  No fevers or drainage.    CODE STATUS/ADVANCE DIRECTIVES DISCUSSION:  Prior  DNR / DNI  ALLERGIES: No Known Allergies   PAST MEDICAL HISTORY:   Past Medical History:   Diagnosis Date     Anemia      Chronic kidney disease      Essential hypertension      Osteoarthritis      Stenosis of carotid artery       PAST SURGICAL HISTORY:   has a past surgical history that includes Open reduction internal fixation femur distal (Right, 9/8/2021) and Irrigation and debridement lower extremity, combined (Right, 11/1/2021).  FAMILY HISTORY: family history includes No Known Problems in her father and mother.  SOCIAL HISTORY:   reports that she has never smoked. She has never used smokeless tobacco. She reports previous  "alcohol use. She reports that she does not use drugs.  Patient's living condition: NICHOLE    Post Discharge Medication Reconciliation Status: discharge medications reconciled, continue medications without change  Current Outpatient Medications   Medication Sig     acetaminophen (TYLENOL) 500 MG tablet Take 1,000 mg by mouth 3 times daily     apixaban ANTICOAGULANT (ELIQUIS) 5 MG tablet Take 1 tablet (5 mg) by mouth 2 times daily     collagenase (SANTYL) 250 UNIT/GM external ointment Apply topically daily     cyanocobalamin (CYANOCOBALAMIN) 100 MCG tablet Take 1 tablet (100 mcg) by mouth daily     diltiazem ER (CARDIZEM SR) 90 MG 12 hr capsule Take 1 capsule (90 mg) by mouth 2 times daily     famotidine (PEPCID) 20 MG tablet Take 1 tablet (20 mg) by mouth every morning     ferrous sulfate (FEROSUL) 325 (65 Fe) MG tablet Take 325 mg by mouth daily (with breakfast)      metoprolol tartrate (LOPRESSOR) 100 MG tablet Take 1 tablet (100 mg) by mouth 2 times daily     polyethylene glycol (MIRALAX) 17 GM/Dose powder Take 17 g by mouth daily     potassium chloride ER (KLOR-CON M) 20 MEQ CR tablet Take 20 mEq by mouth daily     traMADol (ULTRAM) 50 MG tablet 1/2 TAB (25MG) BY MOUTH EVERY 6 HOURS AS NEEDED FOR SEVERE PAIN     Vitamin D3 (CHOLECALCIFEROL) 25 mcg (1000 units) tablet Take 1 tablet (25 mcg) by mouth daily     No current facility-administered medications for this visit.       ROS:  4 point ROS including Respiratory, CV, GI and , other than that noted in the HPI,  is negative    Vitals:  /75   Pulse 88   Temp 97.8  F (36.6  C)   Resp 16   Ht 1.575 m (5' 2\")   Wt 54.3 kg (119 lb 12.8 oz)   SpO2 97%   BMI 21.91 kg/m    Exam:  Physical Exam   General appearance: alert, appears stated age and cooperative.   Head: Normocephalic, without obvious abnormality, atraumatic, Eyes: sclera anicteric.  Lungs: respirations without effort, LSCTA; no wheezing or rales.   Cardiovascular: S1, S2. Regular rate and rhythm. "   ABDOMEN: Globular and soft, non tender.    Skin: Reviewed incision to right knee, there is a 2 x 2 centimeter scabbed area over the patella, with increasing warmth and erythema.  Neurologic:oriented. No focal deficits.   Psych: interacts well with caregivers, exhibits logical thought processes and connections, pleasant    Lab/Diagnostic data:  Recent labs in [facility] reviewed by me today.     ASSESSMENT/PLAN:    Pressure sores to coccyx, gluteal fold: Unstable.  -Wound care MD following, continue per their orders.    Cellulitis of postop incision (primary encounter diagnosis):  (W19.XXXA) Fall    (S72.401A) Closed fracture of distal end of right femur, unspecified fracture morphology, initial encounter (H): Unstable.  Comment: Increased pain and warmth of the right knee near scabbed region.   Plan:   -DC tramadol; ineffective.  -Obtain CBC, ESR, CRP.  -X-ray to right knee.  -Oxycodone 2.5 mg p.o. every 8 hours as needed.  -Ice to right knee 4 times daily as needed.  -Follow up with ortho Thursday.  -PT, OT at their discretion.     (R21) Rash and nonspecific skin eruption: Appears to have cleared; no s/sx of rash.  We will continue famotidine for GI protection due to blood thinner and recent prolonged hospitalization.  Plan:   -Continue famotidine 20mg daily.     (I48.91) Rapid atrial fibrillation (H)  Comment: new onset.   Plan:   -metoprolol 100mg  -diltiazem 90 daily.   -apixiban.       Anemia, postop: Hemoglobin up to 9.  Improved from discharge in the sevens.  -Continues on daily iron supplementation.    Electronically signed by:  Sherry Wu CNP         Sincerely,        Sherry Wu, CNP

## 2021-12-01 ENCOUNTER — LAB REQUISITION (OUTPATIENT)
Dept: LAB | Facility: CLINIC | Age: 86
End: 2021-12-01
Payer: MEDICARE

## 2021-12-01 DIAGNOSIS — N18.32 CHRONIC KIDNEY DISEASE, STAGE 3B (H): ICD-10-CM

## 2021-12-01 DIAGNOSIS — I48.91 UNSPECIFIED ATRIAL FIBRILLATION (H): ICD-10-CM

## 2021-12-02 ENCOUNTER — TELEPHONE (OUTPATIENT)
Dept: GERIATRICS | Facility: CLINIC | Age: 86
End: 2021-12-02
Payer: MEDICARE

## 2021-12-02 LAB
BASOPHILS # BLD AUTO: 0.1 10E3/UL (ref 0–0.2)
BASOPHILS NFR BLD AUTO: 1 %
C REACTIVE PROTEIN LHE: 9.9 MG/DL (ref 0–0.8)
EOSINOPHIL # BLD AUTO: 0 10E3/UL (ref 0–0.7)
EOSINOPHIL NFR BLD AUTO: 0 %
ERYTHROCYTE [DISTWIDTH] IN BLOOD BY AUTOMATED COUNT: 14.3 % (ref 10–15)
ERYTHROCYTE [SEDIMENTATION RATE] IN BLOOD BY WESTERGREN METHOD: 99 MM/HR (ref 0–20)
HCT VFR BLD AUTO: 36.7 % (ref 35–47)
HGB BLD-MCNC: 11.3 G/DL (ref 11.7–15.7)
IMM GRANULOCYTES # BLD: 0.2 10E3/UL
IMM GRANULOCYTES NFR BLD: 2 %
LYMPHOCYTES # BLD AUTO: 1.1 10E3/UL (ref 0.8–5.3)
LYMPHOCYTES NFR BLD AUTO: 11 %
MCH RBC QN AUTO: 29.7 PG (ref 26.5–33)
MCHC RBC AUTO-ENTMCNC: 30.8 G/DL (ref 31.5–36.5)
MCV RBC AUTO: 96 FL (ref 78–100)
MONOCYTES # BLD AUTO: 1.1 10E3/UL (ref 0–1.3)
MONOCYTES NFR BLD AUTO: 10 %
NEUTROPHILS # BLD AUTO: 8.2 10E3/UL (ref 1.6–8.3)
NEUTROPHILS NFR BLD AUTO: 76 %
NRBC # BLD AUTO: 0 10E3/UL
NRBC BLD AUTO-RTO: 0 /100
PLATELET # BLD AUTO: 440 10E3/UL (ref 150–450)
RBC # BLD AUTO: 3.81 10E6/UL (ref 3.8–5.2)
WBC # BLD AUTO: 10.7 10E3/UL (ref 4–11)

## 2021-12-02 PROCEDURE — 85025 COMPLETE CBC W/AUTO DIFF WBC: CPT | Performed by: NURSE PRACTITIONER

## 2021-12-02 PROCEDURE — 85652 RBC SED RATE AUTOMATED: CPT | Performed by: NURSE PRACTITIONER

## 2021-12-02 PROCEDURE — P9603 ONE-WAY ALLOW PRORATED MILES: HCPCS | Performed by: NURSE PRACTITIONER

## 2021-12-02 PROCEDURE — 36415 COLL VENOUS BLD VENIPUNCTURE: CPT | Performed by: NURSE PRACTITIONER

## 2021-12-02 PROCEDURE — 86141 C-REACTIVE PROTEIN HS: CPT | Performed by: NURSE PRACTITIONER

## 2021-12-03 NOTE — TELEPHONE ENCOUNTER
[facility] Nurse Triage Telephone Note    Provider: SANDRINE Cline  Facility: [facility]      Facility Type:  [facility]    Caller: Taylor  Call Back Number: [phone]    Allergies:  No Known Allergies     Reason for call: ESR 99, CRP 9.9, WBC 10.7, Hgb 11.3. Going to see Ortho tomorrow.    Verbal Order/Direction given by Provider: Send results with pt to Ortho tomorrow.    Provider giving Order:  SANDRINE Cline    Verbal Order given to: Taylor Benton RN

## 2021-12-08 NOTE — PROGRESS NOTES
[facility] GERIATRIC SERVICES  PRIMARY CARE PROVIDER AND CLINIC:  Elif Alcantara MD, [facility] [address] /   Chief Complaint   Patient presents with     Knee Pain      [place] Medical Record Number:  [id number]  Place of Service where encounter took place:  [facility] () [[id number]]    Elysia Lane  is a 89 year old  (12/2/1923), admitted to the above facility from  [facility]. Hospital stay 9/6 through 9/16..   HPI:  Elysia has a history of HTN and anemia who had a fall at home and presented to [facility] ED with  R leg pain. She had closed fracture of the distal femur and received ORIF and is non weight bearing. Completed IV ceftraixone for UTI with sepsis.   Also found in a fib with RVR, which improved on metoprolol 100mg and diltiazem, as well as apixaban.     Today: Seen today per nursing request for reports of increased pain to the right knee, she has had difficulty performing therapies despite tramadol use.  Tramadol seems to be quite effective for her.  She is having increased warmth under the scabbed area of the right knee.  Inability to walk or perform therapies.  No fevers or drainage.    CODE STATUS/ADVANCE DIRECTIVES DISCUSSION:  Prior  DNR / DNI  ALLERGIES: No Known Allergies   PAST MEDICAL HISTORY:   Past Medical History:   Diagnosis Date     Anemia      Chronic kidney disease      Essential hypertension      Osteoarthritis      Stenosis of carotid artery       PAST SURGICAL HISTORY:   has a past surgical history that includes Open reduction internal fixation femur distal (Right, 9/8/2021) and Irrigation and debridement lower extremity, combined (Right, 11/1/2021).  FAMILY HISTORY: family history includes No Known Problems in her father and mother.  SOCIAL HISTORY:   reports that she has never smoked. She has never used smokeless tobacco. She reports previous alcohol use. She reports that she does not use drugs.  Patient's living condition:  "longterm    Post Discharge Medication Reconciliation Status: discharge medications reconciled, continue medications without change  Current Outpatient Medications   Medication Sig     acetaminophen (TYLENOL) 500 MG tablet Take 1,000 mg by mouth 3 times daily     apixaban ANTICOAGULANT (ELIQUIS) 5 MG tablet Take 1 tablet (5 mg) by mouth 2 times daily     collagenase (SANTYL) 250 UNIT/GM external ointment Apply topically daily     cyanocobalamin (CYANOCOBALAMIN) 100 MCG tablet Take 1 tablet (100 mcg) by mouth daily     diltiazem ER (CARDIZEM SR) 90 MG 12 hr capsule Take 1 capsule (90 mg) by mouth 2 times daily     famotidine (PEPCID) 20 MG tablet Take 1 tablet (20 mg) by mouth every morning     ferrous sulfate (FEROSUL) 325 (65 Fe) MG tablet Take 325 mg by mouth daily (with breakfast)      metoprolol tartrate (LOPRESSOR) 100 MG tablet Take 1 tablet (100 mg) by mouth 2 times daily     polyethylene glycol (MIRALAX) 17 GM/Dose powder Take 17 g by mouth daily     potassium chloride ER (KLOR-CON M) 20 MEQ CR tablet Take 20 mEq by mouth daily     traMADol (ULTRAM) 50 MG tablet 1/2 TAB (25MG) BY MOUTH EVERY 6 HOURS AS NEEDED FOR SEVERE PAIN     Vitamin D3 (CHOLECALCIFEROL) 25 mcg (1000 units) tablet Take 1 tablet (25 mcg) by mouth daily     No current facility-administered medications for this visit.       ROS:  4 point ROS including Respiratory, CV, GI and , other than that noted in the HPI,  is negative    Vitals:  /75   Pulse 88   Temp 97.8  F (36.6  C)   Resp 16   Ht 1.575 m (5' 2\")   Wt 54.3 kg (119 lb 12.8 oz)   SpO2 97%   BMI 21.91 kg/m    Exam:  Physical Exam   General appearance: alert, appears stated age and cooperative.   Head: Normocephalic, without obvious abnormality, atraumatic, Eyes: sclera anicteric.  Lungs: respirations without effort, LSCTA; no wheezing or rales.   Cardiovascular: S1, S2. Regular rate and rhythm.   ABDOMEN: Globular and soft, non tender.    Skin: Reviewed incision to right knee, " there is a 2 x 2 centimeter scabbed area over the patella, with increasing warmth and erythema.  Neurologic:oriented. No focal deficits.   Psych: interacts well with caregivers, exhibits logical thought processes and connections, pleasant    Lab/Diagnostic data:  Recent labs in [facility] reviewed by me today.     ASSESSMENT/PLAN:    Pressure sores to coccyx, gluteal fold: Unstable.  -Wound care MD following, continue per their orders.    Cellulitis of postop incision (primary encounter diagnosis):  (W19.XXXA) Fall    (S72.401A) Closed fracture of distal end of right femur, unspecified fracture morphology, initial encounter (H): Unstable.  Comment: Increased pain and warmth of the right knee near scabbed region.   Plan:   -DC tramadol; ineffective.  -Obtain CBC, ESR, CRP.  -X-ray to right knee.  -Oxycodone 2.5 mg p.o. every 8 hours as needed.  -Ice to right knee 4 times daily as needed.  -Follow up with ortho Thursday.  -PT, OT at their discretion.     (R21) Rash and nonspecific skin eruption: Appears to have cleared; no s/sx of rash.  We will continue famotidine for GI protection due to blood thinner and recent prolonged hospitalization.  Plan:   -Continue famotidine 20mg daily.     (I48.91) Rapid atrial fibrillation (H)  Comment: new onset.   Plan:   -metoprolol 100mg  -diltiazem 90 daily.   -apixiban.       Anemia, postop: Hemoglobin up to 9.  Improved from discharge in the sevens.  -Continues on daily iron supplementation.    Electronically signed by:  Sherry Wu CNP

## 2022-01-01 ENCOUNTER — TELEPHONE (OUTPATIENT)
Dept: GERIATRICS | Facility: CLINIC | Age: 87
End: 2022-01-01

## 2022-01-01 ENCOUNTER — NURSING HOME VISIT (OUTPATIENT)
Dept: GERIATRICS | Facility: CLINIC | Age: 87
End: 2022-01-01
Payer: MEDICARE

## 2022-01-01 ENCOUNTER — NURSING HOME VISIT (OUTPATIENT)
Dept: GERIATRICS | Facility: CLINIC | Age: 87
End: 2022-01-01
Payer: COMMERCIAL

## 2022-01-01 ENCOUNTER — PATIENT OUTREACH (OUTPATIENT)
Dept: GERIATRIC MEDICINE | Facility: CLINIC | Age: 87
End: 2022-01-01

## 2022-01-01 VITALS
HEART RATE: 68 BPM | DIASTOLIC BLOOD PRESSURE: 52 MMHG | HEIGHT: 62 IN | RESPIRATION RATE: 14 BRPM | TEMPERATURE: 97.3 F | BODY MASS INDEX: 18.9 KG/M2 | SYSTOLIC BLOOD PRESSURE: 92 MMHG | WEIGHT: 102.7 LBS | OXYGEN SATURATION: 97 %

## 2022-01-01 VITALS
DIASTOLIC BLOOD PRESSURE: 56 MMHG | SYSTOLIC BLOOD PRESSURE: 95 MMHG | RESPIRATION RATE: 18 BRPM | BODY MASS INDEX: 17.98 KG/M2 | HEIGHT: 62 IN | OXYGEN SATURATION: 94 % | TEMPERATURE: 99 F | HEART RATE: 54 BPM | WEIGHT: 97.7 LBS

## 2022-01-01 VITALS
RESPIRATION RATE: 16 BRPM | TEMPERATURE: 98.6 F | DIASTOLIC BLOOD PRESSURE: 72 MMHG | SYSTOLIC BLOOD PRESSURE: 139 MMHG | OXYGEN SATURATION: 99 % | BODY MASS INDEX: 19.71 KG/M2 | HEIGHT: 62 IN | WEIGHT: 107.1 LBS | HEART RATE: 78 BPM

## 2022-01-01 VITALS
DIASTOLIC BLOOD PRESSURE: 64 MMHG | BODY MASS INDEX: 18.81 KG/M2 | SYSTOLIC BLOOD PRESSURE: 127 MMHG | WEIGHT: 102.2 LBS | HEIGHT: 62 IN | RESPIRATION RATE: 18 BRPM | HEART RATE: 61 BPM | OXYGEN SATURATION: 99 % | TEMPERATURE: 97.2 F

## 2022-01-01 VITALS
BODY MASS INDEX: 18.33 KG/M2 | TEMPERATURE: 96.5 F | OXYGEN SATURATION: 98 % | HEART RATE: 86 BPM | RESPIRATION RATE: 20 BRPM | HEIGHT: 62 IN | DIASTOLIC BLOOD PRESSURE: 69 MMHG | SYSTOLIC BLOOD PRESSURE: 110 MMHG | WEIGHT: 99.6 LBS

## 2022-01-01 VITALS
RESPIRATION RATE: 18 BRPM | TEMPERATURE: 97.8 F | OXYGEN SATURATION: 99 % | HEART RATE: 92 BPM | HEIGHT: 62 IN | SYSTOLIC BLOOD PRESSURE: 134 MMHG | WEIGHT: 99.6 LBS | BODY MASS INDEX: 18.33 KG/M2 | DIASTOLIC BLOOD PRESSURE: 89 MMHG

## 2022-01-01 VITALS
TEMPERATURE: 97.8 F | SYSTOLIC BLOOD PRESSURE: 125 MMHG | RESPIRATION RATE: 16 BRPM | HEART RATE: 70 BPM | BODY MASS INDEX: 18.58 KG/M2 | DIASTOLIC BLOOD PRESSURE: 73 MMHG | OXYGEN SATURATION: 98 % | HEIGHT: 62 IN | WEIGHT: 101 LBS

## 2022-01-01 DIAGNOSIS — R05.9 COUGH, UNSPECIFIED TYPE: Primary | ICD-10-CM

## 2022-01-01 DIAGNOSIS — U07.1 INFECTION DUE TO 2019 NOVEL CORONAVIRUS: ICD-10-CM

## 2022-01-01 DIAGNOSIS — N18.32 CHRONIC KIDNEY DISEASE, STAGE 3B (H): ICD-10-CM

## 2022-01-01 DIAGNOSIS — F02.80 LATE ONSET ALZHEIMER'S DEMENTIA WITHOUT BEHAVIORAL DISTURBANCE (H): Primary | ICD-10-CM

## 2022-01-01 DIAGNOSIS — Z79.01 CHRONIC ANTICOAGULATION: ICD-10-CM

## 2022-01-01 DIAGNOSIS — S72.401S CLOSED FRACTURE OF DISTAL END OF RIGHT FEMUR, UNSPECIFIED FRACTURE MORPHOLOGY, SEQUELA: ICD-10-CM

## 2022-01-01 DIAGNOSIS — Z87.81 HISTORY OF FEMUR FRACTURE: ICD-10-CM

## 2022-01-01 DIAGNOSIS — I48.20 CHRONIC ATRIAL FIBRILLATION (H): ICD-10-CM

## 2022-01-01 DIAGNOSIS — G30.1 LATE ONSET ALZHEIMER'S DEMENTIA WITHOUT BEHAVIORAL DISTURBANCE (H): Primary | ICD-10-CM

## 2022-01-01 DIAGNOSIS — I48.91 RAPID ATRIAL FIBRILLATION (H): Chronic | ICD-10-CM

## 2022-01-01 DIAGNOSIS — F03.90 DEMENTIA WITHOUT BEHAVIORAL DISTURBANCE, UNSPECIFIED DEMENTIA TYPE: Primary | ICD-10-CM

## 2022-01-01 DIAGNOSIS — F03.90 DEMENTIA WITHOUT BEHAVIORAL DISTURBANCE (H): Primary | ICD-10-CM

## 2022-01-01 DIAGNOSIS — L02.415 ABSCESS OF RIGHT LEG: ICD-10-CM

## 2022-01-01 DIAGNOSIS — I48.91 RAPID ATRIAL FIBRILLATION (H): ICD-10-CM

## 2022-01-01 DIAGNOSIS — G30.1 LATE ONSET ALZHEIMER'S DEMENTIA WITHOUT BEHAVIORAL DISTURBANCE (H): ICD-10-CM

## 2022-01-01 DIAGNOSIS — F02.80 LATE ONSET ALZHEIMER'S DEMENTIA WITHOUT BEHAVIORAL DISTURBANCE (H): ICD-10-CM

## 2022-01-01 DIAGNOSIS — E46 PROTEIN-CALORIE MALNUTRITION, UNSPECIFIED SEVERITY (H): ICD-10-CM

## 2022-01-01 DIAGNOSIS — D64.9 ANEMIA, UNSPECIFIED TYPE: ICD-10-CM

## 2022-01-01 DIAGNOSIS — I10 HYPERTENSION, UNSPECIFIED TYPE: ICD-10-CM

## 2022-01-01 DIAGNOSIS — D64.9 CHRONIC ANEMIA: ICD-10-CM

## 2022-01-01 DIAGNOSIS — M62.84 SARCOPENIA: ICD-10-CM

## 2022-01-01 DIAGNOSIS — U07.1 INFECTION DUE TO 2019 NOVEL CORONAVIRUS: Primary | ICD-10-CM

## 2022-01-01 DIAGNOSIS — E43 SEVERE PROTEIN-CALORIE MALNUTRITION (H): ICD-10-CM

## 2022-01-01 DIAGNOSIS — M79.604 PAIN OF RIGHT LOWER EXTREMITY: ICD-10-CM

## 2022-01-01 DIAGNOSIS — I48.21 PERMANENT ATRIAL FIBRILLATION (H): ICD-10-CM

## 2022-01-01 PROCEDURE — 99309 SBSQ NF CARE MODERATE MDM 30: CPT | Performed by: NURSE PRACTITIONER

## 2022-01-01 PROCEDURE — 99310 SBSQ NF CARE HIGH MDM 45: CPT | Mod: CS | Performed by: FAMILY MEDICINE

## 2022-01-01 PROCEDURE — 99309 SBSQ NF CARE MODERATE MDM 30: CPT | Performed by: FAMILY MEDICINE

## 2022-01-01 PROCEDURE — 99318 PR ANNUAL NURSING FAC ASSESSMNT, STABLE: CPT | Performed by: NURSE PRACTITIONER

## 2022-01-01 PROCEDURE — 99310 SBSQ NF CARE HIGH MDM 45: CPT | Performed by: NURSE PRACTITIONER

## 2022-01-01 RX ORDER — CEPHALEXIN 500 MG/1
500 CAPSULE ORAL 3 TIMES DAILY
Qty: 21 CAPSULE | Refills: 0
Start: 2022-01-01 | End: 2023-01-01

## 2022-01-01 RX ORDER — ONDANSETRON 4 MG/1
4 TABLET, FILM COATED ORAL EVERY 6 HOURS PRN
Qty: 30 TABLET | Refills: 0 | Status: SHIPPED | OUTPATIENT
Start: 2022-01-01

## 2022-01-01 RX ORDER — GUAIFENESIN 200 MG/10ML
200 LIQUID ORAL EVERY 4 HOURS PRN
Qty: 473 ML | Refills: 0 | Status: SHIPPED | OUTPATIENT
Start: 2022-01-01

## 2022-01-01 ASSESSMENT — ACTIVITIES OF DAILY LIVING (ADL)
DEPENDENT_IADLS:: CLEANING;COOKING;LAUNDRY;SHOPPING;MEAL PREPARATION;MEDICATION MANAGEMENT;MONEY MANAGEMENT;TRANSPORTATION;INCONTINENCE

## 2022-01-07 ENCOUNTER — NURSING HOME VISIT (OUTPATIENT)
Dept: GERIATRICS | Facility: CLINIC | Age: 87
End: 2022-01-07
Payer: MEDICARE

## 2022-01-07 VITALS
HEIGHT: 62 IN | SYSTOLIC BLOOD PRESSURE: 96 MMHG | WEIGHT: 115.3 LBS | RESPIRATION RATE: 16 BRPM | HEART RATE: 63 BPM | TEMPERATURE: 96.5 F | OXYGEN SATURATION: 99 % | BODY MASS INDEX: 21.22 KG/M2 | DIASTOLIC BLOOD PRESSURE: 55 MMHG

## 2022-01-07 DIAGNOSIS — I48.91 RAPID ATRIAL FIBRILLATION (H): Chronic | ICD-10-CM

## 2022-01-07 DIAGNOSIS — Z98.890 HISTORY OF INCISION AND DRAINAGE: ICD-10-CM

## 2022-01-07 DIAGNOSIS — S72.401A CLOSED FRACTURE OF DISTAL END OF RIGHT FEMUR, UNSPECIFIED FRACTURE MORPHOLOGY, INITIAL ENCOUNTER (H): Primary | Chronic | ICD-10-CM

## 2022-01-07 PROCEDURE — 99309 SBSQ NF CARE MODERATE MDM 30: CPT | Performed by: NURSE PRACTITIONER

## 2022-01-07 ASSESSMENT — MIFFLIN-ST. JEOR: SCORE: 856.25

## 2022-01-07 NOTE — LETTER
1/7/2022        RE: Elysia Lane  [address]  [facility] [address]        [facility]  PRIMARY CARE PROVIDER AND CLINIC:  Elif Alcantara MD, [facility] [address] /   Chief Complaint   Patient presents with     [facility] Regulatory      [place] Medical Record Number:  [id number]  Place of Service where encounter took place:  [facility] () [[id number]]    Elysia Lane  is a 89 year old  (12/2/1923), admitted to the above facility from  [facility]. Hospital stay 9/6 through 9/16..   HPI:  Elysia has a history of HTN and anemia who had a fall at home and presented to [facility]'s ED with  R leg pain. She had closed fracture of the distal femur and received ORIF and is non weight bearing. Completed IV ceftraixone for UTI with sepsis.   Also found in a fib with RVR, which improved on metoprolol 100mg and diltiazem, as well as apixaban.     Today: Seen today for Regulatory visit. Reviewed knee wound which is scabbed over and healing well. She reports that she still has pain and takes her pain medication oxycodone 2.5mg q 6 hours prn. Will not discontinue at this time but will review again in a few weeks to wean or discontinue.  Reviewed medications, she is still on iron and B12, will order B12 level and CBC for Tuesday to evaluate necessity of both.  We will continue her vitamin D due to recent fracture.  Otherwise she is doing well, vital signs are stable, weights are trending down, she remains on apixaban for A. fib.    CODE STATUS/ADVANCE DIRECTIVES DISCUSSION:  Prior  DNR / DNI  ALLERGIES: No Known Allergies   PAST MEDICAL HISTORY:   Past Medical History:   Diagnosis Date     Anemia      Chronic kidney disease      Essential hypertension      Osteoarthritis      Stenosis of carotid artery       PAST SURGICAL HISTORY:   has a past surgical history that includes Open reduction internal fixation femur distal (Right, 9/8/2021) and Irrigation  "and debridement lower extremity, combined (Right, 11/1/2021).  FAMILY HISTORY: family history includes No Known Problems in her father and mother.  SOCIAL HISTORY:   reports that she has never smoked. She has never used smokeless tobacco. She reports previous alcohol use. She reports that she does not use drugs.  Patient's living condition: intermediate    Post Discharge Medication Reconciliation Status: discharge medications reconciled, continue medications without change  Current Outpatient Medications   Medication Sig     acetaminophen (TYLENOL) 500 MG tablet Take 1,000 mg by mouth 3 times daily     apixaban ANTICOAGULANT (ELIQUIS) 5 MG tablet Take 1 tablet (5 mg) by mouth 2 times daily     collagenase (SANTYL) 250 UNIT/GM external ointment Apply topically daily     cyanocobalamin (CYANOCOBALAMIN) 100 MCG tablet Take 1 tablet (100 mcg) by mouth daily     diltiazem ER (CARDIZEM SR) 90 MG 12 hr capsule Take 1 capsule (90 mg) by mouth 2 times daily     famotidine (PEPCID) 20 MG tablet Take 1 tablet (20 mg) by mouth every morning     ferrous sulfate (FEROSUL) 325 (65 Fe) MG tablet Take 325 mg by mouth daily (with breakfast)      metoprolol tartrate (LOPRESSOR) 100 MG tablet Take 1 tablet (100 mg) by mouth 2 times daily     polyethylene glycol (MIRALAX) 17 GM/Dose powder Take 17 g by mouth daily     potassium chloride ER (KLOR-CON M) 20 MEQ CR tablet Take 20 mEq by mouth daily     traMADol (ULTRAM) 50 MG tablet 1/2 TAB (25MG) BY MOUTH EVERY 6 HOURS AS NEEDED FOR SEVERE PAIN     Vitamin D3 (CHOLECALCIFEROL) 25 mcg (1000 units) tablet Take 1 tablet (25 mcg) by mouth daily     No current facility-administered medications for this visit.       ROS:  4 point ROS including Respiratory, CV, GI and , other than that noted in the HPI,  is negative    Vitals:  BP 96/55   Pulse 63   Temp (!) 96.5  F (35.8  C)   Resp 16   Ht 1.575 m (5' 2\")   Wt 52.3 kg (115 lb 4.8 oz)   SpO2 99%   BMI 21.09 kg/m    Exam:  Physical Exam   General " appearance: alert, appears stated age and cooperative.   Head: Normocephalic, without obvious abnormality, atraumatic, Eyes: sclera anicteric.  Lungs: respirations without effort, LSCTA; no wheezing or rales.   Cardiovascular: S1, S2. Regular rate and rhythm.   ABDOMEN: Globular and soft, non tender.    Skin: Scabbed over knee wound.   Neurologic:oriented. No focal deficits.   Psych: interacts well with caregivers, exhibits logical thought processes and connections, pleasant    Lab/Diagnostic data:  Recent labs in [facility] reviewed by me today.     ASSESSMENT/PLAN:    Pressure sores to coccyx, gluteal fold: Healing.  -Wound care MD following, continue per their orders.    (W19.XXXA) Fall    (S72.401A) Closed fracture of distal end of right femur, unspecified fracture morphology, initial encounter (H):  Comment:  Healing well.   Plan:   -Per wound care team.     (R21) Rash and nonspecific skin eruption: Appears to have cleared; no s/sx of rash.  We will continue famotidine for GI protection due to blood thinner and recent prolonged hospitalization.  Plan:   -Continue famotidine 20mg daily.     (I48.91) Rapid atrial fibrillation (H)  Comment: new onset.   Plan:   -metoprolol 100mg  -diltiazem 90 daily.   -apixiban.       Anemia, postop: Hemoglobin up to 9.  Improved from discharge in the sevens.  -Continues on daily iron supplementation and B12.  -Recheck CBC, B12 level on Tuesday.    Weight loss: Admit weight 150 pounds, however this was with cast in place, now 115 pounds which has been a steady downtrend over the last several months.  -Dietitian eval and treat.    Case Management:  I have reviewed the care plan and MDS and do agree with the plan. Patient's desire to return to the community is not present.  Information reviewed:  Medications, vital signs, orders, and nursing notes.    Electronically signed by:  Sherry Wu CNP             Sincerely,        Sherry Wu CNP

## 2022-01-07 NOTE — PROGRESS NOTES
[facility] GERIATRIC SERVICES  PRIMARY CARE PROVIDER AND CLINIC:  Elif Alcantara MD, [facility] [address] [address] /   Chief Complaint   Patient presents with     [facility] Regulatory      [place] Medical Record Number:  [id number]  Place of Service where encounter took place:  [facility] () [[id number]]    Elysia Lane  is a 89 year old  (12/2/1923), admitted to the above facility from  [facility]. Hospital stay 9/6 through 9/16..   HPI:  Elysia has a history of HTN and anemia who had a fall at home and presented to [facility] ED with  R leg pain. She had closed fracture of the distal femur and received ORIF and is non weight bearing. Completed IV ceftraixone for UTI with sepsis.   Also found in a fib with RVR, which improved on metoprolol 100mg and diltiazem, as well as apixaban.     Today: Seen today for Regulatory visit. Reviewed knee wound which is scabbed over and healing well. She reports that she still has pain and takes her pain medication oxycodone 2.5mg q 6 hours prn. Will not discontinue at this time but will review again in a few weeks to wean or discontinue.  Reviewed medications, she is still on iron and B12, will order B12 level and CBC for Tuesday to evaluate necessity of both.  We will continue her vitamin D due to recent fracture.  Otherwise she is doing well, vital signs are stable, weights are trending down, she remains on apixaban for A. fib.    CODE STATUS/ADVANCE DIRECTIVES DISCUSSION:  Prior  DNR / DNI  ALLERGIES: No Known Allergies   PAST MEDICAL HISTORY:   Past Medical History:   Diagnosis Date     Anemia      Chronic kidney disease      Essential hypertension      Osteoarthritis      Stenosis of carotid artery       PAST SURGICAL HISTORY:   has a past surgical history that includes Open reduction internal fixation femur distal (Right, 9/8/2021) and Irrigation and debridement lower extremity, combined (Right, 11/1/2021).  FAMILY HISTORY: family  [facility]  [address] [facility], [address]    LYmphedema Therapy  Visit: 4    []                  Daily note               [x]    90 day Reassessment with Updated Plan of Care/Progress Note     NAME: Yoli Etsevez  DATE: 2/10/2020    GOALS     Patient returns for compression garment fitting. Time frame: Extend all to 4/5/2020 (patient not seen in the clinic since 11/18/19. 1.  Patient will be measured and fitted for new custom compression flat-knit stockings to manage her chronic lymphedema and prevent reaccumulation of fluid. Patient now with Juzo expert christ and knee highs bilaterally and Circaid Juxtafit lower leg and knee/thigh garments. Also has Jacinto Carranza classic footpieces. Juxtafit lower leg garments and farrow foot pieces all fit well. The thigh Juxtafits were remade as patient requested increased length. Patient now reports that she feels that the material is not as strong and the straps are not as secure as she has had in the past with these garments. These garments are past the remake period, but will contact [facility] to let them know of patient's concern as there were obvious differences in the straps lengths/widths with remake. Patient also having issues with her products not covering well with many gaps exposing skin. She was encouraged to use her shelf straps she received previously for coverage. Patient also wore in her remake of her Lena Olden and her remake of the R knee high. Patient received these garments on Saturday. The original remake order was sent 11/18/19. Issues with return/processing has caused increased delay in receiving products. She reports that the products are fine, but she is not sure that she likes the reduced compression level at class 2.  She reports that if she was to change anything it would be to return to the class 3 in the [place], even though this was the main issue for "history includes No Known Problems in her father and mother.  SOCIAL HISTORY:   reports that she has never smoked. She has never used smokeless tobacco. She reports previous alcohol use. She reports that she does not use drugs.  Patient's living condition: intermediate    Post Discharge Medication Reconciliation Status: discharge medications reconciled, continue medications without change  Current Outpatient Medications   Medication Sig     acetaminophen (TYLENOL) 500 MG tablet Take 1,000 mg by mouth 3 times daily     apixaban ANTICOAGULANT (ELIQUIS) 5 MG tablet Take 1 tablet (5 mg) by mouth 2 times daily     collagenase (SANTYL) 250 UNIT/GM external ointment Apply topically daily     cyanocobalamin (CYANOCOBALAMIN) 100 MCG tablet Take 1 tablet (100 mcg) by mouth daily     diltiazem ER (CARDIZEM SR) 90 MG 12 hr capsule Take 1 capsule (90 mg) by mouth 2 times daily     famotidine (PEPCID) 20 MG tablet Take 1 tablet (20 mg) by mouth every morning     ferrous sulfate (FEROSUL) 325 (65 Fe) MG tablet Take 325 mg by mouth daily (with breakfast)      metoprolol tartrate (LOPRESSOR) 100 MG tablet Take 1 tablet (100 mg) by mouth 2 times daily     polyethylene glycol (MIRALAX) 17 GM/Dose powder Take 17 g by mouth daily     potassium chloride ER (KLOR-CON M) 20 MEQ CR tablet Take 20 mEq by mouth daily     traMADol (ULTRAM) 50 MG tablet 1/2 TAB (25MG) BY MOUTH EVERY 6 HOURS AS NEEDED FOR SEVERE PAIN     Vitamin D3 (CHOLECALCIFEROL) 25 mcg (1000 units) tablet Take 1 tablet (25 mcg) by mouth daily     No current facility-administered medications for this visit.       ROS:  4 point ROS including Respiratory, CV, GI and , other than that noted in the HPI,  is negative    Vitals:  BP 96/55   Pulse 63   Temp (!) 96.5  F (35.8  C)   Resp 16   Ht 1.575 m (5' 2\")   Wt 52.3 kg (115 lb 4.8 oz)   SpO2 99%   BMI 21.09 kg/m    Exam:  Physical Exam   General appearance: alert, appears stated age and cooperative.   Head: Normocephalic, without " remake. Will contact vendor [employer] to see if she is able to have a remake of the capris. Patient aware that this may not be able to happen. Extend goal to make sure that patient has all of her garment issues resolved. 2.  Patient will be independent with don/doff of compression system and use in order to prevent reaccumulation of fluid at discharge. Educated patient in donning and doffing the Rachele Half classic footpiece and reviewed the process today. Also educated patient in correct Juxtafit donning process which patient had the process reversed. She is independent with product use after education today. Goal met 11/18/19. SUBJECTIVE REPORT: Patient arrived today for fitting of her new compression capris. Patient reports that she feels they are ok, but feels that there is not as much compression as she is used to and feels that if she could change anything it would be to make them back to the class 3 strength. Patient also brought in her Juxta fit garments that were remade as she feels that the material is different that her previous garments and that the straps are not securing in place well, leaving open gaps in the knee and thighs. Pain: 0/10  Gait:    [x]  Independent gait without any assistive device for community distances  ADLs:  Independent  Treatment Response:   []   Patient reviewed packet received at evaluation  [x]   Patient completed home program as prescribed  []   Patient not fully compliant with home program to date  [x]   Patient able to have her remade garments assessed today. Function: Patient drives herself and works. [x]   Patient requiring less assistance with completion of home program  []   ADLs are requiring less assistance   [x]   Patient able to return to work/leisure activities  [facility] Lymphedema Assessment Scale: Not reassessed today.   Weight: 252.8 lbs  TREATMENT AND OBJECTIVE DATA SUMMARY:   Patient/Family Education:        Instructed in don/doff of compression system:   []   Multi layer bandage (MLB) donning principles and wear precautions  [x]   Day garments  [x]   Night garments     Therapeutic Activity 0 minutes   Treatment time: N/A  Functional Mobility: Independent with all activities and transfers performed in the clinic today. Fall risk: Low     Manual Lymphatic Drainage (MLD)  75 minutes   Treatment time:   Area to decongest:    [] UE          []  Right     []  Left      [] Trunk      [x] LE             [x]  Right     [x]  Left      [x] Trunk    Patient has been trained in self manual lymph drainage techniques during previous treatment session. She is skilled in her self care techniques. Sequence used and effectiveness: [] Secondary/Primary sequence for upper extremity with / without trunk involvement    [] Secondary/Primary sequence for lower extremities with / without trunk involvement     [] Modifications were made to manual lymph drainage sequence to exclude cervical techniques secondary to patient's age    [] Self MLD sequence/techniques/hand strokes   Skin/wound care/debridement: Skin is 100% intact today bilateral lower extremities. Upper/Lower extremity compression: Fitted patient for the following day and nightcustom compression products    Right []   Left []    Bilateral [x]     [] Upper Extremity        []   Sleeve        []  Glove/Gauntlet        []  Trunk             [x]  Lower Extremity        [x]   Knee length        [x]   Thigh length        [x]   Panty      Style  [] Circular knit        [x] Flat-knit  Juzo knee highs with custom christ           [x] Velcro - custom knee length garment and custom knee/thigh piece bilaterally, Farrow foot piece  []  Foam based    [x]  Brand: Stockings- Juzo expert  Velcro- Circaid Juxtafit  [x]  Custom    []  Non-Custom:        Size:    [x] Vendor: [employer]  Patient arrived today wearing her remake of her R custom Juzo Knee high and her Custom Mishel Lu.  Patient obvious abnormality, atraumatic, Eyes: sclera anicteric.  Lungs: respirations without effort, LSCTA; no wheezing or rales.   Cardiovascular: S1, S2. Regular rate and rhythm.   ABDOMEN: Globular and soft, non tender.    Skin: Scabbed over knee wound.   Neurologic:oriented. No focal deficits.   Psych: interacts well with caregivers, exhibits logical thought processes and connections, pleasant    Lab/Diagnostic data:  Recent labs in [facility] reviewed by me today.     ASSESSMENT/PLAN:    Pressure sores to coccyx, gluteal fold: Healing.  -Wound care MD following, continue per their orders.    (W19.XXXA) Fall    (S72.401A) Closed fracture of distal end of right femur, unspecified fracture morphology, initial encounter (H):  Comment:  Healing well.   Plan:   -Per wound care team.     (R21) Rash and nonspecific skin eruption: Appears to have cleared; no s/sx of rash.  We will continue famotidine for GI protection due to blood thinner and recent prolonged hospitalization.  Plan:   -Continue famotidine 20mg daily.     (I48.91) Rapid atrial fibrillation (H)  Comment: new onset.   Plan:   -metoprolol 100mg  -diltiazem 90 daily.   -apixiban.       Anemia, postop: Hemoglobin up to 9.  Improved from discharge in the sevens.  -Continues on daily iron supplementation and B12.  -Recheck CBC, B12 level on Tuesday.    Weight loss: Admit weight 150 pounds, however this was with cast in place, now 115 pounds which has been a steady downtrend over the last several months.  -Dietitian eval and treat.    Case Management:  I have reviewed the care plan and MDS and do agree with the plan. Patient's desire to return to the community is not present.  Information reviewed:  Medications, vital signs, orders, and nursing notes.    Electronically signed by:  Sherry Wu, SMOOTH        received these on Saturday as there was a delay in processing her remakes with Erika Goff. Patient reports the garments are fine, but now that she has changed to the class 2 strength, she feels that they are not as supportive and if she could change anything she would return to the class 3 strength. Will contact vendor [employer] to make them aware of these concerns and if able to remake these products vs in the future patient will return to class 3 for her capris. The rest of the fit looks good except for some slight continued at the posterior calf seam on the christ. Patient aware that products may not be able to be remade. Patient also had her Custom Juxta Fit Thigh/knee garments remade and she reports that she does not feel that the material is holding her well. The material does not feel like the premium material she is familiar with and also there is differences in the strap size by 1.5 to 2 cm in width and also some differences in length. Patient reports that there are gaps that occur and she is overall not pleased with how the product fits compared to garments that she has had in the past. Patient does have some additional shelf straps that she can use in these weak areas, but it seems to be several issues in the production of the garments. These products are past a remake period, but will reach out to [facility] Rep to see what options there are to make these products fit better for patient. Patient was also shown different options in night time foam garments, which may be a option in the future. Kinesiotaping: N/A   Girth/Volume measurement: Full volumes taken today to reveal that patient has gained 191 ml on the R LE and lost 51 ml on the L LE since volumes were taken last on 6/25/2019. Patient's current percent difference is 5.50%. TOTAL TREATMENT 75 mins     ASSESSMENT:   Treatment effectiveness and tolerance: Patient returned to the clinic today for the first time since 11/18/2019. Patient brought in her products for assessment and is still having some issues with her garments. Issues are minor, but need to be addressed. Because of the timing she may not be able to have her products remade. Patient aware that we will contact the proper vendors and manufacturers to voice her concerns and that it issues can be addressed they will and if not on upcoming orders we will make the changes she is requesting. Patient will contact the clinic if she receives a remake of her garments. If remakes are not able to be done than patient will be called and set up with a 3-6 month follow up. Progress toward goals: See goals above. Short term goal 2 was met. PLAN OF CARE:   Changes to the plan of care: Patient will be fitted for remade custom day and night compression products upon delivery. Frequency: []  2 times a week  []  Weekly  []  Biweekly  [x]  Patient will contact clinic once she is made aware if she can get remakes or assistance from  with her current garment concerns. If she is unable to have any changes will set her up with 3-6 month follow up at that time. Other: Vendor: [employer]       Uriah C Patch, PTA, CLT     Leigh Aguero, MSPT, CLT-GABY       TREATMENT PLAN EFFECTIVE DATES:   1/10/2020 TO 4/7/2020  I have read the above plan of care for Kapil [place] I Steve. Elvira Noonan. I certify the above prescribed services are required by this patient and are medically necessary.   The above plan of care has been developed in conjunction with the lymphedema/physical therapist.   Physician Signature: ____________________________________________________      GALINDO Martell  Date: _____________

## 2022-01-10 ENCOUNTER — LAB REQUISITION (OUTPATIENT)
Dept: LAB | Facility: CLINIC | Age: 87
End: 2022-01-10
Payer: MEDICARE

## 2022-01-10 DIAGNOSIS — D51.3 OTHER DIETARY VITAMIN B12 DEFICIENCY ANEMIA: ICD-10-CM

## 2022-01-10 DIAGNOSIS — D62 ACUTE POSTHEMORRHAGIC ANEMIA: ICD-10-CM

## 2022-01-11 PROCEDURE — 85014 HEMATOCRIT: CPT | Performed by: NURSE PRACTITIONER

## 2022-01-11 PROCEDURE — P9604 ONE-WAY ALLOW PRORATED TRIP: HCPCS | Performed by: NURSE PRACTITIONER

## 2022-01-11 PROCEDURE — 82607 VITAMIN B-12: CPT | Performed by: NURSE PRACTITIONER

## 2022-01-11 PROCEDURE — 36415 COLL VENOUS BLD VENIPUNCTURE: CPT | Performed by: NURSE PRACTITIONER

## 2022-01-13 LAB
ERYTHROCYTE [DISTWIDTH] IN BLOOD BY AUTOMATED COUNT: 16.7 % (ref 10–15)
HCT VFR BLD AUTO: 32.7 % (ref 35–47)
HGB BLD-MCNC: 10 G/DL (ref 11.7–15.7)
MCH RBC QN AUTO: 29.1 PG (ref 26.5–33)
MCHC RBC AUTO-ENTMCNC: 30.6 G/DL (ref 31.5–36.5)
MCV RBC AUTO: 95 FL (ref 78–100)
PLATELET # BLD AUTO: 309 10E3/UL (ref 150–450)
RBC # BLD AUTO: 3.44 10E6/UL (ref 3.8–5.2)
VIT B12 SERPL-MCNC: 999 PG/ML (ref 213–816)
WBC # BLD AUTO: 7.5 10E3/UL (ref 4–11)

## 2022-01-31 ENCOUNTER — NURSING HOME VISIT (OUTPATIENT)
Dept: GERIATRICS | Facility: CLINIC | Age: 87
End: 2022-01-31
Payer: MEDICARE

## 2022-01-31 VITALS
HEART RATE: 80 BPM | RESPIRATION RATE: 17 BRPM | SYSTOLIC BLOOD PRESSURE: 122 MMHG | BODY MASS INDEX: 21.47 KG/M2 | DIASTOLIC BLOOD PRESSURE: 59 MMHG | OXYGEN SATURATION: 99 % | WEIGHT: 116.7 LBS | TEMPERATURE: 97.1 F | HEIGHT: 62 IN

## 2022-01-31 DIAGNOSIS — Z98.890 H/O MAJOR ORTHOPEDIC SURGERY: Primary | Chronic | ICD-10-CM

## 2022-01-31 DIAGNOSIS — S72.401A CLOSED FRACTURE OF DISTAL END OF RIGHT FEMUR, UNSPECIFIED FRACTURE MORPHOLOGY, INITIAL ENCOUNTER (H): Chronic | ICD-10-CM

## 2022-01-31 DIAGNOSIS — T81.49XA CELLULITIS, WOUND, POST-OPERATIVE: ICD-10-CM

## 2022-01-31 PROCEDURE — 99309 SBSQ NF CARE MODERATE MDM 30: CPT | Performed by: NURSE PRACTITIONER

## 2022-01-31 ASSESSMENT — MIFFLIN-ST. JEOR: SCORE: 862.6

## 2022-01-31 NOTE — LETTER
1/31/2022        RE: Elysia Lane  [address]  [facility] [address]        [facility] GERIATRIC [facility]  PRIMARY CARE PROVIDER AND CLINIC:  Elif Alcantara MD, [facility] [address] /   Chief Complaint   Patient presents with     RECHECK      [place] Medical Record Number:  [id number]  Place of Service where encounter took place:  [facility] () [[id number]]    Elysia Lane  is a 89 year old  (12/2/1923), admitted to the above facility from  [facility]. Hospital stay 9/6 through 9/16..   HPI:  Elysia has a history of HTN and anemia who had a fall at home and presented to [facility]'s ED with  R leg pain. She had closed fracture of the distal femur and received ORIF and is non weight bearing. Completed IV ceftraixone for UTI with sepsis.   Also found in a fib with RVR, which improved on metoprolol 100mg and diltiazem, as well as apixaban.     Today: Opal is seen today per request of both nursing and pharmacy.  Pharmacy reviewed medications, they will not be changing any of the cardiology medications as she has a new diagnosis of A. fib, and she seems to be tolerating diltiazem, warfarin and metoprolol.  Vital signs stable with good blood pressures, pulse regular.  Saw cardiology last in September.   Reviewed oxycodone pain management, pain has been well controlled, she does endorse some discomfort on exam today over the right patella which is open with a healing wound.  The wound looks much better than it did a month ago.  I did encourage the use of ice, she does have Tylenol.  We will discontinue oxycodone.  She has had a few falls however they have been nonambulatory and mostly slipping out of her wheelchair onto the floor.  No injuries.  Discussed with nursing, she does have an order for Ace wrap's from orthopedics however lower extremity edema is well controlled at this time, we will change this to Tubigrip's and continue monitoring.    CODE  STATUS/ADVANCE DIRECTIVES DISCUSSION:  Prior  DNR / DNI  ALLERGIES: No Known Allergies   PAST MEDICAL HISTORY:   Past Medical History:   Diagnosis Date     Anemia      Chronic kidney disease      Essential hypertension      Osteoarthritis      Stenosis of carotid artery       PAST SURGICAL HISTORY:   has a past surgical history that includes Open reduction internal fixation femur distal (Right, 9/8/2021) and Irrigation and debridement lower extremity, combined (Right, 11/1/2021).  FAMILY HISTORY: family history includes No Known Problems in her father and mother.  SOCIAL HISTORY:   reports that she has never smoked. She has never used smokeless tobacco. She reports previous alcohol use. She reports that she does not use drugs.  Patient's living condition: NICHOLE    Post Discharge Medication Reconciliation Status: discharge medications reconciled, continue medications without change  Current Outpatient Medications   Medication Sig     acetaminophen (TYLENOL) 500 MG tablet Take 1,000 mg by mouth 3 times daily     apixaban ANTICOAGULANT (ELIQUIS) 5 MG tablet Take 1 tablet (5 mg) by mouth 2 times daily     collagenase (SANTYL) 250 UNIT/GM external ointment Apply topically daily     diltiazem ER (CARDIZEM SR) 90 MG 12 hr capsule Take 1 capsule (90 mg) by mouth 2 times daily     famotidine (PEPCID) 20 MG tablet Take 1 tablet (20 mg) by mouth every morning     ferrous sulfate (FEROSUL) 325 (65 Fe) MG tablet Take 325 mg by mouth daily (with breakfast)      metoprolol tartrate (LOPRESSOR) 100 MG tablet Take 1 tablet (100 mg) by mouth 2 times daily     polyethylene glycol (MIRALAX) 17 GM/Dose powder Take 17 g by mouth daily     potassium chloride ER (KLOR-CON M) 20 MEQ CR tablet Take 20 mEq by mouth daily     Vitamin D3 (CHOLECALCIFEROL) 25 mcg (1000 units) tablet Take 1 tablet (25 mcg) by mouth daily     No current facility-administered medications for this visit.       ROS:  4 point ROS including Respiratory, CV, GI and ,  "other than that noted in the HPI,  is negative    Vitals:  /59   Pulse 80   Temp 97.1  F (36.2  C)   Resp 17   Ht 1.575 m (5' 2\")   Wt 52.9 kg (116 lb 11.2 oz)   SpO2 99%   BMI 21.34 kg/m    Exam:  Physical Exam   General appearance: alert, appears stated age and cooperative.   Head: Normocephalic, without obvious abnormality, atraumatic, Eyes: sclera anicteric.  Lungs: respirations unlabored.  ABDOMEN: Globular and soft, non tender.    Skin: Wound bed with granulation tissue, scant purulent drainage over right knee.  Neurologic:oriented. No focal deficits.   Psych: interacts well with caregivers, exhibits logical thought processes and connections, pleasant    Lab/Diagnostic data:  Recent labs in [facility] reviewed by me today.     ASSESSMENT/PLAN:    Pressure sores to coccyx, gluteal fold: Healing.  -Wound care MD following, continue per their orders.    (W19.XXXA) Fall    (S72.401A) Closed fracture of distal end of right femur, unspecified fracture morphology, initial encounter (H):  Comment:  Healing well.  Pain well controlled.  Plan:   -Discontinue oxycodone.  -Discontinue Ace wraps, replaced with Tubigrip's on in the morning off at at bedtime.  -Per wound care team.     (R21) Rash and nonspecific skin eruption: Appears to have cleared; no s/sx of rash.  We will continue famotidine for GI protection due to blood thinner and recent prolonged hospitalization.  Plan:   -Continue famotidine 20mg daily.     (I48.91) Rapid atrial fibrillation (H)  Comment: new onset.  Continue per cardiology.  Plan:   -metoprolol 100mg  -diltiazem 90 daily.   -apixiban.       Anemia, postop: Hemoglobin up to 9.  Improved from discharge in the sevens.  -Continues on daily iron supplementation and B12.    Weight loss: Admit weight 150 pounds, however this was with cast in place, now 116 pounds-stable x 1 month.   -Dietitian following closely.      Electronically signed by:  Sherry Wu, CNP "             Sincerely,        Sherry Wu, CNP

## 2022-01-31 NOTE — PROGRESS NOTES
[facility] GERIATRIC SERVICES  PRIMARY CARE PROVIDER AND CLINIC:  Elif Alcantara MD, [facility] [address] /   Chief Complaint   Patient presents with     RALPHECK      [place] Medical Record Number:  [id number]  Place of Service where encounter took place:  [facility] () [[id number]]    Elysia Lane  is a 89 year old  (12/2/1923), admitted to the above facility from  [facility]. Hospital stay 9/6 through 9/16..   HPI:  Elysia has a history of HTN and anemia who had a fall at home and presented to [facility] ED with  R leg pain. She had closed fracture of the distal femur and received ORIF and is non weight bearing. Completed IV ceftraixone for UTI with sepsis.   Also found in a fib with RVR, which improved on metoprolol 100mg and diltiazem, as well as apixaban.     Today: Opal is seen today per request of both nursing and pharmacy.  Pharmacy reviewed medications, they will not be changing any of the cardiology medications as she has a new diagnosis of A. fib, and she seems to be tolerating diltiazem, warfarin and metoprolol.  Vital signs stable with good blood pressures, pulse regular.  Saw cardiology last in September.   Reviewed oxycodone pain management, pain has been well controlled, she does endorse some discomfort on exam today over the right patella which is open with a healing wound.  The wound looks much better than it did a month ago.  I did encourage the use of ice, she does have Tylenol.  We will discontinue oxycodone.  She has had a few falls however they have been nonambulatory and mostly slipping out of her wheelchair onto the floor.  No injuries.  Discussed with nursing, she does have an order for Ace wrap's from orthopedics however lower extremity edema is well controlled at this time, we will change this to Tubigrip's and continue monitoring.    CODE STATUS/ADVANCE DIRECTIVES DISCUSSION:  Prior  DNR / DNI  ALLERGIES: No Known Allergies   PAST MEDICAL  HISTORY:   Past Medical History:   Diagnosis Date     Anemia      Chronic kidney disease      Essential hypertension      Osteoarthritis      Stenosis of carotid artery       PAST SURGICAL HISTORY:   has a past surgical history that includes Open reduction internal fixation femur distal (Right, 9/8/2021) and Irrigation and debridement lower extremity, combined (Right, 11/1/2021).  FAMILY HISTORY: family history includes No Known Problems in her father and mother.  SOCIAL HISTORY:   reports that she has never smoked. She has never used smokeless tobacco. She reports previous alcohol use. She reports that she does not use drugs.  Patient's living condition: retirement    Post Discharge Medication Reconciliation Status: discharge medications reconciled, continue medications without change  Current Outpatient Medications   Medication Sig     acetaminophen (TYLENOL) 500 MG tablet Take 1,000 mg by mouth 3 times daily     apixaban ANTICOAGULANT (ELIQUIS) 5 MG tablet Take 1 tablet (5 mg) by mouth 2 times daily     collagenase (SANTYL) 250 UNIT/GM external ointment Apply topically daily     diltiazem ER (CARDIZEM SR) 90 MG 12 hr capsule Take 1 capsule (90 mg) by mouth 2 times daily     famotidine (PEPCID) 20 MG tablet Take 1 tablet (20 mg) by mouth every morning     ferrous sulfate (FEROSUL) 325 (65 Fe) MG tablet Take 325 mg by mouth daily (with breakfast)      metoprolol tartrate (LOPRESSOR) 100 MG tablet Take 1 tablet (100 mg) by mouth 2 times daily     polyethylene glycol (MIRALAX) 17 GM/Dose powder Take 17 g by mouth daily     potassium chloride ER (KLOR-CON M) 20 MEQ CR tablet Take 20 mEq by mouth daily     Vitamin D3 (CHOLECALCIFEROL) 25 mcg (1000 units) tablet Take 1 tablet (25 mcg) by mouth daily     No current facility-administered medications for this visit.       ROS:  4 point ROS including Respiratory, CV, GI and , other than that noted in the HPI,  is negative    Vitals:  /59   Pulse 80   Temp 97.1  F (36.2  " C)   Resp 17   Ht 1.575 m (5' 2\")   Wt 52.9 kg (116 lb 11.2 oz)   SpO2 99%   BMI 21.34 kg/m    Exam:  Physical Exam   General appearance: alert, appears stated age and cooperative.   Head: Normocephalic, without obvious abnormality, atraumatic, Eyes: sclera anicteric.  Lungs: respirations unlabored.  ABDOMEN: Globular and soft, non tender.    Skin: Wound bed with granulation tissue, scant purulent drainage over right knee.  Neurologic:oriented. No focal deficits.   Psych: interacts well with caregivers, exhibits logical thought processes and connections, pleasant    Lab/Diagnostic data:  Recent labs in [place] reviewed by me today.     ASSESSMENT/PLAN:    Pressure sores to coccyx, gluteal fold: Healing.  -Wound care MD following, continue per their orders.    (W19.XXXA) Fall    (S72.401A) Closed fracture of distal end of right femur, unspecified fracture morphology, initial encounter (H):  Comment:  Healing well.  Pain well controlled.  Plan:   -Discontinue oxycodone.  -Discontinue Ace wraps, replaced with Tubigrip's on in the morning off at at bedtime.  -Per wound care team.     (R21) Rash and nonspecific skin eruption: Appears to have cleared; no s/sx of rash.  We will continue famotidine for GI protection due to blood thinner and recent prolonged hospitalization.  Plan:   -Continue famotidine 20mg daily.     (I48.91) Rapid atrial fibrillation (H)  Comment: new onset.  Continue per cardiology.  Plan:   -metoprolol 100mg  -diltiazem 90 daily.   -apixiban.       Anemia, postop: Hemoglobin up to 9.  Improved from discharge in the sevens.  -Continues on daily iron supplementation and B12.    Weight loss: Admit weight 150 pounds, however this was with cast in place, now 116 pounds-stable x 1 month.   -Dietitian following closely.      Electronically signed by:  Sherry Wu, CNP       "

## 2022-03-02 DIAGNOSIS — R52 PAIN: Primary | ICD-10-CM

## 2022-03-02 RX ORDER — OXYCODONE HYDROCHLORIDE 5 MG/1
2.5 TABLET ORAL
COMMUNITY
End: 2022-03-02

## 2022-03-02 RX ORDER — OXYCODONE HYDROCHLORIDE 5 MG/1
2.5 TABLET ORAL
Qty: 15 TABLET | Refills: 0 | Status: SHIPPED | OUTPATIENT
Start: 2022-03-02 | End: 2022-03-22

## 2022-03-10 ENCOUNTER — LAB REQUISITION (OUTPATIENT)
Dept: LAB | Facility: CLINIC | Age: 87
End: 2022-03-10
Payer: MEDICARE

## 2022-03-10 ENCOUNTER — NURSING HOME VISIT (OUTPATIENT)
Dept: GERIATRICS | Facility: CLINIC | Age: 87
End: 2022-03-10
Payer: MEDICARE

## 2022-03-10 VITALS
DIASTOLIC BLOOD PRESSURE: 72 MMHG | HEART RATE: 72 BPM | OXYGEN SATURATION: 98 % | SYSTOLIC BLOOD PRESSURE: 112 MMHG | HEIGHT: 62 IN | TEMPERATURE: 96.4 F | RESPIRATION RATE: 20 BRPM | BODY MASS INDEX: 20.3 KG/M2 | WEIGHT: 110.3 LBS

## 2022-03-10 DIAGNOSIS — D62 ACUTE POSTHEMORRHAGIC ANEMIA: ICD-10-CM

## 2022-03-10 DIAGNOSIS — S72.401S CLOSED FRACTURE OF DISTAL END OF RIGHT FEMUR, UNSPECIFIED FRACTURE MORPHOLOGY, SEQUELA: ICD-10-CM

## 2022-03-10 DIAGNOSIS — D62 ANEMIA DUE TO BLOOD LOSS, ACUTE: ICD-10-CM

## 2022-03-10 DIAGNOSIS — Z79.01 CHRONIC ANTICOAGULATION: ICD-10-CM

## 2022-03-10 DIAGNOSIS — F03.90 DEMENTIA WITHOUT BEHAVIORAL DISTURBANCE, UNSPECIFIED DEMENTIA TYPE: Primary | ICD-10-CM

## 2022-03-10 DIAGNOSIS — Z98.890 H/O MAJOR ORTHOPEDIC SURGERY: ICD-10-CM

## 2022-03-10 DIAGNOSIS — I48.91 ATRIAL FIBRILLATION, UNSPECIFIED TYPE (H): ICD-10-CM

## 2022-03-10 PROCEDURE — 99309 SBSQ NF CARE MODERATE MDM 30: CPT | Performed by: FAMILY MEDICINE

## 2022-03-10 NOTE — LETTER
3/10/2022        RE: Elysia Lane  [address]  [facility] [address]        [facility] GERIATRICS  Chief Complaint   [id number]  Place of Service where encounter took place:  [facility] () [[id number]]    HPI:    Elysia Lane  is 89 year old F,  (1923),  who enjoys reading and Zoroastrianism, with medical hx including atrial fibrillation, HTN, dementia, and falls with R distal femoral fracture for which she was admitted to facility initially in [facility] in 2021. Post-operative course complicated by wound dehiscence and cellulitis. She is being seen today for a federally mandated E/M visit.     She is seen in the common area reading her magazines.  She has no concerns.  She enjoys reading to engage her mind.  She is feeling well and she has no concerns.  She appreciates the windows to give her a nice outdoor view.  She likes the current snow cover.  With some prompting, she did note some pain in her right knee but says she is getting used to it.  She tries to distract herself without her activities.  She is quite Anabaptism and uses this as support and motivation.     [facility]    Nursing concerns: Continues to have pain in her right knee, wondering about further pain control. Is currently receiving low-dose oxycodone prior to therapy.      Appetite: Good appetitie.      Weight changes: Decreased since initial admission else stable, was previously in heavy cast.     Bowel: Incontinent frequently.      Bladder: Incontinent.      Skin: Right knee with redness, healing wound - wound care just signed off.      Sleep: Sleeping well.      Mood: No mood concerns.      Behavior: No behavioral concerns.      Mobility: Primarily via wheelchair, extensive assist to ambulate    ADL assistance: Assist with all, supervision with eating.      Falls: Few slips out of w/c. 22 stated she felt weak and sat on the  floor    Resp:  No cough, no SOB    Cardio: No chest pain, pressre        ALLERGIES:Patient has no known allergies.  PAST MEDICAL HISTORY:   Past Medical History:   Diagnosis Date     Anemia      Chronic kidney disease      Essential hypertension      Osteoarthritis      Stenosis of carotid artery      PAST SURGICAL HISTORY:   has a past surgical history that includes Open reduction internal fixation femur distal (Right, 9/8/2021) and Irrigation and debridement lower extremity, combined (Right, 11/1/2021).  FAMILY HISTORY: family history includes No Known Problems in her father and mother.  SOCIAL HISTORY:  reports that she has never smoked. She has never used smokeless tobacco. She reports previous alcohol use. She reports that she does not use drugs.    MEDICATIONS:     Review of your medicines          Accurate as of March 10, 2022 11:59 PM. If you have any questions, ask your nurse or doctor.            CONTINUE these medicines which may have CHANGED, or have new prescriptions. If we are uncertain of the size of tablets/capsules you have at home, strength may be listed as something that might have changed.      Dose / Directions   potassium chloride ER 15 MEQ CR tablet  Commonly known as: KLOR-CON M15  This may have changed:     medication strength    how much to take  Changed by: Benjamin Rosenstein, MD      Dose: 30 mEq  Take 2 tablets (30 mEq) by mouth daily  Refills: 0        CONTINUE these medicines which have NOT CHANGED      Dose / Directions   acetaminophen 500 MG tablet  Commonly known as: TYLENOL      Dose: 1,000 mg  Take 1,000 mg by mouth 3 times daily  Refills: 0     apixaban ANTICOAGULANT 5 MG tablet  Commonly known as: ELIQUIS  Indication: Atrial Fibrillation Not Caused By A Heart Valve Problem  Used for: Closed fracture of distal end of right femur, unspecified fracture morphology, initial encounter (H)      Dose: 5 mg  Take 1 tablet (5 mg) by mouth 2 times daily  Quantity: 60 tablet  Refills: 1      collagenase 250 UNIT/GM external ointment  Commonly known as: SANTYL      Apply topically daily  Refills: 0     diltiazem ER 90 MG 12 hr capsule  Commonly known as: CARDIZEM SR  Used for: Closed fracture of distal end of right femur, unspecified fracture morphology, initial encounter (H)      Dose: 90 mg  Take 1 capsule (90 mg) by mouth 2 times daily  Quantity: 30 capsule  Refills: 1     famotidine 20 MG tablet  Commonly known as: PEPCID      Dose: 20 mg  Take 1 tablet (20 mg) by mouth every morning  Refills: 0     ferrous sulfate 325 (65 Fe) MG tablet  Commonly known as: FEROSUL      Dose: 325 mg  Take 325 mg by mouth daily (with breakfast)  Refills: 0     metoprolol tartrate 100 MG tablet  Commonly known as: LOPRESSOR  Used for: Closed fracture of distal end of right femur, unspecified fracture morphology, initial encounter (H)      Dose: 100 mg  Take 1 tablet (100 mg) by mouth 2 times daily  Quantity: 60 tablet  Refills: 1     oxyCODONE 5 MG tablet  Commonly known as: ROXICODONE  Used for: Pain      Dose: 2.5 mg  Take 0.5 tablets (2.5 mg) by mouth three times a week On Mon, Wed, Fri 30 minutes before therapy  Quantity: 15 tablet  Refills: 0     polyethylene glycol 17 GM/Dose powder  Commonly known as: MIRALAX  Used for: Closed fracture of distal end of right femur, unspecified fracture morphology, initial encounter (H)      Dose: 17 g  Take 17 g by mouth daily  Quantity: 510 g  Refills: 1     Vitamin D3 25 mcg (1000 units) tablet  Commonly known as: CHOLECALCIFEROL  Used for: Closed fracture of distal end of right femur, unspecified fracture morphology, initial encounter (H)      Dose: 25 mcg  Take 1 tablet (25 mcg) by mouth daily  Quantity: 30 tablet  Refills: 0           Case Management:  I have reviewed the care plan and MDS and do agree with the plan. Patient's desire to return to the community is not present. Information reviewed:  Medications, vital signs, orders, and nursing notes.    ROS:  4 point ROS  "including Respiratory, CV, GI and , other than that noted in the HPI,  is negative    Vitals:  /72   Pulse 72   Temp (!) 96.4  F (35.8  C)   Resp 20   Ht 1.575 m (5' 2\")   Wt 50 kg (110 lb 4.8 oz)   SpO2 98%   BMI 20.17 kg/m    Body mass index is 20.17 kg/m .  Exam:  General: Seated comfortably, reading magazines, NAD  HENT: Moderate temporal atrophy, [place], teeth reasonable  Eyes: Conjunctiva clear, anicteric, EOMI, PERRL  CV: Irregularly irregular, S1/S2 normal  Pulm: Normal WOB, lungs CTAB, no wheezes or crackles  Ext: Right knee with mild swelling, redness, healing skin wound; no warm to touch, moderate pain with palpation of joint line  Neuro: Alert, disoriented, repetative and easily distracted; CN II-XII grossly intact to observation  Pscyh: Mood normal, affect appropriate, brigth    Lab/Diagnostic data:   Recent labs in [facility] reviewed by me today.     ASSESSMENT/PLAN  (F03.90) Dementia without behavioral disturbance, unspecified dementia type (H)  (primary encounter diagnosis)  Comment: Significant cognitive deficits, SLUMS of 2/30 (10/20/21) with global decrease. Possibly a participation effect, though highly distractible with poor attention likely leading to difficulty with most tasks. Transitioned to long-term care ~ 11/2021, doing well.  Plan:   -Continue supportive cares in facility  -Continue providing activities such as reading    (Z98.890) H/O major orthopedic surgery    (S72.401S) Closed fracture of distal end of right femur, unspecified fracture morphology, sequela  Comment: Continues intermittent therapies. Complicated by wound dehiscence early in course and overlying cellulitis, had been followed by wound care as well who recently signed off. Continues to have moderate pain with activity  Plan:   -Continue scheduled tylenol  -Continue oxycodone prior to therapies  -Start diclofenac gel as needed    (I48.91) Atrial fibrillation, unspecified type (H)  (Z79.01) Chronic " anticoagulation  Comment: Diagnosed during admission for her fracture with episode of RVR. Rate controlled  Plan:   -Continue metoprolol 100mg BID  -Continue apixaban 5mg BID    (D62) Anemia due to blood loss, acute  Comment: Related to knee surgery, Hgb stable at 10-11, nearly macrocytic. Possibly anemia of CKD as well.   Plan:   -CBC, Ferritin, B12, Folate    Electronically signed by:    Benjamin Rosenstein, MD, MA  [facility] Faculty                  Sincerely,        Benjamin Rosenstein, MD

## 2022-03-10 NOTE — PROGRESS NOTES
[facility] GERIATRICS  Chief Complaint   [facility]     [id number]  Place of Service where encounter took place:  [facility] () [[id number]]    HPI:    Elysia Lane  is 89 year old F,  (1923),  who enjoys reading and Voodoo, with medical hx including atrial fibrillation, HTN, dementia, and falls with R distal femoral fracture for which she was admitted to facility initially in [facility] in 2021. Post-operative course complicated by wound dehiscence and cellulitis. She is being seen today for a federally mandated E/M visit.     She is seen in the common area reading her magazines.  She has no concerns.  She enjoys reading to engage her mind.  She is feeling well and she has no concerns.  She appreciates the windows to give her a nice outdoor view.  She likes the current snow cover.  With some prompting, she did note some pain in her right knee but says she is getting used to it.  She tries to distract herself without her activities.  She is quite Yazidism and uses this as support and motivation.     [facility]    Nursing concerns: Continues to have pain in her right knee, wondering about further pain control. Is currently receiving low-dose oxycodone prior to therapy.      Appetite: Good appetitie.      Weight changes: Decreased since initial admission else stable, was previously in heavy cast.     Bowel: Incontinent frequently.      Bladder: Incontinent.      Skin: Right knee with redness, healing wound - wound care just signed off.      Sleep: Sleeping well.      Mood: No mood concerns.      Behavior: No behavioral concerns.      Mobility: Primarily via wheelchair, extensive assist to ambulate    ADL assistance: Assist with all, supervision with eating.      Falls: Few slips out of w/c. 22 stated she felt weak and sat on the floor    Resp:  No cough, no SOB    Cardio: No chest pain, pressre        ALLERGIES:Patient has  no known allergies.  PAST MEDICAL HISTORY:   Past Medical History:   Diagnosis Date     Anemia      Chronic kidney disease      Essential hypertension      Osteoarthritis      Stenosis of carotid artery      PAST SURGICAL HISTORY:   has a past surgical history that includes Open reduction internal fixation femur distal (Right, 9/8/2021) and Irrigation and debridement lower extremity, combined (Right, 11/1/2021).  FAMILY HISTORY: family history includes No Known Problems in her father and mother.  SOCIAL HISTORY:  reports that she has never smoked. She has never used smokeless tobacco. She reports previous alcohol use. She reports that she does not use drugs.    MEDICATIONS:     Review of your medicines          Accurate as of March 10, 2022 11:59 PM. If you have any questions, ask your nurse or doctor.            CONTINUE these medicines which may have CHANGED, or have new prescriptions. If we are uncertain of the size of tablets/capsules you have at home, strength may be listed as something that might have changed.      Dose / Directions   potassium chloride ER 15 MEQ CR tablet  Commonly known as: KLOR-CON M15  This may have changed:     medication strength    how much to take  Changed by: Benjamin Rosenstein, MD      Dose: 30 mEq  Take 2 tablets (30 mEq) by mouth daily  Refills: 0        CONTINUE these medicines which have NOT CHANGED      Dose / Directions   acetaminophen 500 MG tablet  Commonly known as: TYLENOL      Dose: 1,000 mg  Take 1,000 mg by mouth 3 times daily  Refills: 0     apixaban ANTICOAGULANT 5 MG tablet  Commonly known as: ELIQUIS  Indication: Atrial Fibrillation Not Caused By A Heart Valve Problem  Used for: Closed fracture of distal end of right femur, unspecified fracture morphology, initial encounter (H)      Dose: 5 mg  Take 1 tablet (5 mg) by mouth 2 times daily  Quantity: 60 tablet  Refills: 1     collagenase 250 UNIT/GM external ointment  Commonly known as: SANTYL      Apply topically  daily  Refills: 0     diltiazem ER 90 MG 12 hr capsule  Commonly known as: CARDIZEM SR  Used for: Closed fracture of distal end of right femur, unspecified fracture morphology, initial encounter (H)      Dose: 90 mg  Take 1 capsule (90 mg) by mouth 2 times daily  Quantity: 30 capsule  Refills: 1     famotidine 20 MG tablet  Commonly known as: PEPCID      Dose: 20 mg  Take 1 tablet (20 mg) by mouth every morning  Refills: 0     ferrous sulfate 325 (65 Fe) MG tablet  Commonly known as: FEROSUL      Dose: 325 mg  Take 325 mg by mouth daily (with breakfast)  Refills: 0     metoprolol tartrate 100 MG tablet  Commonly known as: LOPRESSOR  Used for: Closed fracture of distal end of right femur, unspecified fracture morphology, initial encounter (H)      Dose: 100 mg  Take 1 tablet (100 mg) by mouth 2 times daily  Quantity: 60 tablet  Refills: 1     oxyCODONE 5 MG tablet  Commonly known as: ROXICODONE  Used for: Pain      Dose: 2.5 mg  Take 0.5 tablets (2.5 mg) by mouth three times a week On Mon, Wed, Fri 30 minutes before therapy  Quantity: 15 tablet  Refills: 0     polyethylene glycol 17 GM/Dose powder  Commonly known as: MIRALAX  Used for: Closed fracture of distal end of right femur, unspecified fracture morphology, initial encounter (H)      Dose: 17 g  Take 17 g by mouth daily  Quantity: 510 g  Refills: 1     Vitamin D3 25 mcg (1000 units) tablet  Commonly known as: CHOLECALCIFEROL  Used for: Closed fracture of distal end of right femur, unspecified fracture morphology, initial encounter (H)      Dose: 25 mcg  Take 1 tablet (25 mcg) by mouth daily  Quantity: 30 tablet  Refills: 0           Case Management:  I have reviewed the care plan and MDS and do agree with the plan. Patient's desire to return to the community is not present. Information reviewed:  Medications, vital signs, orders, and nursing notes.    ROS:  4 point ROS including Respiratory, CV, GI and , other than that noted in the HPI,  is  "negative    Vitals:  /72   Pulse 72   Temp (!) 96.4  F (35.8  C)   Resp 20   Ht 1.575 m (5' 2\")   Wt 50 kg (110 lb 4.8 oz)   SpO2 98%   BMI 20.17 kg/m    Body mass index is 20.17 kg/m .  Exam:  General: Seated comfortably, reading magazines, NAD  HENT: Moderate temporal atrophy, [place], teeth reasonable  Eyes: Conjunctiva clear, anicteric, EOMI, PERRL  CV: Irregularly irregular, S1/S2 normal  Pulm: Normal WOB, lungs CTAB, no wheezes or crackles  Ext: Right knee with mild swelling, redness, healing skin wound; no warm to touch, moderate pain with palpation of joint line  Neuro: Alert, disoriented, repetative and easily distracted; CN II-XII grossly intact to observation  Pscyh: Mood normal, affect appropriate, brigth    Lab/Diagnostic data:   Recent labs in [facility] reviewed by me today.     ASSESSMENT/PLAN  (F03.90) Dementia without behavioral disturbance, unspecified dementia type (H)  (primary encounter diagnosis)  Comment: Significant cognitive deficits, SLUMS of 2/30 (10/20/21) with global decrease. Possibly a participation effect, though highly distractible with poor attention likely leading to difficulty with most tasks. Transitioned to long-term care ~ 11/2021, doing well.  Plan:   -Continue supportive cares in facility  -Continue providing activities such as reading    (Z98.890) H/O major orthopedic surgery    (S72.401S) Closed fracture of distal end of right femur, unspecified fracture morphology, sequela  Comment: Continues intermittent therapies. Complicated by wound dehiscence early in course and overlying cellulitis, had been followed by wound care as well who recently signed off. Continues to have moderate pain with activity  Plan:   -Continue scheduled tylenol  -Continue oxycodone prior to therapies  -Start diclofenac gel as needed    (I48.91) Atrial fibrillation, unspecified type (H)  (Z79.01) Chronic anticoagulation  Comment: Diagnosed during admission for her fracture with episode of RVR. " Rate controlled  Plan:   -Continue metoprolol 100mg BID  -Continue apixaban 5mg BID    (D62) Anemia due to blood loss, acute  Comment: Related to knee surgery, Hgb stable at 10-11, nearly macrocytic. Possibly anemia of CKD as well.   Plan:   -CBC, Ferritin, B12, Folate    Electronically signed by:    Benjamin Rosenstein, MD, MA  [facility] Faculty

## 2022-03-11 LAB
ERYTHROCYTE [DISTWIDTH] IN BLOOD BY AUTOMATED COUNT: 15.1 % (ref 10–15)
FERRITIN SERPL-MCNC: 246 NG/ML (ref 10–130)
FOLATE SERPL-MCNC: 5.6 NG/ML
HCT VFR BLD AUTO: 34.1 % (ref 35–47)
HGB BLD-MCNC: 10.9 G/DL (ref 11.7–15.7)
MCH RBC QN AUTO: 30.3 PG (ref 26.5–33)
MCHC RBC AUTO-ENTMCNC: 32 G/DL (ref 31.5–36.5)
MCV RBC AUTO: 95 FL (ref 78–100)
PLATELET # BLD AUTO: 257 10E3/UL (ref 150–450)
RBC # BLD AUTO: 3.6 10E6/UL (ref 3.8–5.2)
WBC # BLD AUTO: 7.2 10E3/UL (ref 4–11)

## 2022-03-11 PROCEDURE — P9603 ONE-WAY ALLOW PRORATED MILES: HCPCS | Mod: ORL | Performed by: FAMILY MEDICINE

## 2022-03-11 PROCEDURE — 36415 COLL VENOUS BLD VENIPUNCTURE: CPT | Mod: ORL | Performed by: FAMILY MEDICINE

## 2022-03-11 PROCEDURE — 82746 ASSAY OF FOLIC ACID SERUM: CPT | Mod: ORL | Performed by: FAMILY MEDICINE

## 2022-03-11 PROCEDURE — 82728 ASSAY OF FERRITIN: CPT | Mod: ORL | Performed by: FAMILY MEDICINE

## 2022-03-11 PROCEDURE — 85027 COMPLETE CBC AUTOMATED: CPT | Mod: ORL | Performed by: FAMILY MEDICINE

## 2022-03-14 PROBLEM — F03.90 DEMENTIA WITHOUT BEHAVIORAL DISTURBANCE, UNSPECIFIED DEMENTIA TYPE: Status: ACTIVE | Noted: 2022-03-14

## 2022-03-14 RX ORDER — POTASSIUM CHLORIDE 1125 MG/1
30 TABLET, EXTENDED RELEASE ORAL DAILY
COMMUNITY
Start: 2022-03-14

## 2022-03-22 DIAGNOSIS — R52 PAIN: ICD-10-CM

## 2022-03-22 RX ORDER — OXYCODONE HYDROCHLORIDE 5 MG/1
2.5 TABLET ORAL
Qty: 15 TABLET | Refills: 0 | Status: SHIPPED | OUTPATIENT
Start: 2022-03-23 | End: 2023-01-01

## 2022-03-25 ENCOUNTER — DISCHARGE SUMMARY NURSING HOME (OUTPATIENT)
Dept: GERIATRICS | Facility: CLINIC | Age: 87
End: 2022-03-25
Payer: MEDICARE

## 2022-03-25 VITALS
OXYGEN SATURATION: 98 % | HEIGHT: 62 IN | TEMPERATURE: 96.6 F | SYSTOLIC BLOOD PRESSURE: 115 MMHG | WEIGHT: 111.6 LBS | BODY MASS INDEX: 20.54 KG/M2 | DIASTOLIC BLOOD PRESSURE: 70 MMHG | HEART RATE: 66 BPM | RESPIRATION RATE: 18 BRPM

## 2022-03-25 DIAGNOSIS — S72.401A CLOSED FRACTURE OF DISTAL END OF RIGHT FEMUR, UNSPECIFIED FRACTURE MORPHOLOGY, INITIAL ENCOUNTER (H): Chronic | ICD-10-CM

## 2022-03-25 DIAGNOSIS — F03.90 DEMENTIA WITHOUT BEHAVIORAL DISTURBANCE, UNSPECIFIED DEMENTIA TYPE: Primary | ICD-10-CM

## 2022-03-25 DIAGNOSIS — Z79.01 CHRONIC ANTICOAGULATION: Chronic | ICD-10-CM

## 2022-03-25 PROCEDURE — 99316 NF DSCHRG MGMT 30 MIN+: CPT | Performed by: NURSE PRACTITIONER

## 2022-03-25 NOTE — PROGRESS NOTES
[facility] GERIATRICS DISCHARGE SUMMARY  PATIENT'S NAME: Elysia Lane  YOB: 1923  MEDICAL RECORD NUMBER:  [id number]  Place of Service where encounter took place:  [facility] () [[id number]]    PRIMARY CARE PROVIDER AND CLINIC RESPONSIBLE AFTER TRANSFER:   Sherry Wu CNP, [address] [facility] [address]st [address] / [facility] [address]    Non-FMG Provider     Transferring providers: Sherry Wu CNP, Rosenstein, MD  Recent Hospitalization/ED:  [facility] Hospital stay 9/6/21 to 9/17/21.  Date of SNF Admission: September 17, 2021  Date of SNF (anticipated) Discharge: 3/28/22  Discharged to: [facility] skilled nursing [facility] [place]   Cognitive Scores: BIMS: 6/15  Physical Function: Wheelchair dependent  DME: No DME needed    CODE STATUS/ADVANCE DIRECTIVES DISCUSSION:  Prior   ALLERGIES: Patient has no known allergies.    NURSING FACILITY COURSE   Medication Changes/Rationale:     None     Summary of nursing facility stay:   Elysia Lane  is a 89 year old  (12/2/1923), admitted to the above facility from  [facility]. Hospital stay 9/6 through 9/16/21.   HPI:  Elysia has a history of HTN and anemia who had a fall at home and presented to [facility] ED with  R leg pain. She had closed fracture of the distal femur and received ORIF and is non weight bearing. Completed IV ceftraixone for UTI with sepsis.   Also found in a fib with RVR, which improved on metoprolol 100mg and diltiazem, as well as apixaban.      (W19.XXXA) Fall    (S72.401A) Closed fracture of distal end of right femur, unspecified fracture morphology, initial encounter (H):  Comment:  Healing well.  Pain well controlled.  Plan:   -Discontinue Ace wraps, replaced with Tubigrip's on in the morning off at at bedtime.  -Per wound care team.        (R21) Rash and nonspecific skin eruption: Appears to have cleared; no s/sx of rash.  We will continue famotidine for GI protection due to  blood thinner and recent prolonged hospitalization.  Plan:   -Continue famotidine 20mg daily.      (I48.91) Rapid atrial fibrillation (H)  Comment: new onset.  Continue per cardiology.  Plan:   -metoprolol 100mg  -diltiazem 90 daily.   -apixiban.        Anemia, postop: Hemoglobin up to 9.  Improved from discharge in the sevens.  -Continues on daily iron supplementation and B12.     Weight loss: Admit weight 150 pounds, however this was with cast in place: stable 110-116lbs since December      Discharge Medications:    Current Outpatient Medications   Medication Sig Dispense Refill     acetaminophen (TYLENOL) 500 MG tablet Take 1,000 mg by mouth 3 times daily       apixaban ANTICOAGULANT (ELIQUIS) 5 MG tablet Take 1 tablet (5 mg) by mouth 2 times daily 60 tablet 1     collagenase (SANTYL) 250 UNIT/GM external ointment Apply topically daily       diltiazem ER (CARDIZEM SR) 90 MG 12 hr capsule Take 1 capsule (90 mg) by mouth 2 times daily 30 capsule 1     famotidine (PEPCID) 20 MG tablet Take 1 tablet (20 mg) by mouth every morning       ferrous sulfate (FEROSUL) 325 (65 Fe) MG tablet Take 325 mg by mouth daily (with breakfast)        metoprolol tartrate (LOPRESSOR) 100 MG tablet Take 1 tablet (100 mg) by mouth 2 times daily 60 tablet 1     oxyCODONE (ROXICODONE) 5 MG tablet Take 0.5 tablets (2.5 mg) by mouth three times a week On Mon, Wed, Fri 30 minutes before therapy 15 tablet 0     polyethylene glycol (MIRALAX) 17 GM/Dose powder Take 17 g by mouth daily 510 g 1     potassium chloride ER (KLOR-CON M15) 15 MEQ CR tablet Take 2 tablets (30 mEq) by mouth daily       Vitamin D3 (CHOLECALCIFEROL) 25 mcg (1000 units) tablet Take 1 tablet (25 mcg) by mouth daily 30 tablet 0      Controlled medications:   oxycodone 2.5mg daily piror to PT     Past Medical History:   Past Medical History:   Diagnosis Date     Anemia      Chronic kidney disease      Essential hypertension      Osteoarthritis      Stenosis of carotid artery   "    Physical Exam:   Vitals: /70   Pulse 66   Temp (!) 96.6  F (35.9  C)   Resp 18   Ht 1.575 m (5' 2\")   Wt 50.6 kg (111 lb 9.6 oz)   SpO2 98%   BMI 20.41 kg/m    BMI: Body mass index is 20.41 kg/m .  Physical Exam   General appearance: alert, appears stated age and cooperative.   Head: Normocephalic, without obvious abnormality, atraumatic, Eyes: sclera anicteric.  Lungs: respirations without effort, LSCTA; no wheezing or rales.   Cardiovascular: S1, S2. Regular rate and rhythm.   ABDOMEN: Globular and soft, non tender.    Extremities: R knee swelling, scarring.   Skin: Skin color, texture, turgor normal. No rashes or lesions  Neurologic:oriented. No focal deficits.   Psych: interacts well with caregivers, exhibits logical thought processes and connections, pleasant     SNF labs:   Most Recent 3 CBC's:Recent Labs   Lab Test 03/11/22  0514 01/11/22  0503 12/02/21  1011   WBC 7.2 7.5 10.7   HGB 10.9* 10.0* 11.3*   MCV 95 95 96    309 440     Most Recent 3 BMP's:Recent Labs   Lab Test 11/30/21  0513 11/16/21  0522 11/09/21  0518 11/02/21  1129 11/01/21  1325 10/29/21  0518 10/28/21  0532   NA  --   --  141 139  --   --  143   POTASSIUM 4.1 4.0 3.4* 4.1 4.1   < > 2.6*   CHLORIDE  --   --  104 104  --   --  105   CO2  --   --  27 24  --   --  28   BUN  --   --  11 13  --   --  19   CR  --   --  0.77 0.75 0.83  --  0.76   ANIONGAP  --   --  10 11  --   --  10   ADAM  --   --  8.7 8.9  --   --  9.0   GLC  --   --  89 132*  --   --  92    < > = values in this interval not displayed.       DISCHARGE PLAN:    Follow up labs: per PCP    Medical Follow Up:      Follow up with primary care provider in 1-2 weeks    [facility] scheduled appointments:   none.    Discharge Services: No therapy or home care recommended. PT if deemed appropriate.     Discharge Instructions Verbalized to Patient at Discharge:     None    TOTAL DISCHARGE TIME:   Greater than 30 minutes  Electronically signed by:  Sherry" Bryce, SMOOTH

## 2022-03-25 NOTE — LETTER
3/25/2022        RE: Elysia Lane  [address]  [facility] [address]        [facility] GERIATRICS DISCHARGE SUMMARY  PATIENT'S NAME: Elysia Lane  YOB: 1923  MEDICAL RECORD NUMBER:  [id number]  Place of Service where encounter took place:  [facility] () [[id number]]    PRIMARY CARE PROVIDER AND CLINIC RESPONSIBLE AFTER TRANSFER:   Sherry Wu CNP, [address] [facility] [address] / [facility] [address]    Non-FMG Provider     Transferring providers: Sherry Wu CNP, Rosenstein, MD  Recent Hospitalization/ED:  [facility] Hospital stay 9/6/21 to 9/17/21.  Date of SNF Admission: September 17, 2021  Date of SNF (anticipated) Discharge: 3/28/22  Discharged to: [facility] skilled nursing facility [facility]   Cognitive Scores: BIMS: 6/15  Physical Function: Wheelchair dependent  DME: No DME needed    CODE STATUS/ADVANCE DIRECTIVES DISCUSSION:  Prior   ALLERGIES: Patient has no known allergies.    NURSING FACILITY COURSE   Medication Changes/Rationale:     None     Summary of nursing facility stay:   Elysia Lane  is a 89 year old  (12/2/1923), admitted to the above facility from  [facility]. Hospital stay 9/6 through 9/16/21.   HPI:  Elysia has a history of HTN and anemia who had a fall at home and presented to [facility] ED with  R leg pain. She had closed fracture of the distal femur and received ORIF and is non weight bearing. Completed IV ceftraixone for UTI with sepsis.   Also found in a fib with RVR, which improved on metoprolol 100mg and diltiazem, as well as apixaban.      (W19.XXXA) Fall    (S72.401A) Closed fracture of distal end of right femur, unspecified fracture morphology, initial encounter (H):  Comment:  Healing well.  Pain well controlled.  Plan:   -Discontinue Ace wraps, replaced with Tubigrip's on in the morning off at at bedtime.  -Per wound care team.        (R21) Rash and nonspecific skin eruption:  Appears to have cleared; no s/sx of rash.  We will continue famotidine for GI protection due to blood thinner and recent prolonged hospitalization.  Plan:   -Continue famotidine 20mg daily.      (I48.91) Rapid atrial fibrillation (H)  Comment: new onset.  Continue per cardiology.  Plan:   -metoprolol 100mg  -diltiazem 90 daily.   -apixiban.        Anemia, postop: Hemoglobin up to 9.  Improved from discharge in the sevens.  -Continues on daily iron supplementation and B12.     Weight loss: Admit weight 150 pounds, however this was with cast in place: stable 110-116lbs since December      Discharge Medications:    Current Outpatient Medications   Medication Sig Dispense Refill     acetaminophen (TYLENOL) 500 MG tablet Take 1,000 mg by mouth 3 times daily       apixaban ANTICOAGULANT (ELIQUIS) 5 MG tablet Take 1 tablet (5 mg) by mouth 2 times daily 60 tablet 1     collagenase (SANTYL) 250 UNIT/GM external ointment Apply topically daily       diltiazem ER (CARDIZEM SR) 90 MG 12 hr capsule Take 1 capsule (90 mg) by mouth 2 times daily 30 capsule 1     famotidine (PEPCID) 20 MG tablet Take 1 tablet (20 mg) by mouth every morning       ferrous sulfate (FEROSUL) 325 (65 Fe) MG tablet Take 325 mg by mouth daily (with breakfast)        metoprolol tartrate (LOPRESSOR) 100 MG tablet Take 1 tablet (100 mg) by mouth 2 times daily 60 tablet 1     oxyCODONE (ROXICODONE) 5 MG tablet Take 0.5 tablets (2.5 mg) by mouth three times a week On Mon, Wed, Fri 30 minutes before therapy 15 tablet 0     polyethylene glycol (MIRALAX) 17 GM/Dose powder Take 17 g by mouth daily 510 g 1     potassium chloride ER (KLOR-CON M15) 15 MEQ CR tablet Take 2 tablets (30 mEq) by mouth daily       Vitamin D3 (CHOLECALCIFEROL) 25 mcg (1000 units) tablet Take 1 tablet (25 mcg) by mouth daily 30 tablet 0      Controlled medications:   oxycodone 2.5mg daily piror to PT     Past Medical History:   Past Medical History:   Diagnosis Date     Anemia      Chronic  "kidney disease      Essential hypertension      Osteoarthritis      Stenosis of carotid artery      Physical Exam:   Vitals: /70   Pulse 66   Temp (!) 96.6  F (35.9  C)   Resp 18   Ht 1.575 m (5' 2\")   Wt 50.6 kg (111 lb 9.6 oz)   SpO2 98%   BMI 20.41 kg/m    BMI: Body mass index is 20.41 kg/m .  Physical Exam   General appearance: alert, appears stated age and cooperative.   Head: Normocephalic, without obvious abnormality, atraumatic, Eyes: sclera anicteric.  Lungs: respirations without effort, LSCTA; no wheezing or rales.   Cardiovascular: S1, S2. Regular rate and rhythm.   ABDOMEN: Globular and soft, non tender.    Extremities: R knee swelling, scarring.   Skin: Skin color, texture, turgor normal. No rashes or lesions  Neurologic:oriented. No focal deficits.   Psych: interacts well with caregivers, exhibits logical thought processes and connections, pleasant     SNF labs:   Most Recent 3 CBC's:Recent Labs   Lab Test 03/11/22  0514 01/11/22  0503 12/02/21  1011   WBC 7.2 7.5 10.7   HGB 10.9* 10.0* 11.3*   MCV 95 95 96    309 440     Most Recent 3 BMP's:Recent Labs   Lab Test 11/30/21  0513 11/16/21  0522 11/09/21  0518 11/02/21  1129 11/01/21  1325 10/29/21  0518 10/28/21  0532   NA  --   --  141 139  --   --  143   POTASSIUM 4.1 4.0 3.4* 4.1 4.1   < > 2.6*   CHLORIDE  --   --  104 104  --   --  105   CO2  --   --  27 24  --   --  28   BUN  --   --  11 13  --   --  19   CR  --   --  0.77 0.75 0.83  --  0.76   ANIONGAP  --   --  10 11  --   --  10   ADAM  --   --  8.7 8.9  --   --  9.0   GLC  --   --  89 132*  --   --  92    < > = values in this interval not displayed.       DISCHARGE PLAN:    Follow up labs: per PCP    Medical Follow Up:      Follow up with primary care provider in 1-2 weeks    [facility] scheduled appointments:   none.    Discharge Services: No therapy or home care recommended. PT if deemed appropriate.     Discharge Instructions Verbalized to Patient at Discharge: "     None    TOTAL DISCHARGE TIME:   Greater than 30 minutes  Electronically signed by:  Sherry Wu CNP                     Sincerely,        Sherry Wu, CNP

## 2022-03-28 NOTE — PROGRESS NOTES
[facility] GERIATRICS    PRIMARY CARE PROVIDER AND CLINIC:  Samm Scott, CNP, [address] / [place] [address]  Chief Complaint   Patient presents with     [facility] Medical Record Number:  [id number]  Place of Service where encounter took place:  [facility] () [[id number]]    Elysia Lane  is a 89 year old  (12/2/1923), admitted to the above facility from [facility]..   HPI:    Opal is a 89-year-old female with past medical history of atrial fibrillation anticoagulated with apixaban, hypertension, dementia, falls, status post right ORIF September, 2021, and I&D of right lateral thigh wound November 2021 who admitted to   [facility]    Opal was pleasantly confused. She thinks she is at her apartment.     CODE STATUS/ADVANCE DIRECTIVES DISCUSSION:  DNR comfort focus  ALLERGIES: No Known Allergies   PAST MEDICAL HISTORY:   Past Medical History:   Diagnosis Date     Anemia      Chronic kidney disease      Essential hypertension      Osteoarthritis      Stenosis of carotid artery       PAST SURGICAL HISTORY:   has a past surgical history that includes Open reduction internal fixation femur distal (Right, 9/8/2021) and Irrigation and debridement lower extremity, combined (Right, 11/1/2021).  FAMILY HISTORY: family history includes No Known Problems in her father and mother.  SOCIAL HISTORY:   reports that she has never smoked. She has never used smokeless tobacco. She reports previous alcohol use. She reports that she does not use drugs.  Patient's living condition: lives in a skilled nursing facility    Post Discharge Medication Reconciliation Status: discharge medications reconciled, continue medications without change  Current Outpatient Medications   Medication Sig     acetaminophen (TYLENOL) 500 MG tablet Take 1,000 mg by mouth 3 times daily     apixaban ANTICOAGULANT (ELIQUIS) 5 MG tablet Take 1 tablet (5 mg) by mouth 2 times daily      "collagenase (SANTYL) 250 UNIT/GM external ointment Apply topically daily     diltiazem ER (CARDIZEM SR) 90 MG 12 hr capsule Take 1 capsule (90 mg) by mouth 2 times daily     famotidine (PEPCID) 20 MG tablet Take 1 tablet (20 mg) by mouth every morning     ferrous sulfate (FEROSUL) 325 (65 Fe) MG tablet Take 325 mg by mouth daily (with breakfast)      metoprolol tartrate (LOPRESSOR) 100 MG tablet Take 1 tablet (100 mg) by mouth 2 times daily     oxyCODONE (ROXICODONE) 5 MG tablet Take 0.5 tablets (2.5 mg) by mouth three times a week On Mon, Wed, Fri 30 minutes before therapy     polyethylene glycol (MIRALAX) 17 GM/Dose powder Take 17 g by mouth daily     potassium chloride ER (KLOR-CON M15) 15 MEQ CR tablet Take 2 tablets (30 mEq) by mouth daily     Vitamin D3 (CHOLECALCIFEROL) 25 mcg (1000 units) tablet Take 1 tablet (25 mcg) by mouth daily     No current facility-administered medications for this visit.       ROS:  10 point ROS of systems including Constitutional, Eyes, Respiratory, Cardiovascular, Gastroenterology, Genitourinary, Integumentary, Musculoskeletal, Psychiatric were all negative except for pertinent positives noted in my HPI.    Vitals:  BP (!) 167/83   Pulse 89   Temp 98  F (36.7  C)   Resp 18   Ht 1.575 m (5' 2\")   Wt 50.6 kg (111 lb 9.6 oz)   SpO2 97%   BMI 20.41 kg/m      Exam:  GENERAL APPEARANCE:  Alert, in no distress  EYES:  EOM normal, conjunctiva and lids normal  RESP:  respiratory effort and palpation of chest normal, lungs clear to auscultation , no respiratory distress  CV:  Irregularly irregular, S1-S2, no extra heart sounds  ABDOMEN:  normal bowel sounds, soft, nontender, no hepatosplenomegaly or other masses  M/S:   Right knee swollen  SKIN:  no rash, warm and dry  NEURO:   Alert, clear speech, no focal deficits on observation  PSYCH:  oriented to self, memory impaired     Lab/Diagnostic data:  Component      Latest Ref Rng & Units 3/11/2022   WBC      4.0 - 11.0 10e3/uL 7.2 "   RBC Count      3.80 - 5.20 10e6/uL 3.60 (L)   Hemoglobin      11.7 - 15.7 g/dL 10.9 (L)   Hematocrit      35.0 - 47.0 % 34.1 (L)   MCV      78 - 100 fL 95   MCH      26.5 - 33.0 pg 30.3   MCHC      31.5 - 36.5 g/dL 32.0   RDW      10.0 - 15.0 % 15.1 (H)   Platelet Count      150 - 450 10e3/uL 257   Ferritin      10 - 130 ng/mL 246 (H)   Folate      >=3.5 ng/mL 5.6     ASSESSMENT/PLAN:  (F03.90) Dementia without behavioral disturbance, unspecified dementia type (H)  (primary encounter diagnosis)  Plan:   -SLUMS 2/30 Oct 2021  -Expect gradual decline  -Provide safe environment    (I48.91) Atrial fibrillation, unspecified type (H)  (Z79.01) Chronic anticoagulation  Plan:   -Rate controlled  -Continue metoprolol and Cardizem  -Anticoagulated with apixaban    (S72.401S) Closed fracture of distal end of right femur, unspecified fracture morphology, sequela  (Z98.890) H/O major orthopedic surgery  Plan:   -Status post right ORIF September 2021  -Status post right I&D right thigh November 2021   -Oxycodone,tylenol and diclofenac for pain management     (N18.32) Chronic kidney disease, stage 3b (H)  Plan:   -Stable  -Avoid nephrotoxic   -Renally dose medications  -Check BMP 3/30/22  -Agents renally dose medications    (D64.9) Anemia, unspecified type  Plan:   -acute blood loss vs anemia of chronic disease.   -Normocytic, normochromic anemia  -Continue oral supplement replacement    Total time spent with patient visit at the [facility] nursing [facility] was 35 min including patient visit, review of past records, phone call to patient contact, POSLT form completion,  medication reconciliation and plan of care. Greater than 50% of total time spent with counseling and coordinating care due to complexity.     Electronically signed by:  Samm Scott, SMOOTH

## 2022-03-29 ENCOUNTER — TELEPHONE (OUTPATIENT)
Dept: GERIATRICS | Facility: CLINIC | Age: 87
End: 2022-03-29

## 2022-03-29 ENCOUNTER — NURSING HOME VISIT (OUTPATIENT)
Dept: GERIATRICS | Facility: CLINIC | Age: 87
End: 2022-03-29
Payer: MEDICARE

## 2022-03-29 VITALS
TEMPERATURE: 98 F | WEIGHT: 111.6 LBS | SYSTOLIC BLOOD PRESSURE: 167 MMHG | HEIGHT: 62 IN | RESPIRATION RATE: 18 BRPM | HEART RATE: 89 BPM | DIASTOLIC BLOOD PRESSURE: 83 MMHG | OXYGEN SATURATION: 97 % | BODY MASS INDEX: 20.54 KG/M2

## 2022-03-29 DIAGNOSIS — D64.9 ANEMIA, UNSPECIFIED TYPE: ICD-10-CM

## 2022-03-29 DIAGNOSIS — Z79.01 CHRONIC ANTICOAGULATION: ICD-10-CM

## 2022-03-29 DIAGNOSIS — I48.91 ATRIAL FIBRILLATION, UNSPECIFIED TYPE (H): ICD-10-CM

## 2022-03-29 DIAGNOSIS — N18.32 CHRONIC KIDNEY DISEASE, STAGE 3B (H): ICD-10-CM

## 2022-03-29 DIAGNOSIS — F03.90 DEMENTIA WITHOUT BEHAVIORAL DISTURBANCE, UNSPECIFIED DEMENTIA TYPE: Primary | ICD-10-CM

## 2022-03-29 DIAGNOSIS — Z98.890 H/O MAJOR ORTHOPEDIC SURGERY: ICD-10-CM

## 2022-03-29 DIAGNOSIS — S72.401S CLOSED FRACTURE OF DISTAL END OF RIGHT FEMUR, UNSPECIFIED FRACTURE MORPHOLOGY, SEQUELA: ICD-10-CM

## 2022-03-29 PROCEDURE — 99309 SBSQ NF CARE MODERATE MDM 30: CPT | Performed by: NURSE PRACTITIONER

## 2022-03-29 PROCEDURE — 99207 PR CDG-CODE INCORRECT PER BILLING BASED ON TIME: CPT | Performed by: NURSE PRACTITIONER

## 2022-03-29 NOTE — TELEPHONE ENCOUNTER
Nursing called for pain medication state patient admitted yesterday for Fx of right knee and c/o pain, orders for oxycodone during therapy only.   NP did see patient today, patient was admitted to NH for dementia with slums of 2/30, the distal right femur Fx and ORIF were 11/2021 not recent.   Order: tylenol 1000mg q 6 hours scheduled, update NP in AM

## 2022-03-29 NOTE — LETTER
3/29/2022        RE: Elysia Lane  [address]  [facility] [address]        [facility] GERIATRICS    PRIMARY CARE PROVIDER AND CLINIC:  Samm Scott, [employer], [address] [facility] / [place] [address]  Chief Complaint   Patient presents with     [facility] Medical Record Number:  [id number]  Place of Service where encounter took place:  [facility]  AT [facility] () [[id number]]    Elysia Lane  is a 89 year old  (12/2/1923), admitted to the above facility from [facility]..   HPI:    Opal is a 89-year-old female with past medical history of atrial fibrillation anticoagulated with apixaban, hypertension, dementia, falls, status post right ORIF September, 2021, and I&D of right lateral thigh wound November 2021 who admitted to   [facility]    Opal was pleasantly confused. She thinks she is at her apartment.     CODE STATUS/ADVANCE DIRECTIVES DISCUSSION:  DNR comfort focus  ALLERGIES: No Known Allergies   PAST MEDICAL HISTORY:   Past Medical History:   Diagnosis Date     Anemia      Chronic kidney disease      Essential hypertension      Osteoarthritis      Stenosis of carotid artery       PAST SURGICAL HISTORY:   has a past surgical history that includes Open reduction internal fixation femur distal (Right, 9/8/2021) and Irrigation and debridement lower extremity, combined (Right, 11/1/2021).  FAMILY HISTORY: family history includes No Known Problems in her father and mother.  SOCIAL HISTORY:   reports that she has never smoked. She has never used smokeless tobacco. She reports previous alcohol use. She reports that she does not use drugs.  Patient's living condition: lives in a skilled nursing facility    Post Discharge Medication Reconciliation Status: discharge medications reconciled, continue medications without change  Current Outpatient Medications   Medication Sig     acetaminophen (TYLENOL) 500 MG tablet Take 1,000 mg by mouth 3 times daily     apixaban  "ANTICOAGULANT (ELIQUIS) 5 MG tablet Take 1 tablet (5 mg) by mouth 2 times daily     collagenase (SANTYL) 250 UNIT/GM external ointment Apply topically daily     diltiazem ER (CARDIZEM SR) 90 MG 12 hr capsule Take 1 capsule (90 mg) by mouth 2 times daily     famotidine (PEPCID) 20 MG tablet Take 1 tablet (20 mg) by mouth every morning     ferrous sulfate (FEROSUL) 325 (65 Fe) MG tablet Take 325 mg by mouth daily (with breakfast)      metoprolol tartrate (LOPRESSOR) 100 MG tablet Take 1 tablet (100 mg) by mouth 2 times daily     oxyCODONE (ROXICODONE) 5 MG tablet Take 0.5 tablets (2.5 mg) by mouth three times a week On Mon, Wed, Fri 30 minutes before therapy     polyethylene glycol (MIRALAX) 17 GM/Dose powder Take 17 g by mouth daily     potassium chloride ER (KLOR-CON M15) 15 MEQ CR tablet Take 2 tablets (30 mEq) by mouth daily     Vitamin D3 (CHOLECALCIFEROL) 25 mcg (1000 units) tablet Take 1 tablet (25 mcg) by mouth daily     No current facility-administered medications for this visit.       ROS:  10 point ROS of systems including Constitutional, Eyes, Respiratory, Cardiovascular, Gastroenterology, Genitourinary, Integumentary, Musculoskeletal, Psychiatric were all negative except for pertinent positives noted in my HPI.    Vitals:  BP (!) 167/83   Pulse 89   Temp 98  F (36.7  C)   Resp 18   Ht 1.575 m (5' 2\")   Wt 50.6 kg (111 lb 9.6 oz)   SpO2 97%   BMI 20.41 kg/m      Exam:  GENERAL APPEARANCE:  Alert, in no distress  EYES:  EOM normal, conjunctiva and lids normal  RESP:  respiratory effort and palpation of chest normal, lungs clear to auscultation , no respiratory distress  CV:  Irregularly irregular, S1-S2, no extra heart sounds  ABDOMEN:  normal bowel sounds, soft, nontender, no hepatosplenomegaly or other masses  M/S:   Right knee swollen  SKIN:  no rash, warm and dry  NEURO:   Alert, clear speech, no focal deficits on observation  PSYCH:  oriented to self, memory impaired     Lab/Diagnostic " data:  Component      Latest Ref Rng & Units 3/11/2022   WBC      4.0 - 11.0 10e3/uL 7.2   RBC Count      3.80 - 5.20 10e6/uL 3.60 (L)   Hemoglobin      11.7 - 15.7 g/dL 10.9 (L)   Hematocrit      35.0 - 47.0 % 34.1 (L)   MCV      78 - 100 fL 95   MCH      26.5 - 33.0 pg 30.3   MCHC      31.5 - 36.5 g/dL 32.0   RDW      10.0 - 15.0 % 15.1 (H)   Platelet Count      150 - 450 10e3/uL 257   Ferritin      10 - 130 ng/mL 246 (H)   Folate      >=3.5 ng/mL 5.6     ASSESSMENT/PLAN:  (F03.90) Dementia without behavioral disturbance, unspecified dementia type (H)  (primary encounter diagnosis)  Plan:   -SLUMS 2/30 Oct 2021  -Expect gradual decline  -Provide safe environment    (I48.91) Atrial fibrillation, unspecified type (H)  (Z79.01) Chronic anticoagulation  Plan:   -Rate controlled  -Continue metoprolol and Cardizem  -Anticoagulated with apixaban    (S72.401S) Closed fracture of distal end of right femur, unspecified fracture morphology, sequela  (Z98.890) H/O major orthopedic surgery  Plan:   -Status post right ORIF September 2021  -Status post right I&D right thigh November 2021   -Oxycodone,tylenol and diclofenac for pain management     (N18.32) Chronic kidney disease, stage 3b (H)  Plan:   -Stable  -Avoid nephrotoxic   -Renally dose medications  -Check BMP 3/30/22  -Agents renally dose medications    (D64.9) Anemia, unspecified type  Plan:   -acute blood loss vs anemia of chronic disease.   -Normocytic, normochromic anemia  -Continue oral supplement replacement    Total time spent with patient visit at the skilled nursing facility was 35 min including patient visit, review of past records, phone call to patient contact, POSLT form completion,  medication reconciliation and plan of care. Greater than 50% of total time spent with counseling and coordinating care due to complexity.     Electronically signed by:  Samm Scott CNP                        Sincerely,        Samm Scott CNP

## 2022-03-30 ENCOUNTER — LAB REQUISITION (OUTPATIENT)
Dept: LAB | Facility: CLINIC | Age: 87
End: 2022-03-30
Payer: MEDICARE

## 2022-03-30 ENCOUNTER — TELEPHONE (OUTPATIENT)
Dept: GERIATRICS | Facility: CLINIC | Age: 87
End: 2022-03-30

## 2022-03-30 DIAGNOSIS — I10 ESSENTIAL (PRIMARY) HYPERTENSION: ICD-10-CM

## 2022-03-30 LAB
ANION GAP SERPL CALCULATED.3IONS-SCNC: 16 MMOL/L (ref 5–18)
BUN SERPL-MCNC: 15 MG/DL (ref 8–28)
CALCIUM SERPL-MCNC: 8.7 MG/DL (ref 8.5–10.5)
CHLORIDE BLD-SCNC: 102 MMOL/L (ref 98–107)
CO2 SERPL-SCNC: 20 MMOL/L (ref 22–31)
CREAT SERPL-MCNC: 0.72 MG/DL (ref 0.6–1.1)
GFR SERPL CREATININE-BSD FRML MDRD: 75 ML/MIN/1.73M2
GLUCOSE BLD-MCNC: 43 MG/DL (ref 70–125)
POTASSIUM BLD-SCNC: 4.4 MMOL/L (ref 3.5–5)
SODIUM SERPL-SCNC: 138 MMOL/L (ref 136–145)

## 2022-03-30 PROCEDURE — 80048 BASIC METABOLIC PNL TOTAL CA: CPT | Mod: ORL | Performed by: NURSE PRACTITIONER

## 2022-03-30 NOTE — TELEPHONE ENCOUNTER
[facility] Triage Nurse Telephone Encounter    Provider: NICOLÁS Zuñiga CNP  Facility: [facility] Facility Type:  LTC    Caller: Yamile       Allergies:  No Known Allergies     Reason for call: Nursing is requesting OT for w/c positioning or possibly new w/c as the nurse feels that she doesn't fit in it well.     MCS/FGS STANDING ORDER GIVEN:  Initiate OT as needed      Provider giving Order:  NICOLÁS Zuñiga CNP    Verbal Order given to: Yamile Abbott RN

## 2022-04-13 NOTE — PROGRESS NOTES
"[place] [facility]ERIATRIC SERVICES  PRIMARY CARE PROVIDER AND CLINIC:  Samm Scott, CNP, [address] [facility] / [place] [address]  Chief Complaint   Patient presents with     Establish Care     [place] Medical Record Number:  [id number]  Place of Service where encounter took place:  [facility] AT [facility] () [[id number]]    Elysia Lane  is a 89 year old  (12/2/1923), admitted to the above facility from [facility]..  Admitted to this facility for  rehab, medical management and nursing care.    HPI:    HPI information obtained from: facility chart records, facility staff, patient report and [facility] chart review.   Brief Summary:  * PT was hospitzlied in Sept 2021 with right distal femur fx s/p ORIF, sepsis 2/2 UTI and A-fib RVR,  transitioned to TCU for rehab unitl 3/28/22, then transferred here for LTC placement.       Today:  - Rt femur: reports knee hurts like a hell, aggravated with any movements, better with taking easy and medicine.\" I broke my knee two years ago\"  - A-fib: denies palpitation or chest pain  - Anemia: denies fainting.     ============================================    CODE STATUS/ADVANCE DIRECTIVES DISCUSSION:   DNR / DNI  Patient's living condition: lives in a nursing home  ALLERGIES: Patient has no known allergies.  PAST MEDICAL HISTORY:  has a past medical history of Anemia, Chronic kidney disease, Essential hypertension, Osteoarthritis, and Stenosis of carotid artery.    She has no past medical history of Complication of anesthesia, Diabetes (H), Malignant hyperthermia, Motion sickness, PONV (postoperative nausea and vomiting), or Sleep apnea.  PAST SURGICAL HISTORY:   has a past surgical history that includes Open reduction internal fixation femur distal (Right, 9/8/2021) and Irrigation and debridement lower extremity, combined (Right, 11/1/2021).  FAMILY HISTORY: family history includes No Known Problems in her father and mother.  SOCIAL HISTORY:   reports that " "she has never smoked. She has never used smokeless tobacco. She reports previous alcohol use. She reports that she does not use drugs.    Post Discharge Medication Reconciliation Status: discharge medications reconciled and changed, per note/orders  Current Outpatient Medications   Medication Sig Dispense Refill     acetaminophen (TYLENOL) 500 MG tablet Take 1,000 mg by mouth 3 times daily       apixaban ANTICOAGULANT (ELIQUIS) 5 MG tablet Take 1 tablet (5 mg) by mouth 2 times daily 60 tablet 1     diclofenac (VOLTAREN) 1 % topical gel Apply 4 g topically 4 times daily as needed for moderate pain Apply to right knee       diltiazem ER (CARDIZEM SR) 90 MG 12 hr capsule Take 1 capsule (90 mg) by mouth 2 times daily 30 capsule 1     famotidine (PEPCID) 20 MG tablet Take 1 tablet (20 mg) by mouth every morning       ferrous sulfate (FEROSUL) 325 (65 Fe) MG tablet Take 325 mg by mouth daily (with breakfast)        metoprolol tartrate (LOPRESSOR) 100 MG tablet Take 1 tablet (100 mg) by mouth 2 times daily 60 tablet 1     oxyCODONE (ROXICODONE) 5 MG tablet Take 0.5 tablets (2.5 mg) by mouth three times a week On Mon, Wed, Fri 30 minutes before therapy 15 tablet 0     polyethylene glycol (MIRALAX) 17 GM/Dose powder Take 17 g by mouth daily 510 g 1     potassium chloride ER (KLOR-CON M15) 15 MEQ CR tablet Take 2 tablets (30 mEq) by mouth daily       Vitamin D3 (CHOLECALCIFEROL) 25 mcg (1000 units) tablet Take 1 tablet (25 mcg) by mouth daily 30 tablet 0     ROS: Limited secondary to cognitive impairment but today pt reports- see HPI    Vitals:  /74   Pulse 58   Temp 97.9  F (36.6  C)   Resp 18   Ht 1.575 m (5' 2\")   Wt 48.9 kg (107 lb 14.4 oz)   SpO2 94%   BMI 19.74 kg/m    Exam:  GENERAL APPEARANCE:  in no distress, cooperative  ENT:  Mouth and posterior oropharynx normal, moist mucous membranes, oral mucosa moist, no lesion noted.   EYES:  EOMI, Pupil rounded and equal.  RESP:  lungs clear to auscultation "   CV:  S1S2 audible, irregular HR, no murmur appreciated.   ABDOMEN:  soft, NT/ND, BS audible. no mass appreciated on palpation.   M/S:  Deformed right knee  SKIN:  Fading ecchymosis over distal right thigh, healing wound over rt knee  NEURO:   No NFD appreciated on observation. and  5/5 b/l  PSYCH:  AAOx person. affect and mood normal    Lab/Diagnostic data: Reviewed in the chart and EHR.        ASSESSMENT/PLAN:  -------------------------------  * PT was hospitzlied in Sept 2021 with right distal femur fx s/p ORIF, sepsis 2/2 UTI and A-fib RVR, and post-op anemia  transitioned to TCU for rehab unitl 3/28/22, then transferred here for LTC placement.       Chronic Atrial fibrillation (H):  - on Eliquis and Famotidine for GP protection  - on metoprolol and diltiazem.  CVR.     Hx of right distal femur fx s/p ORIF:  - analgesia optimal with oxycodone.   - wound care  - rehab prn      Chronic anemia  Hx of pos-op anemia  - HH trending up. On iron supplement  - Ferritin 246 (3/11/22). Will discontinue iron supplement        Protein Calorie Malnutrition (H)  - Body mass index is 19.74 kg/m .  - dietician eval/ recommendation      Severe Dementia, query Alzheimer disease w.o behaviors (H)  - SLUM 2/30  - Continue to anticipate needs. Chronic condition, ongoing decline expected.   -  Continue to provide redirection and reassurance as needed. Maintain safe living situation with goals focused on comfort.      Electronically signed by:  Marbella Franco MD

## 2022-04-14 ENCOUNTER — TRANSFERRED RECORDS (OUTPATIENT)
Dept: HEALTH INFORMATION MANAGEMENT | Facility: CLINIC | Age: 87
End: 2022-04-14

## 2022-04-14 ENCOUNTER — NURSING HOME VISIT (OUTPATIENT)
Dept: GERIATRICS | Facility: CLINIC | Age: 87
End: 2022-04-14

## 2022-04-14 ENCOUNTER — LAB REQUISITION (OUTPATIENT)
Dept: LAB | Facility: CLINIC | Age: 87
End: 2022-04-14
Payer: MEDICARE

## 2022-04-14 VITALS
OXYGEN SATURATION: 94 % | BODY MASS INDEX: 19.85 KG/M2 | SYSTOLIC BLOOD PRESSURE: 120 MMHG | HEART RATE: 58 BPM | DIASTOLIC BLOOD PRESSURE: 74 MMHG | WEIGHT: 107.9 LBS | HEIGHT: 62 IN | TEMPERATURE: 97.9 F | RESPIRATION RATE: 18 BRPM

## 2022-04-14 DIAGNOSIS — F02.80 LATE ONSET ALZHEIMER'S DEMENTIA WITHOUT BEHAVIORAL DISTURBANCE (H): ICD-10-CM

## 2022-04-14 DIAGNOSIS — Z79.01 CHRONIC ANTICOAGULATION: ICD-10-CM

## 2022-04-14 DIAGNOSIS — S72.401S CLOSED FRACTURE OF DISTAL END OF RIGHT FEMUR, UNSPECIFIED FRACTURE MORPHOLOGY, SEQUELA: ICD-10-CM

## 2022-04-14 DIAGNOSIS — E46 PROTEIN-CALORIE MALNUTRITION, UNSPECIFIED SEVERITY (H): ICD-10-CM

## 2022-04-14 DIAGNOSIS — G30.1 LATE ONSET ALZHEIMER'S DEMENTIA WITHOUT BEHAVIORAL DISTURBANCE (H): ICD-10-CM

## 2022-04-14 DIAGNOSIS — I48.21 PERMANENT ATRIAL FIBRILLATION (H): Primary | ICD-10-CM

## 2022-04-14 DIAGNOSIS — E56.9 VITAMIN DEFICIENCY, UNSPECIFIED: ICD-10-CM

## 2022-04-14 DIAGNOSIS — D64.9 ANEMIA, UNSPECIFIED TYPE: ICD-10-CM

## 2022-04-14 LAB
ERYTHROCYTE [DISTWIDTH] IN BLOOD BY AUTOMATED COUNT: 15.8 % (ref 10–15)
HCT VFR BLD AUTO: 40.1 % (ref 35–47)
HGB BLD-MCNC: 12.4 G/DL (ref 11.7–15.7)
MCH RBC QN AUTO: 30.2 PG (ref 26.5–33)
MCHC RBC AUTO-ENTMCNC: 30.9 G/DL (ref 31.5–36.5)
MCV RBC AUTO: 98 FL (ref 78–100)
PLATELET # BLD AUTO: 308 10E3/UL (ref 150–450)
RBC # BLD AUTO: 4.1 10E6/UL (ref 3.8–5.2)
VIT B12 SERPL-MCNC: 518 PG/ML (ref 213–816)
WBC # BLD AUTO: 8.4 10E3/UL (ref 4–11)

## 2022-04-14 PROCEDURE — 82607 VITAMIN B-12: CPT | Mod: ORL | Performed by: FAMILY MEDICINE

## 2022-04-14 PROCEDURE — 99305 1ST NF CARE MODERATE MDM 35: CPT | Performed by: FAMILY MEDICINE

## 2022-04-14 PROCEDURE — 85027 COMPLETE CBC AUTOMATED: CPT | Mod: ORL | Performed by: FAMILY MEDICINE

## 2022-04-14 NOTE — LETTER
"    4/14/2022        RE: Elysia Lane  [address]  [facility] [address]        [place] GERIATRIC SERVICES  PRIMARY CARE PROVIDER AND CLINIC:  Samm Scott, [facility], [address] [facility] / [place] [address]  Chief Complaint   Patient presents with     Establish Care     [place] Medical Record Number:  [id number]  Place of Service where encounter took place:  [facility] () [[id number]]    Elysia Lane  is a 89 year old  (12/2/1923), admitted to the above facility from [facility]..  Admitted to this facility for  rehab, medical management and nursing care.    HPI:    HPI information obtained from: facility chart records, facility staff, patient report and [facility] chart review.   Brief Summary:  * PT was hospitzlied in Sept 2021 with right distal femur fx s/p ORIF, sepsis 2/2 UTI and A-fib RVR,  transitioned to TCU for rehab unitl 3/28/22, then transferred here for LTC placement.       Today:  - Rt femur: reports knee hurts like a hell, aggravated with any movements, better with taking easy and medicine.\" I broke my knee two years ago\"  - A-fib: denies palpitation or chest pain  - Anemia: denies fainting.     ============================================    CODE STATUS/ADVANCE DIRECTIVES DISCUSSION:   DNR / DNI  Patient's living condition: lives in a nursing home  ALLERGIES: Patient has no known allergies.  PAST MEDICAL HISTORY:  has a past medical history of Anemia, Chronic kidney disease, Essential hypertension, Osteoarthritis, and Stenosis of carotid artery.    She has no past medical history of Complication of anesthesia, Diabetes (H), Malignant hyperthermia, Motion sickness, PONV (postoperative nausea and vomiting), or Sleep apnea.  PAST SURGICAL HISTORY:   has a past surgical history that includes Open reduction internal fixation femur distal (Right, 9/8/2021) and Irrigation and debridement lower extremity, combined (Right, 11/1/2021).  FAMILY HISTORY: family " "history includes No Known Problems in her father and mother.  SOCIAL HISTORY:   reports that she has never smoked. She has never used smokeless tobacco. She reports previous alcohol use. She reports that she does not use drugs.    Post Discharge Medication Reconciliation Status: discharge medications reconciled and changed, per note/orders  Current Outpatient Medications   Medication Sig Dispense Refill     acetaminophen (TYLENOL) 500 MG tablet Take 1,000 mg by mouth 3 times daily       apixaban ANTICOAGULANT (ELIQUIS) 5 MG tablet Take 1 tablet (5 mg) by mouth 2 times daily 60 tablet 1     diclofenac (VOLTAREN) 1 % topical gel Apply 4 g topically 4 times daily as needed for moderate pain Apply to right knee       diltiazem ER (CARDIZEM SR) 90 MG 12 hr capsule Take 1 capsule (90 mg) by mouth 2 times daily 30 capsule 1     famotidine (PEPCID) 20 MG tablet Take 1 tablet (20 mg) by mouth every morning       ferrous sulfate (FEROSUL) 325 (65 Fe) MG tablet Take 325 mg by mouth daily (with breakfast)        metoprolol tartrate (LOPRESSOR) 100 MG tablet Take 1 tablet (100 mg) by mouth 2 times daily 60 tablet 1     oxyCODONE (ROXICODONE) 5 MG tablet Take 0.5 tablets (2.5 mg) by mouth three times a week On Mon, Wed, Fri 30 minutes before therapy 15 tablet 0     polyethylene glycol (MIRALAX) 17 GM/Dose powder Take 17 g by mouth daily 510 g 1     potassium chloride ER (KLOR-CON M15) 15 MEQ CR tablet Take 2 tablets (30 mEq) by mouth daily       Vitamin D3 (CHOLECALCIFEROL) 25 mcg (1000 units) tablet Take 1 tablet (25 mcg) by mouth daily 30 tablet 0     ROS: Limited secondary to cognitive impairment but today pt reports- see HPI    Vitals:  /74   Pulse 58   Temp 97.9  F (36.6  C)   Resp 18   Ht 1.575 m (5' 2\")   Wt 48.9 kg (107 lb 14.4 oz)   SpO2 94%   BMI 19.74 kg/m    Exam:  GENERAL APPEARANCE:  in no distress, cooperative  ENT:  Mouth and posterior oropharynx normal, moist mucous membranes, oral mucosa moist, no " lesion noted.   EYES:  EOMI, Pupil rounded and equal.  RESP:  lungs clear to auscultation   CV:  S1S2 audible, irregular HR, no murmur appreciated.   ABDOMEN:  soft, NT/ND, BS audible. no mass appreciated on palpation.   M/S:  Deformed right knee  SKIN:  Fading ecchymosis over distal right thigh, healing wound over rt knee  NEURO:   No NFD appreciated on observation. and  5/5 b/l  PSYCH:  AAOx person. affect and mood normal    Lab/Diagnostic data: Reviewed in the chart and EHR.        ASSESSMENT/PLAN:  -------------------------------  * PT was hospitzlied in Sept 2021 with right distal femur fx s/p ORIF, sepsis 2/2 UTI and A-fib RVR, and post-op anemia  transitioned to TCU for rehab unitl 3/28/22, then transferred here for LTC placement.       Chronic Atrial fibrillation (H):  - on Eliquis and Famotidine for GP protection  - on metoprolol and diltiazem.  CVR.     Hx of right distal femur fx s/p ORIF:  - analgesia optimal with oxycodone.   - wound care  - rehab prn      Chronic anemia  Hx of pos-op anemia  - HH trending up. On iron supplement  - Ferritin 246 (3/11/22). Will discontinue iron supplement        Protein Calorie Malnutrition (H)  - Body mass index is 19.74 kg/m .  - dietician eval/ recommendation      Severe Dementia, query Alzheimer disease w.o behaviors (H)  - UM 2/30  - Continue to anticipate needs. Chronic condition, ongoing decline expected.   -  Continue to provide redirection and reassurance as needed. Maintain safe living situation with goals focused on comfort.      Electronically signed by:  Marbella Franco MD           Sincerely,        Marbella Franco MD

## 2022-04-18 ENCOUNTER — TELEPHONE (OUTPATIENT)
Dept: GERIATRICS | Facility: CLINIC | Age: 87
End: 2022-04-18
Payer: MEDICARE

## 2022-04-18 NOTE — TELEPHONE ENCOUNTER
[facility] Triage Nurse Telephone Encounter    Provider: NICOLÁS Zuñiga CNP  Facility: [facility] Facility Type:  LTC    Caller: After hours message from weekend  Call Back Number: [phone]    Allergies:  No Known Allergies     Reason for call: Pt as a bruise on her left shin 4.5 x 3cm, the area is swollen and dark blue in color. The area is painful to touch, Tylenol scheduled 1000mg tid and oxycodone 5mg prn prior to therapy. Staff not sure what caused the bruise.     Verbal Order/Direction given by Provider: Xray left tib/fib d/t bruise and pain.     Provider giving Order:  NICOLÁS Zuñiga CNP    Verbal Order given to: Nicolette Abbott RN

## 2022-05-04 NOTE — PROGRESS NOTES
[facility] GERIATRICS  Chief Complaint   Patient presents with     [facility] Medical Record Number:  [id number]  Place of Service where encounter took place:  [facility] () [[id number]]    HPI:    Elysia Lane  is 89 year old (12/2/1923), who is being seen today for a federally mandated E/M visit. Today's concerns are:    Nursing reported no acute concerns. Elysia reported no pain. She was watching the cars drive down the highway and just woke up from a nap.     ALLERGIES:Patient has no known allergies.  PAST MEDICAL HISTORY:   Past Medical History:   Diagnosis Date     Anemia      Chronic kidney disease      Essential hypertension      Osteoarthritis      Stenosis of carotid artery      PAST SURGICAL HISTORY:   has a past surgical history that includes Open reduction internal fixation femur distal (Right, 9/8/2021) and Irrigation and debridement lower extremity, combined (Right, 11/1/2021).  FAMILY HISTORY: family history includes No Known Problems in her father and mother.  SOCIAL HISTORY:  reports that she has never smoked. She has never used smokeless tobacco. She reports previous alcohol use. She reports that she does not use drugs.    MEDICATIONS:     Review of your medicines          Accurate as of May 4, 2022  1:26 PM. If you have any questions, ask your nurse or doctor.            CONTINUE these medicines which have NOT CHANGED      Dose / Directions   acetaminophen 500 MG tablet  Commonly known as: TYLENOL      Dose: 1,000 mg  Take 1,000 mg by mouth 3 times daily  Refills: 0     apixaban ANTICOAGULANT 5 MG tablet  Commonly known as: ELIQUIS  Indication: Atrial Fibrillation Not Caused By A Heart Valve Problem  Used for: Closed fracture of distal end of right femur, unspecified fracture morphology, initial encounter (H)      Dose: 5 mg  Take 1 tablet (5 mg) by mouth 2 times daily  Quantity: 60 tablet  Refills: 1     diclofenac 1 % topical gel  Commonly known as:  VOLTAREN      Dose: 4 g  Apply 4 g topically 4 times daily as needed for moderate pain Apply to right knee  Refills: 0     diltiazem ER 90 MG 12 hr capsule  Commonly known as: CARDIZEM SR  Used for: Closed fracture of distal end of right femur, unspecified fracture morphology, initial encounter (H)      Dose: 90 mg  Take 1 capsule (90 mg) by mouth 2 times daily  Quantity: 30 capsule  Refills: 1     famotidine 20 MG tablet  Commonly known as: PEPCID      Dose: 20 mg  Take 1 tablet (20 mg) by mouth every morning  Refills: 0     ferrous sulfate 325 (65 Fe) MG tablet  Commonly known as: FEROSUL      Dose: 325 mg  Take 325 mg by mouth daily (with breakfast)  Refills: 0     metoprolol tartrate 100 MG tablet  Commonly known as: LOPRESSOR  Used for: Closed fracture of distal end of right femur, unspecified fracture morphology, initial encounter (H)      Dose: 100 mg  Take 1 tablet (100 mg) by mouth 2 times daily  Quantity: 60 tablet  Refills: 1     oxyCODONE 5 MG tablet  Commonly known as: ROXICODONE  Used for: Pain      Dose: 2.5 mg  Take 0.5 tablets (2.5 mg) by mouth three times a week On Mon, Wed, Fri 30 minutes before therapy  Quantity: 15 tablet  Refills: 0     polyethylene glycol 17 GM/Dose powder  Commonly known as: MIRALAX  Used for: Closed fracture of distal end of right femur, unspecified fracture morphology, initial encounter (H)      Dose: 17 g  Take 17 g by mouth daily  Quantity: 510 g  Refills: 1     potassium chloride ER 15 MEQ CR tablet  Commonly known as: KLOR-CON M15      Dose: 30 mEq  Take 2 tablets (30 mEq) by mouth daily  Refills: 0     Vitamin D3 25 mcg (1000 units) tablet  Commonly known as: CHOLECALCIFEROL  Used for: Closed fracture of distal end of right femur, unspecified fracture morphology, initial encounter (H)      Dose: 25 mcg  Take 1 tablet (25 mcg) by mouth daily  Quantity: 30 tablet  Refills: 0           Case Management:  I have reviewed the care plan and MDS and do agree with the plan.  "Patient's desire to return to the community is not present. Information reviewed:  Medications, vital signs, orders, and nursing notes.    ROS:  4 point ROS including Respiratory, CV, GI and , other than that noted in the HPI,  is negative    Vitals:  /72   Pulse 78   Temp 98.6  F (37  C)   Resp 16   Ht 1.575 m (5' 2\")   Wt 48.6 kg (107 lb 1.6 oz)   SpO2 99%   BMI 19.59 kg/m    Body mass index is 19.59 kg/m .  Exam:  GENERAL APPEARANCE:  Alert, in no distress  EYES:  EOM normal, conjunctiva and lids normal  RESP:  respiratory effort and palpation of chest normal, lungs clear to auscultation , no respiratory distress  CV:  Irregular, S1-S2, no extra heart sounds  ABDOMEN:  normal bowel sounds, soft, nontender, no hepatosplenomegaly or other masses  M/S:   Swollen right knee painful to palpation  SKIN:  No rash  NEURO:   Alert, clear speech, no focal deficits on observation  PSYCH:  oriented to Self    Lab/Diagnostic data:     Most Recent 3 CBC's:Recent Labs   Lab Test 04/14/22  0535 03/11/22  0514 01/11/22  0503   WBC 8.4 7.2 7.5   HGB 12.4 10.9* 10.0*   MCV 98 95 95    257 309     Most Recent 3 BMP's:Recent Labs   Lab Test 03/30/22  0535 11/30/21  0513 11/16/21  0522 11/09/21  0518 11/02/21  1129     --   --  141 139   POTASSIUM 4.4 4.1 4.0 3.4* 4.1   CHLORIDE 102  --   --  104 104   CO2 20*  --   --  27 24   BUN 15  --   --  11 13   CR 0.72  --   --  0.77 0.75   ANIONGAP 16  --   --  10 11   ADAM 8.7  --   --  8.7 8.9   GLC 43*  --   --  89 132*       ASSESSMENT/PLAN    (G30.1,  F02.80) Late onset Alzheimer's dementia without behavioral disturbance (H)  (primary encounter diagnosis)  Plan:   -SLUM 2/30.  Continue to decline expected.  Requires extensive assistance from staff for ADLs    (I48.21) Permanent atrial fibrillation (H)  (Z79.01) Chronic anticoagulation  Plan:   -Rate controlled.  Continue current medication regimen and is anticoagulated with apixaban    (N18.32) Chronic kidney " disease, stage 3b (H)  Plan:   -Renally dose medications.  Avoid nephrotoxic agents.  Monitor hydration status    (S72.401S) Closed fracture of distal end of right femur, unspecified fracture morphology, sequela  Plan:   -Stable.  Continue pain medication regimen.  As needed therapies          Electronically signed by:  Samm Scott CNP

## 2022-05-05 NOTE — LETTER
5/5/2022        RE: Elysia Lane  [address]  [facility] [address]        [facility] GERIATRICS  Chief Complaint   Patient presents with     assisted [facility] Medical Record Number:  [id number]  Place of Service where encounter took place:  [facility] () [[id number]]    HPI:    Elysia Lane  is 89 year old (12/2/1923), who is being seen today for a federally mandated E/M visit. Today's concerns are:    Nursing reported no acute concerns. Elysia reported no pain. She was watching the cars drive down the highway and just woke up from a nap.     ALLERGIES:Patient has no known allergies.  PAST MEDICAL HISTORY:   Past Medical History:   Diagnosis Date     Anemia      Chronic kidney disease      Essential hypertension      Osteoarthritis      Stenosis of carotid artery      PAST SURGICAL HISTORY:   has a past surgical history that includes Open reduction internal fixation femur distal (Right, 9/8/2021) and Irrigation and debridement lower extremity, combined (Right, 11/1/2021).  FAMILY HISTORY: family history includes No Known Problems in her father and mother.  SOCIAL HISTORY:  reports that she has never smoked. She has never used smokeless tobacco. She reports previous alcohol use. She reports that she does not use drugs.    MEDICATIONS:     Review of your medicines          Accurate as of May 4, 2022  1:26 PM. If you have any questions, ask your nurse or doctor.            CONTINUE these medicines which have NOT CHANGED      Dose / Directions   acetaminophen 500 MG tablet  Commonly known as: TYLENOL      Dose: 1,000 mg  Take 1,000 mg by mouth 3 times daily  Refills: 0     apixaban ANTICOAGULANT 5 MG tablet  Commonly known as: ELIQUIS  Indication: Atrial Fibrillation Not Caused By A Heart Valve Problem  Used for: Closed fracture of distal end of right femur, unspecified fracture morphology, initial encounter (H)      Dose: 5 mg  Take 1 tablet (5 mg) by mouth 2 times  daily  Quantity: 60 tablet  Refills: 1     diclofenac 1 % topical gel  Commonly known as: VOLTAREN      Dose: 4 g  Apply 4 g topically 4 times daily as needed for moderate pain Apply to right knee  Refills: 0     diltiazem ER 90 MG 12 hr capsule  Commonly known as: CARDIZEM SR  Used for: Closed fracture of distal end of right femur, unspecified fracture morphology, initial encounter (H)      Dose: 90 mg  Take 1 capsule (90 mg) by mouth 2 times daily  Quantity: 30 capsule  Refills: 1     famotidine 20 MG tablet  Commonly known as: PEPCID      Dose: 20 mg  Take 1 tablet (20 mg) by mouth every morning  Refills: 0     ferrous sulfate 325 (65 Fe) MG tablet  Commonly known as: FEROSUL      Dose: 325 mg  Take 325 mg by mouth daily (with breakfast)  Refills: 0     metoprolol tartrate 100 MG tablet  Commonly known as: LOPRESSOR  Used for: Closed fracture of distal end of right femur, unspecified fracture morphology, initial encounter (H)      Dose: 100 mg  Take 1 tablet (100 mg) by mouth 2 times daily  Quantity: 60 tablet  Refills: 1     oxyCODONE 5 MG tablet  Commonly known as: ROXICODONE  Used for: Pain      Dose: 2.5 mg  Take 0.5 tablets (2.5 mg) by mouth three times a week On Mon, Wed, Fri 30 minutes before therapy  Quantity: 15 tablet  Refills: 0     polyethylene glycol 17 GM/Dose powder  Commonly known as: MIRALAX  Used for: Closed fracture of distal end of right femur, unspecified fracture morphology, initial encounter (H)      Dose: 17 g  Take 17 g by mouth daily  Quantity: 510 g  Refills: 1     potassium chloride ER 15 MEQ CR tablet  Commonly known as: KLOR-CON M15      Dose: 30 mEq  Take 2 tablets (30 mEq) by mouth daily  Refills: 0     Vitamin D3 25 mcg (1000 units) tablet  Commonly known as: CHOLECALCIFEROL  Used for: Closed fracture of distal end of right femur, unspecified fracture morphology, initial encounter (H)      Dose: 25 mcg  Take 1 tablet (25 mcg) by mouth daily  Quantity: 30 tablet  Refills: 0          "  Case Management:  I have reviewed the care plan and MDS and do agree with the plan. Patient's desire to return to the community is not present. Information reviewed:  Medications, vital signs, orders, and nursing notes.    ROS:  4 point ROS including Respiratory, CV, GI and , other than that noted in the HPI,  is negative    Vitals:  /72   Pulse 78   Temp 98.6  F (37  C)   Resp 16   Ht 1.575 m (5' 2\")   Wt 48.6 kg (107 lb 1.6 oz)   SpO2 99%   BMI 19.59 kg/m    Body mass index is 19.59 kg/m .  Exam:  GENERAL APPEARANCE:  Alert, in no distress  EYES:  EOM normal, conjunctiva and lids normal  RESP:  respiratory effort and palpation of chest normal, lungs clear to auscultation , no respiratory distress  CV:  Irregular, S1-S2, no extra heart sounds  ABDOMEN:  normal bowel sounds, soft, nontender, no hepatosplenomegaly or other masses  M/S:   Swollen right knee painful to palpation  SKIN:  No rash  NEURO:   Alert, clear speech, no focal deficits on observation  PSYCH:  oriented to Self    Lab/Diagnostic data:     Most Recent 3 CBC's:Recent Labs   Lab Test 04/14/22  0535 03/11/22  0514 01/11/22  0503   WBC 8.4 7.2 7.5   HGB 12.4 10.9* 10.0*   MCV 98 95 95    257 309     Most Recent 3 BMP's:Recent Labs   Lab Test 03/30/22  0535 11/30/21  0513 11/16/21  0522 11/09/21  0518 11/02/21  1129     --   --  141 139   POTASSIUM 4.4 4.1 4.0 3.4* 4.1   CHLORIDE 102  --   --  104 104   CO2 20*  --   --  27 24   BUN 15  --   --  11 13   CR 0.72  --   --  0.77 0.75   ANIONGAP 16  --   --  10 11   ADAM 8.7  --   --  8.7 8.9   GLC 43*  --   --  89 132*       ASSESSMENT/PLAN    (G30.1,  F02.80) Late onset Alzheimer's dementia without behavioral disturbance (H)  (primary encounter diagnosis)  Plan:   -SLUM 2/30.  Continue to decline expected.  Requires extensive assistance from staff for ADLs    (I48.21) Permanent atrial fibrillation (H)  (Z79.01) Chronic anticoagulation  Plan:   -Rate controlled.  Continue " current medication regimen and is anticoagulated with apixaban    (N18.32) Chronic kidney disease, stage 3b (H)  Plan:   -Renally dose medications.  Avoid nephrotoxic agents.  Monitor hydration status    (S72.401S) Closed fracture of distal end of right femur, unspecified fracture morphology, sequela  Plan:   -Stable.  Continue pain medication regimen.  As needed therapies          Electronically signed by:  Samm Scott CNP                Sincerely,        Samm Scott, CNP

## 2022-05-05 NOTE — LETTER
5/5/2022        RE: Elysia Lane  [address]  [place] [address]        [facility] GERIATRICS  Chief Complaint   Patient presents with     long term [facility] Medical Record Number:  [id number]  Place of Service where encounter took place:  No question data found.    HPI:    Elysia Lane  is 89 year old (12/2/1923), who is being seen today for a federally mandated E/M visit. Today's concerns are:  {FGS DX:605508}    ALLERGIES:Patient has no known allergies.  PAST MEDICAL HISTORY:   Past Medical History:   Diagnosis Date     Anemia      Chronic kidney disease      Essential hypertension      Osteoarthritis      Stenosis of carotid artery      PAST SURGICAL HISTORY:   has a past surgical history that includes Open reduction internal fixation femur distal (Right, 9/8/2021) and Irrigation and debridement lower extremity, combined (Right, 11/1/2021).  FAMILY HISTORY: family history includes No Known Problems in her father and mother.  SOCIAL HISTORY:  reports that she has never smoked. She has never used smokeless tobacco. She reports previous alcohol use. She reports that she does not use drugs.    MEDICATIONS:     Review of your medicines          Accurate as of May 4, 2022  1:24 PM. If you have any questions, ask your nurse or doctor.            CONTINUE these medicines which have NOT CHANGED      Dose / Directions   acetaminophen 500 MG tablet  Commonly known as: TYLENOL      Dose: 1,000 mg  Take 1,000 mg by mouth 3 times daily  Refills: 0     apixaban ANTICOAGULANT 5 MG tablet  Commonly known as: ELIQUIS  Indication: Atrial Fibrillation Not Caused By A Heart Valve Problem  Used for: Closed fracture of distal end of right femur, unspecified fracture morphology, initial encounter (H)      Dose: 5 mg  Take 1 tablet (5 mg) by mouth 2 times daily  Quantity: 60 tablet  Refills: 1     diclofenac 1 % topical gel  Commonly known as: VOLTAREN      Dose: 4 g  Apply 4 g topically 4  times daily as needed for moderate pain Apply to right knee  Refills: 0     diltiazem ER 90 MG 12 hr capsule  Commonly known as: CARDIZEM SR  Used for: Closed fracture of distal end of right femur, unspecified fracture morphology, initial encounter (H)      Dose: 90 mg  Take 1 capsule (90 mg) by mouth 2 times daily  Quantity: 30 capsule  Refills: 1     famotidine 20 MG tablet  Commonly known as: PEPCID      Dose: 20 mg  Take 1 tablet (20 mg) by mouth every morning  Refills: 0     ferrous sulfate 325 (65 Fe) MG tablet  Commonly known as: FEROSUL      Dose: 325 mg  Take 325 mg by mouth daily (with breakfast)  Refills: 0     metoprolol tartrate 100 MG tablet  Commonly known as: LOPRESSOR  Used for: Closed fracture of distal end of right femur, unspecified fracture morphology, initial encounter (H)      Dose: 100 mg  Take 1 tablet (100 mg) by mouth 2 times daily  Quantity: 60 tablet  Refills: 1     oxyCODONE 5 MG tablet  Commonly known as: ROXICODONE  Used for: Pain      Dose: 2.5 mg  Take 0.5 tablets (2.5 mg) by mouth three times a week On Mon, Wed, Fri 30 minutes before therapy  Quantity: 15 tablet  Refills: 0     polyethylene glycol 17 GM/Dose powder  Commonly known as: MIRALAX  Used for: Closed fracture of distal end of right femur, unspecified fracture morphology, initial encounter (H)      Dose: 17 g  Take 17 g by mouth daily  Quantity: 510 g  Refills: 1     potassium chloride ER 15 MEQ CR tablet  Commonly known as: KLOR-CON M15      Dose: 30 mEq  Take 2 tablets (30 mEq) by mouth daily  Refills: 0     Vitamin D3 25 mcg (1000 units) tablet  Commonly known as: CHOLECALCIFEROL  Used for: Closed fracture of distal end of right femur, unspecified fracture morphology, initial encounter (H)      Dose: 25 mcg  Take 1 tablet (25 mcg) by mouth daily  Quantity: 30 tablet  Refills: 0         ***  Case Management:  I have reviewed the care plan and MDS and do agree with the plan. Patient's desire to return to the community is  {FGS RETURN TO COMMUNITY:213790}. Information reviewed:  Medications, vital signs, orders, and nursing notes.    ROS:  {ROS FGS:911256}    Vitals:  There were no vitals taken for this visit.  There is no height or weight on file to calculate BMI.  Exam:  {group home physical exam :375420}    Lab/Diagnostic data:   {fgslab:332208}    ASSESSMENT/PLAN  {FGS DX2:535715}    {fgsorders:371991}  ***    {fgstime1:363182}    Electronically signed by:  Heather Estevez***              Sincerely,        Samm Scott, CNP

## 2022-06-24 NOTE — PROGRESS NOTES
"[facility] GERIATRICS  Chief Complaint   Patient presents with     [facility] Medical Record Number: [id number]  Place of Service Where Encounter Took Place: [facility] AT [facility] () [[id number]]    HPI:   Elysia Lane is 89 year old (12/2/1923), who is being seen today for a federally mandated E/M visit. Today's concerns are:    Today:   - Resident seen and examined, chart reviewed and discussed with the nursing staff.   - Dementia: \" I am going to be 89 years\". RN has no behavioral concern.   - A-fib: Denies CP, SOB or palpitation  - CKD: endorses drinks a lot of water \" I love water\"  - Rt femur fx: endorse pain in the right knee, but reports he know how to avoid the pain. Endorses uses WC and is self-propel.   - DNP and RN have no concern today.     --------------------------------  - Past Medical, social, family histories, medications, and allergies reviewed and updated  - Medications reviewed: in the chart and EHR.   - Case Management:   I have reviewed the care plan and MDS and do agree with the plan. Patient's desire to return to the community is not present.  Information reviewed:  Medications, vital signs, orders, and nursing notes.    MEDICATIONS:     Review of your medicines          Accurate as of June 27, 2022 11:59 PM. If you have any questions, ask your nurse or doctor.            CONTINUE these medicines which have NOT CHANGED      Dose / Directions   acetaminophen 500 MG tablet  Commonly known as: TYLENOL      Dose: 1,000 mg  Take 1,000 mg by mouth 3 times daily  Refills: 0     apixaban ANTICOAGULANT 5 MG tablet  Commonly known as: ELIQUIS  Indication: Atrial Fibrillation Not Caused By A Heart Valve Problem  Used for: Closed fracture of distal end of right femur, unspecified fracture morphology, initial encounter (H)      Dose: 5 mg  Take 1 tablet (5 mg) by mouth 2 times daily  Quantity: 60 tablet  Refills: 1     diclofenac 1 % topical gel  Commonly known as: " VOLTAREN      Dose: 4 g  Apply 4 g topically 4 times daily as needed for moderate pain Apply to right knee  Refills: 0     diltiazem ER 90 MG 12 hr capsule  Commonly known as: CARDIZEM SR  Used for: Closed fracture of distal end of right femur, unspecified fracture morphology, initial encounter (H)      Dose: 90 mg  Take 1 capsule (90 mg) by mouth 2 times daily  Quantity: 30 capsule  Refills: 1     famotidine 20 MG tablet  Commonly known as: PEPCID      Dose: 20 mg  Take 1 tablet (20 mg) by mouth every morning  Refills: 0     ferrous sulfate 325 (65 Fe) MG tablet  Commonly known as: FEROSUL      Dose: 325 mg  Take 325 mg by mouth daily (with breakfast)  Refills: 0     metoprolol tartrate 100 MG tablet  Commonly known as: LOPRESSOR  Used for: Closed fracture of distal end of right femur, unspecified fracture morphology, initial encounter (H)      Dose: 100 mg  Take 1 tablet (100 mg) by mouth 2 times daily  Quantity: 60 tablet  Refills: 1     oxyCODONE 5 MG tablet  Commonly known as: ROXICODONE  Used for: Pain      Dose: 2.5 mg  Take 0.5 tablets (2.5 mg) by mouth three times a week On Mon, Wed, Fri 30 minutes before therapy  Quantity: 15 tablet  Refills: 0     polyethylene glycol 17 GM/Dose powder  Commonly known as: MIRALAX  Used for: Closed fracture of distal end of right femur, unspecified fracture morphology, initial encounter (H)      Dose: 17 g  Take 17 g by mouth daily  Quantity: 510 g  Refills: 1     potassium chloride ER 15 MEQ CR tablet  Commonly known as: KLOR-CON M15      Dose: 30 mEq  Take 2 tablets (30 mEq) by mouth daily  Refills: 0     Vitamin D3 25 mcg (1000 units) tablet  Commonly known as: CHOLECALCIFEROL  Used for: Closed fracture of distal end of right femur, unspecified fracture morphology, initial encounter (H)      Dose: 25 mcg  Take 1 tablet (25 mcg) by mouth daily  Quantity: 30 tablet  Refills: 0           ROS: limited due to memory deficit.     Vitals:  BP 92/52   Pulse 68   Temp 97.3  F  "(36.3  C)   Resp 14   Ht 1.575 m (5' 2\")   Wt 46.6 kg (102 lb 11.2 oz)   SpO2 97%   BMI 18.78 kg/m    Body mass index is 18.78 kg/m .  Exam: performed today  GENERAL APPEARANCE:  in no distress, cooperative  RESP:  lungs clear to auscultation   CV:  S1S2 audible, regular HR, no murmur appreciated.   ABDOMEN:  soft, NT/ND, BS audible. no mass appreciated on palpation.   M/S:   no joint deformity noted on observation.   SKIN:  No rash.   NEURO:   No NFD appreciated on observation. Hand  5/5 b/l  PSYCH: AAOx person, place and day only. affect and mood normal    Lab/Diagnostic data: Reviewed in the chart and EHR.        ASSESSMENT/PLAN  ----------------------------  Severe Dementia, likely Alzheimer disease w.o behaviors (H)  - adjusting well to the new place.   - SLUM 2/30  - Continue to anticipate needs. Chronic condition, ongoing decline expected.   -  Continue to provide redirection and reassurance as needed. Maintain safe living situation with goals focused on comfort.      Chronic Atrial fibrillation (H):  - on Eliquis,  and Famotidine for GP protection  - on metoprolol and diltiazem.  CVR.       Protein Calorie Malnutrition (H)  Sarcopenia  Hypoalbumenemia: 2.7 (11/19/21)  - Body mass index is 18.78 kg/m . from 9.74 kg/m .  - dietician eval/ recommendations  - In frail elderly keep BMI b/lw 25-35 Kg(m2).       Chronic anemia  Hx of pos-op anemia  - Hb wnl now. Off iron supplement.       Hx of right distal femur fx s/p ORIF:  Frailty  - analgesia optimal  - Significant  Deficits requiring NH placement. Requiring extensive assistance from nursing.         Electronically signed by:  Marbella Franco MD  "

## 2022-06-27 NOTE — LETTER
"    6/27/2022        RE: Elysia Lane  [address]  [facility] [address]        [facility] GERIATRICS  Chief Complaint   Patient presents with     [facility] Medical Record Number: [id number]  Place of Service Where Encounter Took Place: MICHELLE REYNOSO AT [facility] () [[id number]]    HPI:   Elysia Lane is 89 year old (12/2/1923), who is being seen today for a federally mandated E/M visit. Today's concerns are:    Today:   - Resident seen and examined, chart reviewed and discussed with the nursing staff.   - Dementia: \" I am going to be 89 years\". RN has no behavioral concern.   - A-fib: Denies CP, SOB or palpitation  - CKD: endorses drinks a lot of water \" I love water\"  - Rt femur fx: endorse pain in the right knee, but reports he know how to avoid the pain. Endorses uses WC and is self-propel.   - DNP and RN have no concern today.     --------------------------------  - Past Medical, social, family histories, medications, and allergies reviewed and updated  - Medications reviewed: in the chart and EHR.   - Case Management:   I have reviewed the care plan and MDS and do agree with the plan. Patient's desire to return to the community is not present.  Information reviewed:  Medications, vital signs, orders, and nursing notes.    MEDICATIONS:     Review of your medicines          Accurate as of June 27, 2022 11:59 PM. If you have any questions, ask your nurse or doctor.            CONTINUE these medicines which have NOT CHANGED      Dose / Directions   acetaminophen 500 MG tablet  Commonly known as: TYLENOL      Dose: 1,000 mg  Take 1,000 mg by mouth 3 times daily  Refills: 0     apixaban ANTICOAGULANT 5 MG tablet  Commonly known as: ELIQUIS  Indication: Atrial Fibrillation Not Caused By A Heart Valve Problem  Used for: Closed fracture of distal end of right femur, unspecified fracture morphology, initial encounter (H)      Dose: 5 mg  Take 1 tablet (5 mg) by mouth 2 times " daily  Quantity: 60 tablet  Refills: 1     diclofenac 1 % topical gel  Commonly known as: VOLTAREN      Dose: 4 g  Apply 4 g topically 4 times daily as needed for moderate pain Apply to right knee  Refills: 0     diltiazem ER 90 MG 12 hr capsule  Commonly known as: CARDIZEM SR  Used for: Closed fracture of distal end of right femur, unspecified fracture morphology, initial encounter (H)      Dose: 90 mg  Take 1 capsule (90 mg) by mouth 2 times daily  Quantity: 30 capsule  Refills: 1     famotidine 20 MG tablet  Commonly known as: PEPCID      Dose: 20 mg  Take 1 tablet (20 mg) by mouth every morning  Refills: 0     ferrous sulfate 325 (65 Fe) MG tablet  Commonly known as: FEROSUL      Dose: 325 mg  Take 325 mg by mouth daily (with breakfast)  Refills: 0     metoprolol tartrate 100 MG tablet  Commonly known as: LOPRESSOR  Used for: Closed fracture of distal end of right femur, unspecified fracture morphology, initial encounter (H)      Dose: 100 mg  Take 1 tablet (100 mg) by mouth 2 times daily  Quantity: 60 tablet  Refills: 1     oxyCODONE 5 MG tablet  Commonly known as: ROXICODONE  Used for: Pain      Dose: 2.5 mg  Take 0.5 tablets (2.5 mg) by mouth three times a week On Mon, Wed, Fri 30 minutes before therapy  Quantity: 15 tablet  Refills: 0     polyethylene glycol 17 GM/Dose powder  Commonly known as: MIRALAX  Used for: Closed fracture of distal end of right femur, unspecified fracture morphology, initial encounter (H)      Dose: 17 g  Take 17 g by mouth daily  Quantity: 510 g  Refills: 1     potassium chloride ER 15 MEQ CR tablet  Commonly known as: KLOR-CON M15      Dose: 30 mEq  Take 2 tablets (30 mEq) by mouth daily  Refills: 0     Vitamin D3 25 mcg (1000 units) tablet  Commonly known as: CHOLECALCIFEROL  Used for: Closed fracture of distal end of right femur, unspecified fracture morphology, initial encounter (H)      Dose: 25 mcg  Take 1 tablet (25 mcg) by mouth daily  Quantity: 30 tablet  Refills: 0          "  ROS: limited due to memory deficit.     Vitals:  BP 92/52   Pulse 68   Temp 97.3  F (36.3  C)   Resp 14   Ht 1.575 m (5' 2\")   Wt 46.6 kg (102 lb 11.2 oz)   SpO2 97%   BMI 18.78 kg/m    Body mass index is 18.78 kg/m .  Exam: performed today  GENERAL APPEARANCE:  in no distress, cooperative  RESP:  lungs clear to auscultation   CV:  S1S2 audible, regular HR, no murmur appreciated.   ABDOMEN:  soft, NT/ND, BS audible. no mass appreciated on palpation.   M/S:   no joint deformity noted on observation.   SKIN:  No rash.   NEURO:   No NFD appreciated on observation. Hand  5/5 b/l  PSYCH: AAOx person, place and day only. affect and mood normal    Lab/Diagnostic data: Reviewed in the chart and EHR.        ASSESSMENT/PLAN  ----------------------------  Severe Dementia, likely Alzheimer disease w.o behaviors (H)  - adjusting well to the new place.   - SLUM 2/30  - Continue to anticipate needs. Chronic condition, ongoing decline expected.   -  Continue to provide redirection and reassurance as needed. Maintain safe living situation with goals focused on comfort.      Chronic Atrial fibrillation (H):  - on Eliquis,  and Famotidine for GP protection  - on metoprolol and diltiazem.  CVR.       Protein Calorie Malnutrition (H)  Sarcopenia  Hypoalbumenemia: 2.7 (11/19/21)  - Body mass index is 18.78 kg/m . from 9.74 kg/m .  - dietician eval/ recommendations  - In frail elderly keep BMI b/lw 25-35 Kg(m2).       Chronic anemia  Hx of pos-op anemia  - Hb wnl now. Off iron supplement.       Hx of right distal femur fx s/p ORIF:  Frailty  - analgesia optimal  - Significant  Deficits requiring NH placement. Requiring extensive assistance from nursing.         Electronically signed by:  Marbella Franco MD        Sincerely,        Marbella Franco MD    "

## 2022-08-08 NOTE — TELEPHONE ENCOUNTER
ON-CALL  GERIATRICS CROSS-COVERAGE NOTE:    Call from RN that patient had a fall while transferring herself in the room.  No apparent injury on initial examination.  Patient is on apixaban.    Plan:  Advised RN to monitor patient's mental status and neurologic symptoms and report back for any abnormality.  In that case imaging of the head is warranted.    Aleksander Colmenares MD

## 2022-08-09 NOTE — LETTER
8/9/2022        RE: Elysia Lane  [address]  [facility] [address]        [facility] GERIATRICS  Chief Complaint   Patient presents with     [facility] Medical Record Number:  [id number]  Place of Service where encounter took place:  MICHELLE  AT [facility] () [[id number]]    HPI:    Elysia Lane  is 89 year old (12/2/1923), who is being seen today for a federally mandated E/M visit. Today's concerns are:     Dementia without behavioral disturbance, unspecified dementia type (H)  Rapid atrial fibrillation (H)  Anemia, unspecified type     Patient seen today in room, very pleasant, SLUMS 2/30, doing a crossword puzzle and using answers to fill in the blanks, denies CP/palpitations, recent Hgb 12.4 and on Fe supplement, pharmacy consult and suggested stopping, overall appears healthy for age.    ALLERGIES:Patient has no known allergies.  PAST MEDICAL HISTORY:   Past Medical History:   Diagnosis Date     Anemia      Chronic kidney disease      Essential hypertension      Osteoarthritis      Stenosis of carotid artery      PAST SURGICAL HISTORY:   has a past surgical history that includes Open reduction internal fixation femur distal (Right, 9/8/2021) and Irrigation and debridement lower extremity, combined (Right, 11/1/2021).  FAMILY HISTORY: family history includes No Known Problems in her father and mother.  SOCIAL HISTORY:  reports that she has never smoked. She has never used smokeless tobacco. She reports previous alcohol use. She reports that she does not use drugs.    MEDICATIONS:     Review of your medicines          Accurate as of August 9, 2022  5:30 PM. If you have any questions, ask your nurse or doctor.            CONTINUE these medicines which have NOT CHANGED      Dose / Directions   acetaminophen 500 MG tablet  Commonly known as: TYLENOL      Dose: 1,000 mg  Take 1,000 mg by mouth 3 times daily  Refills: 0     apixaban ANTICOAGULANT 5 MG tablet  Commonly  known as: ELIQUIS  Indication: Atrial Fibrillation Not Caused By A Heart Valve Problem  Used for: Closed fracture of distal end of right femur, unspecified fracture morphology, initial encounter (H)      Dose: 5 mg  Take 1 tablet (5 mg) by mouth 2 times daily  Quantity: 60 tablet  Refills: 1     diclofenac 1 % topical gel  Commonly known as: VOLTAREN      Dose: 4 g  Apply 4 g topically 4 times daily as needed for moderate pain Apply to right knee  Refills: 0     diltiazem ER 90 MG 12 hr capsule  Commonly known as: CARDIZEM SR  Used for: Closed fracture of distal end of right femur, unspecified fracture morphology, initial encounter (H)      Dose: 90 mg  Take 1 capsule (90 mg) by mouth 2 times daily  Quantity: 30 capsule  Refills: 1     famotidine 20 MG tablet  Commonly known as: PEPCID      Dose: 20 mg  Take 1 tablet (20 mg) by mouth every morning  Refills: 0     ferrous sulfate 325 (65 Fe) MG tablet  Commonly known as: FEROSUL      Dose: 325 mg  Take 325 mg by mouth daily (with breakfast)  Refills: 0     metoprolol tartrate 100 MG tablet  Commonly known as: LOPRESSOR  Used for: Closed fracture of distal end of right femur, unspecified fracture morphology, initial encounter (H)      Dose: 100 mg  Take 1 tablet (100 mg) by mouth 2 times daily  Quantity: 60 tablet  Refills: 1     oxyCODONE 5 MG tablet  Commonly known as: ROXICODONE  Used for: Pain      Dose: 2.5 mg  Take 0.5 tablets (2.5 mg) by mouth three times a week On Mon, Wed, Fri 30 minutes before therapy  Quantity: 15 tablet  Refills: 0     polyethylene glycol 17 GM/Dose powder  Commonly known as: MIRALAX  Used for: Closed fracture of distal end of right femur, unspecified fracture morphology, initial encounter (H)      Dose: 17 g  Take 17 g by mouth daily  Quantity: 510 g  Refills: 1     potassium chloride ER 15 MEQ CR tablet  Commonly known as: KLOR-CON M15      Dose: 30 mEq  Take 2 tablets (30 mEq) by mouth daily  Refills: 0     Vitamin D3 25 mcg (1000  "units) tablet  Commonly known as: CHOLECALCIFEROL  Used for: Closed fracture of distal end of right femur, unspecified fracture morphology, initial encounter (H)      Dose: 25 mcg  Take 1 tablet (25 mcg) by mouth daily  Quantity: 30 tablet  Refills: 0           Case Management:  I have reviewed the care plan and MDS and do agree with the plan. Patient's desire to return to the community is present, but is not able due to care needs . Information reviewed:  Medications, vital signs, orders, and nursing notes.    ROS:  4 point ROS including Respiratory, CV, GI and , other than that noted in the HPI,  is negative    Vitals:  /64   Pulse 61   Temp 97.2  F (36.2  C)   Resp 18   Ht 1.575 m (5' 2\")   Wt 46.4 kg (102 lb 3.2 oz)   SpO2 99%   BMI 18.69 kg/m    Body mass index is 18.69 kg/m .  Exam:  GENERAL APPEARANCE:  in no distress, appears healthy  ENT:  Mouth and posterior oropharynx normal, moist mucous membranes  RESP:  lungs clear to auscultation   CV:  no edema  ABDOMEN:  bowel sounds normal  M/S:   Gait and station abnormal ADL assist  SKIN:  Inspection of skin and subcutaneous tissue baseline  NEURO:   Examination of sensation by touch normal  PSYCH:  oriented X 3, memory impaired     Lab/Diagnostic data:   Labs done in SNF are in [facility]. Please refer to them using [employer] Everywhere.    ASSESSMENT/PLAN  (F03.90) Dementia without behavioral disturbance, unspecified dementia type (H)  (primary encounter diagnosis)  Comment: moderate limitations, appears healthy  Plan: staff to support as indicated  -no medication intervention indicated    (I48.91) Rapid atrial fibrillation (H)  Comment: today RRR, no edema  Plan: continue eliquis 5mg BID for now  -will check with facility dosing as eliquis s/b 2.5mg BID based on age and weight    (D64.9) Anemia, unspecified type  Comment: recent Hgb 12.4  Plan: discontinue Fe supplement  -follow up Hgb in 6-12 months        Electronically signed by:  Jesus" NICOLÁS Celaya CNP              Sincerely,        NICOLÁS Justice CNP

## 2022-08-09 NOTE — PROGRESS NOTES
[facility] GERIATRICS  Chief Complaint   Patient presents with     [facility] Medical Record Number:  [id number]  Place of Service where encounter took place:  MICHELLE REYNOSO AT [facility] () [[id number]]    HPI:    Elysia Lane  is 89 year old (12/2/1923), who is being seen today for a federally mandated E/M visit. Today's concerns are:     Dementia without behavioral disturbance, unspecified dementia type (H)  Rapid atrial fibrillation (H)  Anemia, unspecified type     Patient seen today in room, very pleasant, SLUMS 2/30, doing a crossword puzzle and using answers to fill in the blanks, denies CP/palpitations, recent Hgb 12.4 and on Fe supplement, pharmacy consult and suggested stopping, overall appears healthy for age.    ALLERGIES:Patient has no known allergies.  PAST MEDICAL HISTORY:   Past Medical History:   Diagnosis Date     Anemia      Chronic kidney disease      Essential hypertension      Osteoarthritis      Stenosis of carotid artery      PAST SURGICAL HISTORY:   has a past surgical history that includes Open reduction internal fixation femur distal (Right, 9/8/2021) and Irrigation and debridement lower extremity, combined (Right, 11/1/2021).  FAMILY HISTORY: family history includes No Known Problems in her father and mother.  SOCIAL HISTORY:  reports that she has never smoked. She has never used smokeless tobacco. She reports previous alcohol use. She reports that she does not use drugs.    MEDICATIONS:     Review of your medicines          Accurate as of August 9, 2022  5:30 PM. If you have any questions, ask your nurse or doctor.            CONTINUE these medicines which have NOT CHANGED      Dose / Directions   acetaminophen 500 MG tablet  Commonly known as: TYLENOL      Dose: 1,000 mg  Take 1,000 mg by mouth 3 times daily  Refills: 0     apixaban ANTICOAGULANT 5 MG tablet  Commonly known as: ELIQUIS  Indication: Atrial Fibrillation Not Caused By A Heart Valve  Problem  Used for: Closed fracture of distal end of right femur, unspecified fracture morphology, initial encounter (H)      Dose: 5 mg  Take 1 tablet (5 mg) by mouth 2 times daily  Quantity: 60 tablet  Refills: 1     diclofenac 1 % topical gel  Commonly known as: VOLTAREN      Dose: 4 g  Apply 4 g topically 4 times daily as needed for moderate pain Apply to right knee  Refills: 0     diltiazem ER 90 MG 12 hr capsule  Commonly known as: CARDIZEM SR  Used for: Closed fracture of distal end of right femur, unspecified fracture morphology, initial encounter (H)      Dose: 90 mg  Take 1 capsule (90 mg) by mouth 2 times daily  Quantity: 30 capsule  Refills: 1     famotidine 20 MG tablet  Commonly known as: PEPCID      Dose: 20 mg  Take 1 tablet (20 mg) by mouth every morning  Refills: 0     ferrous sulfate 325 (65 Fe) MG tablet  Commonly known as: FEROSUL      Dose: 325 mg  Take 325 mg by mouth daily (with breakfast)  Refills: 0     metoprolol tartrate 100 MG tablet  Commonly known as: LOPRESSOR  Used for: Closed fracture of distal end of right femur, unspecified fracture morphology, initial encounter (H)      Dose: 100 mg  Take 1 tablet (100 mg) by mouth 2 times daily  Quantity: 60 tablet  Refills: 1     oxyCODONE 5 MG tablet  Commonly known as: ROXICODONE  Used for: Pain      Dose: 2.5 mg  Take 0.5 tablets (2.5 mg) by mouth three times a week On Mon, Wed, Fri 30 minutes before therapy  Quantity: 15 tablet  Refills: 0     polyethylene glycol 17 GM/Dose powder  Commonly known as: MIRALAX  Used for: Closed fracture of distal end of right femur, unspecified fracture morphology, initial encounter (H)      Dose: 17 g  Take 17 g by mouth daily  Quantity: 510 g  Refills: 1     potassium chloride ER 15 MEQ CR tablet  Commonly known as: KLOR-CON M15      Dose: 30 mEq  Take 2 tablets (30 mEq) by mouth daily  Refills: 0     Vitamin D3 25 mcg (1000 units) tablet  Commonly known as: CHOLECALCIFEROL  Used for: Closed fracture of  "distal end of right femur, unspecified fracture morphology, initial encounter (H)      Dose: 25 mcg  Take 1 tablet (25 mcg) by mouth daily  Quantity: 30 tablet  Refills: 0           Case Management:  I have reviewed the care plan and MDS and do agree with the plan. Patient's desire to return to the community is present, but is not able due to care needs . Information reviewed:  Medications, vital signs, orders, and nursing notes.    ROS:  4 point ROS including Respiratory, CV, GI and , other than that noted in the HPI,  is negative    Vitals:  /64   Pulse 61   Temp 97.2  F (36.2  C)   Resp 18   Ht 1.575 m (5' 2\")   Wt 46.4 kg (102 lb 3.2 oz)   SpO2 99%   BMI 18.69 kg/m    Body mass index is 18.69 kg/m .  Exam:  GENERAL APPEARANCE:  in no distress, appears healthy  ENT:  Mouth and posterior oropharynx normal, moist mucous membranes  RESP:  lungs clear to auscultation   CV:  no edema  ABDOMEN:  bowel sounds normal  M/S:   Gait and station abnormal ADL assist  SKIN:  Inspection of skin and subcutaneous tissue baseline  NEURO:   Examination of sensation by touch normal  PSYCH:  oriented X 3, memory impaired     Lab/Diagnostic data:   Labs done in SNF are in [facility]. Please refer to them using [employer] Everywhere.    ASSESSMENT/PLAN  (F03.90) Dementia without behavioral disturbance, unspecified dementia type (H)  (primary encounter diagnosis)  Comment: moderate limitations, appears healthy  Plan: staff to support as indicated  -no medication intervention indicated    (I48.91) Rapid atrial fibrillation (H)  Comment: today RRR, no edema  Plan: continue eliquis 5mg BID for now  -will check with facility dosing as eliquis s/b 2.5mg BID based on age and weight    (D64.9) Anemia, unspecified type  Comment: recent Hgb 12.4  Plan: discontinue Fe supplement  -follow up Hgb in 6-12 months        Electronically signed by:  NICOLÁS Justice CNP        "

## 2022-09-09 NOTE — LETTER
September 9, 2022    ANDREI MCNAMARA  [address]   [facility] [address]    Dear Andrei,    Welcome to [facility]'s [facility] Options ([facility] ([facility] SNP) plan. My name is ALICE Bell, [facility]. I am your [facility] care coordinator. You are eligible for Care Coordination through [facility].    Here is how Care Coordination works:    I will meet with you to discuss your care needs and health goals.    I will work with your facility to ensure your care needs are being met.    I will review the facility's plan of care for you and help them meet your needs.    If you are discharged from the nursing home, I will help you return to the community.    Our goal is to provide services that will keep you as healthy and independent as possible.     [facility] combines the benefits you may already receive from Medical Assistance, Medicare, and the Prescription Drug Coverage Program. Soon you will receive a new [facility] member identification (ID) card from [facility]. When you receive it, please use this card whenever you get health services.    Being in the [facility] Options ([facility] ([facility] SNP) Care Coordination program is voluntary and offered to you at no cost.  If you ever wish to stop being in the Care Coordination program or have questions, call me at [phone]. If you reach my voice mail, leave a message and your phone number. If you are hearing impaired, call the [place] Rel[employer] at 210 or [phone] (ddeiev-vg-fcmywp relay service).    Sincerely,      ALICE Bell, [facility]    E-mail: [email or web address]  Phone: [phone]      [facility] ([facility] SNP) is a health plan that contracts with both Medicare and the [place] Medical Assistance (Medicaid) program to provide benefits of both programs to enrollees. Enrollment in [facility] depends on contract renewal.    [id number] accepted  (12/2019)

## 2022-09-09 NOTE — PROGRESS NOTES
[facility] Care Coordination Contact    Member became effective with [facility] on 9/1/22 with Jonathan BERMAN.  Previous Health Plan: MA/Fee For Service  Previous Care System: NA   Nothing on MMIS for member.   UTF received: No UTF to request     WL sent.     Jennifer Cheung  Care Management Specialist   [facility]   [phone]

## 2022-09-12 NOTE — PROGRESS NOTES
[facility] GERIATRICS  [place] Medical Record Number: [id number]  Place of Service Where Encounter Took Place: MICHELLE  AT [facility] () [[id number]]  Chief Complaint   Patient presents with     Annual Comprehensive Nursing Home     FVP Care Coordination - Health Plan or Product Change     HPI:    Elysia Lane is a 89 year old (12/2/1923), who is being seen today for an annual comprehensive visit. HPI information obtained from: facility chart records, facility staff, patient report and [facility] chart review.     1. Dementia without behavioral disturbance, unspecified dementia type (H)    2. Chronic kidney disease, stage 3b (H)    3. Rapid atrial fibrillation (H)    4. Protein-calorie malnutrition, unspecified severity (H)    5. Hypertension, unspecified type      Patient seen in room today, pleasant, enjoys crossword puzzles and sitting by the window, moderate cognitive limitations, appears healthy for age, recent labs GFR 75, BMI 18.47 with thin habitus, SBP's in the 120 range, no distress.    ALLERGIES: Patient has no known allergies.  PAST MEDICAL HISTORY:   Past Medical History:   Diagnosis Date     Anemia      Chronic kidney disease      Essential hypertension      Osteoarthritis      Stenosis of carotid artery       PAST SURGICAL HISTORY:  has a past surgical history that includes Open reduction internal fixation femur distal (Right, 9/8/2021) and Irrigation and debridement lower extremity, combined (Right, 11/1/2021).    IMMUNIZATIONS:  Immunization History   Administered Date(s) Administered     COVID-19,PF,Moderna 02/18/2021, 03/18/2021, 10/29/2021, 06/13/2022     Flu 65+ Years 09/10/2020     Influenza Vaccine, 6+MO IM (QUADRIVALENT W/PRESERVATIVES) 11/01/2019     Influenza vaccine ages 6-35 months 11/01/2019     Influenza, Quad, High Dose, Pf, 65yr+ (Fluzone HD) 10/20/2021     Above immunizations pulled from [facility]. MIIC and facility records also reconciled. Outstanding information  sent to  to update [facility].  Future immunizations needed:  yearly influenza per facility protocol    Current Outpatient Medications   Medication Sig Dispense Refill     apixaban ANTICOAGULANT (ELIQUIS) 2.5 MG tablet Take 1 tablet (2.5 mg) by mouth 2 times daily 1 tablet 0     acetaminophen (TYLENOL) 500 MG tablet Take 1,000 mg by mouth 3 times daily       diclofenac (VOLTAREN) 1 % topical gel Apply 4 g topically 4 times daily as needed for moderate pain Apply to right knee       diltiazem ER (CARDIZEM SR) 90 MG 12 hr capsule Take 1 capsule (90 mg) by mouth 2 times daily 30 capsule 1     famotidine (PEPCID) 20 MG tablet Take 1 tablet (20 mg) by mouth every morning       metoprolol tartrate (LOPRESSOR) 100 MG tablet Take 1 tablet (100 mg) by mouth 2 times daily 60 tablet 1     oxyCODONE (ROXICODONE) 5 MG tablet Take 0.5 tablets (2.5 mg) by mouth three times a week On Mon, Wed, Fri 30 minutes before therapy 15 tablet 0     polyethylene glycol (MIRALAX) 17 GM/Dose powder Take 17 g by mouth daily 510 g 1     potassium chloride ER (KLOR-CON M15) 15 MEQ CR tablet Take 2 tablets (30 mEq) by mouth daily       Vitamin D3 (CHOLECALCIFEROL) 25 mcg (1000 units) tablet Take 1 tablet (25 mcg) by mouth daily 30 tablet 0     Case Management:  I have reviewed the facility/SNF care plan/MDS, including the falls risk, nutrition and pain screening. I also reviewed the current immunizations, and preventive care.. Future cancer screening is not clinically indicated secondary to age/goals of care Patient's desire to return to the community is present, but is not able due to care needs . Current Level of Care is appropriate.    Advance Directive Discussion:    I reviewed the current advanced directives as reflected in EPIC, the POLST and the facility chart, and verified the congruency of orders.  I did not due to cognitive impairment review the advance directives with the resident. Patient's goal is pain control and  "comfort.    Team Discussion:  I communicated with the appropriate disciplines involved with the Plan of Care: Nursing    Information reviewed: Medications, vital signs, orders, and nursing notes.    [facility]:  The health plan new enrollment has happened. I have reviewed the MDS, the preventative needs, and facility care plan. The level of care is appropriate. I have reviewed the code status/advanced directives.    ROS:  4 point ROS including Respiratory, CV, GI and , other than that noted in the HPI,  is negative    Vitals:  /73   Pulse 70   Temp 97.8  F (36.6  C)   Resp 16   Ht 1.575 m (5' 2\")   Wt 45.8 kg (101 lb)   SpO2 98%   BMI 18.47 kg/m   Body mass index is 18.47 kg/m .  Exam:  GENERAL APPEARANCE:  in no distress, appears healthy  ENT:  Mouth and posterior oropharynx normal, moist mucous membranes  RESP:  lungs clear to auscultation, no SOB   CV:  peripheral edema 1+ in lower legs, irregular rhythm rate  ABDOMEN:  bowel sounds normal, no distension  M/S:   Gait and station abnormal WC mobility, transfer assist  SKIN:  Inspection of skin and subcutaneous tissue baseline, Palpation of skin and subcutaneous tissue baseline  NEURO:   Cranial nerves 2-12 are normal tested and grossly at patient's baseline, Examination of sensation by touch normal  PSYCH:  oriented X 3, memory impaired      Lab/Diagnostic data:   Labs done in SNF are in [facility] EPIC. Please refer to them using [employer]/Care Everywhere.    ASSESSMENT/PLAN  (F03.90) Dementia without behavioral disturbance, unspecified dementia type (H)  (primary encounter diagnosis)  Comment: moderate limitations, pleasant  Plan: staff to support as indicated    (N18.32) Chronic kidney disease, stage 3b (H)  Comment: recent creat 0.72, GFR 75  Plan: dose medications renally as possible  -periodic BMP's    (I48.91) Rapid atrial fibrillation (H)  Comment: today IRR, denies CP/palpitations  Plan: reduce eliquis from 5 to 2.5mg BID  -continue " metoprolol  -follow up cardiology PRN    (E46) Protein-calorie malnutrition, unspecified severity (H)  Comment: BMI 18.47, limited appetite  Plan: staff to encourage intake  -dietary to follow    (I10) Hypertension, unspecified type  Comment: SBP's in the 120 range  Plan: continue metoprolol 100mg BID, cardizem  -staff to check VS as scheduled      Electronically signed by: NICOLÁS Justice CNP

## 2022-09-13 PROBLEM — E46 PROTEIN-CALORIE MALNUTRITION, UNSPECIFIED SEVERITY (H): Status: ACTIVE | Noted: 2022-01-01

## 2022-09-13 PROBLEM — I10 HYPERTENSION, UNSPECIFIED TYPE: Status: ACTIVE | Noted: 2022-01-01

## 2022-09-13 NOTE — LETTER
9/13/2022        RE: Elysia Lane  [address]  [facility] [address]        [facility] GERIATRICS  [facility] Medical Record Number: [id number]  Place of Service Where Encounter Took Place: MICHELLE REYNOSO AT [facility] () [[id number]]  Chief Complaint   Patient presents with     Annual Comprehensive Nursing Home     FVP Care Coordination - Health Plan or Product Change     HPI:    Elysia Lane is a 89 year old (12/2/1923), who is being seen today for an annual comprehensive visit. HPI information obtained from: facility chart records, facility staff, patient report and [facility] chart review.     1. Dementia without behavioral disturbance, unspecified dementia type (H)    2. Chronic kidney disease, stage 3b (H)    3. Rapid atrial fibrillation (H)    4. Protein-calorie malnutrition, unspecified severity (H)    5. Hypertension, unspecified type      Patient seen in room today, pleasant, enjoys crossword puzzles and sitting by the window, moderate cognitive limitations, appears healthy for age, recent labs GFR 75, BMI 18.47 with thin habitus, SBP's in the 120 range, no distress.    ALLERGIES: Patient has no known allergies.  PAST MEDICAL HISTORY:   Past Medical History:   Diagnosis Date     Anemia      Chronic kidney disease      Essential hypertension      Osteoarthritis      Stenosis of carotid artery       PAST SURGICAL HISTORY:  has a past surgical history that includes Open reduction internal fixation femur distal (Right, 9/8/2021) and Irrigation and debridement lower extremity, combined (Right, 11/1/2021).    IMMUNIZATIONS:  Immunization History   Administered Date(s) Administered     COVID-19,PF,Moderna 02/18/2021, 03/18/2021, 10/29/2021, 06/13/2022     Flu 65+ Years 09/10/2020     Influenza Vaccine, 6+MO IM (QUADRIVALENT W/PRESERVATIVES) 11/01/2019     Influenza vaccine ages 6-35 months 11/01/2019     Influenza, Quad, High Dose, Pf, 65yr+ (Fluzone HD) 10/20/2021     Above immunizations pulled  from [facility]. MIIC and facility records also reconciled. Outstanding information sent to  to update [facility].  Future immunizations needed:  yearly influenza per facility protocol    Current Outpatient Medications   Medication Sig Dispense Refill     apixaban ANTICOAGULANT (ELIQUIS) 2.5 MG tablet Take 1 tablet (2.5 mg) by mouth 2 times daily 1 tablet 0     acetaminophen (TYLENOL) 500 MG tablet Take 1,000 mg by mouth 3 times daily       diclofenac (VOLTAREN) 1 % topical gel Apply 4 g topically 4 times daily as needed for moderate pain Apply to right knee       diltiazem ER (CARDIZEM SR) 90 MG 12 hr capsule Take 1 capsule (90 mg) by mouth 2 times daily 30 capsule 1     famotidine (PEPCID) 20 MG tablet Take 1 tablet (20 mg) by mouth every morning       metoprolol tartrate (LOPRESSOR) 100 MG tablet Take 1 tablet (100 mg) by mouth 2 times daily 60 tablet 1     oxyCODONE (ROXICODONE) 5 MG tablet Take 0.5 tablets (2.5 mg) by mouth three times a week On Mon, Wed, Fri 30 minutes before therapy 15 tablet 0     polyethylene glycol (MIRALAX) 17 GM/Dose powder Take 17 g by mouth daily 510 g 1     potassium chloride ER (KLOR-CON M15) 15 MEQ CR tablet Take 2 tablets (30 mEq) by mouth daily       Vitamin D3 (CHOLECALCIFEROL) 25 mcg (1000 units) tablet Take 1 tablet (25 mcg) by mouth daily 30 tablet 0     Case Management:  I have reviewed the facility/SNF care plan/MDS, including the falls risk, nutrition and pain screening. I also reviewed the current immunizations, and preventive care.. Future cancer screening is not clinically indicated secondary to age/goals of care Patient's desire to return to the community is present, but is not able due to care needs . Current Level of Care is appropriate.    Advance Directive Discussion:    I reviewed the current advanced directives as reflected in EPIC, the POLST and the facility chart, and verified the congruency of orders.  I did not due to cognitive impairment  "review the advance directives with the resident. Patient's goal is pain control and comfort.    Team Discussion:  I communicated with the appropriate disciplines involved with the Plan of Care: Nursing    Information reviewed: Medications, vital signs, orders, and nursing notes.    [facility]:  The health plan new enrollment has happened. I have reviewed the MDS, the preventative needs, and facility care plan. The level of care is appropriate. I have reviewed the code status/advanced directives.    ROS:  4 point ROS including Respiratory, CV, GI and , other than that noted in the HPI,  is negative    Vitals:  /73   Pulse 70   Temp 97.8  F (36.6  C)   Resp 16   Ht 1.575 m (5' 2\")   Wt 45.8 kg (101 lb)   SpO2 98%   BMI 18.47 kg/m   Body mass index is 18.47 kg/m .  Exam:  GENERAL APPEARANCE:  in no distress, appears healthy  ENT:  Mouth and posterior oropharynx normal, moist mucous membranes  RESP:  lungs clear to auscultation, no SOB   CV:  peripheral edema 1+ in lower legs, irregular rhythm rate  ABDOMEN:  bowel sounds normal, no distension  M/S:   Gait and station abnormal WC mobility, transfer assist  SKIN:  Inspection of skin and subcutaneous tissue baseline, Palpation of skin and subcutaneous tissue baseline  NEURO:   Cranial nerves 2-12 are normal tested and grossly at patient's baseline, Examination of sensation by touch normal  PSYCH:  oriented X 3, memory impaired      Lab/Diagnostic data:   Labs done in SNF are in [facility] EPIC. Please refer to them using EPIC/Care Everywhere.    ASSESSMENT/PLAN  (F03.90) Dementia without behavioral disturbance, unspecified dementia type (H)  (primary encounter diagnosis)  Comment: moderate limitations, pleasant  Plan: staff to support as indicated    (N18.32) Chronic kidney disease, stage 3b (H)  Comment: recent creat 0.72, GFR 75  Plan: dose medications renally as possible  -periodic BMP's    (I48.91) Rapid atrial fibrillation (H)  Comment: today IRR, " denies CP/palpitations  Plan: reduce eliquis from 5 to 2.5mg BID  -continue metoprolol  -follow up cardiology PRN    (E46) Protein-calorie malnutrition, unspecified severity (H)  Comment: BMI 18.47, limited appetite  Plan: staff to encourage intake  -dietary to follow    (I10) Hypertension, unspecified type  Comment: SBP's in the 120 range  Plan: continue metoprolol 100mg BID, cardizem  -staff to check VS as scheduled      Electronically signed by: NICOLÁS Justice CNP        Sincerely,        NICOLÁS Justice CNP

## 2022-09-15 NOTE — PROGRESS NOTES
[facility] Care Coordination Contact:    E-mailed Parmjit at [facility] to complete visit with Opal on 9/22/22.    ALICE Bell, [facility]  [phone]

## 2022-09-22 NOTE — Clinical Note
Jace Lee, I completed Elysia's initial assessment last week, she was very pleasant when I saw her!  Please let me know if I can be of any assistance, thanks!  Yani Rivera, ALICE, [facility] [phone]

## 2022-09-28 NOTE — PROGRESS NOTES
"[facility] Institutional Assessment     Institutional Assessment for Health Risk Assessment with Elysia Lane completed on September 22, 2022 at [facility]    Type of residence:: Nursing home  Current living arrangement:: I live in a nursing home     Assessment completed with:: Patient, Care Team Member    Mental/Behavioral Health   Depression Screening: States she is \"fine\", no concerns by staff      Mental health DX:: No        Falls Assessment:   Fallen 2 or more times in the past year?: No   Any fall with injury in the past year?: No    ADL/IADL Dependencies:   Dependent ADLs:: Wheelchair-with assist, Bathing, Toileting  Dependent IADLs:: Cleaning, Cooking, Laundry, Shopping, Meal Preparation, Medication Management, Money Management, Transportation, Incontinence      Care Plan & Recommendations: CC met with Elysia in her room, which she does share with a roommate.  She was sitting in her wheelchair facing the sun and commented how much she liked the sun.  Shared she likes to do puzzles, and wasn't able to articulate much more than a few statements about puzzles and seasons to CC.  Per nursing staff,  Parmjit, and her friend Miroslava who is her POA, Elysia has been stable with no concerned.  Discussed options/opportunities for transitions.    See Institutional Care Plan for detailed assessment information.    Obtained a copy of the facility care plan and MDS from facility electronic records. Requested of assisted social worker to put this care coordinator on care conference attendee list.    Placed the Health Plan facility face sheet in the member's facility chart.    Follow-Up Plan: Member informed of future contact, plan to f/u with member with a 6 month assessment, attend 1 care conference annually, and will follow any hospitalizations or transitions. Care Coordinator contact information shared with member/family and facility, and encouraged to call this care coordinator with any " questions or concerns at any time.     [place] care continuum providers: Please see Snapshot and Care Management Flowsheets for Specific details of care plan.    This CC note routed to PCP.    ALICE Bell, [facility]  [phone]

## 2022-10-13 NOTE — TELEPHONE ENCOUNTER
[facility] NON-EMERGENT  ENCOUNTER    Elysia Lane is a 89 year old  (12/2/1923), phone call received today regarding: COVID      Disposition    Pt tested positive for COVID on 10/13/22 - asymptomatic at this time. Vitals: /64, T 97.4, O2 97%, R 16.    Requests    PCP notified       Electronically signed by:   Candace dAame RN

## 2022-10-14 NOTE — TELEPHONE ENCOUNTER
[facility] TELEPHONE ENCOUNTER    Chief Complaint   Patient presents with     Suspected Covid       Elysia Lane is a 89 year old  (12/2/1923),Nurse called today to report: Increased cough. Vitals stable. Covid positive yesterday 10/14/22.     ASSESSMENT/PLAN      Start robitussin PRN    Start zofran PRN    Monitor for worsening s/sx of concerns    Due to DNR/DNI status and age risks and benefits reviewed. Would not recommend treatment at this time.     Electronically signed by:   NICOLÁS Holloway CNP

## 2022-10-17 NOTE — PROGRESS NOTES
[facility] GERIATRICS  Chief Complaint   Patient presents with     [facility] Medical Record Number: [id number]  Place of Service Where Encounter Took Place: MICHELLE REYNOSO AT [facility] () [[id number]]    HPI:   Elysia Lane is 89 year old (12/2/1923), who is being seen today for a federally mandated E/M visit. Today's concerns are:    Today:   - Resident seen and examined, chart reviewed and discussed with the nursing staff.     - CNP reports Resident tested positive with covid19 and has mild sx. . Pt denies running nose, stuffy nose, sore throat, cough, wheezing, SOB, n/v/d, muscles or skeletal pain.     - Dementia / bipolar:  RN has no behavioral concern.     - A-fib: Denies CP, SOB or palpitation      - Rt femur fx: denies pain  --------------------------------  - Past Medical, social, family histories, medications, and allergies reviewed and updated  - Medications reviewed: in the chart and EHR.   - Case Management:   I have reviewed the care plan and MDS and do agree with the plan. Patient's desire to return to the community is not present.  Information reviewed:  Medications, vital signs, orders, and nursing notes.    MEDICATIONS:     Review of your medicines          Accurate as of October 18, 2022 11:59 PM. If you have any questions, ask your nurse or doctor.            CONTINUE these medicines which have NOT CHANGED      Dose / Directions   acetaminophen 500 MG tablet  Commonly known as: TYLENOL      Dose: 1,000 mg  Take 1,000 mg by mouth 3 times daily  Refills: 0     apixaban ANTICOAGULANT 2.5 MG tablet  Commonly known as: ELIQUIS  Used for: Rapid atrial fibrillation (H)      Dose: 2.5 mg  Take 1 tablet (2.5 mg) by mouth 2 times daily  Quantity: 1 tablet  Refills: 0     diclofenac 1 % topical gel  Commonly known as: VOLTAREN      Dose: 4 g  Apply 4 g topically 4 times daily as needed for moderate pain Apply to right knee  Refills: 0     diltiazem ER 90 MG 12 hr  capsule  Commonly known as: CARDIZEM SR  Used for: Closed fracture of distal end of right femur, unspecified fracture morphology, initial encounter (H)      Dose: 90 mg  Take 1 capsule (90 mg) by mouth 2 times daily  Quantity: 30 capsule  Refills: 1     famotidine 20 MG tablet  Commonly known as: PEPCID      Dose: 20 mg  Take 1 tablet (20 mg) by mouth every morning  Refills: 0     guaiFENesin 100 MG/5ML liquid  Commonly known as: ROBITUSSIN  Used for: Cough, unspecified type, Infection due to 2019 novel coronavirus      Dose: 200 mg  Take 10 mLs (200 mg) by mouth every 4 hours as needed for cough  Quantity: 473 mL  Refills: 0     metoprolol tartrate 100 MG tablet  Commonly known as: LOPRESSOR  Used for: Closed fracture of distal end of right femur, unspecified fracture morphology, initial encounter (H)      Dose: 100 mg  Take 1 tablet (100 mg) by mouth 2 times daily  Quantity: 60 tablet  Refills: 1     ondansetron 4 MG tablet  Commonly known as: ZOFRAN  Used for: Cough, unspecified type, Infection due to 2019 novel coronavirus      Dose: 4 mg  Take 1 tablet (4 mg) by mouth every 6 hours as needed for nausea  Quantity: 30 tablet  Refills: 0     oxyCODONE 5 MG tablet  Commonly known as: ROXICODONE  Used for: Pain      Dose: 2.5 mg  Take 0.5 tablets (2.5 mg) by mouth three times a week On Mon, Wed, Fri 30 minutes before therapy  Quantity: 15 tablet  Refills: 0     polyethylene glycol 17 GM/Dose powder  Commonly known as: MIRALAX  Used for: Closed fracture of distal end of right femur, unspecified fracture morphology, initial encounter (H)      Dose: 17 g  Take 17 g by mouth daily  Quantity: 510 g  Refills: 1     potassium chloride ER 15 MEQ CR tablet  Commonly known as: KLOR-CON M15      Dose: 30 mEq  Take 2 tablets (30 mEq) by mouth daily  Refills: 0     Vitamin D3 25 mcg (1000 units) tablet  Commonly known as: CHOLECALCIFEROL  Used for: Closed fracture of distal end of right femur, unspecified fracture morphology,  "initial encounter (H)      Dose: 25 mcg  Take 1 tablet (25 mcg) by mouth daily  Quantity: 30 tablet  Refills: 0           ROS: limited due to memory deficit.     Vitals:  BP 95/56   Pulse 54   Temp 99  F (37.2  C)   Resp 18   Ht 1.575 m (5' 2\")   Wt 44.3 kg (97 lb 11.2 oz)   SpO2 94%   BMI 17.87 kg/m    Body mass index is 17.87 kg/m .  Exam: performed today 10/18/22 and no change.   GENERAL APPEARANCE:  in no distress, cooperative  RESP:  lungs clear to auscultation   CV:  S1S2 audible, regular HR, no murmur appreciated.   ABDOMEN:  soft, NT/ND, BS audible. no mass appreciated on palpation.   M/S:   no joint deformity noted on observation.   SKIN:  No rash.   NEURO:   No NFD appreciated on observation. Hand  5/5 b/l  PSYCH: AAOx person, place and day only. affect and mood normal    Lab/Diagnostic data: Reviewed in the chart and EHR.        ASSESSMENT/PLAN  ----------------------------  COVID 19 viral infection: now asymptomatic. On isolation.     Severe Dementia, likely Alzheimer disease w.o behaviors (H)  - no behavioral concern.    - SLUM 2/30  - Continue to anticipate needs. Chronic condition, ongoing decline expected.   -  Continue to provide redirection and reassurance as needed. Maintain safe living situation with goals focused on comfort.      Chronic Atrial fibrillation (H):  - on Eliquis,  and Famotidine for GP protection. Continue.   - on metoprolol and diltiazem.  CVR.  continue      Protein Calorie Malnutrition, severe (H)  Sarcopenia  Hypoalbumenemia: 2.7 (11/19/21)  - Body mass index is  Body mass index is 17.87 kg/m . from 18.78 kg/m . from 19.74 kg/m . BMI less than 22 kg/m2 in frail elderly increases mortality rate.   - 10 lbs wt loss in five month (about 10%.)  - dietician eval/ recommendations  - In frail elderly keep BMI b/lw 25-35 Kg(m2).         Chronic anemia  Hx of pos-op anemia  - Hb wnl now. Off iron supplement.       Hx of right distal femur fx s/p ORIF (Sept 2021)  Frailty  - " analgesia optimal  - Significant  Deficits requiring NH placement. Requiring extensive assistance from nursing.   - life expectancy is less than six months if terminal illness runs its normal trajectory.         Electronically signed by:  Marbella Franco MD

## 2022-10-18 NOTE — LETTER
10/18/2022        RE: Elysia Lane  Elvia Buford  [address]   [facility] [address]        [facility] GERIATRICS  Chief Complaint   Patient presents with     halfway Regulatory     [place] Medical Record Number: [id number]  Place of Service Where Encounter Took Place: [facility]  AT [facility] () [[id number]]    HPI:   Elysia Lane is 89 year old (12/2/1923), who is being seen today for a federally mandated E/M visit. Today's concerns are:    Today:   - Resident seen and examined, chart reviewed and discussed with the nursing staff.     - CNP reports Resident tested positive with covid19 and has mild sx. . Pt denies running nose, stuffy nose, sore throat, cough, wheezing, SOB, n/v/d, muscles or skeletal pain.     - Dementia / bipolar:  RN has no behavioral concern.     - A-fib: Denies CP, SOB or palpitation      - Rt femur fx: denies pain  --------------------------------  - Past Medical, social, family histories, medications, and allergies reviewed and updated  - Medications reviewed: in the chart and EHR.   - Case Management:   I have reviewed the care plan and MDS and do agree with the plan. Patient's desire to return to the community is not present.  Information reviewed:  Medications, vital signs, orders, and nursing notes.    MEDICATIONS:     Review of your medicines          Accurate as of October 18, 2022 11:59 PM. If you have any questions, ask your nurse or doctor.            CONTINUE these medicines which have NOT CHANGED      Dose / Directions   acetaminophen 500 MG tablet  Commonly known as: TYLENOL      Dose: 1,000 mg  Take 1,000 mg by mouth 3 times daily  Refills: 0     apixaban ANTICOAGULANT 2.5 MG tablet  Commonly known as: ELIQUIS  Used for: Rapid atrial fibrillation (H)      Dose: 2.5 mg  Take 1 tablet (2.5 mg) by mouth 2 times daily  Quantity: 1 tablet  Refills: 0     diclofenac 1 % topical gel  Commonly known as: VOLTAREN      Dose: 4 g  Apply 4 g topically 4  times daily as needed for moderate pain Apply to right knee  Refills: 0     diltiazem ER 90 MG 12 hr capsule  Commonly known as: CARDIZEM SR  Used for: Closed fracture of distal end of right femur, unspecified fracture morphology, initial encounter (H)      Dose: 90 mg  Take 1 capsule (90 mg) by mouth 2 times daily  Quantity: 30 capsule  Refills: 1     famotidine 20 MG tablet  Commonly known as: PEPCID      Dose: 20 mg  Take 1 tablet (20 mg) by mouth every morning  Refills: 0     guaiFENesin 100 MG/5ML liquid  Commonly known as: ROBITUSSIN  Used for: Cough, unspecified type, Infection due to 2019 novel coronavirus      Dose: 200 mg  Take 10 mLs (200 mg) by mouth every 4 hours as needed for cough  Quantity: 473 mL  Refills: 0     metoprolol tartrate 100 MG tablet  Commonly known as: LOPRESSOR  Used for: Closed fracture of distal end of right femur, unspecified fracture morphology, initial encounter (H)      Dose: 100 mg  Take 1 tablet (100 mg) by mouth 2 times daily  Quantity: 60 tablet  Refills: 1     ondansetron 4 MG tablet  Commonly known as: ZOFRAN  Used for: Cough, unspecified type, Infection due to 2019 novel coronavirus      Dose: 4 mg  Take 1 tablet (4 mg) by mouth every 6 hours as needed for nausea  Quantity: 30 tablet  Refills: 0     oxyCODONE 5 MG tablet  Commonly known as: ROXICODONE  Used for: Pain      Dose: 2.5 mg  Take 0.5 tablets (2.5 mg) by mouth three times a week On Mon, Wed, Fri 30 minutes before therapy  Quantity: 15 tablet  Refills: 0     polyethylene glycol 17 GM/Dose powder  Commonly known as: MIRALAX  Used for: Closed fracture of distal end of right femur, unspecified fracture morphology, initial encounter (H)      Dose: 17 g  Take 17 g by mouth daily  Quantity: 510 g  Refills: 1     potassium chloride ER 15 MEQ CR tablet  Commonly known as: KLOR-CON M15      Dose: 30 mEq  Take 2 tablets (30 mEq) by mouth daily  Refills: 0     Vitamin D3 25 mcg (1000 units) tablet  Commonly known as:  "CHOLECALCIFEROL  Used for: Closed fracture of distal end of right femur, unspecified fracture morphology, initial encounter (H)      Dose: 25 mcg  Take 1 tablet (25 mcg) by mouth daily  Quantity: 30 tablet  Refills: 0           ROS: limited due to memory deficit.     Vitals:  BP 95/56   Pulse 54   Temp 99  F (37.2  C)   Resp 18   Ht 1.575 m (5' 2\")   Wt 44.3 kg (97 lb 11.2 oz)   SpO2 94%   BMI 17.87 kg/m    Body mass index is 17.87 kg/m .  Exam: performed today 10/18/22 and no change.   GENERAL APPEARANCE:  in no distress, cooperative  RESP:  lungs clear to auscultation   CV:  S1S2 audible, regular HR, no murmur appreciated.   ABDOMEN:  soft, NT/ND, BS audible. no mass appreciated on palpation.   M/S:   no joint deformity noted on observation.   SKIN:  No rash.   NEURO:   No NFD appreciated on observation. Hand  5/5 b/l  PSYCH: AAOx person, place and day only. affect and mood normal    Lab/Diagnostic data: Reviewed in the chart and EHR.        ASSESSMENT/PLAN  ----------------------------  COVID 19 viral infection: now asymptomatic. On isolation.     Severe Dementia, likely Alzheimer disease w.o behaviors (H)  - no behavioral concern.    - SLUM 2/30  - Continue to anticipate needs. Chronic condition, ongoing decline expected.   -  Continue to provide redirection and reassurance as needed. Maintain safe living situation with goals focused on comfort.      Chronic Atrial fibrillation (H):  - on Eliquis,  and Famotidine for GP protection. Continue.   - on metoprolol and diltiazem.  CVR.  continue      Protein Calorie Malnutrition, severe (H)  Sarcopenia  Hypoalbumenemia: 2.7 (11/19/21)  - Body mass index is  Body mass index is 17.87 kg/m . from 18.78 kg/m . from 19.74 kg/m . BMI less than 22 kg/m2 in frail elderly increases mortality rate.   - 10 lbs wt loss in five month (about 10%.)  - dietician eval/ recommendations  - In frail elderly keep BMI b/lw 25-35 Kg(m2).         Chronic anemia  Hx of pos-op " anemia  - Hb wnl now. Off iron supplement.       Hx of right distal femur fx s/p ORIF (Sept 2021)  Frailty  - analgesia optimal  - Significant  Deficits requiring NH placement. Requiring extensive assistance from nursing.   - life expectancy is less than six months if terminal illness runs its normal trajectory.         Electronically signed by:  Marbella Franco MD        Sincerely,        Marbella Franco MD

## 2022-12-13 NOTE — PROGRESS NOTES
[facility] GERIATRICS  Chief Complaint   Patient presents with     Annual Comprehensive Nursing Home     [facility] Medical Record Number:  [id number]  Place of Service where encounter took place:  [facility] AT [facility] () [[id number]]    HPI:    Elysia Lane  is a 89 year old  (12/2/1923), who is being seen today for an annual comprehensive visit. HPI information obtained from: facility chart records, facility staff, patient report and [facility] chart review.   1. Dementia without behavioral disturbance, unspecified dementia type    2. Protein-calorie malnutrition, unspecified severity (H)    3. Rapid atrial fibrillation (H)    4. Chronic kidney disease, stage 3b (H)    5. Hypertension, unspecified type      Patient seen for follow up, always sitting in front of her window looking out, pleasantly confused, moderate cognitive limitations, BMI 18.22, limited appetite, thin habitus, IRR, denies AP, labs in March GFR 75, SBP's in the 130 range, overall appears very healthy for age.     ALLERGIES: Patient has no known allergies.  PAST MEDICAL HISTORY:   Past Medical History:   Diagnosis Date     Anemia      Chronic kidney disease      Essential hypertension      Osteoarthritis      Stenosis of carotid artery       PAST SURGICAL HISTORY:  has a past surgical history that includes Open reduction internal fixation femur distal (Right, 9/8/2021) and Irrigation and debridement lower extremity, combined (Right, 11/1/2021).  IMMUNIZATIONS:  Immunization History   Administered Date(s) Administered     COVID-19 Vaccine 18+ (Moderna) 02/18/2021, 03/18/2021, 10/29/2021, 06/13/2022     Flu 65+ Years 09/10/2020     Influenza Vaccine 65+ (Fluzone HD) 10/20/2021     Influenza Vaccine Im 4yrs+ 4 Valent CCIIV4 10/07/2022     Influenza Vaccine, 6+MO IM (QUADRIVALENT W/PRESERVATIVES) 11/01/2019     Influenza vaccine ages 6-35 months 11/01/2019     Above immunizations pulled from [facility]. MIIC and facility records also  reconciled. Outstanding information sent to  to update [facility].  Future immunizations needed:  yearly influenza per facility protocol      Current Outpatient Medications:      acetaminophen (TYLENOL) 500 MG tablet, Take 1,000 mg by mouth 3 times daily, Disp: , Rfl:      apixaban ANTICOAGULANT (ELIQUIS) 2.5 MG tablet, Take 1 tablet (2.5 mg) by mouth 2 times daily, Disp: 1 tablet, Rfl: 0     diclofenac (VOLTAREN) 1 % topical gel, Apply 4 g topically 4 times daily as needed for moderate pain Apply to right knee, Disp: , Rfl:      diltiazem ER (CARDIZEM SR) 90 MG 12 hr capsule, Take 1 capsule (90 mg) by mouth 2 times daily, Disp: 30 capsule, Rfl: 1     famotidine (PEPCID) 20 MG tablet, Take 1 tablet (20 mg) by mouth every morning, Disp: , Rfl:      guaiFENesin (ROBITUSSIN) 100 MG/5ML liquid, Take 10 mLs (200 mg) by mouth every 4 hours as needed for cough, Disp: 473 mL, Rfl: 0     metoprolol tartrate (LOPRESSOR) 100 MG tablet, Take 1 tablet (100 mg) by mouth 2 times daily, Disp: 60 tablet, Rfl: 1     ondansetron (ZOFRAN) 4 MG tablet, Take 1 tablet (4 mg) by mouth every 6 hours as needed for nausea, Disp: 30 tablet, Rfl: 0     oxyCODONE (ROXICODONE) 5 MG tablet, Take 0.5 tablets (2.5 mg) by mouth three times a week On Mon, Wed, Fri 30 minutes before therapy, Disp: 15 tablet, Rfl: 0     polyethylene glycol (MIRALAX) 17 GM/Dose powder, Take 17 g by mouth daily, Disp: 510 g, Rfl: 1     potassium chloride ER (KLOR-CON M15) 15 MEQ CR tablet, Take 2 tablets (30 mEq) by mouth daily, Disp: , Rfl:      Vitamin D3 (CHOLECALCIFEROL) 25 mcg (1000 units) tablet, Take 1 tablet (25 mcg) by mouth daily, Disp: 30 tablet, Rfl: 0     MED REC REQUIRED  Post Medication Reconciliation Status: medication reconcilation previously completed during another office visit      Case Management:  I have reviewed the facility/SNF care plan/MDS, including the falls risk, nutrition and pain screening. I also reviewed the current  "immunizations, and preventive care.. Future cancer screening is not clinically indicated secondary to age/goals of care Patient's desire to return to the community is present, but is not able due to care needs . Current Level of Care is appropriate.    Advance Directive Discussion:    I reviewed the current advanced directives as reflected in EPIC, the POLST and the facility chart, and verified the congruency of orders. I did not due to cognitive impairment review the advance directives with the resident. Patient's goal is pain control and comfort.    Team Discussion:  I communicated with the appropriate disciplines involved with the Plan of Care:   Nursing    Information reviewed:  Medications, vital signs, orders, and nursing notes.    ROS:  4 point ROS including Respiratory, CV, GI and , other than that noted in the HPI,  is negative    Vitals:  /89   Pulse 92   Temp 97.8  F (36.6  C)   Resp 18   Ht 1.575 m (5' 2\")   Wt 45.2 kg (99 lb 9.6 oz)   SpO2 99%   BMI 18.22 kg/m   Body mass index is 18.22 kg/m .  Exam:  GENERAL APPEARANCE:  in no distress, appears healthy  ENT:  Mouth and posterior oropharynx normal, moist mucous membranes  RESP:  lungs clear to auscultation , no respiratory distress  CV:  regular rate and rhythm, no murmur, rub, or gallop, no edema  ABDOMEN:  no guarding or rebound, bowel sounds normal  M/S:   Gait and station abnormal transfer assist, WC mobility  SKIN:  Inspection of skin and subcutaneous tissue baseline, Palpation of skin and subcutaneous tissue baseline  NEURO:   Cranial nerves 2-12 are normal tested and grossly at patient's baseline, Examination of sensation by touch normal  PSYCH:  memory impaired , affect and mood normal     Lab/Diagnostic data:   Labs done in SNF are in [place] EPIC. Please refer to them using [facility]/Care Everywhere.    ASSESSMENT/PLAN  (F03.90) Dementia without behavioral disturbance, unspecified dementia type  (primary encounter " diagnosis)  Comment: moderate cognitive limitations  Plan: staff to support as indicated  -no medication intervention at this juncture    (E46) Protein-calorie malnutrition, unspecified severity (H)  Comment: BMI 18.22  Plan: staff to encourage intake  -dietary to follow weights, supplements PRN    (I48.91) Rapid atrial fibrillation (H)  Comment: IRR, no CP  Plan: continue eliquis reduced to 2.5mg   -follow up cardiology PRN    (N18.32) Chronic kidney disease, stage 3b (H)  Comment: recent GFR 75   Plan: dose medications renally as possible  -periodic BMP's    (I10) Hypertension, unspecified type  Comment: SBP's in the 130 range  Plan: continue dilt, metoprolol, K  -staff to check VS as scheduled        Electronically signed by:  NICOLÁS Justice CNP

## 2022-12-13 NOTE — LETTER
12/13/2022        RE: Elysia Lane  [facility]  [address]   [place] [address]        [facility] GERIATRICS  Chief Complaint   Patient presents with     Annual Comprehensive Nursing Home     [place] Medical Record Number:  [id number]  Place of Service where encounter took place:  [facility]  AT [facility] () [[id number]]    HPI:    Elysia Lane  is a 89 year old  (12/2/1923), who is being seen today for an annual comprehensive visit. HPI information obtained from: facility chart records, facility staff, patient report and [facility] chart review.   1. Dementia without behavioral disturbance, unspecified dementia type    2. Protein-calorie malnutrition, unspecified severity (H)    3. Rapid atrial fibrillation (H)    4. Chronic kidney disease, stage 3b (H)    5. Hypertension, unspecified type      Patient seen for follow up, always sitting in front of her window looking out, pleasantly confused, moderate cognitive limitations, BMI 18.22, limited appetite, thin habitus, IRR, denies AP, labs in March GFR 75, SBP's in the 130 range, overall appears very healthy for age.     ALLERGIES: Patient has no known allergies.  PAST MEDICAL HISTORY:   Past Medical History:   Diagnosis Date     Anemia      Chronic kidney disease      Essential hypertension      Osteoarthritis      Stenosis of carotid artery       PAST SURGICAL HISTORY:  has a past surgical history that includes Open reduction internal fixation femur distal (Right, 9/8/2021) and Irrigation and debridement lower extremity, combined (Right, 11/1/2021).  IMMUNIZATIONS:  Immunization History   Administered Date(s) Administered     COVID-19 Vaccine 18+ (Moderna) 02/18/2021, 03/18/2021, 10/29/2021, 06/13/2022     Flu 65+ Years 09/10/2020     Influenza Vaccine 65+ (Fluzone HD) 10/20/2021     Influenza Vaccine Im 4yrs+ 4 Valent CCIIV4 10/07/2022     Influenza Vaccine, 6+MO IM (QUADRIVALENT W/PRESERVATIVES) 11/01/2019     Influenza  vaccine ages 6-35 months 11/01/2019     Above immunizations pulled from [facility]. MIIC and facility records also reconciled. Outstanding information sent to  to update [facility].  Future immunizations needed:  yearly influenza per facility protocol      Current Outpatient Medications:      acetaminophen (TYLENOL) 500 MG tablet, Take 1,000 mg by mouth 3 times daily, Disp: , Rfl:      apixaban ANTICOAGULANT (ELIQUIS) 2.5 MG tablet, Take 1 tablet (2.5 mg) by mouth 2 times daily, Disp: 1 tablet, Rfl: 0     diclofenac (VOLTAREN) 1 % topical gel, Apply 4 g topically 4 times daily as needed for moderate pain Apply to right knee, Disp: , Rfl:      diltiazem ER (CARDIZEM SR) 90 MG 12 hr capsule, Take 1 capsule (90 mg) by mouth 2 times daily, Disp: 30 capsule, Rfl: 1     famotidine (PEPCID) 20 MG tablet, Take 1 tablet (20 mg) by mouth every morning, Disp: , Rfl:      guaiFENesin (ROBITUSSIN) 100 MG/5ML liquid, Take 10 mLs (200 mg) by mouth every 4 hours as needed for cough, Disp: 473 mL, Rfl: 0     metoprolol tartrate (LOPRESSOR) 100 MG tablet, Take 1 tablet (100 mg) by mouth 2 times daily, Disp: 60 tablet, Rfl: 1     ondansetron (ZOFRAN) 4 MG tablet, Take 1 tablet (4 mg) by mouth every 6 hours as needed for nausea, Disp: 30 tablet, Rfl: 0     oxyCODONE (ROXICODONE) 5 MG tablet, Take 0.5 tablets (2.5 mg) by mouth three times a week On Mon, Wed, Fri 30 minutes before therapy, Disp: 15 tablet, Rfl: 0     polyethylene glycol (MIRALAX) 17 GM/Dose powder, Take 17 g by mouth daily, Disp: 510 g, Rfl: 1     potassium chloride ER (KLOR-CON M15) 15 MEQ CR tablet, Take 2 tablets (30 mEq) by mouth daily, Disp: , Rfl:      Vitamin D3 (CHOLECALCIFEROL) 25 mcg (1000 units) tablet, Take 1 tablet (25 mcg) by mouth daily, Disp: 30 tablet, Rfl: 0     MED REC REQUIRED  Post Medication Reconciliation Status: medication reconcilation previously completed during another office visit      Case Management:  I have reviewed  "the facility/SNF care plan/MDS, including the falls risk, nutrition and pain screening. I also reviewed the current immunizations, and preventive care.. Future cancer screening is not clinically indicated secondary to age/goals of care Patient's desire to return to the community is present, but is not able due to care needs . Current Level of Care is appropriate.    Advance Directive Discussion:    I reviewed the current advanced directives as reflected in EPIC, the POLST and the facility chart, and verified the congruency of orders. I did not due to cognitive impairment review the advance directives with the resident. Patient's goal is pain control and comfort.    Team Discussion:  I communicated with the appropriate disciplines involved with the Plan of Care:   Nursing    Information reviewed:  Medications, vital signs, orders, and nursing notes.    ROS:  4 point ROS including Respiratory, CV, GI and , other than that noted in the HPI,  is negative    Vitals:  /89   Pulse 92   Temp 97.8  F (36.6  C)   Resp 18   Ht 1.575 m (5' 2\")   Wt 45.2 kg (99 lb 9.6 oz)   SpO2 99%   BMI 18.22 kg/m   Body mass index is 18.22 kg/m .  Exam:  GENERAL APPEARANCE:  in no distress, appears healthy  ENT:  Mouth and posterior oropharynx normal, moist mucous membranes  RESP:  lungs clear to auscultation , no respiratory distress  CV:  regular rate and rhythm, no murmur, rub, or gallop, no edema  ABDOMEN:  no guarding or rebound, bowel sounds normal  M/S:   Gait and station abnormal transfer assist, WC mobility  SKIN:  Inspection of skin and subcutaneous tissue baseline, Palpation of skin and subcutaneous tissue baseline  NEURO:   Cranial nerves 2-12 are normal tested and grossly at patient's baseline, Examination of sensation by touch normal  PSYCH:  memory impaired , affect and mood normal     Lab/Diagnostic data:   Labs done in SNF are in [facility] EPIC. Please refer to them using EPIC/Care " Everywhere.    ASSESSMENT/PLAN  (F03.90) Dementia without behavioral disturbance, unspecified dementia type  (primary encounter diagnosis)  Comment: moderate cognitive limitations  Plan: staff to support as indicated  -no medication intervention at this juncture    (E46) Protein-calorie malnutrition, unspecified severity (H)  Comment: BMI 18.22  Plan: staff to encourage intake  -dietary to follow weights, supplements PRN    (I48.91) Rapid atrial fibrillation (H)  Comment: IRR, no CP  Plan: continue eliquis reduced to 2.5mg   -follow up cardiology PRN    (N18.32) Chronic kidney disease, stage 3b (H)  Comment: recent GFR 75   Plan: dose medications renally as possible  -periodic BMP's    (I10) Hypertension, unspecified type  Comment: SBP's in the 130 range  Plan: continue dilt, metoprolol, K  -staff to check VS as scheduled        Electronically signed by:  NICOLÁS Justice CNP           Sincerely,        NICOLÁS Justice CNP

## 2022-12-30 NOTE — PROGRESS NOTES
"[facility] GERIATRICS    Chief Complaint   Patient presents with     RECHECK     HPI:  Elysia Lane is a 89 year old  (12/2/1923), who is being seen today for an episodic care visit at: [facility] AT [facility] () [[id number]]. Today's concern is:   1. Dementia without behavioral disturbance, unspecified dementia type    2. Abscess of right leg    3. Pain of right lower extremity      Patient seen on request, new R leg pain with open area, today moderate cognitive limitations, pleasant, no known etiology for R leg issues, R lateral knee has 3x2 raised edematous area with scant serosanguinous drainage, high level of pain with palpation and unable to press hard, area boggy, overlying skin a few small open areas for drainage, no odor, probable cellulitic.     Allergies, and PMH/PSH reviewed in [facility] today.  REVIEW OF SYSTEMS:  4 point ROS including Respiratory, CV, GI and , other than that noted in the HPI,  is negative    Objective:   /69   Pulse 86   Temp (!) 96.5  F (35.8  C)   Resp 20   Ht 1.575 m (5' 2\")   Wt 45.2 kg (99 lb 9.6 oz)   SpO2 98%   BMI 18.22 kg/m    GENERAL APPEARANCE:  in no distress, appears healthy  ENT:  Mouth and posterior oropharynx normal, moist mucous membranes  RESP:  lungs clear to auscultation   CV:  no edema  ABDOMEN:  bowel sounds normal  M/S:   Gait and station abnormal transfer assist  SKIN:  Inspection of skin and subcutaneous tissueas in HPI  NEURO:   Examination of sensation by touch normal  PSYCH:  affect and mood normal    Labs done in SNF are in [facility]. Please refer to them using [facility]/Care Everywhere.    Assessment/Plan:  (F03.90) Dementia without behavioral disturbance, unspecified dementia type  (primary encounter diagnosis)  (L02.415) Abscess of right leg  (M79.604) Pain of right lower extremity  Comment: moderate cognitive limitations, no recall of abscess etiology nor progression, pain with manipulation  Plan:   -start cephalexin TID x7 days  -daily " wound cares; cleanse, adaptic, gauze, kerlix wrap  -wound care team to follow up  -continue APAP for pain  -plan to follow up next week; consider draining if possible      MED REC REQUIRED  Post Medication Reconciliation Status: medication reconcilation previously completed during another office visit        Electronically signed by: NICOLÁS Justice CNP

## 2022-12-30 NOTE — LETTER
"    12/30/2022        RE: Elysia Lane  [facility]  [address]   [place] [address]        M [facility] GERIATRICS    Chief Complaint   Patient presents with     RECHECK     HPI:  Elysia Lane is a 89 year old  (12/2/1923), who is being seen today for an episodic care visit at: [facility] AT [facility] () [[id number]]. Today's concern is:   1. Dementia without behavioral disturbance, unspecified dementia type    2. Abscess of right leg    3. Pain of right lower extremity      Patient seen on request, new R leg pain with open area, today moderate cognitive limitations, pleasant, no known etiology for R leg issues, R lateral knee has 3x2 raised edematous area with scant serosanguinous drainage, high level of pain with palpation and unable to press hard, area boggy, overlying skin a few small open areas for drainage, no odor, probable cellulitic.     Allergies, and PMH/PSH reviewed in EPIC today.  REVIEW OF SYSTEMS:  4 point ROS including Respiratory, CV, GI and , other than that noted in the HPI,  is negative    Objective:   /69   Pulse 86   Temp (!) 96.5  F (35.8  C)   Resp 20   Ht 1.575 m (5' 2\")   Wt 45.2 kg (99 lb 9.6 oz)   SpO2 98%   BMI 18.22 kg/m    GENERAL APPEARANCE:  in no distress, appears healthy  ENT:  Mouth and posterior oropharynx normal, moist mucous membranes  RESP:  lungs clear to auscultation   CV:  no edema  ABDOMEN:  bowel sounds normal  M/S:   Gait and station abnormal transfer assist  SKIN:  Inspection of skin and subcutaneous tissueas in HPI  NEURO:   Examination of sensation by touch normal  PSYCH:  affect and mood normal    Labs done in SNF are in [facility]. Please refer to them using [facility]/Care Everywhere.    Assessment/Plan:  (F03.90) Dementia without behavioral disturbance, unspecified dementia type  (primary encounter diagnosis)  (L02.415) Abscess of right leg  (M79.604) Pain of right lower extremity  Comment: moderate cognitive " limitations, no recall of abscess etiology nor progression, pain with manipulation  Plan:   -start cephalexin TID x7 days  -daily wound cares; cleanse, adaptic, gauze, kerlix wrap  -wound care team to follow up  -continue APAP for pain  -plan to follow up next week; consider draining if possible      MED REC REQUIRED  Post Medication Reconciliation Status: medication reconcilation previously completed during another office visit        Electronically signed by: NICOLÁS Justice CNP           Sincerely,        NICOLÁS Justice CNP

## 2023-01-01 ENCOUNTER — NURSING HOME VISIT (OUTPATIENT)
Dept: GERIATRICS | Facility: CLINIC | Age: 88
End: 2023-01-01
Payer: COMMERCIAL

## 2023-01-01 ENCOUNTER — TELEPHONE (OUTPATIENT)
Dept: GERIATRICS | Facility: CLINIC | Age: 88
End: 2023-01-01
Payer: COMMERCIAL

## 2023-01-01 ENCOUNTER — LAB REQUISITION (OUTPATIENT)
Dept: LAB | Facility: CLINIC | Age: 88
End: 2023-01-01
Payer: COMMERCIAL

## 2023-01-01 ENCOUNTER — PATIENT OUTREACH (OUTPATIENT)
Dept: GERIATRIC MEDICINE | Facility: CLINIC | Age: 88
End: 2023-01-01
Payer: COMMERCIAL

## 2023-01-01 ENCOUNTER — CLINICAL UPDATE (OUTPATIENT)
Dept: PHARMACY | Facility: CLINIC | Age: 88
End: 2023-01-01
Payer: COMMERCIAL

## 2023-01-01 VITALS
WEIGHT: 95.6 LBS | DIASTOLIC BLOOD PRESSURE: 75 MMHG | OXYGEN SATURATION: 100 % | HEART RATE: 89 BPM | RESPIRATION RATE: 16 BRPM | SYSTOLIC BLOOD PRESSURE: 127 MMHG | BODY MASS INDEX: 17.59 KG/M2 | HEIGHT: 62 IN | TEMPERATURE: 96.8 F

## 2023-01-01 VITALS
DIASTOLIC BLOOD PRESSURE: 86 MMHG | SYSTOLIC BLOOD PRESSURE: 110 MMHG | TEMPERATURE: 97.8 F | WEIGHT: 99.6 LBS | RESPIRATION RATE: 18 BRPM | OXYGEN SATURATION: 97 % | BODY MASS INDEX: 18.33 KG/M2 | HEIGHT: 62 IN | HEART RATE: 76 BPM

## 2023-01-01 VITALS
TEMPERATURE: 97.2 F | HEART RATE: 59 BPM | DIASTOLIC BLOOD PRESSURE: 72 MMHG | RESPIRATION RATE: 17 BRPM | HEIGHT: 62 IN | BODY MASS INDEX: 18.33 KG/M2 | WEIGHT: 99.6 LBS | SYSTOLIC BLOOD PRESSURE: 117 MMHG | OXYGEN SATURATION: 98 %

## 2023-01-01 VITALS
HEART RATE: 69 BPM | SYSTOLIC BLOOD PRESSURE: 90 MMHG | BODY MASS INDEX: 17.59 KG/M2 | DIASTOLIC BLOOD PRESSURE: 53 MMHG | HEIGHT: 62 IN | OXYGEN SATURATION: 95 % | TEMPERATURE: 97.3 F | RESPIRATION RATE: 17 BRPM | WEIGHT: 95.6 LBS

## 2023-01-01 VITALS
BODY MASS INDEX: 17.52 KG/M2 | WEIGHT: 95.2 LBS | SYSTOLIC BLOOD PRESSURE: 109 MMHG | RESPIRATION RATE: 18 BRPM | OXYGEN SATURATION: 100 % | HEART RATE: 65 BPM | DIASTOLIC BLOOD PRESSURE: 64 MMHG | HEIGHT: 62 IN | TEMPERATURE: 98.3 F

## 2023-01-01 VITALS
RESPIRATION RATE: 16 BRPM | TEMPERATURE: 98 F | DIASTOLIC BLOOD PRESSURE: 79 MMHG | HEIGHT: 62 IN | OXYGEN SATURATION: 97 % | WEIGHT: 97 LBS | HEART RATE: 73 BPM | SYSTOLIC BLOOD PRESSURE: 121 MMHG | BODY MASS INDEX: 17.85 KG/M2

## 2023-01-01 VITALS
SYSTOLIC BLOOD PRESSURE: 127 MMHG | HEART RATE: 70 BPM | TEMPERATURE: 97.2 F | BODY MASS INDEX: 17.43 KG/M2 | HEIGHT: 62 IN | OXYGEN SATURATION: 100 % | DIASTOLIC BLOOD PRESSURE: 72 MMHG | RESPIRATION RATE: 16 BRPM | WEIGHT: 94.7 LBS

## 2023-01-01 DIAGNOSIS — F02.80 ALZHEIMER DISEASE (H): Primary | ICD-10-CM

## 2023-01-01 DIAGNOSIS — F03.90 DEMENTIA WITHOUT BEHAVIORAL DISTURBANCE (H): ICD-10-CM

## 2023-01-01 DIAGNOSIS — D64.9 ANEMIA, UNSPECIFIED: ICD-10-CM

## 2023-01-01 DIAGNOSIS — L02.415 ABSCESS OF RIGHT LEG: ICD-10-CM

## 2023-01-01 DIAGNOSIS — I48.20 CHRONIC ATRIAL FIBRILLATION (H): ICD-10-CM

## 2023-01-01 DIAGNOSIS — I10 HYPERTENSION, UNSPECIFIED TYPE: ICD-10-CM

## 2023-01-01 DIAGNOSIS — R54 FRAIL ELDERLY: ICD-10-CM

## 2023-01-01 DIAGNOSIS — E46 PROTEIN-CALORIE MALNUTRITION, UNSPECIFIED SEVERITY (H): Primary | ICD-10-CM

## 2023-01-01 DIAGNOSIS — I48.91 RAPID ATRIAL FIBRILLATION (H): Primary | ICD-10-CM

## 2023-01-01 DIAGNOSIS — I50.22 CHRONIC SYSTOLIC CONGESTIVE HEART FAILURE (H): Primary | ICD-10-CM

## 2023-01-01 DIAGNOSIS — G30.9 ALZHEIMER DISEASE (H): Primary | ICD-10-CM

## 2023-01-01 DIAGNOSIS — J15.9 BACTERIAL PNEUMONIA: ICD-10-CM

## 2023-01-01 DIAGNOSIS — S72.401A CLOSED FRACTURE OF DISTAL END OF RIGHT FEMUR, UNSPECIFIED FRACTURE MORPHOLOGY, INITIAL ENCOUNTER (H): Chronic | ICD-10-CM

## 2023-01-01 DIAGNOSIS — E88.09 HYPOALBUMINEMIA: ICD-10-CM

## 2023-01-01 DIAGNOSIS — E46 PROTEIN-CALORIE MALNUTRITION, UNSPECIFIED SEVERITY (H): ICD-10-CM

## 2023-01-01 DIAGNOSIS — I48.91 RAPID ATRIAL FIBRILLATION (H): Chronic | ICD-10-CM

## 2023-01-01 DIAGNOSIS — N18.32 CHRONIC KIDNEY DISEASE, STAGE 3B (H): ICD-10-CM

## 2023-01-01 DIAGNOSIS — F03.90 DEMENTIA WITHOUT BEHAVIORAL DISTURBANCE (H): Primary | ICD-10-CM

## 2023-01-01 DIAGNOSIS — E43 SEVERE PROTEIN-CALORIE MALNUTRITION (H): ICD-10-CM

## 2023-01-01 DIAGNOSIS — M62.84 SARCOPENIA: ICD-10-CM

## 2023-01-01 LAB
ANION GAP SERPL CALCULATED.3IONS-SCNC: 15 MMOL/L (ref 7–15)
BUN SERPL-MCNC: 23.2 MG/DL (ref 8–23)
CALCIUM SERPL-MCNC: 9.4 MG/DL (ref 8.2–9.6)
CHLORIDE SERPL-SCNC: 106 MMOL/L (ref 98–107)
CREAT SERPL-MCNC: 1.27 MG/DL (ref 0.51–0.95)
DEPRECATED HCO3 PLAS-SCNC: 17 MMOL/L (ref 22–29)
ERYTHROCYTE [DISTWIDTH] IN BLOOD BY AUTOMATED COUNT: 13.3 % (ref 10–15)
GFR SERPL CREATININE-BSD FRML MDRD: 38 ML/MIN/1.73M2
GLUCOSE SERPL-MCNC: 65 MG/DL (ref 70–99)
HCT VFR BLD AUTO: 39 % (ref 35–47)
HGB BLD-MCNC: 12.6 G/DL (ref 11.7–15.7)
MCH RBC QN AUTO: 35.5 PG (ref 26.5–33)
MCHC RBC AUTO-ENTMCNC: 32.3 G/DL (ref 31.5–36.5)
MCV RBC AUTO: 110 FL (ref 78–100)
PLATELET # BLD AUTO: 130 10E3/UL (ref 150–450)
POTASSIUM SERPL-SCNC: 4.8 MMOL/L (ref 3.4–5.3)
RBC # BLD AUTO: 3.55 10E6/UL (ref 3.8–5.2)
SODIUM SERPL-SCNC: 138 MMOL/L (ref 136–145)
WBC # BLD AUTO: 5 10E3/UL (ref 4–11)

## 2023-01-01 PROCEDURE — 99309 SBSQ NF CARE MODERATE MDM 30: CPT | Performed by: NURSE PRACTITIONER

## 2023-01-01 PROCEDURE — 85027 COMPLETE CBC AUTOMATED: CPT | Mod: ORL | Performed by: NURSE PRACTITIONER

## 2023-01-01 PROCEDURE — 99309 SBSQ NF CARE MODERATE MDM 30: CPT | Performed by: FAMILY MEDICINE

## 2023-01-01 PROCEDURE — 99207 PR NO CHARGE LOS: CPT | Performed by: PHARMACIST

## 2023-01-01 PROCEDURE — 80048 BASIC METABOLIC PNL TOTAL CA: CPT | Mod: ORL | Performed by: NURSE PRACTITIONER

## 2023-01-01 RX ORDER — DILTIAZEM HCL 60 MG
60 TABLET ORAL 2 TIMES DAILY
Qty: 60 TABLET | Refills: 11
Start: 2023-01-01

## 2023-01-03 NOTE — LETTER
"    1/3/2023        RE: Elysia Lane  [facility]  [address]   [place] [address]        M [facility] GERIATRICS    Chief Complaint   Patient presents with     RECHECK     HPI:  Elysia Lane is a 89 year old  (12/2/1923), who is being seen today for an episodic care visit at: [facility] AT [facility] () [[id number]]. Today's concern is:   1. Protein-calorie malnutrition, unspecified severity (H)    2. Dementia without behavioral disturbance, unspecified dementia type    3. Abscess of right leg      Patient seen for follow up from last week, BMI 18.22, moderate cognitive limitations, pleasantly confused, new abscess/boil on R lateral knee was raised and approx 3x2cm, today has been reduced and now flat open epithelial layer with sanguinous drainage, pain with removing dressing as was adherent and pulled on skin, no s/s infection, thought is should resolve without issues.    Allergies, and PMH/PSH reviewed in [facility] today.  REVIEW OF SYSTEMS:  4 point ROS including Respiratory, CV, GI and , other than that noted in the HPI,  is negative    Objective:   /86   Pulse 76   Temp 97.8  F (36.6  C)   Resp 18   Ht 1.575 m (5' 2\")   Wt 45.2 kg (99 lb 9.6 oz)   SpO2 97%   BMI 18.22 kg/m    GENERAL APPEARANCE:  in no distress, appears healthy  ENT:  Mouth and posterior oropharynx normal, moist mucous membranes  RESP:  lungs clear to auscultation   CV:  no edema  ABDOMEN:  bowel sounds normal  M/S:   Gait and station abnormal transfer assist  SKIN:  Inspection of skin and subcutaneous tissueas in HPI  NEURO:   Examination of sensation by touch normal  PSYCH:  affect and mood normal    Labs done in SNF are in [facility]. Please refer to them using [facility]/Care Everywhere.    Assessment/Plan:  (E46) Protein-calorie malnutrition, unspecified severity (H)  (primary encounter diagnosis)  Comment: BMI 18.22  Plan: dietary to follow   -if BMI drops <18 plan for additional " supplementation    (F03.90) Dementia without behavioral disturbance, unspecified dementia type  Comment: moderate cognitive limitations, pleasant  Plan: no medication intervention indicated  -staff to support PRN    (L02.415) Abscess of right leg  Comment: newer onset, potential cellulytic  Plan: cephalexin 500mg TID x7 days through 1/7  -change dressing to daily cleanse, adaptic, gauze then kerlix wrap until healed    MED REC REQUIRED  Post Medication Reconciliation Status: medication reconcilation previously completed during another office visit          Electronically signed by: NICOLÁS Justice CNP           Sincerely,        NICOLÁS Justice CNP

## 2023-01-03 NOTE — PROGRESS NOTES
"[facility] GERIATRICS    Chief Complaint   Patient presents with     RECHECK     HPI:  Elysia Lane is a 89 year old  (12/2/1923), who is being seen today for an episodic care visit at: [facility] AT [facility] () [[id number]]. Today's concern is:   1. Protein-calorie malnutrition, unspecified severity (H)    2. Dementia without behavioral disturbance, unspecified dementia type    3. Abscess of right leg      Patient seen for follow up from last week, BMI 18.22, moderate cognitive limitations, pleasantly confused, new abscess/boil on R lateral knee was raised and approx 3x2cm, today has been reduced and now flat open epithelial layer with sanguinous drainage, pain with removing dressing as was adherent and pulled on skin, no s/s infection, thought is should resolve without issues.    Allergies, and PMH/PSH reviewed in EPIC today.  REVIEW OF SYSTEMS:  4 point ROS including Respiratory, CV, GI and , other than that noted in the HPI,  is negative    Objective:   /86   Pulse 76   Temp 97.8  F (36.6  C)   Resp 18   Ht 1.575 m (5' 2\")   Wt 45.2 kg (99 lb 9.6 oz)   SpO2 97%   BMI 18.22 kg/m    GENERAL APPEARANCE:  in no distress, appears healthy  ENT:  Mouth and posterior oropharynx normal, moist mucous membranes  RESP:  lungs clear to auscultation   CV:  no edema  ABDOMEN:  bowel sounds normal  M/S:   Gait and station abnormal transfer assist  SKIN:  Inspection of skin and subcutaneous tissueas in HPI  NEURO:   Examination of sensation by touch normal  PSYCH:  affect and mood normal    Labs done in SNF are in [facility]. Please refer to them using [facility]/Care Everywhere.    Assessment/Plan:  (E46) Protein-calorie malnutrition, unspecified severity (H)  (primary encounter diagnosis)  Comment: BMI 18.22  Plan: dietary to follow   -if BMI drops <18 plan for additional supplementation    (F03.90) Dementia without behavioral disturbance, unspecified dementia type  Comment: moderate cognitive limitations, " pleasant  Plan: no medication intervention indicated  -staff to support PRN    (L02.415) Abscess of right leg  Comment: newer onset, potential cellulytic  Plan: cephalexin 500mg TID x7 days through 1/7  -change dressing to daily cleanse, adaptic, gauze then kerlix wrap until healed    MED REC REQUIRED  Post Medication Reconciliation Status: medication reconcilation previously completed during another office visit          Electronically signed by: NICOLÁS Justice CNP

## 2023-01-09 NOTE — PROGRESS NOTES
This patient's medication list and chart were reviewed as part of the service provided by [facility].    Assessment/Recommendations:    Noted cardiology increased metoprolol dose to 100mg twice daily and added diltiazem ER on 9/9/21 for improved rate control.  If blood pressures continue on low end, and if heart rate allows, may consider reduction in diltiazem ER to 60mg twice daily from current dose of 90mg twice daily and monitor.  Continue to monitor closely for signs/symptoms of bleeding, periodic CBC; diltiazem may increase Eliquis concentrations.    If feasible, consider d'c oxycodone 30min prior to therapy and monitor.  May no longer need to pre-medicate.    Akosua Valle, Pharm.D.,[facility]  Board Certified Geriatric Pharmacist  Medication Therapy Management Pharmacist  [phone]

## 2023-01-10 PROBLEM — L02.415 ABSCESS OF RIGHT LEG: Status: ACTIVE | Noted: 2023-01-01

## 2023-01-10 NOTE — LETTER
"    1/10/2023        RE: Elysia Lane  [facility]  [address]   [place] [facility] [address]        M [facility] GERIATRICS    Chief Complaint   Patient presents with     RECHECK     HPI:  Elysia Lane is a 89 year old  (12/2/1923), who is being seen today for an episodic care visit at: [facility] AT [facility] () [[id number]]. Today's concern is:   1. Rapid atrial fibrillation (H)    2. Chronic kidney disease, stage 3b (H)    3. Abscess of right leg      Patient seen for follow up, RRR, denies CP/palpitations, pleasantly confused, recent labs with creat 0.72, GFR 75, appears healthy, newer onset R lateral knee abscess, previously raised, warm and painful, now has drained, open epithelial layer approx 2x2cm, removed skin flap today and underlying tissue granular with no s/s infection, palpation wound bed firm, painful to touch.     Allergies, and PMH/PSH reviewed in EPIC today.  REVIEW OF SYSTEMS:  4 point ROS including Respiratory, CV, GI and , other than that noted in the HPI,  is negative    Objective:   /72   Pulse 59   Temp 97.2  F (36.2  C)   Resp 17   Ht 1.575 m (5' 2\")   Wt 45.2 kg (99 lb 9.6 oz)   SpO2 98%   BMI 18.22 kg/m    GENERAL APPEARANCE:  in no distress, appears healthy  ENT:  Mouth and posterior oropharynx normal, moist mucous membranes  RESP:  lungs clear to auscultation   CV:  no edema  ABDOMEN:  bowel sounds normal  M/S:   Gait and station abnormal WC mobiltiy  SKIN:  Inspection of skin and subcutaneous tissueas in HPI  NEURO:   Examination of sensation by touch normal  PSYCH:  affect and mood normal    Labs done in SNF are in [facility]. Please refer to them using [facility]/Care Everywhere.    Assessment/Plan:  (I48.91) Rapid atrial fibrillation (H)  (primary encounter diagnosis)  Comment: today RRR, VS WNL  Plan: continue eliquis 2.5mg BID, metoprolol  -follow up cardiology PRN  -if having chest issues consider EKG in-house    (N18.32) Chronic kidney " disease, stage 3b (H)  Comment: labs in March 2022 creat 0.72, GFR 75  Plan: dose medications renally as possible  -recheck BMP in March 2023    (L02.415) Abscess of right leg  Comment: newer onset, no s/s infection  Plan: competed course of keflex x7 days  -change dressing to daily cleanse, adaptic, gauze and kerlix  -wound nurse to follow up status this week    MED REC REQUIRED  Post Medication Reconciliation Status: medication reconcilation previously completed during another office visit        Electronically signed by: NICOLÁS Justice CNP           Sincerely,        NICOLÁS Justice CNP

## 2023-01-10 NOTE — PROGRESS NOTES
"[facility] GERIATRICS    Chief Complaint   Patient presents with     RECHECK     HPI:  Elysia Lane is a 89 year old  (12/2/1923), who is being seen today for an episodic care visit at: [facility] AT [facility] () [[id number]]. Today's concern is:   1. Rapid atrial fibrillation (H)    2. Chronic kidney disease, stage 3b (H)    3. Abscess of right leg      Patient seen for follow up, RRR, denies CP/palpitations, pleasantly confused, recent labs with creat 0.72, GFR 75, appears healthy, newer onset R lateral knee abscess, previously raised, warm and painful, now has drained, open epithelial layer approx 2x2cm, removed skin flap today and underlying tissue granular with no s/s infection, palpation wound bed firm, painful to touch.     Allergies, and PMH/PSH reviewed in EPIC today.  REVIEW OF SYSTEMS:  4 point ROS including Respiratory, CV, GI and , other than that noted in the HPI,  is negative    Objective:   /72   Pulse 59   Temp 97.2  F (36.2  C)   Resp 17   Ht 1.575 m (5' 2\")   Wt 45.2 kg (99 lb 9.6 oz)   SpO2 98%   BMI 18.22 kg/m    GENERAL APPEARANCE:  in no distress, appears healthy  ENT:  Mouth and posterior oropharynx normal, moist mucous membranes  RESP:  lungs clear to auscultation   CV:  no edema  ABDOMEN:  bowel sounds normal  M/S:   Gait and station abnormal WC mobiltiy  SKIN:  Inspection of skin and subcutaneous tissueas in HPI  NEURO:   Examination of sensation by touch normal  PSYCH:  affect and mood normal    Labs done in SNF are in [facility]. Please refer to them using [email or web address]/Care Everywhere.    Assessment/Plan:  (I48.91) Rapid atrial fibrillation (H)  (primary encounter diagnosis)  Comment: today RRR, VS WNL  Plan: continue eliquis 2.5mg BID, metoprolol  -follow up cardiology PRN  -if having chest issues consider EKG in-house    (N18.32) Chronic kidney disease, stage 3b (H)  Comment: labs in March 2022 creat 0.72, GFR 75  Plan: dose medications renally as possible  -recheck " BMP in March 2023    (L02.415) Abscess of right leg  Comment: newer onset, no s/s infection  Plan: competed course of keflex x7 days  -change dressing to daily cleanse, adaptic, gauze and kerlix  -wound nurse to follow up status this week    MED REC REQUIRED  Post Medication Reconciliation Status: medication reconcilation previously completed during another office visit        Electronically signed by: NICOLÁS Justice CNP

## 2023-01-17 NOTE — LETTER
"    1/17/2023        RE: Elysia Lane  [facility]  [address]   [place] [address]        M [facility] GERIATRICS    Chief Complaint   Patient presents with     RECHECK     HPI:  Elysia Lane is a 89 year old  (12/2/1923), who is being seen today for an episodic care visit at: [facility] AT [facility] () [[id number]]. Today's concern is:   1. Protein-calorie malnutrition, unspecified severity (H)    2. Dementia without behavioral disturbance, unspecified dementia type    3. Abscess of right leg      Patient seen for follow up, pleasantly confused, moderate cognitive limitations, appears thin and frail, BMI 17.74, limited appetite, now with newer wound on R lateral knee that was bulging abscess and now opens 2x2 flat epithelial layer with granular base, no s/s infection, discharge scant, mild pain with dressing removal and palpation.     Allergies, and PMH/PSH reviewed in [facility] today.  REVIEW OF SYSTEMS:  4 point ROS including Respiratory, CV, GI and , other than that noted in the HPI,  is negative    Objective:   /79   Pulse 73   Temp 98  F (36.7  C)   Resp 16   Ht 1.575 m (5' 2\")   Wt 44 kg (97 lb)   SpO2 97%   BMI 17.74 kg/m    GENERAL APPEARANCE:  in no distress, appears healthy  ENT:  Mouth and posterior oropharynx normal, moist mucous membranes  RESP:  lungs clear to auscultation   CV:  no edema  ABDOMEN:  bowel sounds normal  M/S:   Gait and station abnormal transfer assist  SKIN:  Inspection of skin and subcutaneous tissueas in HPI  NEURO:   Examination of sensation by touch normal  PSYCH:  affect and mood normal    Labs done in SNF are in [facility]. Please refer to them using [facility]/Care Everywhere.    Assessment/Plan:  (E46) Protein-calorie malnutrition, unspecified severity (H)  (primary encounter diagnosis)  Comment: BMI 17.74, limited appetite  Plan: dietary to follow weights  -supplements PRN  -consider mirtazapine trial if continues to lose " weight    (F03.90) Dementia without behavioral disturbance, unspecified dementia type  Comment: moderate cognitive limitations, pleasant  Plan: staff to support as indicated  -WC mobility, transfer assist  -consider hospice with continued age related decline    (L02.415) Abscess of right leg  Comment: newer onset, now flat open area, no s/s infection  Plan: completed course of cephalexin already  -change dressings daily and PRN until healed    MED REC REQUIRED  Post Medication Reconciliation Status: medication reconcilation previously completed during another office visit        Electronically signed by: NICOLÁS Justice CNP           Sincerely,        NICOLÁS Justice CNP

## 2023-01-17 NOTE — PROGRESS NOTES
"[facility] GERIATRICS    Chief Complaint   Patient presents with     RECHECK     HPI:  Elysia Lane is a 89 year old  (12/2/1923), who is being seen today for an episodic care visit at: [facility] AT [facility] () [[id number]]. Today's concern is:   1. Protein-calorie malnutrition, unspecified severity (H)    2. Dementia without behavioral disturbance, unspecified dementia type    3. Abscess of right leg      Patient seen for follow up, pleasantly confused, moderate cognitive limitations, appears thin and frail, BMI 17.74, limited appetite, now with newer wound on R lateral knee that was bulging abscess and now opens 2x2 flat epithelial layer with granular base, no s/s infection, discharge scant, mild pain with dressing removal and palpation.     Allergies, and PMH/PSH reviewed in [facility] today.  REVIEW OF SYSTEMS:  4 point ROS including Respiratory, CV, GI and , other than that noted in the HPI,  is negative    Objective:   /79   Pulse 73   Temp 98  F (36.7  C)   Resp 16   Ht 1.575 m (5' 2\")   Wt 44 kg (97 lb)   SpO2 97%   BMI 17.74 kg/m    GENERAL APPEARANCE:  in no distress, appears healthy  ENT:  Mouth and posterior oropharynx normal, moist mucous membranes  RESP:  lungs clear to auscultation   CV:  no edema  ABDOMEN:  bowel sounds normal  M/S:   Gait and station abnormal transfer assist  SKIN:  Inspection of skin and subcutaneous tissueas in HPI  NEURO:   Examination of sensation by touch normal  PSYCH:  affect and mood normal    Labs done in SNF are in [facility]. Please refer to them using [facility]/Care Everywhere.    Assessment/Plan:  (E46) Protein-calorie malnutrition, unspecified severity (H)  (primary encounter diagnosis)  Comment: BMI 17.74, limited appetite  Plan: dietary to follow weights  -supplements PRN  -consider mirtazapine trial if continues to lose weight    (F03.90) Dementia without behavioral disturbance, unspecified dementia type  Comment: moderate cognitive limitations, " pleasant  Plan: staff to support as indicated  -WC mobility, transfer assist  -consider hospice with continued age related decline    (L02.415) Abscess of right leg  Comment: newer onset, now flat open area, no s/s infection  Plan: completed course of cephalexin already  -change dressings daily and PRN until healed    MED REC REQUIRED  Post Medication Reconciliation Status: medication reconcilation previously completed during another office visit        Electronically signed by: NICOLÁS Justice CNP

## 2023-01-24 NOTE — PROGRESS NOTES
[facility] GERIATRICS    Chief Complaint   Patient presents with     RECHECK     HPI:  Elysia Lane is a 89 year old  (12/2/1923), who is being seen today for an episodic care visit at: [facility] AT [facility] () [[id number]]. Today's concern is:   1. Dementia without behavioral disturbance, unspecified dementia type    2. Abscess of right leg    3. Bacterial pneumonia      Patient seen today for follow up, moderate cognitive limitations, pleasant, R lateral leg evaluation today still mild swollen very sensitive 3x3 open area where abscess was, now flatter with no s/s infection, scant discharge still, has newer non-productive cough, CXR today with mildly extensive RLL pneumonia.    Allergies, and PMH/PSH reviewed in [facility] today.  REVIEW OF SYSTEMS:  Limited secondary to cognitive impairment but today pt reports I'm OK    Objective:   There were no vitals taken for this visit.  GENERAL APPEARANCE:  in no distress, appears healthy, thin  ENT:  Mouth and posterior oropharynx normal, moist mucous membranes  RESP:  crackles fine bronchial  CV:  no edema  ABDOMEN:  bowel sounds normal  M/S:   Gait and station abnormal transfer assist  SKIN:  Inspection of skin and subcutaneous tissue baseline  NEURO:   Examination of sensation by touch normal  PSYCH:  affect and mood normal    Labs done in SNF are in [facility]. Please refer to them using [facility]/Care Everywhere.    Assessment/Plan:  (F03.90) Dementia without behavioral disturbance, unspecified dementia type  (primary encounter diagnosis)  Comment: moderate cognitive limitations, pleasant  Plan: staff to support as indicated    (L02.415) Abscess of right leg  Comment: now mild elevation open wound, no s/s infection  Plan: change dressing to daily; cleanse, skin prep, adaptic, gauze, kerlix wrap until healed  -informed wound nurse to follow up    (J15.9) Bacterial pneumonia  Comment: newer cough, CXR + RLL PNE  Plan:   -start augmentin BID x7 days  -consider mucinex  or oxygen if indicated  -plan to follow up RE: status and plan later this week    MED REC REQUIRED  Post Medication Reconciliation Status: medication reconcilation previously completed during another office visit          Electronically signed by: NICOLÁS Justice CNP

## 2023-01-24 NOTE — LETTER
1/24/2023        RE: Elysia Lane  C/o Kenia Roderick  [address]  [facility] [address]        M [facility] GERIATRICS    Chief Complaint   Patient presents with     RECHECK     HPI:  Elysia Lane is a 89 year old  (12/2/1923), who is being seen today for an episodic care visit at: [facility] AT [facility] () [[id number]]. Today's concern is:   1. Dementia without behavioral disturbance, unspecified dementia type    2. Abscess of right leg    3. Bacterial pneumonia      Patient seen today for follow up, moderate cognitive limitations, pleasant, R lateral leg evaluation today still mild swollen very sensitive 3x3 open area where abscess was, now flatter with no s/s infection, scant discharge still, has newer non-productive cough, CXR today with mildly extensive RLL pneumonia.    Allergies, and PMH/PSH reviewed in EPIC today.  REVIEW OF SYSTEMS:  Limited secondary to cognitive impairment but today pt reports I'm OK    Objective:   There were no vitals taken for this visit.  GENERAL APPEARANCE:  in no distress, appears healthy, thin  ENT:  Mouth and posterior oropharynx normal, moist mucous membranes  RESP:  crackles fine bronchial  CV:  no edema  ABDOMEN:  bowel sounds normal  M/S:   Gait and station abnormal transfer assist  SKIN:  Inspection of skin and subcutaneous tissue baseline  NEURO:   Examination of sensation by touch normal  PSYCH:  affect and mood normal    Labs done in SNF are in [facility]. Please refer to them using [employer]/Care Everywhere.    Assessment/Plan:  (F03.90) Dementia without behavioral disturbance, unspecified dementia type  (primary encounter diagnosis)  Comment: moderate cognitive limitations, pleasant  Plan: staff to support as indicated    (L02.415) Abscess of right leg  Comment: now mild elevation open wound, no s/s infection  Plan: change dressing to daily; cleanse, skin prep, adaptic, gauze, kerlix wrap until healed  -informed wound nurse to follow  up    (J15.9) Bacterial pneumonia  Comment: newer cough, CXR + RLL PNE  Plan:   -start augmentin BID x7 days  -consider mucinex or oxygen if indicated  -plan to follow up RE: status and plan later this week    MED REC REQUIRED  Post Medication Reconciliation Status: medication reconcilation previously completed during another office visit          Electronically signed by: NICOLÁS Justice CNP           Sincerely,        NICOLÁS Justice CNP

## 2023-02-23 NOTE — PROGRESS NOTES
[facility] Six-Month Assessment    6 month assessment completed on 2/23/23 with  Parmjit Mcintyre.    ER visits: No  Hospitalizations: No  TCU stays: No  Significant health status changes: None  Falls/Injuries: No  ADL/IADL changes: No    Reviewed Institutional Assessment and updated as needed.     Will see member in 6 months for an annual health risk assessment.   Encouraged member to call CC with any questions or concerns in the meantime.     ALICE Bell, [facility]  [phone]

## 2023-02-24 NOTE — PROGRESS NOTES
[facility] GERIATRICS  Chief Complaint   Patient presents with     [facility] Medical Record Number: [id number]  Place of Service Where Encounter Took Place: [facility] AT [facility] () [[id number]]    HPI:   Elysia Lane is 89 year old (12/2/1923), who is being seen today for a federally mandated E/M visit. Today's concerns are:    Today:   - Resident seen and examined, chart reviewed and discussed with the nursing staff.     -  Dementia / bipolar:  RN has no behavioral concern.     - A-fib: Denies CP, SOB or palpitation  --------------------------------    MEDICATIONS:     Review of your medicines          Accurate as of February 27, 2023 11:59 PM. If you have any questions, ask your nurse or doctor.            CONTINUE these medicines which have NOT CHANGED      Dose / Directions   acetaminophen 500 MG tablet  Commonly known as: TYLENOL      Dose: 1,000 mg  Take 1,000 mg by mouth 3 times daily  Refills: 0     apixaban ANTICOAGULANT 2.5 MG tablet  Commonly known as: ELIQUIS  Used for: Rapid atrial fibrillation (H)      Dose: 2.5 mg  Take 1 tablet (2.5 mg) by mouth 2 times daily  Quantity: 1 tablet  Refills: 0     diclofenac 1 % topical gel  Commonly known as: VOLTAREN      Dose: 4 g  Apply 4 g topically 4 times daily as needed for moderate pain Apply to right knee  Refills: 0     diltiazem ER 90 MG 12 hr capsule  Commonly known as: CARDIZEM SR  Used for: Closed fracture of distal end of right femur, unspecified fracture morphology, initial encounter (H)      Dose: 90 mg  Take 1 capsule (90 mg) by mouth 2 times daily  Quantity: 30 capsule  Refills: 1     famotidine 20 MG tablet  Commonly known as: PEPCID      Dose: 20 mg  Take 1 tablet (20 mg) by mouth every morning  Refills: 0     guaiFENesin 20 mg/mL liquid  Commonly known as: ROBITUSSIN  Used for: Cough, unspecified type, Infection due to 2019 novel coronavirus      Dose: 200 mg  Take 10 mLs (200 mg) by mouth every 4 hours as  "needed for cough  Quantity: 473 mL  Refills: 0     metoprolol tartrate 100 MG tablet  Commonly known as: LOPRESSOR  Used for: Closed fracture of distal end of right femur, unspecified fracture morphology, initial encounter (H)      Dose: 100 mg  Take 1 tablet (100 mg) by mouth 2 times daily  Quantity: 60 tablet  Refills: 1     ondansetron 4 MG tablet  Commonly known as: ZOFRAN  Used for: Cough, unspecified type, Infection due to 2019 novel coronavirus      Dose: 4 mg  Take 1 tablet (4 mg) by mouth every 6 hours as needed for nausea  Quantity: 30 tablet  Refills: 0     oxyCODONE 5 MG tablet  Commonly known as: ROXICODONE  Used for: Pain      Dose: 2.5 mg  Take 0.5 tablets (2.5 mg) by mouth three times a week On Mon, Wed, Fri 30 minutes before therapy  Quantity: 15 tablet  Refills: 0     polyethylene glycol 17 GM/Dose powder  Commonly known as: MIRALAX  Used for: Closed fracture of distal end of right femur, unspecified fracture morphology, initial encounter (H)      Dose: 17 g  Take 17 g by mouth daily  Quantity: 510 g  Refills: 1     potassium chloride ER 15 MEQ CR tablet  Commonly known as: KLOR-CON M15      Dose: 30 mEq  Take 2 tablets (30 mEq) by mouth daily  Refills: 0     Vitamin D3 25 mcg (1000 units) tablet  Commonly known as: CHOLECALCIFEROL  Used for: Closed fracture of distal end of right femur, unspecified fracture morphology, initial encounter (H)      Dose: 25 mcg  Take 1 tablet (25 mcg) by mouth daily  Quantity: 30 tablet  Refills: 0           ROS: limited due to memory deficit.     Vitals:  /64   Pulse 65   Temp 98.3  F (36.8  C)   Resp 18   Ht 1.575 m (5' 2\")   Wt 43.2 kg (95 lb 3.2 oz)   SpO2 100%   BMI 17.41 kg/m    Body mass index is 17.41 kg/m .  Exam:  GENERAL APPEARANCE:  in no distress, cooperative, thin  RESP:  lungs clear to auscultation   CV:  S1S2 audible, regular HR, no murmur appreciated.   ABDOMEN:  soft, NT/ND, BS audible. no mass appreciated on palpation.   M/S:   no " joint deformity noted on observation.   SKIN:  No rash.   NEURO:   No NFD appreciated on observation. Hand  5/5 b/l  PSYCH: AAOx person, place and day only. affect and mood normal    Lab/Diagnostic data: Reviewed in the chart and EHR.        ASSESSMENT/PLAN  ----------------------------  Severe Dementia, likely Alzheimer disease w.o behaviors (H)  - no behavioral concern.    - SLUM 2/30  - Continue to anticipate needs. Chronic condition, ongoing decline expected.   -  Continue to provide redirection and reassurance as needed. Maintain safe living situation with goals focused on comfort.      Chronic Atrial fibrillation (H):  - on Eliquis,  and Famotidine for GP protection. Continue.   - on metoprolol and diltiazem.  CVR.  continue      Protein Calorie Malnutrition, severe (H)  Sarcopenia  Hypoalbumenemia: 2.7 (11/19/21)  - Body mass index is 17.41 kg/m . from 17.87 kg/m . from 18.78 kg/m .  - BMI less than 22 kg/m2 in frail elderly increases mortality rate.   - dietician eval/ recommendations  - In frail elderly keep BMI b/lw 25-35 Kg(m2).     Chronic anemia  Hx of pos-op anemia  - Hb wnl now. Off iron supplement.       Hx of right distal femur fx s/p ORIF (Sept 2021)  Frailty  - analgesia optimal  - Significant  Deficits requiring NH placement. Requiring extensive assistance from nursing.   - life expectancy is less than six months if terminal illness runs its normal trajectory.       Electronically signed by:  Marbella Franco MD

## 2023-02-27 NOTE — LETTER
2/27/2023        RE: Elysia Lane  C/o Kenia Contreraswilliam  [address]  [facility] [address]        M [facility] GERIATRICS  Chief Complaint   Patient presents with     snf Regulatory     [facility] Medical Record Number: [id number]  Place of Service Where Encounter Took Place: [facility] AT [facility] () [[id number]]    HPI:   Elysia Lane is 89 year old (12/2/1923), who is being seen today for a federally mandated E/M visit. Today's concerns are:    Today:   - Resident seen and examined, chart reviewed and discussed with the nursing staff.     -  Dementia / bipolar:  RN has no behavioral concern.     - A-fib: Denies CP, SOB or palpitation  --------------------------------    MEDICATIONS:     Review of your medicines          Accurate as of February 27, 2023 11:59 PM. If you have any questions, ask your nurse or doctor.            CONTINUE these medicines which have NOT CHANGED      Dose / Directions   acetaminophen 500 MG tablet  Commonly known as: TYLENOL      Dose: 1,000 mg  Take 1,000 mg by mouth 3 times daily  Refills: 0     apixaban ANTICOAGULANT 2.5 MG tablet  Commonly known as: ELIQUIS  Used for: Rapid atrial fibrillation (H)      Dose: 2.5 mg  Take 1 tablet (2.5 mg) by mouth 2 times daily  Quantity: 1 tablet  Refills: 0     diclofenac 1 % topical gel  Commonly known as: VOLTAREN      Dose: 4 g  Apply 4 g topically 4 times daily as needed for moderate pain Apply to right knee  Refills: 0     diltiazem ER 90 MG 12 hr capsule  Commonly known as: CARDIZEM SR  Used for: Closed fracture of distal end of right femur, unspecified fracture morphology, initial encounter (H)      Dose: 90 mg  Take 1 capsule (90 mg) by mouth 2 times daily  Quantity: 30 capsule  Refills: 1     famotidine 20 MG tablet  Commonly known as: PEPCID      Dose: 20 mg  Take 1 tablet (20 mg) by mouth every morning  Refills: 0     guaiFENesin 20 mg/mL liquid  Commonly known as: ROBITUSSIN  Used for: Cough,  "unspecified type, Infection due to 2019 novel coronavirus      Dose: 200 mg  Take 10 mLs (200 mg) by mouth every 4 hours as needed for cough  Quantity: 473 mL  Refills: 0     metoprolol tartrate 100 MG tablet  Commonly known as: LOPRESSOR  Used for: Closed fracture of distal end of right femur, unspecified fracture morphology, initial encounter (H)      Dose: 100 mg  Take 1 tablet (100 mg) by mouth 2 times daily  Quantity: 60 tablet  Refills: 1     ondansetron 4 MG tablet  Commonly known as: ZOFRAN  Used for: Cough, unspecified type, Infection due to 2019 novel coronavirus      Dose: 4 mg  Take 1 tablet (4 mg) by mouth every 6 hours as needed for nausea  Quantity: 30 tablet  Refills: 0     oxyCODONE 5 MG tablet  Commonly known as: ROXICODONE  Used for: Pain      Dose: 2.5 mg  Take 0.5 tablets (2.5 mg) by mouth three times a week On Mon, Wed, Fri 30 minutes before therapy  Quantity: 15 tablet  Refills: 0     polyethylene glycol 17 GM/Dose powder  Commonly known as: MIRALAX  Used for: Closed fracture of distal end of right femur, unspecified fracture morphology, initial encounter (H)      Dose: 17 g  Take 17 g by mouth daily  Quantity: 510 g  Refills: 1     potassium chloride ER 15 MEQ CR tablet  Commonly known as: KLOR-CON M15      Dose: 30 mEq  Take 2 tablets (30 mEq) by mouth daily  Refills: 0     Vitamin D3 25 mcg (1000 units) tablet  Commonly known as: CHOLECALCIFEROL  Used for: Closed fracture of distal end of right femur, unspecified fracture morphology, initial encounter (H)      Dose: 25 mcg  Take 1 tablet (25 mcg) by mouth daily  Quantity: 30 tablet  Refills: 0           ROS: limited due to memory deficit.     Vitals:  /64   Pulse 65   Temp 98.3  F (36.8  C)   Resp 18   Ht 1.575 m (5' 2\")   Wt 43.2 kg (95 lb 3.2 oz)   SpO2 100%   BMI 17.41 kg/m    Body mass index is 17.41 kg/m .  Exam:  GENERAL APPEARANCE:  in no distress, cooperative, thin  RESP:  lungs clear to auscultation   CV:  S1S2 " audible, regular HR, no murmur appreciated.   ABDOMEN:  soft, NT/ND, BS audible. no mass appreciated on palpation.   M/S:   no joint deformity noted on observation.   SKIN:  No rash.   NEURO:   No NFD appreciated on observation. Hand  5/5 b/l  PSYCH: AAOx person, place and day only. affect and mood normal    Lab/Diagnostic data: Reviewed in the chart and EHR.        ASSESSMENT/PLAN  ----------------------------  Severe Dementia, likely Alzheimer disease w.o behaviors (H)  - no behavioral concern.    - SLUM 2/30  - Continue to anticipate needs. Chronic condition, ongoing decline expected.   -  Continue to provide redirection and reassurance as needed. Maintain safe living situation with goals focused on comfort.      Chronic Atrial fibrillation (H):  - on Eliquis,  and Famotidine for GP protection. Continue.   - on metoprolol and diltiazem.  CVR.  continue      Protein Calorie Malnutrition, severe (H)  Sarcopenia  Hypoalbumenemia: 2.7 (11/19/21)  - Body mass index is 17.41 kg/m . from 17.87 kg/m . from 18.78 kg/m .  - BMI less than 22 kg/m2 in frail elderly increases mortality rate.   - dietician eval/ recommendations  - In frail elderly keep BMI b/lw 25-35 Kg(m2).     Chronic anemia  Hx of pos-op anemia  - Hb wnl now. Off iron supplement.       Hx of right distal femur fx s/p ORIF (Sept 2021)  Frailty  - analgesia optimal  - Significant  Deficits requiring NH placement. Requiring extensive assistance from nursing.   - life expectancy is less than six months if terminal illness runs its normal trajectory.       Electronically signed by:  Marbella Franco MD        Sincerely,        Marbella Franco MD

## 2023-03-09 NOTE — ED TRIAGE NOTES
Fall at home, unknown when fall . Neighbor  talked to her yesterday. Pt unsure when she fell. States she did not hit head. Right knee bruised and swollen pain full when moving. Broght in by [facility]   Patient has GERD with Ferrera's esophagus however she is monitored by GI and appears to be stable.

## 2023-04-04 NOTE — PROGRESS NOTES
"[facility] GERIATRICS    Chief Complaint   Patient presents with     RECHECK     HPI:  Elysia Lane is a 89 year old  (12/2/1923), who is being seen today for an episodic care visit at: [facility] AT [facility] () [[id number]]. Today's concern is:   1. Dementia without behavioral disturbance, unspecified dementia type    2. Protein-calorie malnutrition, unspecified severity (H)    3. Hypertension, unspecified type      Patient seen for follow up and pharmacy review, moderate cognitive limitations, pleasant, converses well, no distress, BMI 17.32, limited appetite and intake, sleeping more, skin intact, SBP's in the 110-120 range, non-ambulatory, HR's in the 70-80 range, appears healthy for age.    Allergies, and PMH/PSH reviewed in [facility] today.  REVIEW OF SYSTEMS:  4 point ROS including Respiratory, CV, GI and , other than that noted in the HPI,  is negative    Objective:   /72   Pulse 70   Temp 97.2  F (36.2  C)   Resp 16   Ht 1.575 m (5' 2\")   Wt 43 kg (94 lb 11.2 oz)   SpO2 100%   BMI 17.32 kg/m    GENERAL APPEARANCE:  in no distress, appears healthy, thin  ENT:  Mouth and posterior oropharynx normal, moist mucous membranes  RESP:  lungs clear to auscultation   CV:  no edema, rate-normal  ABDOMEN:  bowel sounds normal  M/S:   Gait and station abnormal transfer assist  SKIN:  Inspection of skin and subcutaneous tissue baseline  NEURO:   Examination of sensation by touch normal  PSYCH:  affect and mood normal    Labs done in SNF are in [facility]. Please refer to them using [facility]/Care Everywhere.    Assessment/Plan:  (F03.90) Dementia without behavioral disturbance, unspecified dementia type  (primary encounter diagnosis)  Comment: moderate cognitive limitations  Plan: staff to support as indicated  -continue oxycodone TID for now, if not having therapy/execise plan to discontinue it    (E46) Protein-calorie malnutrition, unspecified severity (H)  Comment: BMI 17.32, very thin habitus  Plan: " dietary to monitor weights  -staff to encourage intake as possible  -supplements PRN    (I10) Hypertension, unspecified type  Comment: SBP's in the 110-120 range  Plan:   -CBC, BMP on 4/10  -consider reduction in BP meds if HR's remain in the 70-80 range as dilt may increase eliquis concentrations  -will follow up after labs return and obtain new set of VS    MED REC REQUIRED  Post Medication Reconciliation Status: medication reconcilation previously completed during another office visit        Electronically signed by: NICOLÁS Justice CNP

## 2023-04-04 NOTE — LETTER
"    4/4/2023        RE: Elysia Lane  C/o Kenia Roderick  [address]  [facility] [address]        M [facility] GERIATRICS    Chief Complaint   Patient presents with     RECHECK     HPI:  Elysia Lane is a 89 year old  (12/2/1923), who is being seen today for an episodic care visit at: [facility] AT [facility] () [[id number]]. Today's concern is:   1. Dementia without behavioral disturbance, unspecified dementia type    2. Protein-calorie malnutrition, unspecified severity (H)    3. Hypertension, unspecified type      Patient seen for follow up and pharmacy review, moderate cognitive limitations, pleasant, converses well, no distress, BMI 17.32, limited appetite and intake, sleeping more, skin intact, SBP's in the 110-120 range, non-ambulatory, HR's in the 70-80 range, appears healthy for age.    Allergies, and PMH/PSH reviewed in [facility] today.  REVIEW OF SYSTEMS:  4 point ROS including Respiratory, CV, GI and , other than that noted in the HPI,  is negative    Objective:   /72   Pulse 70   Temp 97.2  F (36.2  C)   Resp 16   Ht 1.575 m (5' 2\")   Wt 43 kg (94 lb 11.2 oz)   SpO2 100%   BMI 17.32 kg/m    GENERAL APPEARANCE:  in no distress, appears healthy, thin  ENT:  Mouth and posterior oropharynx normal, moist mucous membranes  RESP:  lungs clear to auscultation   CV:  no edema, rate-normal  ABDOMEN:  bowel sounds normal  M/S:   Gait and station abnormal transfer assist  SKIN:  Inspection of skin and subcutaneous tissue baseline  NEURO:   Examination of sensation by touch normal  PSYCH:  affect and mood normal    Labs done in SNF are in [facility]. Please refer to them using EPIC/Care Everywhere.    Assessment/Plan:  (F03.90) Dementia without behavioral disturbance, unspecified dementia type  (primary encounter diagnosis)  Comment: moderate cognitive limitations  Plan: staff to support as indicated  -continue oxycodone TID for now, if not having therapy/execise plan to " discontinue it    (E46) Protein-calorie malnutrition, unspecified severity (H)  Comment: BMI 17.32, very thin habitus  Plan: dietary to monitor weights  -staff to encourage intake as possible  -supplements PRN    (I10) Hypertension, unspecified type  Comment: SBP's in the 110-120 range  Plan:   -CBC, BMP on 4/10  -consider reduction in BP meds if HR's remain in the 70-80 range as dilt may increase eliquis concentrations  -will follow up after labs return and obtain new set of VS    MED REC REQUIRED  Post Medication Reconciliation Status: medication reconcilation previously completed during another office visit        Electronically signed by: NICOLÁS Justice CNP           Sincerely,        NICOLÁS Justice CNP

## 2023-04-14 PROBLEM — I50.9 CHF (CONGESTIVE HEART FAILURE) (H): Status: ACTIVE | Noted: 2023-01-01

## 2023-04-14 NOTE — PROGRESS NOTES
[facility] GERIATRICS  Chief Complaint   Patient presents with     [facility] Medical Record Number:  [id number]  Place of Service where encounter took place:  [facility] () [[id number]]    HPI:    Elysia Lane  is 89 year old (12/2/1923), who is being seen today for a federally mandated E/M visit. Today's concerns are:     Chronic systolic congestive heart failure (H)  Protein-calorie malnutrition, unspecified severity (H)  Chronic kidney disease, stage 3b (H)     Patient seen for follow up, no overt s/s CHF exacerbation, no edema, lungs clear, SBP's in the 120's, HR's in the 60-70 range, also BMI 17.49, thin habitus and limited appetite, pleasantly confused, labs on 4/10 creat 1.27, GFR 38, no distress.    ALLERGIES:Patient has no known allergies.  PAST MEDICAL HISTORY:   Past Medical History:   Diagnosis Date     Anemia      Chronic kidney disease      Essential hypertension      Osteoarthritis      Stenosis of carotid artery      PAST SURGICAL HISTORY:   has a past surgical history that includes Open reduction internal fixation femur distal (Right, 9/8/2021) and Irrigation and debridement lower extremity, combined (Right, 11/1/2021).  FAMILY HISTORY: family history includes No Known Problems in her father and mother.  SOCIAL HISTORY:  reports that she has never smoked. She has never used smokeless tobacco. She reports that she does not currently use alcohol. She reports that she does not use drugs.    MEDICATIONS:  MED REC REQUIRED  Post Medication Reconciliation Status: medication reconcilation previously completed during another office visit         Review of your medicines          Accurate as of April 14, 2023  4:29 PM. If you have any questions, ask your nurse or doctor.            START taking      Dose / Directions   diltiazem 60 MG tablet  Commonly known as: CARDIZEM  Used for: Chronic systolic congestive heart failure (H)  Replaces: diltiazem ER 90 MG 12 hr  capsule  Started by: NICOLÁS Justice CNP      Dose: 60 mg  Take 1 tablet (60 mg) by mouth 2 times daily  Quantity: 60 tablet  Refills: 11        CONTINUE these medicines which have NOT CHANGED      Dose / Directions   acetaminophen 500 MG tablet  Commonly known as: TYLENOL      Dose: 1,000 mg  Take 1,000 mg by mouth 3 times daily  Refills: 0     apixaban ANTICOAGULANT 2.5 MG tablet  Commonly known as: ELIQUIS  Used for: Rapid atrial fibrillation (H)      Dose: 2.5 mg  Take 1 tablet (2.5 mg) by mouth 2 times daily  Quantity: 1 tablet  Refills: 0     diclofenac 1 % topical gel  Commonly known as: VOLTAREN      Dose: 4 g  Apply 4 g topically 4 times daily as needed for moderate pain Apply to right knee  Refills: 0     famotidine 20 MG tablet  Commonly known as: PEPCID      Dose: 20 mg  Take 1 tablet (20 mg) by mouth every morning  Refills: 0     guaiFENesin 20 mg/mL liquid  Commonly known as: ROBITUSSIN  Used for: Cough, unspecified type, Infection due to 2019 novel coronavirus      Dose: 200 mg  Take 10 mLs (200 mg) by mouth every 4 hours as needed for cough  Quantity: 473 mL  Refills: 0     metoprolol tartrate 100 MG tablet  Commonly known as: LOPRESSOR  Used for: Closed fracture of distal end of right femur, unspecified fracture morphology, initial encounter (H)      Dose: 100 mg  Take 1 tablet (100 mg) by mouth 2 times daily  Quantity: 60 tablet  Refills: 1     ondansetron 4 MG tablet  Commonly known as: ZOFRAN  Used for: Cough, unspecified type, Infection due to 2019 novel coronavirus      Dose: 4 mg  Take 1 tablet (4 mg) by mouth every 6 hours as needed for nausea  Quantity: 30 tablet  Refills: 0     polyethylene glycol 17 GM/Dose powder  Commonly known as: MIRALAX  Used for: Closed fracture of distal end of right femur, unspecified fracture morphology, initial encounter (H)      Dose: 17 g  Take 17 g by mouth daily  Quantity: 510 g  Refills: 1     potassium chloride ER 15 MEQ CR tablet  Commonly  "known as: KLOR-CON M15      Dose: 30 mEq  Take 2 tablets (30 mEq) by mouth daily  Refills: 0     Vitamin D3 25 mcg (1000 units) tablet  Commonly known as: CHOLECALCIFEROL  Used for: Closed fracture of distal end of right femur, unspecified fracture morphology, initial encounter (H)      Dose: 25 mcg  Take 1 tablet (25 mcg) by mouth daily  Quantity: 30 tablet  Refills: 0        STOP taking    diltiazem ER 90 MG 12 hr capsule  Commonly known as: CARDIZEM SR  Replaced by: diltiazem 60 MG tablet  Stopped by: NICOLÁS Justice CNP              Case Management:  I have reviewed the care plan and MDS and do agree with the plan. Patient's desire to return to the community is present, but is not able due to care needs . Information reviewed:  Medications, vital signs, orders, and nursing notes.    ROS:  4 point ROS including Respiratory, CV, GI and , other than that noted in the HPI,  is negative    Vitals:  /75   Pulse 89   Temp 96.8  F (36  C)   Resp 16   Ht 1.575 m (5' 2\")   Wt 43.4 kg (95 lb 9.6 oz)   SpO2 100%   BMI 17.49 kg/m    Body mass index is 17.49 kg/m .  Exam:  GENERAL APPEARANCE:  in no distress, appears healthy, thin  ENT:  Mouth and posterior oropharynx normal, moist mucous membranes  RESP:  lungs clear to auscultation   CV:  no edema, rate-normal  ABDOMEN:  bowel sounds normal  M/S:   Gait and station abnormal WC mobility  SKIN:  Inspection of skin and subcutaneous tissue baseline  NEURO:   Examination of sensation by touch normal  PSYCH:  affect and mood normal    Lab/Diagnostic data:   Labs done in SNF are in [place] EPIC. Please refer to them using EPIC/Care Everywhere.    ASSESSMENT/PLAN  (I50.22) Chronic systolic congestive heart failure (H)  (primary encounter diagnosis)  Comment: no overt sliding scale exacerbation  Plan:   -decrease diltiazem from 90 to 60mg BID  -staff to check VS as scheduled    (E46) Protein-calorie malnutrition, unspecified severity (H)  Comment: BMI " 17.49  Plan: dietary to monitor  -supplements PRN  -staff to encourage intake as possible    (N18.32) Chronic kidney disease, stage 3b (H)  Comment: labs 4/10 creat 1.27, GFR 38  Plan: dose medications renally as possible  -periodic BMP's  -discontinue oxycodone, no longer indicated        Electronically signed by:  NICOLÁS Justice CNP

## 2023-04-14 NOTE — LETTER
4/14/2023        RE: Elysia Lane  C/o Kenia Contrerasashleyalejandra  [address]  [facility] [address]        M [facility] GERIATRICS  Chief Complaint   Patient presents with     [facility] Medical Record Number:  [id number]  Place of Service where encounter took place:  [facility] () [[id number]]    HPI:    Elysia Lane  is 89 year old (12/2/1923), who is being seen today for a federally mandated E/M visit. Today's concerns are:     Chronic systolic congestive heart failure (H)  Protein-calorie malnutrition, unspecified severity (H)  Chronic kidney disease, stage 3b (H)     Patient seen for follow up, no overt s/s CHF exacerbation, no edema, lungs clear, SBP's in the 120's, HR's in the 60-70 range, also BMI 17.49, thin habitus and limited appetite, pleasantly confused, labs on 4/10 creat 1.27, GFR 38, no distress.    ALLERGIES:Patient has no known allergies.  PAST MEDICAL HISTORY:   Past Medical History:   Diagnosis Date     Anemia      Chronic kidney disease      Essential hypertension      Osteoarthritis      Stenosis of carotid artery      PAST SURGICAL HISTORY:   has a past surgical history that includes Open reduction internal fixation femur distal (Right, 9/8/2021) and Irrigation and debridement lower extremity, combined (Right, 11/1/2021).  FAMILY HISTORY: family history includes No Known Problems in her father and mother.  SOCIAL HISTORY:  reports that she has never smoked. She has never used smokeless tobacco. She reports that she does not currently use alcohol. She reports that she does not use drugs.    MEDICATIONS:  MED REC REQUIRED  Post Medication Reconciliation Status: medication reconcilation previously completed during another office visit         Review of your medicines          Accurate as of April 14, 2023  4:29 PM. If you have any questions, ask your nurse or doctor.            START taking      Dose / Directions   diltiazem 60 MG  tablet  Commonly known as: CARDIZEM  Used for: Chronic systolic congestive heart failure (H)  Replaces: diltiazem ER 90 MG 12 hr capsule  Started by: NICOLÁS Justice CNP      Dose: 60 mg  Take 1 tablet (60 mg) by mouth 2 times daily  Quantity: 60 tablet  Refills: 11        CONTINUE these medicines which have NOT CHANGED      Dose / Directions   acetaminophen 500 MG tablet  Commonly known as: TYLENOL      Dose: 1,000 mg  Take 1,000 mg by mouth 3 times daily  Refills: 0     apixaban ANTICOAGULANT 2.5 MG tablet  Commonly known as: ELIQUIS  Used for: Rapid atrial fibrillation (H)      Dose: 2.5 mg  Take 1 tablet (2.5 mg) by mouth 2 times daily  Quantity: 1 tablet  Refills: 0     diclofenac 1 % topical gel  Commonly known as: VOLTAREN      Dose: 4 g  Apply 4 g topically 4 times daily as needed for moderate pain Apply to right knee  Refills: 0     famotidine 20 MG tablet  Commonly known as: PEPCID      Dose: 20 mg  Take 1 tablet (20 mg) by mouth every morning  Refills: 0     guaiFENesin 20 mg/mL liquid  Commonly known as: ROBITUSSIN  Used for: Cough, unspecified type, Infection due to 2019 novel coronavirus      Dose: 200 mg  Take 10 mLs (200 mg) by mouth every 4 hours as needed for cough  Quantity: 473 mL  Refills: 0     metoprolol tartrate 100 MG tablet  Commonly known as: LOPRESSOR  Used for: Closed fracture of distal end of right femur, unspecified fracture morphology, initial encounter (H)      Dose: 100 mg  Take 1 tablet (100 mg) by mouth 2 times daily  Quantity: 60 tablet  Refills: 1     ondansetron 4 MG tablet  Commonly known as: ZOFRAN  Used for: Cough, unspecified type, Infection due to 2019 novel coronavirus      Dose: 4 mg  Take 1 tablet (4 mg) by mouth every 6 hours as needed for nausea  Quantity: 30 tablet  Refills: 0     polyethylene glycol 17 GM/Dose powder  Commonly known as: MIRALAX  Used for: Closed fracture of distal end of right femur, unspecified fracture morphology, initial encounter  "(H)      Dose: 17 g  Take 17 g by mouth daily  Quantity: 510 g  Refills: 1     potassium chloride ER 15 MEQ CR tablet  Commonly known as: KLOR-CON M15      Dose: 30 mEq  Take 2 tablets (30 mEq) by mouth daily  Refills: 0     Vitamin D3 25 mcg (1000 units) tablet  Commonly known as: CHOLECALCIFEROL  Used for: Closed fracture of distal end of right femur, unspecified fracture morphology, initial encounter (H)      Dose: 25 mcg  Take 1 tablet (25 mcg) by mouth daily  Quantity: 30 tablet  Refills: 0        STOP taking    diltiazem ER 90 MG 12 hr capsule  Commonly known as: CARDIZEM SR  Replaced by: diltiazem 60 MG tablet  Stopped by: NICOLÁS Justice CNP              Case Management:  I have reviewed the care plan and MDS and do agree with the plan. Patient's desire to return to the community is present, but is not able due to care needs . Information reviewed:  Medications, vital signs, orders, and nursing notes.    ROS:  4 point ROS including Respiratory, CV, GI and , other than that noted in the HPI,  is negative    Vitals:  /75   Pulse 89   Temp 96.8  F (36  C)   Resp 16   Ht 1.575 m (5' 2\")   Wt 43.4 kg (95 lb 9.6 oz)   SpO2 100%   BMI 17.49 kg/m    Body mass index is 17.49 kg/m .  Exam:  GENERAL APPEARANCE:  in no distress, appears healthy, thin  ENT:  Mouth and posterior oropharynx normal, moist mucous membranes  RESP:  lungs clear to auscultation   CV:  no edema, rate-normal  ABDOMEN:  bowel sounds normal  M/S:   Gait and station abnormal WC mobility  SKIN:  Inspection of skin and subcutaneous tissue baseline  NEURO:   Examination of sensation by touch normal  PSYCH:  affect and mood normal    Lab/Diagnostic data:   Labs done in SNF are in [facility]. Please refer to them using [employer]/Care Everywhere.    ASSESSMENT/PLAN  (I50.22) Chronic systolic congestive heart failure (H)  (primary encounter diagnosis)  Comment: no overt sliding scale exacerbation  Plan:   -decrease diltiazem " from 90 to 60mg BID  -staff to check VS as scheduled    (E46) Protein-calorie malnutrition, unspecified severity (H)  Comment: BMI 17.49  Plan: dietary to monitor  -supplements PRN  -staff to encourage intake as possible    (N18.32) Chronic kidney disease, stage 3b (H)  Comment: labs 4/10 creat 1.27, GFR 38  Plan: dose medications renally as possible  -periodic BMP's  -discontinue oxycodone, no longer indicated        Electronically signed by:  NICOLÁS Justice CNP            Sincerely,        NICOLÁS Justice CNP

## 2023-05-01 NOTE — TELEPHONE ENCOUNTER
[facility] Geriatrics Triage Nurse Telephone Encounter    Provider: NICOLÁS Cruz CNP   Facility: [facility] Facility Type:  LTC    Caller: Manuel   Call Back Number: [phone]    Allergies:  No Known Allergies     Reason for call: Nursing is calling to report that the pt is having Rt wrist and forearm swelling and bruising, pt is in pain and is guarding the area. The pt is also having Rt knee pain, redness, warmth and swelling. Pt will not stand on on the leg, Per nursing there has been no falls or recent trauma noted.   Currently on tylenol 1000mg tid   Verbal Order/Direction given by Provider: STAT Xray of the Rt wrist, Rt knee and hip.     Provider giving Order:  NICOLÁS Cruz CNP     Verbal Order given to: Manuel Abbott RN

## 2023-05-02 NOTE — LETTER
"    5/2/2023        RE: Elysia Lane  C/o Kenia Roderick  [address]  [facility] [address]        M [facility] GERIATRICS    Chief Complaint   Patient presents with     RECHECK     HPI:  Elysia Lane is a 89 year old  (12/2/1923), who is being seen today for an episodic care visit at: [facility] AT [facility] () [[id number]]. Today's concern is:   1. Chronic systolic congestive heart failure (H)    2. Protein-calorie malnutrition, unspecified severity (H)    3. Dementia without behavioral disturbance, unspecified dementia type      Patient seen for follow up today, moderate cognitive limitations, per staff no apparent fall but has wounds and edema on upper arms reported previously, XR's of R hip, R knee and R wrist ordered but results not available, exam today with staff nurse has 6x3 skin tear L elbow bleeding, R forearm from elbow to hand moderate edema and bruising as whole arm is purple with a few blisters from fluid buildup, was using R arm and waving to me flapping wrist, smiles, no apparent distress, seen later in am and VS 90/53, 69, 95%, 97.3, non-verbal, open mouth breathing, RR 12, eyes open, considering normal age related decline.     Allergies, and PMH/PSH reviewed in EPIC today.  REVIEW OF SYSTEMS:  Unobtainable secondary to cognitive impairment.     Objective:   BP 90/53   Pulse 69   Temp 97.3  F (36.3  C)   Resp 17   Ht 1.575 m (5' 2\")   Wt 43.4 kg (95 lb 9.6 oz)   SpO2 95%   BMI 17.49 kg/m    GENERAL APPEARANCE:  in no distress, thin, appears ill  ENT:  Mouth and posterior oropharynx normal, moist mucous membranes  RESP:  lungs clear to auscultation   CV:  no edema  ABDOMEN:  bowel sounds normal  M/S:   Gait and station abnormal full ADL assist  SKIN:  Inspection of skin and subcutaneous tissueas in HPI  NEURO:   Examination of sensation by touch normal  PSYCH:  unknown    Labs done in SNF are in [place] EPIC. Please refer to them using EPIC/Care " Everywhere.    Assessment/Plan:  (I50.22) Chronic systolic congestive heart failure (H)  (primary encounter diagnosis)  (E46) Protein-calorie malnutrition, unspecified severity (H)  (F03.90) Dementia without behavioral disturbance, unspecified dementia type  Comment: no distress nor pain, upper extremity skin tear and edema, BMI 17.49, nonsensical, smiling  Plan:   -start keflex 250mg TID x7 days  -wound care L elbow  -wound care/edema R arm with wraps  -discontinue eliquis  -pending XR's as stated above  -change APAP to TID scheduled  -plan to follow up later this week    ADDEND: patient  later in day, OK to release body      MED REC REQUIRED  Post Medication Reconciliation Status: medication reconcilation previously completed during another office visit          Electronically signed by: NICOLÁS Justice CNP           Sincerely,        NICOLÁS Justice CNP

## 2023-05-02 NOTE — PROGRESS NOTES
"[facility] GERIATRICS    Chief Complaint   Patient presents with     RECHECK     HPI:  Elysia Lane is a 89 year old  (12/2/1923), who is being seen today for an episodic care visit at: [facility] () [[id number]]. Today's concern is:   1. Chronic systolic congestive heart failure (H)    2. Protein-calorie malnutrition, unspecified severity (H)    3. Dementia without behavioral disturbance, unspecified dementia type      Patient seen for follow up today, moderate cognitive limitations, per staff no apparent fall but has wounds and edema on upper arms reported previously, XR's of R hip, R knee and R wrist ordered but results not available, exam today with staff nurse has 6x3 skin tear L elbow bleeding, R forearm from elbow to hand moderate edema and bruising as whole arm is purple with a few blisters from fluid buildup, was using R arm and waving to me flapping wrist, smiles, no apparent distress, seen later in am and VS 90/53, 69, 95%, 97.3, non-verbal, open mouth breathing, RR 12, eyes open, considering normal age related decline.     Allergies, and PMH/PSH reviewed in EPIC today.  REVIEW OF SYSTEMS:  Unobtainable secondary to cognitive impairment.     Objective:   BP 90/53   Pulse 69   Temp 97.3  F (36.3  C)   Resp 17   Ht 1.575 m (5' 2\")   Wt 43.4 kg (95 lb 9.6 oz)   SpO2 95%   BMI 17.49 kg/m    GENERAL APPEARANCE:  in no distress, thin, appears ill  ENT:  Mouth and posterior oropharynx normal, moist mucous membranes  RESP:  lungs clear to auscultation   CV:  no edema  ABDOMEN:  bowel sounds normal  M/S:   Gait and station abnormal full ADL assist  SKIN:  Inspection of skin and subcutaneous tissueas in HPI  NEURO:   Examination of sensation by touch normal  PSYCH:  unknown    Labs done in SNF are in [facility]. Please refer to them using "[employer]/Care Everywhere.    Assessment/Plan:  (I50.22) Chronic systolic congestive heart failure (H)  (primary encounter diagnosis)  (E46) " Protein-calorie malnutrition, unspecified severity (H)  (F03.90) Dementia without behavioral disturbance, unspecified dementia type  Comment: no distress nor pain, upper extremity skin tear and edema, BMI 17.49, nonsensical, smiling  Plan:   -start keflex 250mg TID x7 days  -wound care L elbow  -wound care/edema R arm with wraps  -discontinue eliquis  -pending XR's as stated above  -change APAP to TID scheduled  -plan to follow up later this week    ADDEND: patient  later in day, OK to release body      MED REC REQUIRED  Post Medication Reconciliation Status: medication reconcilation previously completed during another office visit          Electronically signed by: NICOLÁS Justice CNP

## 2023-05-04 ENCOUNTER — PATIENT OUTREACH (OUTPATIENT)
Dept: GERIATRIC MEDICINE | Facility: CLINIC | Age: 88
End: 2023-05-04
Payer: COMMERCIAL

## 2023-05-04 NOTE — PROGRESS NOTES
[facility] Care Coordination Contact    Notified that member passed away on 5/2/23.    PCP notified.     For [facility]:  Death Notification form completed and faxed to [facility].    Chart reviewed and this CC's encounter closed. Chart handed off to CMS to process disenrollment tasks.    ALICE Bell, [facility]  [phone]
